# Patient Record
Sex: FEMALE | Race: WHITE
[De-identification: names, ages, dates, MRNs, and addresses within clinical notes are randomized per-mention and may not be internally consistent; named-entity substitution may affect disease eponyms.]

---

## 2018-01-25 ENCOUNTER — APPOINTMENT (OUTPATIENT)
Dept: MAMMOGRAPHY | Facility: CLINIC | Age: 78
End: 2018-01-25
Payer: MEDICARE

## 2018-01-25 ENCOUNTER — OUTPATIENT (OUTPATIENT)
Dept: OUTPATIENT SERVICES | Facility: HOSPITAL | Age: 78
LOS: 1 days | End: 2018-01-25
Payer: MEDICARE

## 2018-01-25 DIAGNOSIS — Z00.8 ENCOUNTER FOR OTHER GENERAL EXAMINATION: ICD-10-CM

## 2018-01-25 PROCEDURE — 77067 SCR MAMMO BI INCL CAD: CPT | Mod: 26

## 2018-01-25 PROCEDURE — 77063 BREAST TOMOSYNTHESIS BI: CPT | Mod: 26

## 2018-01-25 PROCEDURE — 77063 BREAST TOMOSYNTHESIS BI: CPT

## 2018-01-25 PROCEDURE — 77067 SCR MAMMO BI INCL CAD: CPT

## 2018-10-30 ENCOUNTER — APPOINTMENT (OUTPATIENT)
Dept: OBGYN | Facility: CLINIC | Age: 78
End: 2018-10-30
Payer: MEDICARE

## 2018-10-30 VITALS
BODY MASS INDEX: 21.79 KG/M2 | DIASTOLIC BLOOD PRESSURE: 60 MMHG | WEIGHT: 123 LBS | HEIGHT: 63 IN | SYSTOLIC BLOOD PRESSURE: 100 MMHG

## 2018-10-30 DIAGNOSIS — Z87.891 PERSONAL HISTORY OF NICOTINE DEPENDENCE: ICD-10-CM

## 2018-10-30 DIAGNOSIS — Z78.9 OTHER SPECIFIED HEALTH STATUS: ICD-10-CM

## 2018-10-30 PROCEDURE — 99214 OFFICE O/P EST MOD 30 MIN: CPT

## 2018-11-01 ENCOUNTER — MEDICATION RENEWAL (OUTPATIENT)
Age: 78
End: 2018-11-01

## 2018-11-01 LAB
25(OH)D3 SERPL-MCNC: 18 NG/ML
CALCIUM SERPL-MCNC: 9.4 MG/DL

## 2018-11-29 ENCOUNTER — FORM ENCOUNTER (OUTPATIENT)
Age: 78
End: 2018-11-29

## 2018-11-30 ENCOUNTER — OUTPATIENT (OUTPATIENT)
Dept: OUTPATIENT SERVICES | Facility: HOSPITAL | Age: 78
LOS: 1 days | End: 2018-11-30
Payer: MEDICARE

## 2018-11-30 ENCOUNTER — APPOINTMENT (OUTPATIENT)
Dept: RADIOLOGY | Facility: CLINIC | Age: 78
End: 2018-11-30
Payer: MEDICARE

## 2018-11-30 DIAGNOSIS — Z00.8 ENCOUNTER FOR OTHER GENERAL EXAMINATION: ICD-10-CM

## 2018-11-30 PROCEDURE — 77080 DXA BONE DENSITY AXIAL: CPT | Mod: 26

## 2018-11-30 PROCEDURE — 77080 DXA BONE DENSITY AXIAL: CPT

## 2019-01-11 ENCOUNTER — APPOINTMENT (OUTPATIENT)
Dept: OBGYN | Facility: CLINIC | Age: 79
End: 2019-01-11
Payer: MEDICARE

## 2019-01-11 VITALS — DIASTOLIC BLOOD PRESSURE: 60 MMHG | SYSTOLIC BLOOD PRESSURE: 110 MMHG

## 2019-01-11 DIAGNOSIS — E55.9 VITAMIN D DEFICIENCY, UNSPECIFIED: ICD-10-CM

## 2019-01-11 PROCEDURE — 96372 THER/PROPH/DIAG INJ SC/IM: CPT

## 2019-01-11 PROCEDURE — 36415 COLL VENOUS BLD VENIPUNCTURE: CPT

## 2019-01-11 PROCEDURE — 99213 OFFICE O/P EST LOW 20 MIN: CPT | Mod: 25

## 2019-01-11 RX ORDER — 1.1% SODIUM FLUORIDE PRESCRIPTION DENTAL CREAM 5 MG/G
1.1 CREAM DENTAL
Qty: 51 | Refills: 0 | Status: ACTIVE | COMMUNITY
Start: 2018-12-04

## 2019-01-11 RX ORDER — DENOSUMAB 60 MG/ML
60 INJECTION SUBCUTANEOUS
Refills: 0 | Status: ACTIVE | COMMUNITY
Start: 2019-01-11

## 2019-01-11 RX ORDER — DENOSUMAB 60 MG/ML
60 INJECTION SUBCUTANEOUS
Qty: 1 | Refills: 0 | Status: COMPLETED | OUTPATIENT
Start: 2019-01-11

## 2019-01-11 RX ADMIN — DENOSUMAB 0 MG/ML: 60 INJECTION SUBCUTANEOUS at 00:00

## 2019-01-11 NOTE — CHIEF COMPLAINT
[Follow Up] : follow up GYN visit [FreeTextEntry1] : pt here for first prolia injection. She has been taking 50k vit d since November. Has one pill left.

## 2019-01-23 ENCOUNTER — RX RENEWAL (OUTPATIENT)
Age: 79
End: 2019-01-23

## 2019-01-24 RX ORDER — ERGOCALCIFEROL 1.25 MG/1
1.25 MG CAPSULE, LIQUID FILLED ORAL
Qty: 12 | Refills: 0 | Status: ACTIVE | COMMUNITY
Start: 2018-11-01 | End: 1900-01-01

## 2019-04-08 ENCOUNTER — FORM ENCOUNTER (OUTPATIENT)
Age: 79
End: 2019-04-08

## 2019-04-09 ENCOUNTER — APPOINTMENT (OUTPATIENT)
Dept: MAMMOGRAPHY | Facility: CLINIC | Age: 79
End: 2019-04-09
Payer: MEDICARE

## 2019-04-09 ENCOUNTER — OUTPATIENT (OUTPATIENT)
Dept: OUTPATIENT SERVICES | Facility: HOSPITAL | Age: 79
LOS: 1 days | End: 2019-04-09
Payer: MEDICARE

## 2019-04-09 DIAGNOSIS — Z00.8 ENCOUNTER FOR OTHER GENERAL EXAMINATION: ICD-10-CM

## 2019-04-09 PROCEDURE — 77067 SCR MAMMO BI INCL CAD: CPT | Mod: 26

## 2019-04-09 PROCEDURE — 77067 SCR MAMMO BI INCL CAD: CPT

## 2019-04-09 PROCEDURE — 77063 BREAST TOMOSYNTHESIS BI: CPT

## 2019-04-09 PROCEDURE — 77063 BREAST TOMOSYNTHESIS BI: CPT | Mod: 26

## 2019-05-16 ENCOUNTER — RX RENEWAL (OUTPATIENT)
Age: 79
End: 2019-05-16

## 2019-07-11 ENCOUNTER — APPOINTMENT (OUTPATIENT)
Dept: OBGYN | Facility: CLINIC | Age: 79
End: 2019-07-11

## 2019-07-11 ENCOUNTER — APPOINTMENT (OUTPATIENT)
Dept: OBGYN | Facility: CLINIC | Age: 79
End: 2019-07-11
Payer: MEDICARE

## 2019-07-11 VITALS — SYSTOLIC BLOOD PRESSURE: 130 MMHG | DIASTOLIC BLOOD PRESSURE: 60 MMHG

## 2019-07-11 PROCEDURE — 96372 THER/PROPH/DIAG INJ SC/IM: CPT

## 2019-07-11 PROCEDURE — 99212 OFFICE O/P EST SF 10 MIN: CPT | Mod: 25

## 2019-07-11 RX ORDER — DENOSUMAB 60 MG/ML
60 INJECTION SUBCUTANEOUS
Qty: 1 | Refills: 0 | Status: COMPLETED | OUTPATIENT
Start: 2019-07-11

## 2019-07-11 RX ADMIN — DENOSUMAB 0 MG/ML: 60 INJECTION SUBCUTANEOUS at 00:00

## 2019-07-11 NOTE — CHIEF COMPLAINT
[Follow Up] : follow up GYN visit [FreeTextEntry1] : pt here for prolia shot. Doing well.\par No exercise.

## 2019-11-01 ENCOUNTER — APPOINTMENT (OUTPATIENT)
Dept: OBGYN | Facility: CLINIC | Age: 79
End: 2019-11-01
Payer: MEDICARE

## 2019-11-01 VITALS
DIASTOLIC BLOOD PRESSURE: 60 MMHG | BODY MASS INDEX: 23.05 KG/M2 | SYSTOLIC BLOOD PRESSURE: 100 MMHG | HEIGHT: 63 IN | WEIGHT: 130.07 LBS

## 2019-11-01 DIAGNOSIS — M81.0 AGE-RELATED OSTEOPOROSIS W/OUT CURRENT PATHOLOGICAL FRACTURE: ICD-10-CM

## 2019-11-01 DIAGNOSIS — Z01.419 ENCOUNTER FOR GYNECOLOGICAL EXAMINATION (GENERAL) (ROUTINE) W/OUT ABNORMAL FINDINGS: ICD-10-CM

## 2019-11-01 PROCEDURE — 99214 OFFICE O/P EST MOD 30 MIN: CPT

## 2019-11-01 RX ORDER — GATIFLOXACIN 5 MG/ML
0.5 SOLUTION/ DROPS OPHTHALMIC
Qty: 2 | Refills: 0 | Status: COMPLETED | COMMUNITY
Start: 2019-07-05

## 2019-11-01 RX ORDER — KETOROLAC TROMETHAMINE 5 MG/ML
0.5 SOLUTION OPHTHALMIC
Qty: 5 | Refills: 0 | Status: COMPLETED | COMMUNITY
Start: 2019-09-24

## 2019-11-01 RX ORDER — PREDNISOLONE ACETATE 10 MG/ML
1 SUSPENSION/ DROPS OPHTHALMIC
Qty: 5 | Refills: 0 | Status: COMPLETED | COMMUNITY
Start: 2019-07-05

## 2019-11-01 NOTE — HISTORY OF PRESENT ILLNESS
[1 Year Ago] : 1 year ago [Good] : being in good health [Healthy Diet] : a healthy diet [Last Mammogram ___] : Last Mammogram was [unfilled] [Last Bone Density ___] : Last bone density studies [unfilled] [Postmenopausal] : is postmenopausal

## 2019-11-01 NOTE — PHYSICAL EXAM
[Awake] : awake [Alert] : alert [Acute Distress] : no acute distress [LAD] : no lymphadenopathy [Thyroid Nodule] : no thyroid nodule [Goiter] : no goiter [Mass] : no breast mass [Nipple Discharge] : no nipple discharge [Axillary LAD] : no axillary lymphadenopathy [Soft] : soft [Tender] : non tender [Distended] : not distended [H/Smegaly] : no hepatosplenomegaly [Oriented x3] : oriented to person, place, and time [Depressed Mood] : depressed mood [Flat Affect] : affect not flat [Vulvar Atrophy] : vulvar atrophy [Normal] : uterus [Atrophy] : atrophy [No Bleeding] : there was no active vaginal bleeding [Pap Obtained] : a Pap smear was not performed [Normal Position] : in a normal position [Tenderness] : nontender [Enlarged ___ wks] : not enlarged [Mass ___ cm] : no uterine mass was palpated [Uterine Adnexae] : were not tender and not enlarged [Adnexa Tenderness] : were not tender [Ovarian Mass (___ Cm)] : there were no adnexal masses [No Tenderness] : no rectal tenderness [Occult Blood] : occult blood test from digital rectal exam was negative [RRR, No Murmurs] : RRR, no murmurs [CTAB] : CTAB

## 2019-11-01 NOTE — CHIEF COMPLAINT
[Annual Visit] : annual visit [FreeTextEntry1] : On prolia for osteoporosis, tolerating well, last dose 7/19. No vb, taking vit d. no gyn complaints

## 2020-01-03 ENCOUNTER — APPOINTMENT (OUTPATIENT)
Dept: OBGYN | Facility: CLINIC | Age: 80
End: 2020-01-03

## 2022-04-16 ENCOUNTER — EMERGENCY (EMERGENCY)
Facility: HOSPITAL | Age: 82
LOS: 0 days | Discharge: HOME | End: 2022-04-16
Attending: EMERGENCY MEDICINE
Payer: MEDICARE

## 2022-04-16 VITALS
RESPIRATION RATE: 17 BRPM | TEMPERATURE: 98 F | WEIGHT: 134.92 LBS | HEART RATE: 98 BPM | SYSTOLIC BLOOD PRESSURE: 121 MMHG | DIASTOLIC BLOOD PRESSURE: 60 MMHG | OXYGEN SATURATION: 99 % | HEIGHT: 63 IN

## 2022-04-16 VITALS
TEMPERATURE: 98 F | HEART RATE: 84 BPM | DIASTOLIC BLOOD PRESSURE: 70 MMHG | SYSTOLIC BLOOD PRESSURE: 143 MMHG | RESPIRATION RATE: 16 BRPM | OXYGEN SATURATION: 96 %
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
## 2022-04-16 DIAGNOSIS — J44.9 CHRONIC OBSTRUCTIVE PULMONARY DISEASE, UNSPECIFIED: ICD-10-CM
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
## 2022-04-16 DIAGNOSIS — M79.651 PAIN IN RIGHT THIGH: ICD-10-CM

## 2022-04-16 DIAGNOSIS — N39.0 URINARY TRACT INFECTION, SITE NOT SPECIFIED: ICD-10-CM

## 2022-04-16 DIAGNOSIS — S32.020A WEDGE COMPRESSION FRACTURE OF SECOND LUMBAR VERTEBRA, INITIAL ENCOUNTER FOR CLOSED FRACTURE: ICD-10-CM

## 2022-04-16 DIAGNOSIS — E78.5 HYPERLIPIDEMIA, UNSPECIFIED: ICD-10-CM

## 2022-04-16 DIAGNOSIS — Z20.822 CONTACT WITH AND (SUSPECTED) EXPOSURE TO COVID-19: ICD-10-CM

## 2022-04-16 DIAGNOSIS — R10.9 UNSPECIFIED ABDOMINAL PAIN: ICD-10-CM

## 2022-04-16 LAB
ALBUMIN SERPL ELPH-MCNC: 3.6 G/DL — SIGNIFICANT CHANGE UP (ref 3.3–5)
ALP SERPL-CCNC: 62 U/L — SIGNIFICANT CHANGE UP (ref 40–120)
ALT FLD-CCNC: 22 U/L — SIGNIFICANT CHANGE UP (ref 12–78)
ANION GAP SERPL CALC-SCNC: 7 MMOL/L — SIGNIFICANT CHANGE UP (ref 5–17)
APPEARANCE UR: ABNORMAL
AST SERPL-CCNC: 22 U/L — SIGNIFICANT CHANGE UP (ref 15–37)
BASOPHILS # BLD AUTO: 0.02 K/UL — SIGNIFICANT CHANGE UP (ref 0–0.2)
BASOPHILS NFR BLD AUTO: 0.2 % — SIGNIFICANT CHANGE UP (ref 0–2)
BILIRUB SERPL-MCNC: 0.4 MG/DL — SIGNIFICANT CHANGE UP (ref 0.2–1.2)
BILIRUB UR-MCNC: NEGATIVE — SIGNIFICANT CHANGE UP
BUN SERPL-MCNC: 22 MG/DL — SIGNIFICANT CHANGE UP (ref 7–23)
CALCIUM SERPL-MCNC: 8.9 MG/DL — SIGNIFICANT CHANGE UP (ref 8.5–10.1)
CHLORIDE SERPL-SCNC: 108 MMOL/L — SIGNIFICANT CHANGE UP (ref 96–108)
CO2 SERPL-SCNC: 26 MMOL/L — SIGNIFICANT CHANGE UP (ref 22–31)
COLOR SPEC: YELLOW — SIGNIFICANT CHANGE UP
CREAT SERPL-MCNC: 0.97 MG/DL — SIGNIFICANT CHANGE UP (ref 0.5–1.3)
DIFF PNL FLD: ABNORMAL
EGFR: 59 ML/MIN/1.73M2 — LOW
EOSINOPHIL # BLD AUTO: 0.22 K/UL — SIGNIFICANT CHANGE UP (ref 0–0.5)
EOSINOPHIL NFR BLD AUTO: 2.7 % — SIGNIFICANT CHANGE UP (ref 0–6)
GLUCOSE SERPL-MCNC: 100 MG/DL — HIGH (ref 70–99)
GLUCOSE UR QL: 100 MG/DL
HCT VFR BLD CALC: 44.9 % — SIGNIFICANT CHANGE UP (ref 34.5–45)
HGB BLD-MCNC: 14.4 G/DL — SIGNIFICANT CHANGE UP (ref 11.5–15.5)
IMM GRANULOCYTES NFR BLD AUTO: 0.4 % — SIGNIFICANT CHANGE UP (ref 0–1.5)
KETONES UR-MCNC: NEGATIVE — SIGNIFICANT CHANGE UP
LACTATE SERPL-SCNC: 2 MMOL/L — SIGNIFICANT CHANGE UP (ref 0.7–2)
LEUKOCYTE ESTERASE UR-ACNC: ABNORMAL
LIDOCAIN IGE QN: 103 U/L — SIGNIFICANT CHANGE UP (ref 73–393)
LYMPHOCYTES # BLD AUTO: 2.04 K/UL — SIGNIFICANT CHANGE UP (ref 1–3.3)
LYMPHOCYTES # BLD AUTO: 25.4 % — SIGNIFICANT CHANGE UP (ref 13–44)
MCHC RBC-ENTMCNC: 29 PG — SIGNIFICANT CHANGE UP (ref 27–34)
MCHC RBC-ENTMCNC: 32.1 GM/DL — SIGNIFICANT CHANGE UP (ref 32–36)
MCV RBC AUTO: 90.5 FL — SIGNIFICANT CHANGE UP (ref 80–100)
MONOCYTES # BLD AUTO: 0.84 K/UL — SIGNIFICANT CHANGE UP (ref 0–0.9)
MONOCYTES NFR BLD AUTO: 10.4 % — SIGNIFICANT CHANGE UP (ref 2–14)
NEUTROPHILS # BLD AUTO: 4.89 K/UL — SIGNIFICANT CHANGE UP (ref 1.8–7.4)
NEUTROPHILS NFR BLD AUTO: 60.9 % — SIGNIFICANT CHANGE UP (ref 43–77)
NITRITE UR-MCNC: POSITIVE
PH UR: 5 — SIGNIFICANT CHANGE UP (ref 5–8)
PLATELET # BLD AUTO: 216 K/UL — SIGNIFICANT CHANGE UP (ref 150–400)
POTASSIUM SERPL-MCNC: 4.4 MMOL/L — SIGNIFICANT CHANGE UP (ref 3.5–5.3)
POTASSIUM SERPL-SCNC: 4.4 MMOL/L — SIGNIFICANT CHANGE UP (ref 3.5–5.3)
PROT SERPL-MCNC: 7.8 GM/DL — SIGNIFICANT CHANGE UP (ref 6–8.3)
PROT UR-MCNC: 15
RBC # BLD: 4.96 M/UL — SIGNIFICANT CHANGE UP (ref 3.8–5.2)
RBC # FLD: 13.6 % — SIGNIFICANT CHANGE UP (ref 10.3–14.5)
SARS-COV-2 RNA SPEC QL NAA+PROBE: SIGNIFICANT CHANGE UP
SODIUM SERPL-SCNC: 141 MMOL/L — SIGNIFICANT CHANGE UP (ref 135–145)
SP GR SPEC: 1.02 — SIGNIFICANT CHANGE UP (ref 1.01–1.02)
TROPONIN I, HIGH SENSITIVITY RESULT: 6.04 NG/L — SIGNIFICANT CHANGE UP
UROBILINOGEN FLD QL: NEGATIVE — SIGNIFICANT CHANGE UP
WBC # BLD: 8.04 K/UL — SIGNIFICANT CHANGE UP (ref 3.8–10.5)
WBC # FLD AUTO: 8.04 K/UL — SIGNIFICANT CHANGE UP (ref 3.8–10.5)

## 2022-04-16 PROCEDURE — 96375 TX/PRO/DX INJ NEW DRUG ADDON: CPT

## 2022-04-16 PROCEDURE — 93971 EXTREMITY STUDY: CPT | Mod: 26,RT

## 2022-04-16 PROCEDURE — 99285 EMERGENCY DEPT VISIT HI MDM: CPT | Mod: 25

## 2022-04-16 PROCEDURE — 96374 THER/PROPH/DIAG INJ IV PUSH: CPT | Mod: XU

## 2022-04-16 PROCEDURE — 99285 EMERGENCY DEPT VISIT HI MDM: CPT | Mod: FS

## 2022-04-16 PROCEDURE — 75635 CT ANGIO ABDOMINAL ARTERIES: CPT | Mod: 26,MG

## 2022-04-16 PROCEDURE — 80053 COMPREHEN METABOLIC PANEL: CPT

## 2022-04-16 PROCEDURE — U0005: CPT

## 2022-04-16 PROCEDURE — 83690 ASSAY OF LIPASE: CPT

## 2022-04-16 PROCEDURE — 36415 COLL VENOUS BLD VENIPUNCTURE: CPT

## 2022-04-16 PROCEDURE — U0003: CPT

## 2022-04-16 PROCEDURE — G1004: CPT

## 2022-04-16 PROCEDURE — 83880 ASSAY OF NATRIURETIC PEPTIDE: CPT

## 2022-04-16 PROCEDURE — 87086 URINE CULTURE/COLONY COUNT: CPT

## 2022-04-16 PROCEDURE — 93971 EXTREMITY STUDY: CPT | Mod: RT

## 2022-04-16 PROCEDURE — 85025 COMPLETE CBC W/AUTO DIFF WBC: CPT

## 2022-04-16 PROCEDURE — 71045 X-RAY EXAM CHEST 1 VIEW: CPT

## 2022-04-16 PROCEDURE — 84484 ASSAY OF TROPONIN QUANT: CPT

## 2022-04-16 PROCEDURE — 71045 X-RAY EXAM CHEST 1 VIEW: CPT | Mod: 26

## 2022-04-16 PROCEDURE — 96376 TX/PRO/DX INJ SAME DRUG ADON: CPT | Mod: XU

## 2022-04-16 PROCEDURE — 81001 URINALYSIS AUTO W/SCOPE: CPT

## 2022-04-16 PROCEDURE — 83605 ASSAY OF LACTIC ACID: CPT

## 2022-04-16 PROCEDURE — 75635 CT ANGIO ABDOMINAL ARTERIES: CPT | Mod: MG

## 2022-04-16 RX ORDER — MORPHINE SULFATE 50 MG/1
2 CAPSULE, EXTENDED RELEASE ORAL ONCE
Refills: 0 | Status: DISCONTINUED | OUTPATIENT
Start: 2022-04-16 | End: 2022-04-16

## 2022-04-16 RX ORDER — CEPHALEXIN 500 MG
1 CAPSULE ORAL
Qty: 21 | Refills: 0
Start: 2022-04-16

## 2022-04-16 RX ORDER — CYCLOBENZAPRINE HYDROCHLORIDE 10 MG/1
5 TABLET, FILM COATED ORAL ONCE
Refills: 0 | Status: COMPLETED | OUTPATIENT
Start: 2022-04-16 | End: 2022-04-16

## 2022-04-16 RX ORDER — CEFTRIAXONE 500 MG/1
1000 INJECTION, POWDER, FOR SOLUTION INTRAMUSCULAR; INTRAVENOUS ONCE
Refills: 0 | Status: COMPLETED | OUTPATIENT
Start: 2022-04-16 | End: 2022-04-16

## 2022-04-16 RX ORDER — SODIUM CHLORIDE 9 MG/ML
500 INJECTION INTRAMUSCULAR; INTRAVENOUS; SUBCUTANEOUS ONCE
Refills: 0 | Status: COMPLETED | OUTPATIENT
Start: 2022-04-16 | End: 2022-04-16

## 2022-04-16 RX ORDER — LIDOCAINE 4 G/100G
1 CREAM TOPICAL
Qty: 14 | Refills: 0
Start: 2022-04-16

## 2022-04-16 RX ADMIN — SODIUM CHLORIDE 500 MILLILITER(S): 9 INJECTION INTRAMUSCULAR; INTRAVENOUS; SUBCUTANEOUS at 17:51

## 2022-04-16 RX ADMIN — CYCLOBENZAPRINE HYDROCHLORIDE 5 MILLIGRAM(S): 10 TABLET, FILM COATED ORAL at 19:06

## 2022-04-16 RX ADMIN — MORPHINE SULFATE 2 MILLIGRAM(S): 50 CAPSULE, EXTENDED RELEASE ORAL at 17:41

## 2022-04-16 RX ADMIN — CEFTRIAXONE 100 MILLIGRAM(S): 500 INJECTION, POWDER, FOR SOLUTION INTRAMUSCULAR; INTRAVENOUS at 19:36

## 2022-04-16 RX ADMIN — MORPHINE SULFATE 2 MILLIGRAM(S): 50 CAPSULE, EXTENDED RELEASE ORAL at 18:55

## 2022-04-16 NOTE — ED STATDOCS - CONSTITUTIONAL, MLM
normal... well appearing and + anxious affect well appearing and + anxious affect. AAOx4. Nontoxic appearing.

## 2022-04-16 NOTE — ED STATDOCS - CARE PROVIDER_API CALL
Emanuel Mcallister)  Gastroenterology; Internal Medicine  [address] [place] [address]  Phone: [phone]  Fax: [phone]  Follow Up Time:     Gurpreet Yeh)  Vascular Surgery  [address] [facility], [facility] [place] [address]  Phone: [phone]  Fax: [phone]  Follow Up Time:     Glenroy Medina)  Orthopaedic Surgery  [address] [place] [address]  Phone: [phone]  Fax: [phone]  Follow Up Time:

## 2022-04-16 NOTE — ED STATDOCS - CLINICAL SUMMARY MEDICAL DECISION MAKING FREE TEXT BOX
pt with hx of COPD, no recent f/u with cardiology or PMD presents with severe right flank and RLQ pain radiating to right thigh. ddx include r/o DVT, r/o vascular causes or intraabdominal or spinal causes, plan: labs, CT, ultrasound reassessment. pt with hx of COPD, no recent f/u with cardiology or PMD presents with severe right flank and RLQ pain radiating to right thigh. ddx include r/o DVT, r/o vascular causes or intraabdominal or spinal causes, plan: labs, CT, ultrasound reassessment.    PA note: All labwork/radiology (including incidental findings of L2 compression fracture) results discussed in detail with patient. Patient re-examined and re-evaluated. Patient feels much better at this time. ED evaluation, Diagnosis and management discussed with the patient in detail. Workup results discussed with ED attending, OK to dc home. Close ORTHO Spine MD follow up encouraged, aftercare to assist with scheduling appointment ASAP. Strict ED return instructions discussed in detail and patient given the opportunity to ask any questions about their discharge diagnosis and instructions. Patient verbalized understanding. ~ Dereck Guy PA-C

## 2022-04-16 NOTE — ED STATDOCS - MUSCULOSKELETAL, MLM
range of motion is not limited and +b/l LE mild swelling ( baseline as per pt), b/l LE varicosity (baseline as per pt) range of motion is not limited and +b/l LE mild swelling ( baseline as per pt, no change), b/l LE varicosity (baseline as per pt no chabnge) No saddle anesthesia. FROM x 4. 5/5 strength on flexion and extension of all limbs. No laxity of hip, knee or ankle b/l. No deformities. Intact passive and active range of motion in all limbs. No hematoma or contusion of legs or thigh. No asymmetry in size of thighs.

## 2022-04-16 NOTE — ED STATDOCS - PHYSICAL EXAMINATION
PA NOTE: GEN: AOX3, NAD. HEENT: Throat clear. Airway is patent. EYES: PERRLA. EOMI. Head: NC/AT. NECK: Supple, No JVD. FROM. C-spine non-tender. CV:S1S2, RRR, LUNGS: Non-labored breathing, no tachypnea. O2sat 100% RA. CTA b/l. No w/r/r. CHEST: Equal chest expansion and rise. No deformity. ABD: Soft, NT/ND, no rebound, no guarding. No CVAT. EXT: No e/c/c. 2+ distal pulses. RLE: +Mild tenderness right lateral thigh. SKIN: No rashes. NEURO: No focal deficits. CN II-XII intact. FROM. 5/5 motor and sensory. ~Dereck Guy PA-C

## 2022-04-16 NOTE — ED STATDOCS - OBJECTIVE STATEMENT
81 F hx HLD, COPD here c/o sharp shooting constant right flank pain radiating to  right thigh x 1 week. pt reports she has been taking Advil and using cold and hot compresses with no relief. denies fall. denies known trauma. denies hx of cardiac stents. denies hx of abdominal or chest surgeries. no urinary or bowel incontinence. denies hx of PSHx.  former smoker, quit 4 years ago. NKDA. PMD: Dr. Harris; cardiologist: Dr. Vergara 81 F hx HLD, COPD here c/o sharp shooting constant right flank pain radiating to  right thigh x 1 week. pt reports she has been taking Advil and using cold and hot compresses with no relief. denies fall. denies known trauma. denies hx of cardiac stents. denies hx of abdominal or chest surgeries. no urinary or bowel incontinence. denies hx of PSHx, no hx of PE or DVT, no leg swelling. No saddle anesthesia, lower back pain in the middle of spine, urinary incontinence or retention. No difficulty ambulating although with pain as described, and no paresthesias or anesthesia of the leg or spine or back.  former smoker, quit 4 years ago. NKDA. PMD: Dr. Harris; cardiologist: Dr. Vergara

## 2022-04-16 NOTE — ED STATDOCS - CARDIAC, MLM
normal rate, regular rhythm, and no murmur. 2+ pulses b/l DP and radial arteries normal rate, regular rhythm, and no gallops or rubs. 2+ pulses b/l DP and radial arteries. Cap refill less than 2 seconds.

## 2022-04-16 NOTE — ED STATDOCS - NSFOLLOWUPINSTRUCTIONS_ED_ALL_ED_FT
Please note that we have provided you with the results of your labs and imaging in the emergency room. I have discussed with you all the findings in detail and that the report shows certain incidental abnormalities which you need to further evaluate outpatient and that includes a compression fracture of the lumbar number 2, time of onset unknown which you would require an MRI for outpatient and follow up with an orthopedic spine doctor, the findings also include diverticula which are tic or outpockets in the abdomen which are not emergent but might require follow up with a gastroenterologist. You also have some atherosclerosis (cholesterol build up) and narrowing of the femoral artery (which is an artery that feeds your right leg) and it has no obstruction or any other immediate emergent findings so you need to follow up with your primary care physician or see a vascular doctor if you choose. Please avoid alcohol, stress, and illicit drugs. Please avoid excessive exertion. If your pain worsens then return to us immediately. Please note that if your pain worsens, you are to return to us immediately. If you have any fever, chills, chest pain, lightheadedness, or dizziness you have to return to us immediately as well.    For all other health concerns or if you can not see your primary care physician or a vascular or orthopedic spine doctor you return to us immediately.     ________________      Spinal Compression Fracture       A spinal compression fracture is a collapse of the bones that form the spine (vertebrae). With this type of fracture, the vertebrae become pushed (compressed) into a wedge shape. Most compression fractures happen in the middle or lower part of the spine.      What are the causes?    This condition may be caused by:  •Thinning and loss of density in the bones (osteoporosis). This is the most common cause.      •A fall.      •A car or motorcycle accident.      •Cancer.      •Trauma, such as a heavy, direct hit to the head or back.        What increases the risk?    You are more likely to develop this condition if:  •You are 60 years of age or older.      •You have osteoporosis.    •You have certain types of cancer, including:  •Multiple myeloma.      •Lymphoma.      •Prostate cancer.      •Lung cancer.      •Breast cancer.          What are the signs or symptoms?    Symptoms of this condition include:  •Severe pain with simple movements such as coughing or sneezing.      •Pain that gets worse over time.      •Pain that is worse when you stand, walk, sit, or bend.      •Sudden pain that is so bad that it is hard for you to move.      •Bending or humping of the spine.      •Gradual loss of height.      •Numbness, tingling, or weakness in the back and legs.      •Trouble walking.      Your symptoms will depend on the cause of the fracture and how quickly it develops.      How is this diagnosed?    This condition may be diagnosed based on symptoms, medical history, and a physical exam. During the physical exam, your health care provider may tap along the length of your spine to check for tenderness. Tests may be done to confirm the diagnosis. They may include:  •A bone mineral density test to check for osteoporosis.      •Imaging tests, such as a spine X-ray, CT scan, or MRI.        How is this treated?    Treatment depends on the cause and severity of the condition. Some fractures may heal on their own with supportive care. Treatment may include:  •Pain medicine.      •Rest.      •A back brace.      •Physical therapy exercises.      •Medicine to strengthen bone.      •Calcium and vitamin D supplements.      Fractures that cause the back to become misshapen, cause nerve pain or weakness, or do not respond to other treatment may be treated with surgery. This may include:  •Vertebroplasty. Bone cement is injected into the collapsed vertebrae to stabilize them.      •Balloon kyphoplasty. The collapsed vertebrae are expanded with a balloon and then bone cement is injected into them.      •Spinal fusion. The collapsed vertebrae are connected (fused) to normal vertebrae.        Follow these instructions at home:    Medicines     •Take over-the-counter and prescription medicines only as told by your health care provider.    •Ask your health care provider if the medicine prescribed to you:   •Requires you to avoid driving or using machinery.     •Can cause constipation. You may need to take these actions to prevent or treat constipation:   •Drink enough fluid to keep your urine pale yellow.       •Take over-the-counter or prescription medicines.       •Eat foods that are high in fiber, such as beans, whole grains, and fresh fruits and vegetables.       •Limit foods that are high in fat and processed sugars, such as fried or sweet foods.          If you have a brace:     •Wear the brace as told by your health care provider. Remove it only as told by your health care provider.       • Loosen the brace if your fingers or toes tingle, become numb, or turn cold and blue.      •Keep the brace clean.    •If the brace is not waterproof:  •Do not let it get wet.      •Cover it with a watertight covering when you take a bath or a shower.          Managing pain, stiffness, and swelling    •If directed, put ice on the injured area. To do this:  •If you have a removable brace, remove it as told by your health care provider.      •Put ice in a plastic bag.      •Place a towel between your skin and the bag.      •Leave the ice on for 20 minutes, 2–3 times a day.      •Remove the ice if your skin turns bright red. This is very important. If you cannot feel pain, heat, or cold, you have a greater risk of damage to the area.        Activity     •Rest as told by your health care provider.       •Avoid sitting for a long time without moving. Get up to take short walks every 1–2 hours. This is important to improve blood flow and breathing. Ask for help if you feel weak or unsteady.      •Return to your normal activities as told by your health care provider. Ask what activities are safe for you.      •Do physical therapy exercises to improve movement and strength in your back, as recommended by your health care provider.      •Exercise regularly as directed by your health care provider.        General instructions      • Do not drink alcohol. Alcohol can interfere with your treatment.      • Do not use any products that contain nicotine or tobacco, such as cigarettes, e-cigarettes, and chewing tobacco. These can delay bone healing. If you need help quitting, ask your health care provider.      •Keep all follow-up visits. This is important. It can help to prevent permanent injury, disability, and long-lasting (chronic) pain.        Contact a health care provider if:    •You have a fever.      •Your pain medicine is not helping.      •Your pain does not get better over time.      •You cannot return to your normal activities as planned or expected.        Get help right away if:    •Your pain is very bad and it suddenly gets worse.      •You are unable to move any body part (paralysis) that is below the level of your injury.      •You have numbness, tingling, or weakness in any body part that is below the level of your injury.      •You cannot control your bladder or bowels.        Summary    •A spinal compression fracture is a collapse of the bones that form the spine (vertebrae).      •With this type of fracture, the vertebrae become pushed (compressed) into a wedge shape.      •Your symptoms and treatment will depend on the cause and severity of the fracture and how quickly it develops.      •Some fractures may heal on their own with supportive care. Fractures that cause the back to become misshapen, cause nerve pain or weakness, or do not respond to other treatment may be treated with surgery.      This information is not intended to replace advice given to you by your health care provider. Make sure you discuss any questions you have with your health care provider.    _____________        Abdominal Pain, Adult      Pain in the abdomen (abdominal pain) can be caused by many things. Often, abdominal pain is not serious and it gets better with no treatment or by being treated at home. However, sometimes abdominal pain is serious.    Your health care provider will ask questions about your medical history and do a physical exam to try to determine the cause of your abdominal pain.      Follow these instructions at home:    Medicines     •Take over-the-counter and prescription medicines only as told by your health care provider.      • Do not take a laxative unless told by your health care provider.        General instructions      •Watch your condition for any changes.      •Drink enough fluid to keep your urine pale yellow.      •Keep all follow-up visits as told by your health care provider. This is important.        Contact a health care provider if:    •Your abdominal pain changes or gets worse.      •You are not hungry or you lose weight without trying.      •You are constipated or have diarrhea for more than 2–3 days.      •You have pain when you urinate or have a bowel movement.      •Your abdominal pain wakes you up at night.      •Your pain gets worse with meals, after eating, or with certain foods.      •You are vomiting and cannot keep anything down.      •You have a fever.      •You have blood in your urine.        Get help right away if:    •Your pain does not go away as soon as your health care provider told you to expect.      •You cannot stop vomiting.      •Your pain is only in areas of the abdomen, such as the right side or the left lower portion of the abdomen. Pain on the right side could be caused by appendicitis.      •You have bloody or black stools, or stools that look like tar.      •You have severe pain, cramping, or bloating in your abdomen.    •You have signs of dehydration, such as:  •Dark urine, very little urine, or no urine.      •Cracked lips.      •Dry mouth.      •Sunken eyes.      •Sleepiness.      •Weakness.        •You have trouble breathing or chest pain.        Summary    •Often, abdominal pain is not serious and it gets better with no treatment or by being treated at home. However, sometimes abdominal pain is serious.      •Watch your condition for any changes.      •Take over-the-counter and prescription medicines only as told by your health care provider.      •Contact a health care provider if your abdominal pain changes or gets worse.      •Get help right away if you have severe pain, cramping, or bloating in your abdomen.      This information is not intended to replace advice given to you by your health care provider. Make sure you discuss any questions you have with your health care provider.

## 2022-04-16 NOTE — ED STATDOCS - NS ED ATTENDING STATEMENT MOD
This was a shared visit with the ЮЛИЯ. I reviewed and verified the documentation and independently performed the documented:

## 2022-04-16 NOTE — ED STATDOCS - PATIENT PORTAL LINK FT
You can access the FollowMyHealth Patient Portal offered by [facility] by registering at the following website: [email or web address]. By joining [employer]’s FollowMyHealth portal, you will also be able to view your health information using other applications (apps) compatible with our system.

## 2022-04-16 NOTE — ED STATDOCS - ATTENDING CONTRIBUTION TO CARE
I Israel Reid MD saw and examined the patient. MLP saw and examined the patient under my supervision. I discussed the care of the patient with MLP and agree with MLP's plan, assessment and care of the patient while in the ED.

## 2022-04-16 NOTE — ED ADULT NURSE NOTE - OBJECTIVE STATEMENT
Pt presented to the ER with c/o right thigh pain that started 1 week ago. Pt denies any injury or trauma to the site. Pt needs help ambulating. Pt denies any numbness or tingling in her toes.

## 2022-04-16 NOTE — ED STATDOCS - CARE PLAN
Principal Discharge DX:	Right thigh pain   1 Principal Discharge DX:	Right thigh pain  Secondary Diagnosis:	Fracture, lumbar vertebra, compression  Secondary Diagnosis:	Acute UTI   Principal Discharge DX:	Right thigh pain  Goal:	Compression fracture, no neurological findings, able to ambulate, follow up with ortho spine, strict return precautions, some incidental findings in the abdomen concerning requires close follow up including pancreatic anomalies, and to follow up with MR and outpatient imaging/follow up  Secondary Diagnosis:	Fracture, lumbar vertebra, compression  Secondary Diagnosis:	Acute UTI

## 2022-04-16 NOTE — ED STATDOCS - GASTROINTESTINAL, MLM
abdomen soft, and non-distended. Bowel sounds present. + TTP RLQ, right CVA tenderness abdomen soft, and non-distended. Bowel sounds present. + TTP RLQ, right CVA tenderness. BS+

## 2022-04-16 NOTE — ED STATDOCS - NS_ ATTENDINGSCRIBEDETAILS _ED_A_ED_FT
I Israel Reid MD saw and examined the patient. Scribe documented for me and under my supervision. I have modified the scribe's documentation where necessary to reflect my history, physical exam and other relevant documentations pertinent to the care of the patient.

## 2022-04-16 NOTE — ED STATDOCS - PLAN OF CARE
Compression fracture, no neurological findings, able to ambulate, follow up with ortho spine, strict return precautions, some incidental findings in the abdomen concerning requires close follow up including pancreatic anomalies, and to follow up with MR and outpatient imaging/follow up

## 2022-04-16 NOTE — ED STATDOCS - PROGRESS NOTE DETAILS
Waiting on CTA. ~Dereck Guy PA-C PA: Patient is an 81 year old female with PMHx of HLD, COPD who presents to [facility] c/o sharp shooting constant right flank pain radiating to  right thigh x 1 week. pt reports she has been taking Advil and using cold and hot compresses with no relief. DENIES any trauma or fall injuries. No urinary or bowel incontinence. denies hx of PSHx. ~Dereck Guy PA-C [place] BROCK: Urine shows LE and nitrite positive findings, no evidence of any abdominal acute pathology other than findings of L2 compression fracture that is age indeterminate, evidence of slight dilation of the pancreatic duct (patient informed has to see GI consider MRI), and diverticuli. Also has some femoral atherosclersosis and mild stenosis but no obstruction and no acute vascular findings, to follow up with vascular outpatient. Strict return precautions. Pending US r/o DVT. PA note: All labwork/radiology (including incidental findings of L2 compression fracture) results discussed in detail with patient. Patient re-examined and re-evaluated. Patient feels much better at this time. ED evaluation, Diagnosis and management discussed with the patient in detail. Workup results discussed with ED attending, OK to IN home. Close ORTHO Spine MD follow up encouraged, aftercare to assist with scheduling appointment ASAP. Strict ED return instructions discussed in detail and patient given the opportunity to ask any questions about their discharge diagnosis and instructions. Patient verbalized understanding. ~ Dereck Guy PA-C [place] BROCK: Urine shows LE and nitrite positive findings, no evidence of any abdominal acute pathology other than findings of L2 compression fracture (no cord compression symptoms, no weakness in legs, to see ortho spine, return if any trauma or worsening pain, also has no midline lower back pain) that is age indeterminate, evidence of slight dilation of the pancreatic duct (patient informed has to see GI consider MRI, and can not rule out tumor or even other pathology so patient agreeable to see outpatient MD and further non emergent imaging), and diverticuli. Also has some femoral atherosclerosis and mild stenosis but no obstruction and no acute vascular findings, to follow up with vascular outpatient. Strict return precautions. Pending US r/o DVT.

## 2022-04-18 LAB
CULTURE RESULTS: SIGNIFICANT CHANGE UP
SPECIMEN SOURCE: SIGNIFICANT CHANGE UP

## 2022-04-19 NOTE — ED POST DISCHARGE NOTE - DETAILS
Spoke with pt and son and dicussed the finding of the contamiated UC. Pt already placed on abx. Advised family to have the pt f/u with her PMD to have the urine rechecked. Son aware and states he will take her to urgent care to have the urine retested. Strict return precautions given. -Enoch Osuna PA-C

## 2023-12-11 ENCOUNTER — RESULT REVIEW (OUTPATIENT)
Age: 83
End: 2023-12-11

## 2023-12-11 ENCOUNTER — INPATIENT (INPATIENT)
Facility: HOSPITAL | Age: 83
LOS: 11 days | Discharge: NURSING, REHAB OR LONG-TERM CARE | DRG: 853 | End: 2023-12-23
Attending: COLON & RECTAL SURGERY | Admitting: COLON & RECTAL SURGERY
Payer: MEDICARE

## 2023-12-11 ENCOUNTER — TRANSCRIPTION ENCOUNTER (OUTPATIENT)
Age: 83
End: 2023-12-11

## 2023-12-11 VITALS
HEART RATE: 56 BPM | TEMPERATURE: 98 F | RESPIRATION RATE: 16 BRPM | WEIGHT: 160.06 LBS | OXYGEN SATURATION: 94 % | DIASTOLIC BLOOD PRESSURE: 67 MMHG | SYSTOLIC BLOOD PRESSURE: 126 MMHG

## 2023-12-11 DIAGNOSIS — K63.1 PERFORATION OF INTESTINE (NONTRAUMATIC): ICD-10-CM

## 2023-12-11 DIAGNOSIS — R19.8 OTHER SPECIFIED SYMPTOMS AND SIGNS INVOLVING THE DIGESTIVE SYSTEM AND ABDOMEN: ICD-10-CM

## 2023-12-11 PROBLEM — E78.5 HYPERLIPIDEMIA, UNSPECIFIED: Chronic | Status: ACTIVE | Noted: 2022-04-28

## 2023-12-11 LAB
ABO RH CONFIRMATION: SIGNIFICANT CHANGE UP
ABO RH CONFIRMATION: SIGNIFICANT CHANGE UP
ALBUMIN SERPL ELPH-MCNC: 1.7 G/DL — LOW (ref 3.3–5)
ALBUMIN SERPL ELPH-MCNC: 1.7 G/DL — LOW (ref 3.3–5)
ALBUMIN SERPL ELPH-MCNC: 1.8 G/DL — LOW (ref 3.3–5)
ALBUMIN SERPL ELPH-MCNC: 1.8 G/DL — LOW (ref 3.3–5)
ALBUMIN SERPL ELPH-MCNC: 3.4 G/DL — SIGNIFICANT CHANGE UP (ref 3.3–5)
ALBUMIN SERPL ELPH-MCNC: 3.4 G/DL — SIGNIFICANT CHANGE UP (ref 3.3–5)
ALP SERPL-CCNC: 21 U/L — LOW (ref 40–120)
ALP SERPL-CCNC: 21 U/L — LOW (ref 40–120)
ALP SERPL-CCNC: 24 U/L — LOW (ref 40–120)
ALP SERPL-CCNC: 24 U/L — LOW (ref 40–120)
ALP SERPL-CCNC: 46 U/L — SIGNIFICANT CHANGE UP (ref 40–120)
ALP SERPL-CCNC: 46 U/L — SIGNIFICANT CHANGE UP (ref 40–120)
ALT FLD-CCNC: 19 U/L — SIGNIFICANT CHANGE UP (ref 12–78)
ALT FLD-CCNC: 23 U/L — SIGNIFICANT CHANGE UP (ref 12–78)
ALT FLD-CCNC: 23 U/L — SIGNIFICANT CHANGE UP (ref 12–78)
ANION GAP SERPL CALC-SCNC: 18 MMOL/L — HIGH (ref 5–17)
ANION GAP SERPL CALC-SCNC: 18 MMOL/L — HIGH (ref 5–17)
ANION GAP SERPL CALC-SCNC: 8 MMOL/L — SIGNIFICANT CHANGE UP (ref 5–17)
APTT BLD: 29.1 SEC — SIGNIFICANT CHANGE UP (ref 24.5–35.6)
APTT BLD: 29.1 SEC — SIGNIFICANT CHANGE UP (ref 24.5–35.6)
APTT BLD: 29.9 SEC — SIGNIFICANT CHANGE UP (ref 24.5–35.6)
APTT BLD: 29.9 SEC — SIGNIFICANT CHANGE UP (ref 24.5–35.6)
AST SERPL-CCNC: 20 U/L — SIGNIFICANT CHANGE UP (ref 15–37)
AST SERPL-CCNC: 20 U/L — SIGNIFICANT CHANGE UP (ref 15–37)
AST SERPL-CCNC: 24 U/L — SIGNIFICANT CHANGE UP (ref 15–37)
AST SERPL-CCNC: 24 U/L — SIGNIFICANT CHANGE UP (ref 15–37)
AST SERPL-CCNC: 28 U/L — SIGNIFICANT CHANGE UP (ref 15–37)
AST SERPL-CCNC: 28 U/L — SIGNIFICANT CHANGE UP (ref 15–37)
BASE EXCESS BLDA CALC-SCNC: -7.1 MMOL/L — LOW (ref -2–3)
BASE EXCESS BLDA CALC-SCNC: -7.1 MMOL/L — LOW (ref -2–3)
BASOPHILS # BLD AUTO: 0 K/UL — SIGNIFICANT CHANGE UP (ref 0–0.2)
BASOPHILS NFR BLD AUTO: 0 % — SIGNIFICANT CHANGE UP (ref 0–2)
BILIRUB SERPL-MCNC: 1.4 MG/DL — HIGH (ref 0.2–1.2)
BILIRUB SERPL-MCNC: 1.4 MG/DL — HIGH (ref 0.2–1.2)
BILIRUB SERPL-MCNC: 1.6 MG/DL — HIGH (ref 0.2–1.2)
BILIRUB SERPL-MCNC: 1.6 MG/DL — HIGH (ref 0.2–1.2)
BILIRUB SERPL-MCNC: 1.9 MG/DL — HIGH (ref 0.2–1.2)
BILIRUB SERPL-MCNC: 1.9 MG/DL — HIGH (ref 0.2–1.2)
BLD GP AB SCN SERPL QL: SIGNIFICANT CHANGE UP
BLD GP AB SCN SERPL QL: SIGNIFICANT CHANGE UP
BLOOD GAS COMMENTS ARTERIAL: SIGNIFICANT CHANGE UP
BLOOD GAS COMMENTS ARTERIAL: SIGNIFICANT CHANGE UP
BUN SERPL-MCNC: 20 MG/DL — SIGNIFICANT CHANGE UP (ref 7–23)
BUN SERPL-MCNC: 22 MG/DL — SIGNIFICANT CHANGE UP (ref 7–23)
BUN SERPL-MCNC: 22 MG/DL — SIGNIFICANT CHANGE UP (ref 7–23)
CALCIUM SERPL-MCNC: 6.6 MG/DL — LOW (ref 8.5–10.1)
CALCIUM SERPL-MCNC: 6.6 MG/DL — LOW (ref 8.5–10.1)
CALCIUM SERPL-MCNC: 7.8 MG/DL — LOW (ref 8.5–10.1)
CALCIUM SERPL-MCNC: 7.8 MG/DL — LOW (ref 8.5–10.1)
CALCIUM SERPL-MCNC: 9.3 MG/DL — SIGNIFICANT CHANGE UP (ref 8.5–10.1)
CALCIUM SERPL-MCNC: 9.3 MG/DL — SIGNIFICANT CHANGE UP (ref 8.5–10.1)
CHLORIDE SERPL-SCNC: 101 MMOL/L — SIGNIFICANT CHANGE UP (ref 96–108)
CHLORIDE SERPL-SCNC: 101 MMOL/L — SIGNIFICANT CHANGE UP (ref 96–108)
CHLORIDE SERPL-SCNC: 109 MMOL/L — HIGH (ref 96–108)
CHLORIDE SERPL-SCNC: 109 MMOL/L — HIGH (ref 96–108)
CHLORIDE SERPL-SCNC: 110 MMOL/L — HIGH (ref 96–108)
CHLORIDE SERPL-SCNC: 110 MMOL/L — HIGH (ref 96–108)
CO2 SERPL-SCNC: 17 MMOL/L — LOW (ref 22–31)
CO2 SERPL-SCNC: 17 MMOL/L — LOW (ref 22–31)
CO2 SERPL-SCNC: 21 MMOL/L — LOW (ref 22–31)
CREAT SERPL-MCNC: 1.03 MG/DL — SIGNIFICANT CHANGE UP (ref 0.5–1.3)
CREAT SERPL-MCNC: 1.03 MG/DL — SIGNIFICANT CHANGE UP (ref 0.5–1.3)
CREAT SERPL-MCNC: 1.08 MG/DL — SIGNIFICANT CHANGE UP (ref 0.5–1.3)
CREAT SERPL-MCNC: 1.08 MG/DL — SIGNIFICANT CHANGE UP (ref 0.5–1.3)
CREAT SERPL-MCNC: 1.9 MG/DL — HIGH (ref 0.5–1.3)
CREAT SERPL-MCNC: 1.9 MG/DL — HIGH (ref 0.5–1.3)
EGFR: 26 ML/MIN/1.73M2 — LOW
EGFR: 26 ML/MIN/1.73M2 — LOW
EGFR: 51 ML/MIN/1.73M2 — LOW
EGFR: 51 ML/MIN/1.73M2 — LOW
EGFR: 54 ML/MIN/1.73M2 — LOW
EGFR: 54 ML/MIN/1.73M2 — LOW
EOSINOPHIL # BLD AUTO: 0 K/UL — SIGNIFICANT CHANGE UP (ref 0–0.5)
EOSINOPHIL NFR BLD AUTO: 0 % — SIGNIFICANT CHANGE UP (ref 0–6)
GLUCOSE BLDC GLUCOMTR-MCNC: 88 MG/DL — SIGNIFICANT CHANGE UP (ref 70–99)
GLUCOSE BLDC GLUCOMTR-MCNC: 88 MG/DL — SIGNIFICANT CHANGE UP (ref 70–99)
GLUCOSE SERPL-MCNC: 131 MG/DL — HIGH (ref 70–99)
GLUCOSE SERPL-MCNC: 131 MG/DL — HIGH (ref 70–99)
GLUCOSE SERPL-MCNC: 135 MG/DL — HIGH (ref 70–99)
GLUCOSE SERPL-MCNC: 135 MG/DL — HIGH (ref 70–99)
GLUCOSE SERPL-MCNC: 248 MG/DL — HIGH (ref 70–99)
GLUCOSE SERPL-MCNC: 248 MG/DL — HIGH (ref 70–99)
HCO3 BLDA-SCNC: 19 MMOL/L — LOW (ref 21–28)
HCO3 BLDA-SCNC: 19 MMOL/L — LOW (ref 21–28)
HCT VFR BLD CALC: 35.4 % — SIGNIFICANT CHANGE UP (ref 34.5–45)
HCT VFR BLD CALC: 35.4 % — SIGNIFICANT CHANGE UP (ref 34.5–45)
HCT VFR BLD CALC: 41.6 % — SIGNIFICANT CHANGE UP (ref 34.5–45)
HCT VFR BLD CALC: 41.6 % — SIGNIFICANT CHANGE UP (ref 34.5–45)
HCT VFR BLD CALC: 50.3 % — HIGH (ref 34.5–45)
HCT VFR BLD CALC: 50.3 % — HIGH (ref 34.5–45)
HGB BLD-MCNC: 11.8 G/DL — SIGNIFICANT CHANGE UP (ref 11.5–15.5)
HGB BLD-MCNC: 11.8 G/DL — SIGNIFICANT CHANGE UP (ref 11.5–15.5)
HGB BLD-MCNC: 14 G/DL — SIGNIFICANT CHANGE UP (ref 11.5–15.5)
HGB BLD-MCNC: 14 G/DL — SIGNIFICANT CHANGE UP (ref 11.5–15.5)
HGB BLD-MCNC: 16.9 G/DL — HIGH (ref 11.5–15.5)
HGB BLD-MCNC: 16.9 G/DL — HIGH (ref 11.5–15.5)
INR BLD: 1.09 RATIO — SIGNIFICANT CHANGE UP (ref 0.85–1.18)
INR BLD: 1.09 RATIO — SIGNIFICANT CHANGE UP (ref 0.85–1.18)
INR BLD: 1.33 RATIO — HIGH (ref 0.85–1.18)
INR BLD: 1.33 RATIO — HIGH (ref 0.85–1.18)
LACTATE SERPL-SCNC: 3.4 MMOL/L — HIGH (ref 0.7–2)
LACTATE SERPL-SCNC: 3.4 MMOL/L — HIGH (ref 0.7–2)
LACTATE SERPL-SCNC: 3.6 MMOL/L — HIGH (ref 0.7–2)
LACTATE SERPL-SCNC: 3.6 MMOL/L — HIGH (ref 0.7–2)
LACTATE SERPL-SCNC: 7.7 MMOL/L — CRITICAL HIGH (ref 0.7–2)
LACTATE SERPL-SCNC: 7.7 MMOL/L — CRITICAL HIGH (ref 0.7–2)
LACTATE SERPL-SCNC: 9.5 MMOL/L — CRITICAL HIGH (ref 0.7–2)
LACTATE SERPL-SCNC: 9.5 MMOL/L — CRITICAL HIGH (ref 0.7–2)
LIDOCAIN IGE QN: 10 U/L — LOW (ref 13–75)
LIDOCAIN IGE QN: 10 U/L — LOW (ref 13–75)
LYMPHOCYTES # BLD AUTO: 0.92 K/UL — LOW (ref 1–3.3)
LYMPHOCYTES # BLD AUTO: 0.92 K/UL — LOW (ref 1–3.3)
LYMPHOCYTES # BLD AUTO: 1.88 K/UL — SIGNIFICANT CHANGE UP (ref 1–3.3)
LYMPHOCYTES # BLD AUTO: 1.88 K/UL — SIGNIFICANT CHANGE UP (ref 1–3.3)
LYMPHOCYTES # BLD AUTO: 22 % — SIGNIFICANT CHANGE UP (ref 13–44)
LYMPHOCYTES # BLD AUTO: 22 % — SIGNIFICANT CHANGE UP (ref 13–44)
LYMPHOCYTES # BLD AUTO: 9 % — LOW (ref 13–44)
LYMPHOCYTES # BLD AUTO: 9 % — LOW (ref 13–44)
MAGNESIUM SERPL-MCNC: 1.3 MG/DL — LOW (ref 1.6–2.6)
MAGNESIUM SERPL-MCNC: 1.3 MG/DL — LOW (ref 1.6–2.6)
MANUAL SMEAR VERIFICATION: SIGNIFICANT CHANGE UP
MCHC RBC-ENTMCNC: 29.5 PG — SIGNIFICANT CHANGE UP (ref 27–34)
MCHC RBC-ENTMCNC: 29.5 PG — SIGNIFICANT CHANGE UP (ref 27–34)
MCHC RBC-ENTMCNC: 29.7 PG — SIGNIFICANT CHANGE UP (ref 27–34)
MCHC RBC-ENTMCNC: 29.7 PG — SIGNIFICANT CHANGE UP (ref 27–34)
MCHC RBC-ENTMCNC: 29.9 PG — SIGNIFICANT CHANGE UP (ref 27–34)
MCHC RBC-ENTMCNC: 29.9 PG — SIGNIFICANT CHANGE UP (ref 27–34)
MCHC RBC-ENTMCNC: 33.3 GM/DL — SIGNIFICANT CHANGE UP (ref 32–36)
MCHC RBC-ENTMCNC: 33.3 GM/DL — SIGNIFICANT CHANGE UP (ref 32–36)
MCHC RBC-ENTMCNC: 33.6 GM/DL — SIGNIFICANT CHANGE UP (ref 32–36)
MCHC RBC-ENTMCNC: 33.6 GM/DL — SIGNIFICANT CHANGE UP (ref 32–36)
MCHC RBC-ENTMCNC: 33.7 GM/DL — SIGNIFICANT CHANGE UP (ref 32–36)
MCHC RBC-ENTMCNC: 33.7 GM/DL — SIGNIFICANT CHANGE UP (ref 32–36)
MCV RBC AUTO: 87.9 FL — SIGNIFICANT CHANGE UP (ref 80–100)
MCV RBC AUTO: 87.9 FL — SIGNIFICANT CHANGE UP (ref 80–100)
MCV RBC AUTO: 88.7 FL — SIGNIFICANT CHANGE UP (ref 80–100)
MCV RBC AUTO: 88.7 FL — SIGNIFICANT CHANGE UP (ref 80–100)
MCV RBC AUTO: 89.2 FL — SIGNIFICANT CHANGE UP (ref 80–100)
MCV RBC AUTO: 89.2 FL — SIGNIFICANT CHANGE UP (ref 80–100)
METAMYELOCYTES # FLD: 4 % — HIGH (ref 0–0)
METAMYELOCYTES # FLD: 4 % — HIGH (ref 0–0)
MONOCYTES # BLD AUTO: 0.2 K/UL — SIGNIFICANT CHANGE UP (ref 0–0.9)
MONOCYTES # BLD AUTO: 0.2 K/UL — SIGNIFICANT CHANGE UP (ref 0–0.9)
MONOCYTES # BLD AUTO: 0.68 K/UL — SIGNIFICANT CHANGE UP (ref 0–0.9)
MONOCYTES # BLD AUTO: 0.68 K/UL — SIGNIFICANT CHANGE UP (ref 0–0.9)
MONOCYTES NFR BLD AUTO: 2 % — SIGNIFICANT CHANGE UP (ref 2–14)
MONOCYTES NFR BLD AUTO: 2 % — SIGNIFICANT CHANGE UP (ref 2–14)
MONOCYTES NFR BLD AUTO: 8 % — SIGNIFICANT CHANGE UP (ref 2–14)
MONOCYTES NFR BLD AUTO: 8 % — SIGNIFICANT CHANGE UP (ref 2–14)
NEUTROPHILS # BLD AUTO: 5.98 K/UL — SIGNIFICANT CHANGE UP (ref 1.8–7.4)
NEUTROPHILS # BLD AUTO: 5.98 K/UL — SIGNIFICANT CHANGE UP (ref 1.8–7.4)
NEUTROPHILS # BLD AUTO: 8.69 K/UL — HIGH (ref 1.8–7.4)
NEUTROPHILS # BLD AUTO: 8.69 K/UL — HIGH (ref 1.8–7.4)
NEUTROPHILS NFR BLD AUTO: 63 % — SIGNIFICANT CHANGE UP (ref 43–77)
NEUTROPHILS NFR BLD AUTO: 63 % — SIGNIFICANT CHANGE UP (ref 43–77)
NEUTROPHILS NFR BLD AUTO: 78 % — HIGH (ref 43–77)
NEUTROPHILS NFR BLD AUTO: 78 % — HIGH (ref 43–77)
NEUTS BAND # BLD: 7 % — SIGNIFICANT CHANGE UP (ref 0–8)
NRBC # BLD: 0 /100 — SIGNIFICANT CHANGE UP (ref 0–0)
NRBC # BLD: SIGNIFICANT CHANGE UP /100 WBCS (ref 0–0)
PCO2 BLDA: 41 MMHG — SIGNIFICANT CHANGE UP (ref 32–45)
PCO2 BLDA: 41 MMHG — SIGNIFICANT CHANGE UP (ref 32–45)
PH BLDA: 7.28 — LOW (ref 7.35–7.45)
PH BLDA: 7.28 — LOW (ref 7.35–7.45)
PHOSPHATE SERPL-MCNC: 3.8 MG/DL — SIGNIFICANT CHANGE UP (ref 2.5–4.5)
PHOSPHATE SERPL-MCNC: 3.8 MG/DL — SIGNIFICANT CHANGE UP (ref 2.5–4.5)
PLAT MORPH BLD: NORMAL — SIGNIFICANT CHANGE UP
PLATELET # BLD AUTO: 171 K/UL — SIGNIFICANT CHANGE UP (ref 150–400)
PLATELET # BLD AUTO: 171 K/UL — SIGNIFICANT CHANGE UP (ref 150–400)
PLATELET # BLD AUTO: 181 K/UL — SIGNIFICANT CHANGE UP (ref 150–400)
PLATELET # BLD AUTO: 181 K/UL — SIGNIFICANT CHANGE UP (ref 150–400)
PLATELET # BLD AUTO: 269 K/UL — SIGNIFICANT CHANGE UP (ref 150–400)
PLATELET # BLD AUTO: 269 K/UL — SIGNIFICANT CHANGE UP (ref 150–400)
PO2 BLDA: 156 MMHG — HIGH (ref 83–108)
PO2 BLDA: 156 MMHG — HIGH (ref 83–108)
POTASSIUM SERPL-MCNC: 3.8 MMOL/L — SIGNIFICANT CHANGE UP (ref 3.5–5.3)
POTASSIUM SERPL-MCNC: 3.8 MMOL/L — SIGNIFICANT CHANGE UP (ref 3.5–5.3)
POTASSIUM SERPL-MCNC: 4.1 MMOL/L — SIGNIFICANT CHANGE UP (ref 3.5–5.3)
POTASSIUM SERPL-MCNC: 4.1 MMOL/L — SIGNIFICANT CHANGE UP (ref 3.5–5.3)
POTASSIUM SERPL-MCNC: 4.4 MMOL/L — SIGNIFICANT CHANGE UP (ref 3.5–5.3)
POTASSIUM SERPL-MCNC: 4.4 MMOL/L — SIGNIFICANT CHANGE UP (ref 3.5–5.3)
POTASSIUM SERPL-SCNC: 3.8 MMOL/L — SIGNIFICANT CHANGE UP (ref 3.5–5.3)
POTASSIUM SERPL-SCNC: 3.8 MMOL/L — SIGNIFICANT CHANGE UP (ref 3.5–5.3)
POTASSIUM SERPL-SCNC: 4.1 MMOL/L — SIGNIFICANT CHANGE UP (ref 3.5–5.3)
POTASSIUM SERPL-SCNC: 4.1 MMOL/L — SIGNIFICANT CHANGE UP (ref 3.5–5.3)
POTASSIUM SERPL-SCNC: 4.4 MMOL/L — SIGNIFICANT CHANGE UP (ref 3.5–5.3)
POTASSIUM SERPL-SCNC: 4.4 MMOL/L — SIGNIFICANT CHANGE UP (ref 3.5–5.3)
PROT SERPL-MCNC: 4.2 GM/DL — LOW (ref 6–8.3)
PROT SERPL-MCNC: 4.2 GM/DL — LOW (ref 6–8.3)
PROT SERPL-MCNC: 4.7 GM/DL — LOW (ref 6–8.3)
PROT SERPL-MCNC: 4.7 GM/DL — LOW (ref 6–8.3)
PROT SERPL-MCNC: 8.5 GM/DL — HIGH (ref 6–8.3)
PROT SERPL-MCNC: 8.5 GM/DL — HIGH (ref 6–8.3)
PROTHROM AB SERPL-ACNC: 12.3 SEC — SIGNIFICANT CHANGE UP (ref 9.5–13)
PROTHROM AB SERPL-ACNC: 12.3 SEC — SIGNIFICANT CHANGE UP (ref 9.5–13)
PROTHROM AB SERPL-ACNC: 14.9 SEC — HIGH (ref 9.5–13)
PROTHROM AB SERPL-ACNC: 14.9 SEC — HIGH (ref 9.5–13)
RBC # BLD: 3.97 M/UL — SIGNIFICANT CHANGE UP (ref 3.8–5.2)
RBC # BLD: 3.97 M/UL — SIGNIFICANT CHANGE UP (ref 3.8–5.2)
RBC # BLD: 4.69 M/UL — SIGNIFICANT CHANGE UP (ref 3.8–5.2)
RBC # BLD: 4.69 M/UL — SIGNIFICANT CHANGE UP (ref 3.8–5.2)
RBC # BLD: 5.72 M/UL — HIGH (ref 3.8–5.2)
RBC # BLD: 5.72 M/UL — HIGH (ref 3.8–5.2)
RBC # FLD: 13.8 % — SIGNIFICANT CHANGE UP (ref 10.3–14.5)
RBC # FLD: 14 % — SIGNIFICANT CHANGE UP (ref 10.3–14.5)
RBC # FLD: 14 % — SIGNIFICANT CHANGE UP (ref 10.3–14.5)
RBC BLD AUTO: NORMAL — SIGNIFICANT CHANGE UP
SAO2 % BLDA: 99 % — HIGH (ref 94–98)
SAO2 % BLDA: 99 % — HIGH (ref 94–98)
SODIUM SERPL-SCNC: 136 MMOL/L — SIGNIFICANT CHANGE UP (ref 135–145)
SODIUM SERPL-SCNC: 136 MMOL/L — SIGNIFICANT CHANGE UP (ref 135–145)
SODIUM SERPL-SCNC: 138 MMOL/L — SIGNIFICANT CHANGE UP (ref 135–145)
SODIUM SERPL-SCNC: 138 MMOL/L — SIGNIFICANT CHANGE UP (ref 135–145)
SODIUM SERPL-SCNC: 139 MMOL/L — SIGNIFICANT CHANGE UP (ref 135–145)
SODIUM SERPL-SCNC: 139 MMOL/L — SIGNIFICANT CHANGE UP (ref 135–145)
WBC # BLD: 10.22 K/UL — SIGNIFICANT CHANGE UP (ref 3.8–10.5)
WBC # BLD: 10.22 K/UL — SIGNIFICANT CHANGE UP (ref 3.8–10.5)
WBC # BLD: 5.79 K/UL — SIGNIFICANT CHANGE UP (ref 3.8–10.5)
WBC # BLD: 5.79 K/UL — SIGNIFICANT CHANGE UP (ref 3.8–10.5)
WBC # BLD: 8.54 K/UL — SIGNIFICANT CHANGE UP (ref 3.8–10.5)
WBC # BLD: 8.54 K/UL — SIGNIFICANT CHANGE UP (ref 3.8–10.5)
WBC # FLD AUTO: 10.22 K/UL — SIGNIFICANT CHANGE UP (ref 3.8–10.5)
WBC # FLD AUTO: 10.22 K/UL — SIGNIFICANT CHANGE UP (ref 3.8–10.5)
WBC # FLD AUTO: 5.79 K/UL — SIGNIFICANT CHANGE UP (ref 3.8–10.5)
WBC # FLD AUTO: 5.79 K/UL — SIGNIFICANT CHANGE UP (ref 3.8–10.5)
WBC # FLD AUTO: 8.54 K/UL — SIGNIFICANT CHANGE UP (ref 3.8–10.5)
WBC # FLD AUTO: 8.54 K/UL — SIGNIFICANT CHANGE UP (ref 3.8–10.5)

## 2023-12-11 PROCEDURE — 83735 ASSAY OF MAGNESIUM: CPT

## 2023-12-11 PROCEDURE — 83605 ASSAY OF LACTIC ACID: CPT

## 2023-12-11 PROCEDURE — 49406 IMAGE CATH FLUID PERI/RETRO: CPT

## 2023-12-11 PROCEDURE — 97162 PT EVAL MOD COMPLEX 30 MIN: CPT | Mod: GP

## 2023-12-11 PROCEDURE — 36415 COLL VENOUS BLD VENIPUNCTURE: CPT

## 2023-12-11 PROCEDURE — 71045 X-RAY EXAM CHEST 1 VIEW: CPT | Mod: 26

## 2023-12-11 PROCEDURE — 85027 COMPLETE CBC AUTOMATED: CPT

## 2023-12-11 PROCEDURE — 74177 CT ABD & PELVIS W/CONTRAST: CPT | Mod: 26,MA

## 2023-12-11 PROCEDURE — C1889: CPT

## 2023-12-11 PROCEDURE — 97530 THERAPEUTIC ACTIVITIES: CPT | Mod: GP

## 2023-12-11 PROCEDURE — 86850 RBC ANTIBODY SCREEN: CPT

## 2023-12-11 PROCEDURE — 99291 CRITICAL CARE FIRST HOUR: CPT

## 2023-12-11 PROCEDURE — 85025 COMPLETE CBC W/AUTO DIFF WBC: CPT

## 2023-12-11 PROCEDURE — 93306 TTE W/DOPPLER COMPLETE: CPT

## 2023-12-11 PROCEDURE — 71045 X-RAY EXAM CHEST 1 VIEW: CPT

## 2023-12-11 PROCEDURE — 85730 THROMBOPLASTIN TIME PARTIAL: CPT

## 2023-12-11 PROCEDURE — 80048 BASIC METABOLIC PNL TOTAL CA: CPT

## 2023-12-11 PROCEDURE — 88307 TISSUE EXAM BY PATHOLOGIST: CPT

## 2023-12-11 PROCEDURE — 85610 PROTHROMBIN TIME: CPT

## 2023-12-11 PROCEDURE — 83880 ASSAY OF NATRIURETIC PEPTIDE: CPT

## 2023-12-11 PROCEDURE — 88304 TISSUE EXAM BY PATHOLOGIST: CPT

## 2023-12-11 PROCEDURE — 86901 BLOOD TYPING SEROLOGIC RH(D): CPT

## 2023-12-11 PROCEDURE — 86900 BLOOD TYPING SEROLOGIC ABO: CPT

## 2023-12-11 PROCEDURE — 84478 ASSAY OF TRIGLYCERIDES: CPT

## 2023-12-11 PROCEDURE — C9113: CPT

## 2023-12-11 PROCEDURE — 71045 X-RAY EXAM CHEST 1 VIEW: CPT | Mod: 26,77

## 2023-12-11 PROCEDURE — 74177 CT ABD & PELVIS W/CONTRAST: CPT

## 2023-12-11 PROCEDURE — 97116 GAIT TRAINING THERAPY: CPT | Mod: GP

## 2023-12-11 PROCEDURE — 87040 BLOOD CULTURE FOR BACTERIA: CPT

## 2023-12-11 PROCEDURE — 87077 CULTURE AEROBIC IDENTIFY: CPT

## 2023-12-11 PROCEDURE — 88304 TISSUE EXAM BY PATHOLOGIST: CPT | Mod: 26

## 2023-12-11 PROCEDURE — 87070 CULTURE OTHR SPECIMN AEROBIC: CPT

## 2023-12-11 PROCEDURE — 87086 URINE CULTURE/COLONY COUNT: CPT

## 2023-12-11 PROCEDURE — 36600 WITHDRAWAL OF ARTERIAL BLOOD: CPT

## 2023-12-11 PROCEDURE — 87186 SC STD MICRODIL/AGAR DIL: CPT

## 2023-12-11 PROCEDURE — C1769: CPT

## 2023-12-11 PROCEDURE — 82962 GLUCOSE BLOOD TEST: CPT

## 2023-12-11 PROCEDURE — 93010 ELECTROCARDIOGRAM REPORT: CPT

## 2023-12-11 PROCEDURE — 93005 ELECTROCARDIOGRAM TRACING: CPT

## 2023-12-11 PROCEDURE — 97535 SELF CARE MNGMENT TRAINING: CPT | Mod: GP

## 2023-12-11 PROCEDURE — 81003 URINALYSIS AUTO W/O SCOPE: CPT

## 2023-12-11 PROCEDURE — C1729: CPT

## 2023-12-11 PROCEDURE — 87102 FUNGUS ISOLATION CULTURE: CPT

## 2023-12-11 PROCEDURE — 94003 VENT MGMT INPAT SUBQ DAY: CPT

## 2023-12-11 PROCEDURE — 93971 EXTREMITY STUDY: CPT | Mod: LT

## 2023-12-11 PROCEDURE — 44143 PARTIAL REMOVAL OF COLON: CPT

## 2023-12-11 PROCEDURE — 82803 BLOOD GASES ANY COMBINATION: CPT

## 2023-12-11 PROCEDURE — 84100 ASSAY OF PHOSPHORUS: CPT

## 2023-12-11 PROCEDURE — 99222 1ST HOSP IP/OBS MODERATE 55: CPT | Mod: 57

## 2023-12-11 PROCEDURE — 86920 COMPATIBILITY TEST SPIN: CPT

## 2023-12-11 PROCEDURE — 81001 URINALYSIS AUTO W/SCOPE: CPT

## 2023-12-11 PROCEDURE — 80053 COMPREHEN METABOLIC PANEL: CPT

## 2023-12-11 PROCEDURE — 88307 TISSUE EXAM BY PATHOLOGIST: CPT | Mod: 26

## 2023-12-11 PROCEDURE — P9047: CPT

## 2023-12-11 PROCEDURE — 87075 CULTR BACTERIA EXCEPT BLOOD: CPT

## 2023-12-11 RX ORDER — METRONIDAZOLE 500 MG
500 TABLET ORAL ONCE
Refills: 0 | Status: COMPLETED | OUTPATIENT
Start: 2023-12-11 | End: 2023-12-11

## 2023-12-11 RX ORDER — ONDANSETRON 8 MG/1
4 TABLET, FILM COATED ORAL ONCE
Refills: 0 | Status: DISCONTINUED | OUTPATIENT
Start: 2023-12-11 | End: 2023-12-11

## 2023-12-11 RX ORDER — SODIUM CHLORIDE 9 MG/ML
1000 INJECTION, SOLUTION INTRAVENOUS
Refills: 0 | Status: DISCONTINUED | OUTPATIENT
Start: 2023-12-11 | End: 2023-12-11

## 2023-12-11 RX ORDER — PIPERACILLIN AND TAZOBACTAM 4; .5 G/20ML; G/20ML
3.38 INJECTION, POWDER, LYOPHILIZED, FOR SOLUTION INTRAVENOUS EVERY 8 HOURS
Refills: 0 | Status: DISCONTINUED | OUTPATIENT
Start: 2023-12-11 | End: 2023-12-14

## 2023-12-11 RX ORDER — ONDANSETRON 8 MG/1
4 TABLET, FILM COATED ORAL EVERY 6 HOURS
Refills: 0 | Status: DISCONTINUED | OUTPATIENT
Start: 2023-12-11 | End: 2023-12-23

## 2023-12-11 RX ORDER — METOPROLOL TARTRATE 50 MG
5 TABLET ORAL ONCE
Refills: 0 | Status: COMPLETED | OUTPATIENT
Start: 2023-12-11 | End: 2023-12-11

## 2023-12-11 RX ORDER — SODIUM CHLORIDE 9 MG/ML
1000 INJECTION INTRAMUSCULAR; INTRAVENOUS; SUBCUTANEOUS ONCE
Refills: 0 | Status: COMPLETED | OUTPATIENT
Start: 2023-12-11 | End: 2023-12-11

## 2023-12-11 RX ORDER — SODIUM CHLORIDE 9 MG/ML
1000 INJECTION, SOLUTION INTRAVENOUS ONCE
Refills: 0 | Status: DISCONTINUED | OUTPATIENT
Start: 2023-12-11 | End: 2023-12-11

## 2023-12-11 RX ORDER — MAGNESIUM SULFATE 500 MG/ML
2 VIAL (ML) INJECTION ONCE
Refills: 0 | Status: COMPLETED | OUTPATIENT
Start: 2023-12-11 | End: 2023-12-12

## 2023-12-11 RX ORDER — SODIUM CHLORIDE 9 MG/ML
250 INJECTION, SOLUTION INTRAVENOUS ONCE
Refills: 0 | Status: DISCONTINUED | OUTPATIENT
Start: 2023-12-11 | End: 2023-12-12

## 2023-12-11 RX ORDER — ACETAMINOPHEN 500 MG
1000 TABLET ORAL ONCE
Refills: 0 | Status: COMPLETED | OUTPATIENT
Start: 2023-12-11 | End: 2023-12-11

## 2023-12-11 RX ORDER — SODIUM CHLORIDE 9 MG/ML
1000 INJECTION, SOLUTION INTRAVENOUS
Refills: 0 | Status: DISCONTINUED | OUTPATIENT
Start: 2023-12-11 | End: 2023-12-12

## 2023-12-11 RX ORDER — NOREPINEPHRINE BITARTRATE/D5W 8 MG/250ML
0.05 PLASTIC BAG, INJECTION (ML) INTRAVENOUS
Qty: 8 | Refills: 0 | Status: DISCONTINUED | OUTPATIENT
Start: 2023-12-11 | End: 2023-12-12

## 2023-12-11 RX ORDER — PANTOPRAZOLE SODIUM 20 MG/1
40 TABLET, DELAYED RELEASE ORAL DAILY
Refills: 0 | Status: DISCONTINUED | OUTPATIENT
Start: 2023-12-11 | End: 2023-12-12

## 2023-12-11 RX ORDER — SODIUM CHLORIDE 9 MG/ML
500 INJECTION INTRAMUSCULAR; INTRAVENOUS; SUBCUTANEOUS ONCE
Refills: 0 | Status: COMPLETED | OUTPATIENT
Start: 2023-12-11 | End: 2023-12-11

## 2023-12-11 RX ORDER — MORPHINE SULFATE 50 MG/1
2 CAPSULE, EXTENDED RELEASE ORAL EVERY 4 HOURS
Refills: 0 | Status: DISCONTINUED | OUTPATIENT
Start: 2023-12-11 | End: 2023-12-18

## 2023-12-11 RX ORDER — LIDOCAINE 4 G/100G
1 CREAM TOPICAL DAILY
Refills: 0 | Status: DISCONTINUED | OUTPATIENT
Start: 2023-12-11 | End: 2023-12-23

## 2023-12-11 RX ORDER — CHLORHEXIDINE GLUCONATE 213 G/1000ML
15 SOLUTION TOPICAL EVERY 12 HOURS
Refills: 0 | Status: DISCONTINUED | OUTPATIENT
Start: 2023-12-11 | End: 2023-12-12

## 2023-12-11 RX ORDER — KETOROLAC TROMETHAMINE 30 MG/ML
15 SYRINGE (ML) INJECTION EVERY 8 HOURS
Refills: 0 | Status: DISCONTINUED | OUTPATIENT
Start: 2023-12-11 | End: 2023-12-13

## 2023-12-11 RX ORDER — MORPHINE SULFATE 50 MG/1
4 CAPSULE, EXTENDED RELEASE ORAL ONCE
Refills: 0 | Status: DISCONTINUED | OUTPATIENT
Start: 2023-12-11 | End: 2023-12-11

## 2023-12-11 RX ORDER — SODIUM CHLORIDE 9 MG/ML
1000 INJECTION, SOLUTION INTRAVENOUS ONCE
Refills: 0 | Status: DISCONTINUED | OUTPATIENT
Start: 2023-12-11 | End: 2023-12-12

## 2023-12-11 RX ORDER — VANCOMYCIN HCL 1 G
1000 VIAL (EA) INTRAVENOUS ONCE
Refills: 0 | Status: COMPLETED | OUTPATIENT
Start: 2023-12-11 | End: 2023-12-11

## 2023-12-11 RX ORDER — FENTANYL CITRATE 50 UG/ML
50 INJECTION INTRAVENOUS ONCE
Refills: 0 | Status: DISCONTINUED | OUTPATIENT
Start: 2023-12-11 | End: 2023-12-11

## 2023-12-11 RX ORDER — ONDANSETRON 8 MG/1
4 TABLET, FILM COATED ORAL ONCE
Refills: 0 | Status: COMPLETED | OUTPATIENT
Start: 2023-12-11 | End: 2023-12-11

## 2023-12-11 RX ORDER — HYDROMORPHONE HYDROCHLORIDE 2 MG/ML
0.5 INJECTION INTRAMUSCULAR; INTRAVENOUS; SUBCUTANEOUS
Refills: 0 | Status: DISCONTINUED | OUTPATIENT
Start: 2023-12-11 | End: 2023-12-11

## 2023-12-11 RX ORDER — PIPERACILLIN AND TAZOBACTAM 4; .5 G/20ML; G/20ML
3.38 INJECTION, POWDER, LYOPHILIZED, FOR SOLUTION INTRAVENOUS ONCE
Refills: 0 | Status: COMPLETED | OUTPATIENT
Start: 2023-12-11 | End: 2023-12-11

## 2023-12-11 RX ORDER — ACETAMINOPHEN 500 MG
1000 TABLET ORAL ONCE
Refills: 0 | Status: DISCONTINUED | OUTPATIENT
Start: 2023-12-11 | End: 2023-12-23

## 2023-12-11 RX ORDER — ENOXAPARIN SODIUM 100 MG/ML
40 INJECTION SUBCUTANEOUS EVERY 24 HOURS
Refills: 0 | Status: DISCONTINUED | OUTPATIENT
Start: 2023-12-12 | End: 2023-12-16

## 2023-12-11 RX ORDER — PROPOFOL 10 MG/ML
50 INJECTION, EMULSION INTRAVENOUS
Qty: 1000 | Refills: 0 | Status: DISCONTINUED | OUTPATIENT
Start: 2023-12-11 | End: 2023-12-12

## 2023-12-11 RX ADMIN — Medication 400 MILLIGRAM(S): at 11:13

## 2023-12-11 RX ADMIN — PIPERACILLIN AND TAZOBACTAM 200 GRAM(S): 4; .5 INJECTION, POWDER, LYOPHILIZED, FOR SOLUTION INTRAVENOUS at 12:55

## 2023-12-11 RX ADMIN — FENTANYL CITRATE 50 MICROGRAM(S): 50 INJECTION INTRAVENOUS at 23:23

## 2023-12-11 RX ADMIN — SODIUM CHLORIDE 150 MILLILITER(S): 9 INJECTION, SOLUTION INTRAVENOUS at 20:16

## 2023-12-11 RX ADMIN — Medication 100 MILLIGRAM(S): at 13:47

## 2023-12-11 RX ADMIN — HYDROMORPHONE HYDROCHLORIDE 0.5 MILLIGRAM(S): 2 INJECTION INTRAMUSCULAR; INTRAVENOUS; SUBCUTANEOUS at 20:16

## 2023-12-11 RX ADMIN — SODIUM CHLORIDE 1000 MILLILITER(S): 9 INJECTION INTRAMUSCULAR; INTRAVENOUS; SUBCUTANEOUS at 11:52

## 2023-12-11 RX ADMIN — Medication 250 MILLIGRAM(S): at 15:03

## 2023-12-11 RX ADMIN — Medication 6.81 MICROGRAM(S)/KG/MIN: at 23:35

## 2023-12-11 RX ADMIN — SODIUM CHLORIDE 1000 MILLILITER(S): 9 INJECTION INTRAMUSCULAR; INTRAVENOUS; SUBCUTANEOUS at 12:55

## 2023-12-11 RX ADMIN — PROPOFOL 21.8 MICROGRAM(S)/KG/MIN: 10 INJECTION, EMULSION INTRAVENOUS at 20:17

## 2023-12-11 RX ADMIN — Medication 5 MILLIGRAM(S): at 23:24

## 2023-12-11 RX ADMIN — FENTANYL CITRATE 50 MICROGRAM(S): 50 INJECTION INTRAVENOUS at 23:53

## 2023-12-11 RX ADMIN — SODIUM CHLORIDE 110 MILLILITER(S): 9 INJECTION, SOLUTION INTRAVENOUS at 15:05

## 2023-12-11 RX ADMIN — MORPHINE SULFATE 4 MILLIGRAM(S): 50 CAPSULE, EXTENDED RELEASE ORAL at 13:34

## 2023-12-11 RX ADMIN — HYDROMORPHONE HYDROCHLORIDE 0.5 MILLIGRAM(S): 2 INJECTION INTRAMUSCULAR; INTRAVENOUS; SUBCUTANEOUS at 20:46

## 2023-12-11 RX ADMIN — ONDANSETRON 4 MILLIGRAM(S): 8 TABLET, FILM COATED ORAL at 11:13

## 2023-12-11 RX ADMIN — SODIUM CHLORIDE 500 MILLILITER(S): 9 INJECTION INTRAMUSCULAR; INTRAVENOUS; SUBCUTANEOUS at 13:34

## 2023-12-11 NOTE — ED PROVIDER NOTE - OBJECTIVE STATEMENT
82 y/o female with a PMHx of COPD, HLD presents to the ED c/o abd pain, n/v and constipation x2 days. Denies CP, SOB, urinary symptoms. NKDA. Nonsmoker. No EtOH use. No other complaints at this time. 84 y/o female with a PMHx of COPD, HLD presents to the ED c/o abd pain, n/v and constipation x2 days. Denies CP, SOB, urinary symptoms. NKDA. Nonsmoker. No EtOH use. No other complaints at this time.

## 2023-12-11 NOTE — H&P ADULT - ASSESSMENT
83F w/ colonic perforation, most likely from rectum    s/p 2L in the ED  s/p 1 dose of zosyn 3.375    Plan:  - Admit under Dr. Armenta's service  - Will undergo urgent open sandra's today  - Preop  -Ostomy marking needed  - Pain control PRN  - Monitor vitals  - NPO   - Nausea control PRN  - IV abx: zosyn  - IVF: LR@110  - replete electrolytes  - daily labs  - OOB/PT    Plan will be discussed with Colorectal surgery attending, Dr. Armenta

## 2023-12-11 NOTE — ED ADULT NURSE NOTE - NSFALLRISKASMTTYPE_ED_ALL_ED
Kim Gaviria is here for Initial evaluation of potential COVID-19 exposure.      TM reports unknown exposure.  SX 11/24/20.  PCR test scheduled.  Off work letter emailed to TM and MGR.          You are to remain off work and out of public places except to seek medical care, and complete the symptom tracker daily on [facility].  YOU MAY NOT RETURN TO WORK WITHOUT CLEARANCE FROM EMPLOYEE HEALTH.    Off work start date: 11/28/20                Initial (On Arrival)

## 2023-12-11 NOTE — ED ADULT NURSE REASSESSMENT NOTE - NS ED NURSE REASSESS COMMENT FT1
Pt resting in bed with son at bedside. Pt placed on bedside monitors. Comfort and safety measures maintained.

## 2023-12-11 NOTE — ED ADULT NURSE REASSESSMENT NOTE - NS ED NURSE REASSESS COMMENT FT1
Pt resting in bed with son at bedside. Pt complaining of pain 8/10. Comfort and safety measures maintained.

## 2023-12-11 NOTE — H&P ADULT - NSHPLABSRESULTS_GEN_ALL_CORE
.  Chief complaint:      PMHx:  HLD (hyperlipidemia)    History of COPD        PSHx:      FHx:      Vitals:  T(C): 36.8 (12-11 @ 10:30), Max: 36.8 (12-11 @ 10:30)  HR: 56 (12-11 @ 10:30) (56 - 56)  BP: 126/67 (12-11 @ 10:30) (126/67 - 126/67)  RR: 16 (12-11 @ 10:30) (16 - 16)  SpO2: 94% (12-11 @ 10:30) (94% - 94%)      I&Os    .    Labs:  12-11 @ 10:57                    16.9  CBC: 10.22>)-------(<269                     50.3                 136 | 101 | 22    CMP:  ----------------------< 248               4.4 | 17 | 1.90                      Ca:9.3  Phos:-  Mg:-               1.6|      |24        LFTs:  ------|46|-----             -|      |-        Cultures:        Current Inpatient Medications:  acetaminophen   IVPB .. 1000 milliGRAM(s) IV Intermittent once PRN  ketorolac   Injectable 15 milliGRAM(s) IV Push every 8 hours PRN  lactated ringers. 1000 milliLiter(s) (110 mL/Hr) IV Continuous <Continuous>  lidocaine   4% Patch 1 Patch Transdermal daily  morphine  - Injectable 2 milliGRAM(s) IV Push every 4 hours PRN  ondansetron Injectable 4 milliGRAM(s) IV Push every 6 hours PRN  piperacillin/tazobactam IVPB.. 3.375 Gram(s) IV Intermittent every 8 hours  vancomycin  IVPB. 1000 milliGRAM(s) IV Intermittent once    < from: CT Abdomen and Pelvis w/ Oral Cont and w/ IV Cont (12.11.23 @ 12:45) >      BLADDER: Minimally distended.  REPRODUCTIVE ORGANS: Uterus and adnexa within normal limits.    BOWEL: No bowel obstruction. Appendix is not visualized. No evidence of   inflammation in the pericecal region. Evidence of uncomplicated   predominantly sigmoid diverticula. There are small foci of extraluminal   gas at the level of the rectum along with probable rectal pneumatosis.  PERITONEUM: Small volume abdominal and pelvic ascites. Also small volume   pneumoperitoneum.  VESSELS: Atherosclerotic changes. Common origin of the celiac and   superior mesenteric arteries.  RETROPERITONEUM/LYMPH NODES: No lymphadenopathy.  ABDOMINAL WALL: Within normal limits.  BONES: Degenerative changes. Stable moderate compression of L2.    IMPRESSION:  Evidence of pneumoperitoneum. Suspect rectal perforation considering the   presence of perirectal gas and pneumatosis.      < end of copied text >

## 2023-12-11 NOTE — PATIENT PROFILE ADULT - FALL HARM RISK - HARM RISK INTERVENTIONS
Assistance with ambulation/Assistance OOB with selected safe patient handling equipment/Communicate Risk of Fall with Harm to all staff/Monitor gait and stability/Reinforce activity limits and safety measures with patient and family/Review medications for side effects contributing to fall risk/Sit up slowly, dangle for a short time, stand at bedside before walking/Tailored Fall Risk Interventions/Toileting schedule using arm’s reach rule for commode and bathroom/Use of alarms - bed, chair and/or voice tab/Visual Cue: Yellow wristband and red socks/Bed in lowest position, wheels locked, appropriate side rails in place/Call bell, personal items and telephone in reach/Instruct patient to call for assistance before getting out of bed or chair/Non-slip footwear when patient is out of bed/[place] to call system/Physically safe environment - no spills, clutter or unnecessary equipment/Purposeful Proactive Rounding/Room/bathroom lighting operational, light cord in reach Assistance with ambulation/Assistance OOB with selected safe patient handling equipment/Communicate Risk of Fall with Harm to all staff/Monitor gait and stability/Reinforce activity limits and safety measures with patient and family/Review medications for side effects contributing to fall risk/Sit up slowly, dangle for a short time, stand at bedside before walking/Tailored Fall Risk Interventions/Toileting schedule using arm’s reach rule for commode and bathroom/Use of alarms - bed, chair and/or voice tab/Visual Cue: Yellow wristband and red socks/Bed in lowest position, wheels locked, appropriate side rails in place/Call bell, personal items and telephone in reach/Instruct patient to call for assistance before getting out of bed or chair/Non-slip footwear when patient is out of bed/[place] to call system/Physically safe environment - no spills, clutter or unnecessary equipment/Purposeful Proactive Rounding/Room/bathroom lighting operational, light cord in reach

## 2023-12-11 NOTE — GOALS OF CARE CONVERSATION - ADVANCED CARE PLANNING - CONVERSATION DETAILS
Patient is pretty active at home, goes to [employer] walks the stairs.      Per the son, she has avoided conversations about advanced directives since here  ,     For now aggressive care post op fecal peritonitis JAZMIN and lactic acidosis Patient is pretty active at home, goes to [employer] walks the stairs.      Per the son, she has avoided conversations about advanced directives since here  ,     For now aggressive care post op fecal peritonitis JAZMIN and lactic acidosis

## 2023-12-11 NOTE — ED ADULT NURSE NOTE - OBJECTIVE STATEMENT
Pt presents to the ED with c/o abdominal pain. Pt states the pain began 2 days ago. Pts son at bedside and states pt hasn't been able to eat much. Pt complains of N/V. Pt states she hasn't had a BM in 2 days.

## 2023-12-11 NOTE — ED PROVIDER NOTE - NS_ ATTENDINGSCRIBEDETAILS _ED_A_ED_FT
I, Tra Knight MD,  performed the initial face to face bedside interview with this patient regarding history of present illness, review of symptoms and relevant past medical, social and family history.  I completed an independent physical examination.  I was the initial provider who evaluated this patient.   I personally saw the patient and performed a substantive portion of the visit including all aspects of the medical decision making.  The history, relevant review of systems, past medical and surgical history, medical decision making, and physical examination was documented by the scribe in my presence and I attest to the accuracy of the documentation.

## 2023-12-11 NOTE — H&P ADULT - HISTORY OF PRESENT ILLNESS
[age] y/o female with a PMHx of COPD, HLD presents to the ED c/o abd pain, n/v and constipation x2 days. Denies CP, SOB, urinary symptoms. NKDA. Nonsmoker. No EtOH use. No other complaints at this time.

## 2023-12-11 NOTE — CONSULT NOTE ADULT - SUBJECTIVE AND OBJECTIVE BOX
Date of service  is equal to the date of entry    Patient is a 83y old  Female who presents with a chief complaint of colonic perforation with pneumoperitoneaum (11 Dec 2023 14:04)    PAST MEDICAL & SURGICAL HISTORY:  HLD (hyperlipidemia)      History of COPD        REJI SCHMIDT   83y    Female    BRIEF HOSPITAL COURSE:    Review of Systems:                       All other ROS are negative.    Allergies    No Known Allergies    Intolerances      Weight (kg): 72.6 (12-11-23 @ 10:30)    ICU Vital Signs Last 24 Hrs  T(C): 36.3 (11 Dec 2023 19:35), Max: 36.8 (11 Dec 2023 10:30)  T(F): 97.4 (11 Dec 2023 19:35), Max: 98.2 (11 Dec 2023 10:30)  HR: 103 (11 Dec 2023 21:15) (56 - 137)  BP: 98/44 (11 Dec 2023 21:15) (84/43 - 142/67)  BP(mean): 89 (11 Dec 2023 13:32) (70 - 89)  ABP: --  ABP(mean): --  RR: 14 (11 Dec 2023 21:15) (12 - 33)  SpO2: 99% (11 Dec 2023 21:15) (94% - 100%)    O2 Parameters below as of 11 Dec 2023 19:35  Patient On (Oxygen Delivery Method): ventilator    O2 Concentration (%): 80      Physical Examination:    General: NAD sedate     HEENT: no JVD     PULM: bilateral BS    CVS: s1 s2 reg    ABD: wound vac drains ostomy pink     EXT: trace edema     SKIN: warm     Neuro: withdraws to pain    ABG - ( 11 Dec 2023 21:26 )  pH, Arterial: 7.28  pH, Blood: x     /  pCO2: 41    /  pO2: 156   / HCO3: 19    / Base Excess: -7.1  /  SaO2: 99.0              Mode: AC/ CMV (Assist Control/ Continuous Mandatory Ventilation)  RR (machine): 12  TV (machine): 550  FiO2: 80  PEEP: 5  ITime: 0.8  MAP: 11  PIP: 25    Mode: AC/ CMV (Assist Control/ Continuous Mandatory Ventilation), RR (machine): 12, TV (machine): 550, FiO2: 80, PEEP: 5, ITime: 0.8, MAP: 11, PIP: 25  LABS:                        14.0   5.79  )-----------( 181      ( 11 Dec 2023 20:10 )             41.6     12-11    138  |  109<H>  |  20  ----------------------------<  131<H>  4.1   |  21<L>  |  1.08    Ca    7.8<L>      11 Dec 2023 20:10  Phos  3.8     12-11  Mg     1.3     12-11    TPro  4.7<L>  /  Alb  1.8<L>  /  TBili  1.9<H>  /  DBili  x   /  AST  28  /  ALT  23  /  AlkPhos  24<L>  12-11          CAPILLARY BLOOD GLUCOSE      POCT Blood Glucose.: 88 mg/dL (11 Dec 2023 19:43)    PT/INR - ( 11 Dec 2023 20:10 )   PT: 14.9 sec;   INR: 1.33 ratio         PTT - ( 11 Dec 2023 20:10 )  PTT:29.1 sec  Urinalysis Basic - ( 11 Dec 2023 20:10 )    Color: x / Appearance: x / SG: x / pH: x  Gluc: 131 mg/dL / Ketone: x  / Bili: x / Urobili: x   Blood: x / Protein: x / Nitrite: x   Leuk Esterase: x / RBC: x / WBC x   Sq Epi: x / Non Sq Epi: x / Bacteria: x      CULTURES:      Medications:  MEDICATIONS  (STANDING):  chlorhexidine 0.12% Liquid 15 milliLiter(s) Oral Mucosa every 12 hours  lactated ringers Bolus 1000 milliLiter(s) IV Bolus once  lactated ringers. 1000 milliLiter(s) (75 mL/Hr) IV Continuous <Continuous>  lidocaine   4% Patch 1 Patch Transdermal daily  magnesium sulfate  IVPB 2 Gram(s) IV Intermittent once  norepinephrine Infusion 0.05 MICROgram(s)/kG/Min (6.81 mL/Hr) IV Continuous <Continuous>  pantoprazole  Injectable 40 milliGRAM(s) IV Push daily  piperacillin/tazobactam IVPB.. 3.375 Gram(s) IV Intermittent every 8 hours  propofol Infusion 50 MICROgram(s)/kG/Min (21.8 mL/Hr) IV Continuous <Continuous>    MEDICATIONS  (PRN):  acetaminophen   IVPB .. 1000 milliGRAM(s) IV Intermittent once PRN Temp greater or equal to 38C (100.4F), Mild Pain (1 - 3)  HYDROmorphone  Injectable 0.5 milliGRAM(s) IV Push every 10 minutes PRN Moderate Pain (4 - 6)  ketorolac   Injectable 15 milliGRAM(s) IV Push every 8 hours PRN Severe Pain (7 - 10)  morphine  - Injectable 2 milliGRAM(s) IV Push every 4 hours PRN Moderate Pain (4 - 6)  ondansetron Injectable 4 milliGRAM(s) IV Push every 6 hours PRN Nausea  ondansetron Injectable 4 milliGRAM(s) IV Push once PRN Nausea and/or Vomiting        12-11 @ 07:01  -  12-11 @ 21:56  --------------------------------------------------------  IN: 4000 mL / OUT: 715 mL / NET: 3285 mL        RADIOLOGY/IMAGING/ECHO      < from: CT Abdomen and Pelvis w/ Oral Cont and w/ IV Cont (12.11.23 @ 12:45) >  IMPRESSION:  Evidence of pneumoperitoneum. Suspect rectal perforation considering the   presence of perirectal gas and pneumatosis.          Assessment/Plan:    83F Good functional status  hx  COPD, HLD admit this morning with abd pain.  Free air in the peritoneum on CT     POD #0 Ex lap Christina's   Extensive stercocolitis with 2 feculent perforation of sigmoid & rectum. Necrotic segment of colon also noticed  Fecal peritonitis with 7 liter wash out.     Has received >7 liters of crystalloid since arrival.      Septic shock  starting nor-epi  titrating to MAP 65  POCUS when she arrives to the unit   JAZMIN improving   keep LR at 75.  De-resuscitate when BP stable   Lactic acidosis clearing  Ostomy is pink   Post op Respiratory failure  SBT when more stable and acidosis resolving   LLTV       ABX > Zosyn  ?? antifungal coverage.  Defer for now unless she is not improving   DVT P lovenox     D/W Dr Gutierrez of the E-ICU           CRITICAL CARE TIME SPENT:  35 minutes assessing presenting problems of acute illness, which pose high probability of life threatening deterioration or end organ damage/dysfunction, as well as medical decision making including initiating plan of care, reviewing data, reviewing radiologic exams, discussing with multidisciplinary team,  discussing goals of care with patient/family, and writing this note.  Non-inclusive of procedures performed.  The date of service for this encounter is the entered date.

## 2023-12-11 NOTE — ED PROVIDER NOTE - PHYSICAL EXAMINATION
Constitutional: NAD AAOx3  Eyes: PERRLA EOMI  Head: Normocephalic atraumatic  Mouth: MMM  Cardiac: regular rate   Resp: unlabored breathing  GI: Abd soft, distended, diffuse abd tenderness  Neuro: grossly normal and intact  Skin: No visible rashes Constitutional: moderate distress AAOx3  Eyes: PERRLA EOMI  Head: Normocephalic atraumatic  Mouth: MMM  Cardiac: regular rate   Resp: unlabored breathing  GI: Abd distended, diffuse abd tenderness  Neuro: grossly normal and intact  Skin: No visible rashes

## 2023-12-11 NOTE — BRIEF OPERATIVE NOTE - NSICDXBRIEFPROCEDURE_GEN_ALL_CORE_FT
PROCEDURES:  Christina resection of colon 11-Dec-2023 19:45:47 Open Melissa Wilson  Abdominal washout 11-Dec-2023 19:46:01 7L Melissa Wilson  Block, transversus abdominis plane, bilateral 11-Dec-2023 19:46:22  Melissa Wilson  Mobilization of colon 11-Dec-2023 19:46:33  Melissa Wilson

## 2023-12-11 NOTE — ED PROVIDER NOTE - CLINICAL SUMMARY MEDICAL DECISION MAKING FREE TEXT BOX
82 y/o female with abd pain, n/v and constipation. On exam, pt with diffuse abd tenderness. Concern for obstruction. Will CT, pain control and reassess. 84 y/o female with abd pain, n/v and constipation. On exam, pt with diffuse abd tenderness. Concern for obstruction. Will CT, pain control and reassess. 84 y/o female with abd pain, n/v and constipation. On exam, pt with diffuse abd tenderness. Concern for obstruction. Will CT, pain control and reassess.    Please see progress note for timing of interventions in summary all labs and imaging reviewed by myself.  Lactate came back at 9 creatinine 1.9.  Spoke with surgical attending prior to CT given elevated lactate and abdominal exam spoke with radiologist regarding CT which showed free air in abdomen and perforation likely near rectum spoke with surgical attending  again who recommended calling colorectal.  Spoke with colorectal attending who states they will bring patient to the OR patient received IV antibiotics fluids spoke at length with patient and family regarding severity of illness.

## 2023-12-11 NOTE — H&P ADULT - TIME BILLING
Patient Specific Counseling (Will Not Stick From Patient To Patient): [place] handout provided.
Detail Level: Simple
83yoF with PMH COPD, HLD who presents with abdominal pain, peritonitis, concern for perforated viscus, likely rectosigmoid in location and secondary to stercoral colitis.  Last colonoscopy several years ago, states that she was told she no longer needs any colonoscopy due to age.  CT with locules of pneumoperitoneum in upper abdomen and concern for perforation.  She is hemodynamically stable but she has a significant lactic acidosis to 9.5, down to 7.7 on repeat check after 2L saline.  She received antibiotics in the ED.  On exam her abdomen is markedly distended and diffusely tender, rigid and with guarding.      Given concerns for perforated viscus and intraabdominal sepsis will take to OR emergently for exploratory laparotomy, colectomy, and ostomy.  I marked the patient in the preop area for ostomy with her in the partially recumbent position.  Due to her pain she was unable to sit further upright.    Reviewed risks of bleeding, infection, injury to intraabdominal/pelvic structures, need for further surgery, need for ostomy, anastomotic leak, pain, ileus, hermia, need for prolonged intubation/ICU stay postop, DVT/PE/pneumonia/stroke/MI/death.  These were reviewed with the patient and her son who is at bedside.  Patient expressed understanding and informed consent form was signed and witnessed.  OR today, SICU vs ICU afterward.

## 2023-12-11 NOTE — BRIEF OPERATIVE NOTE - OPERATION/FINDINGS
Extensive stercocolitis with 2 feculent perforation of sigmoid & rectum. Necrotic segment of colon also noticed  Open Christina done, 2 prolene stitches placed on staple line  Irrigated with 7L of warm saline  1 Rodriguez drain placed at pelvis, 19Fr  TAP block done Extensive stercocolitis with 2 feculent perforation of sigmoid & rectum. Necrotic segment of colon also noticed  Open Christina done, 2 prolene stitches placed on staple line of rectal stump  Irrigated with 7L of warm saline  1 Rodriguez drain placed at pelvis, 19Fr  TAP block done

## 2023-12-11 NOTE — ED ADULT NURSE NOTE - NSFALLHARMRISKINTERV_ED_ALL_ED
Assistance OOB with selected safe patient handling equipment if applicable/Assistance with ambulation/Communicate risk of Fall with Harm to all staff, patient, and family/Provide visual cue: red socks, yellow wristband, yellow gown, etc/Reinforce activity limits and safety measures with patient and family/Bed in lowest position, wheels locked, appropriate side rails in place/Call bell, personal items and telephone in reach/Instruct patient to call for assistance before getting out of bed/chair/stretcher/Non-slip footwear applied when patient is off stretcher/[place] to call system/Physically safe environment - no spills, clutter or unnecessary equipment/Purposeful Proactive Rounding/Room/bathroom lighting operational, light cord in reach Assistance OOB with selected safe patient handling equipment if applicable/Assistance with ambulation/Communicate risk of Fall with Harm to all staff, patient, and family/Provide visual cue: red socks, yellow wristband, yellow gown, etc/Reinforce activity limits and safety measures with patient and family/Bed in lowest position, wheels locked, appropriate side rails in place/Call bell, personal items and telephone in reach/Instruct patient to call for assistance before getting out of bed/chair/stretcher/Non-slip footwear applied when patient is off stretcher/[place] to call system/Physically safe environment - no spills, clutter or unnecessary equipment/Purposeful Proactive Rounding/Room/bathroom lighting operational, light cord in reach

## 2023-12-11 NOTE — H&P ADULT - NSHPPHYSICALEXAM_GEN_ALL_CORE
PHYSICAL EXAM:  - GENERAL: No acute distress.  - EYES: EOMI. Anicteric.  - HENT: Moist mucous membranes. No scleral icterus. No cervical lymphadenopathy.  - LUNGS:  No accessory muscle use.  - CARDIOVASCULAR: Regular rate and rhythm.   - ABDOMEN: obese, rigid to lower abdomen. tender tended. No palpable masses.  - EXTREMITIES: No edema. Non-tender.  - SKIN: No rashes or lesions. Warm.  - NEUROLOGIC: No focal neurological deficits. CN II-XII grossly intact, but not individually tested.  - PSYCHIATRIC: Cooperative. Appropriate mood and affect.

## 2023-12-11 NOTE — ED PROVIDER NOTE - PROGRESS NOTE DETAILS
Luis Carlos Ribeiro for attending Dr. Knight: Pt's lactate 9.5. Spoke with surgical attending. Will see pt shortly. spoke with radiology states pt with perforation - spoke with Dr Lvoe states that we should call colorectal. called Dr. Armenat cell left message called surgical resident aware of patient Tra Knight M.D., Attending Physician spoke with radiology states pt with perforation - spoke with Dr Love states that we should call colorectal. called Dr. Armenta cell left message called surgical resident aware of patient Tra Knight M.D., Attending Physician Spoke with Dr. Armenta pt to go to OR. Tra Knight M.D., Attending Physician

## 2023-12-11 NOTE — ED PROVIDER NOTE - NS ED MD DISPO ADMITTING SERVICE
She had colonoscopy in 2017 that was negative other than noting hemorrhoids.  She had an upper endoscopy in 2013 that was essentially unremarkable, empiric dilation was performed at that time.  She saw Dr. Christian couple of months ago for evaluation of rectal bleeding.  She complained of several months of passing blood and mucus.  CT angiogram revealed no active bleeding in the hospital but there was evidence of colitis extending from the transverse colon to the sigmoid colon and the suspicion was acute ischemic colitis. Since last visit patient has seen her pain doctor and was prescribed Movantik.  She took 1 pill only.  She does not want to take laxatives.  She continues to take oxycodone and experiencing opioid-induced constipation.  The rectal bleeding has stopped but she still has mucus and abdominal cramping diffusely.  She would like acute intervention and evaluation of possible. SURG

## 2023-12-12 LAB
ALBUMIN SERPL ELPH-MCNC: 1.4 G/DL — LOW (ref 3.3–5)
ALBUMIN SERPL ELPH-MCNC: 1.4 G/DL — LOW (ref 3.3–5)
ALBUMIN SERPL ELPH-MCNC: 2.8 G/DL — LOW (ref 3.3–5)
ALBUMIN SERPL ELPH-MCNC: 2.8 G/DL — LOW (ref 3.3–5)
ALP SERPL-CCNC: 17 U/L — LOW (ref 40–120)
ALP SERPL-CCNC: 17 U/L — LOW (ref 40–120)
ALP SERPL-CCNC: 19 U/L — LOW (ref 40–120)
ALP SERPL-CCNC: 19 U/L — LOW (ref 40–120)
ALT FLD-CCNC: 20 U/L — SIGNIFICANT CHANGE UP (ref 12–78)
ALT FLD-CCNC: 20 U/L — SIGNIFICANT CHANGE UP (ref 12–78)
ALT FLD-CCNC: 21 U/L — SIGNIFICANT CHANGE UP (ref 12–78)
ALT FLD-CCNC: 21 U/L — SIGNIFICANT CHANGE UP (ref 12–78)
ANION GAP SERPL CALC-SCNC: 7 MMOL/L — SIGNIFICANT CHANGE UP (ref 5–17)
ANION GAP SERPL CALC-SCNC: 7 MMOL/L — SIGNIFICANT CHANGE UP (ref 5–17)
ANION GAP SERPL CALC-SCNC: 8 MMOL/L — SIGNIFICANT CHANGE UP (ref 5–17)
APPEARANCE UR: ABNORMAL
APPEARANCE UR: ABNORMAL
AST SERPL-CCNC: 22 U/L — SIGNIFICANT CHANGE UP (ref 15–37)
AST SERPL-CCNC: 22 U/L — SIGNIFICANT CHANGE UP (ref 15–37)
AST SERPL-CCNC: 23 U/L — SIGNIFICANT CHANGE UP (ref 15–37)
AST SERPL-CCNC: 23 U/L — SIGNIFICANT CHANGE UP (ref 15–37)
BACTERIA # UR AUTO: NEGATIVE /HPF — SIGNIFICANT CHANGE UP
BACTERIA # UR AUTO: NEGATIVE /HPF — SIGNIFICANT CHANGE UP
BASOPHILS # BLD AUTO: 0.04 K/UL — SIGNIFICANT CHANGE UP (ref 0–0.2)
BASOPHILS # BLD AUTO: 0.04 K/UL — SIGNIFICANT CHANGE UP (ref 0–0.2)
BASOPHILS NFR BLD AUTO: 0.6 % — SIGNIFICANT CHANGE UP (ref 0–2)
BASOPHILS NFR BLD AUTO: 0.6 % — SIGNIFICANT CHANGE UP (ref 0–2)
BILIRUB SERPL-MCNC: 1.7 MG/DL — HIGH (ref 0.2–1.2)
BILIRUB SERPL-MCNC: 1.7 MG/DL — HIGH (ref 0.2–1.2)
BILIRUB SERPL-MCNC: 2.8 MG/DL — HIGH (ref 0.2–1.2)
BILIRUB SERPL-MCNC: 2.8 MG/DL — HIGH (ref 0.2–1.2)
BILIRUB UR-MCNC: ABNORMAL
BILIRUB UR-MCNC: ABNORMAL
BUN SERPL-MCNC: 19 MG/DL — SIGNIFICANT CHANGE UP (ref 7–23)
BUN SERPL-MCNC: 19 MG/DL — SIGNIFICANT CHANGE UP (ref 7–23)
BUN SERPL-MCNC: 22 MG/DL — SIGNIFICANT CHANGE UP (ref 7–23)
CALCIUM SERPL-MCNC: 7.1 MG/DL — LOW (ref 8.5–10.1)
CALCIUM SERPL-MCNC: 7.1 MG/DL — LOW (ref 8.5–10.1)
CALCIUM SERPL-MCNC: 7.7 MG/DL — LOW (ref 8.5–10.1)
CALCIUM SERPL-MCNC: 7.7 MG/DL — LOW (ref 8.5–10.1)
CALCIUM SERPL-MCNC: 8.1 MG/DL — LOW (ref 8.5–10.1)
CALCIUM SERPL-MCNC: 8.1 MG/DL — LOW (ref 8.5–10.1)
CAST: 17 /LPF — HIGH (ref 0–4)
CAST: 17 /LPF — HIGH (ref 0–4)
CHLORIDE SERPL-SCNC: 107 MMOL/L — SIGNIFICANT CHANGE UP (ref 96–108)
CHLORIDE SERPL-SCNC: 111 MMOL/L — HIGH (ref 96–108)
CHLORIDE SERPL-SCNC: 111 MMOL/L — HIGH (ref 96–108)
CO2 SERPL-SCNC: 22 MMOL/L — SIGNIFICANT CHANGE UP (ref 22–31)
COLOR SPEC: SIGNIFICANT CHANGE UP
COLOR SPEC: SIGNIFICANT CHANGE UP
CREAT SERPL-MCNC: 0.99 MG/DL — SIGNIFICANT CHANGE UP (ref 0.5–1.3)
CREAT SERPL-MCNC: 0.99 MG/DL — SIGNIFICANT CHANGE UP (ref 0.5–1.3)
CREAT SERPL-MCNC: 1.23 MG/DL — SIGNIFICANT CHANGE UP (ref 0.5–1.3)
CREAT SERPL-MCNC: 1.23 MG/DL — SIGNIFICANT CHANGE UP (ref 0.5–1.3)
CREAT SERPL-MCNC: 1.26 MG/DL — SIGNIFICANT CHANGE UP (ref 0.5–1.3)
CREAT SERPL-MCNC: 1.26 MG/DL — SIGNIFICANT CHANGE UP (ref 0.5–1.3)
DIFF PNL FLD: ABNORMAL
DIFF PNL FLD: ABNORMAL
EGFR: 42 ML/MIN/1.73M2 — LOW
EGFR: 42 ML/MIN/1.73M2 — LOW
EGFR: 44 ML/MIN/1.73M2 — LOW
EGFR: 44 ML/MIN/1.73M2 — LOW
EGFR: 57 ML/MIN/1.73M2 — LOW
EGFR: 57 ML/MIN/1.73M2 — LOW
EOSINOPHIL # BLD AUTO: 0 K/UL — SIGNIFICANT CHANGE UP (ref 0–0.5)
EOSINOPHIL # BLD AUTO: 0 K/UL — SIGNIFICANT CHANGE UP (ref 0–0.5)
EOSINOPHIL NFR BLD AUTO: 0 % — SIGNIFICANT CHANGE UP (ref 0–6)
EOSINOPHIL NFR BLD AUTO: 0 % — SIGNIFICANT CHANGE UP (ref 0–6)
GLUCOSE BLDC GLUCOMTR-MCNC: 100 MG/DL — HIGH (ref 70–99)
GLUCOSE BLDC GLUCOMTR-MCNC: 100 MG/DL — HIGH (ref 70–99)
GLUCOSE BLDC GLUCOMTR-MCNC: 105 MG/DL — HIGH (ref 70–99)
GLUCOSE BLDC GLUCOMTR-MCNC: 105 MG/DL — HIGH (ref 70–99)
GLUCOSE BLDC GLUCOMTR-MCNC: 106 MG/DL — HIGH (ref 70–99)
GLUCOSE BLDC GLUCOMTR-MCNC: 106 MG/DL — HIGH (ref 70–99)
GLUCOSE BLDC GLUCOMTR-MCNC: 109 MG/DL — HIGH (ref 70–99)
GLUCOSE BLDC GLUCOMTR-MCNC: 109 MG/DL — HIGH (ref 70–99)
GLUCOSE BLDC GLUCOMTR-MCNC: 99 MG/DL — SIGNIFICANT CHANGE UP (ref 70–99)
GLUCOSE BLDC GLUCOMTR-MCNC: 99 MG/DL — SIGNIFICANT CHANGE UP (ref 70–99)
GLUCOSE SERPL-MCNC: 108 MG/DL — HIGH (ref 70–99)
GLUCOSE SERPL-MCNC: 108 MG/DL — HIGH (ref 70–99)
GLUCOSE SERPL-MCNC: 117 MG/DL — HIGH (ref 70–99)
GLUCOSE SERPL-MCNC: 117 MG/DL — HIGH (ref 70–99)
GLUCOSE SERPL-MCNC: 120 MG/DL — HIGH (ref 70–99)
GLUCOSE SERPL-MCNC: 120 MG/DL — HIGH (ref 70–99)
GLUCOSE UR QL: NEGATIVE MG/DL — SIGNIFICANT CHANGE UP
GLUCOSE UR QL: NEGATIVE MG/DL — SIGNIFICANT CHANGE UP
GRAN CASTS # UR COMP ASSIST: PRESENT
GRAN CASTS # UR COMP ASSIST: PRESENT
HCT VFR BLD CALC: 34.2 % — LOW (ref 34.5–45)
HCT VFR BLD CALC: 34.2 % — LOW (ref 34.5–45)
HCT VFR BLD CALC: 36.9 % — SIGNIFICANT CHANGE UP (ref 34.5–45)
HCT VFR BLD CALC: 36.9 % — SIGNIFICANT CHANGE UP (ref 34.5–45)
HGB BLD-MCNC: 11.4 G/DL — LOW (ref 11.5–15.5)
HGB BLD-MCNC: 11.4 G/DL — LOW (ref 11.5–15.5)
HGB BLD-MCNC: 12.3 G/DL — SIGNIFICANT CHANGE UP (ref 11.5–15.5)
HGB BLD-MCNC: 12.3 G/DL — SIGNIFICANT CHANGE UP (ref 11.5–15.5)
IMM GRANULOCYTES NFR BLD AUTO: 0.3 % — SIGNIFICANT CHANGE UP (ref 0–0.9)
IMM GRANULOCYTES NFR BLD AUTO: 0.3 % — SIGNIFICANT CHANGE UP (ref 0–0.9)
KETONES UR-MCNC: NEGATIVE MG/DL — SIGNIFICANT CHANGE UP
KETONES UR-MCNC: NEGATIVE MG/DL — SIGNIFICANT CHANGE UP
LACTATE SERPL-SCNC: 1.4 MMOL/L — SIGNIFICANT CHANGE UP (ref 0.7–2)
LACTATE SERPL-SCNC: 1.4 MMOL/L — SIGNIFICANT CHANGE UP (ref 0.7–2)
LACTATE SERPL-SCNC: 2.1 MMOL/L — HIGH (ref 0.7–2)
LACTATE SERPL-SCNC: 2.1 MMOL/L — HIGH (ref 0.7–2)
LACTATE SERPL-SCNC: 4.8 MMOL/L — CRITICAL HIGH (ref 0.7–2)
LACTATE SERPL-SCNC: 4.8 MMOL/L — CRITICAL HIGH (ref 0.7–2)
LEUKOCYTE ESTERASE UR-ACNC: ABNORMAL
LEUKOCYTE ESTERASE UR-ACNC: ABNORMAL
LYMPHOCYTES # BLD AUTO: 1.74 K/UL — SIGNIFICANT CHANGE UP (ref 1–3.3)
LYMPHOCYTES # BLD AUTO: 1.74 K/UL — SIGNIFICANT CHANGE UP (ref 1–3.3)
LYMPHOCYTES # BLD AUTO: 26.9 % — SIGNIFICANT CHANGE UP (ref 13–44)
LYMPHOCYTES # BLD AUTO: 26.9 % — SIGNIFICANT CHANGE UP (ref 13–44)
MAGNESIUM SERPL-MCNC: 1.3 MG/DL — LOW (ref 1.6–2.6)
MAGNESIUM SERPL-MCNC: 1.3 MG/DL — LOW (ref 1.6–2.6)
MAGNESIUM SERPL-MCNC: 2.2 MG/DL — SIGNIFICANT CHANGE UP (ref 1.6–2.6)
MAGNESIUM SERPL-MCNC: 2.2 MG/DL — SIGNIFICANT CHANGE UP (ref 1.6–2.6)
MCHC RBC-ENTMCNC: 29.8 PG — SIGNIFICANT CHANGE UP (ref 27–34)
MCHC RBC-ENTMCNC: 29.8 PG — SIGNIFICANT CHANGE UP (ref 27–34)
MCHC RBC-ENTMCNC: 30.1 PG — SIGNIFICANT CHANGE UP (ref 27–34)
MCHC RBC-ENTMCNC: 30.1 PG — SIGNIFICANT CHANGE UP (ref 27–34)
MCHC RBC-ENTMCNC: 33.3 GM/DL — SIGNIFICANT CHANGE UP (ref 32–36)
MCV RBC AUTO: 89.3 FL — SIGNIFICANT CHANGE UP (ref 80–100)
MCV RBC AUTO: 89.3 FL — SIGNIFICANT CHANGE UP (ref 80–100)
MCV RBC AUTO: 90.2 FL — SIGNIFICANT CHANGE UP (ref 80–100)
MCV RBC AUTO: 90.2 FL — SIGNIFICANT CHANGE UP (ref 80–100)
MONOCYTES # BLD AUTO: 0.29 K/UL — SIGNIFICANT CHANGE UP (ref 0–0.9)
MONOCYTES # BLD AUTO: 0.29 K/UL — SIGNIFICANT CHANGE UP (ref 0–0.9)
MONOCYTES NFR BLD AUTO: 4.5 % — SIGNIFICANT CHANGE UP (ref 2–14)
MONOCYTES NFR BLD AUTO: 4.5 % — SIGNIFICANT CHANGE UP (ref 2–14)
NEUTROPHILS # BLD AUTO: 4.37 K/UL — SIGNIFICANT CHANGE UP (ref 1.8–7.4)
NEUTROPHILS # BLD AUTO: 4.37 K/UL — SIGNIFICANT CHANGE UP (ref 1.8–7.4)
NEUTROPHILS NFR BLD AUTO: 67.7 % — SIGNIFICANT CHANGE UP (ref 43–77)
NEUTROPHILS NFR BLD AUTO: 67.7 % — SIGNIFICANT CHANGE UP (ref 43–77)
NITRITE UR-MCNC: NEGATIVE — SIGNIFICANT CHANGE UP
NITRITE UR-MCNC: NEGATIVE — SIGNIFICANT CHANGE UP
PH UR: 5.5 — SIGNIFICANT CHANGE UP (ref 5–8)
PH UR: 5.5 — SIGNIFICANT CHANGE UP (ref 5–8)
PHOSPHATE SERPL-MCNC: 4 MG/DL — SIGNIFICANT CHANGE UP (ref 2.5–4.5)
PHOSPHATE SERPL-MCNC: 4 MG/DL — SIGNIFICANT CHANGE UP (ref 2.5–4.5)
PHOSPHATE SERPL-MCNC: 4.4 MG/DL — SIGNIFICANT CHANGE UP (ref 2.5–4.5)
PHOSPHATE SERPL-MCNC: 4.4 MG/DL — SIGNIFICANT CHANGE UP (ref 2.5–4.5)
PLATELET # BLD AUTO: 162 K/UL — SIGNIFICANT CHANGE UP (ref 150–400)
PLATELET # BLD AUTO: 162 K/UL — SIGNIFICANT CHANGE UP (ref 150–400)
PLATELET # BLD AUTO: 174 K/UL — SIGNIFICANT CHANGE UP (ref 150–400)
PLATELET # BLD AUTO: 174 K/UL — SIGNIFICANT CHANGE UP (ref 150–400)
POTASSIUM SERPL-MCNC: 4 MMOL/L — SIGNIFICANT CHANGE UP (ref 3.5–5.3)
POTASSIUM SERPL-MCNC: 4 MMOL/L — SIGNIFICANT CHANGE UP (ref 3.5–5.3)
POTASSIUM SERPL-MCNC: 4.3 MMOL/L — SIGNIFICANT CHANGE UP (ref 3.5–5.3)
POTASSIUM SERPL-MCNC: 4.3 MMOL/L — SIGNIFICANT CHANGE UP (ref 3.5–5.3)
POTASSIUM SERPL-MCNC: 4.6 MMOL/L — SIGNIFICANT CHANGE UP (ref 3.5–5.3)
POTASSIUM SERPL-MCNC: 4.6 MMOL/L — SIGNIFICANT CHANGE UP (ref 3.5–5.3)
POTASSIUM SERPL-SCNC: 4 MMOL/L — SIGNIFICANT CHANGE UP (ref 3.5–5.3)
POTASSIUM SERPL-SCNC: 4 MMOL/L — SIGNIFICANT CHANGE UP (ref 3.5–5.3)
POTASSIUM SERPL-SCNC: 4.3 MMOL/L — SIGNIFICANT CHANGE UP (ref 3.5–5.3)
POTASSIUM SERPL-SCNC: 4.3 MMOL/L — SIGNIFICANT CHANGE UP (ref 3.5–5.3)
POTASSIUM SERPL-SCNC: 4.6 MMOL/L — SIGNIFICANT CHANGE UP (ref 3.5–5.3)
POTASSIUM SERPL-SCNC: 4.6 MMOL/L — SIGNIFICANT CHANGE UP (ref 3.5–5.3)
PROT SERPL-MCNC: 3.8 GM/DL — LOW (ref 6–8.3)
PROT SERPL-MCNC: 3.8 GM/DL — LOW (ref 6–8.3)
PROT SERPL-MCNC: 5.5 GM/DL — LOW (ref 6–8.3)
PROT SERPL-MCNC: 5.5 GM/DL — LOW (ref 6–8.3)
PROT UR-MCNC: 30 MG/DL
PROT UR-MCNC: 30 MG/DL
RBC # BLD: 3.83 M/UL — SIGNIFICANT CHANGE UP (ref 3.8–5.2)
RBC # BLD: 3.83 M/UL — SIGNIFICANT CHANGE UP (ref 3.8–5.2)
RBC # BLD: 4.09 M/UL — SIGNIFICANT CHANGE UP (ref 3.8–5.2)
RBC # BLD: 4.09 M/UL — SIGNIFICANT CHANGE UP (ref 3.8–5.2)
RBC # FLD: 14.1 % — SIGNIFICANT CHANGE UP (ref 10.3–14.5)
RBC CASTS # UR COMP ASSIST: 98 /HPF — HIGH (ref 0–4)
RBC CASTS # UR COMP ASSIST: 98 /HPF — HIGH (ref 0–4)
SODIUM SERPL-SCNC: 137 MMOL/L — SIGNIFICANT CHANGE UP (ref 135–145)
SODIUM SERPL-SCNC: 140 MMOL/L — SIGNIFICANT CHANGE UP (ref 135–145)
SODIUM SERPL-SCNC: 140 MMOL/L — SIGNIFICANT CHANGE UP (ref 135–145)
SP GR SPEC: >1.03 — HIGH (ref 1–1.03)
SP GR SPEC: >1.03 — HIGH (ref 1–1.03)
SQUAMOUS # UR AUTO: 5 /HPF — SIGNIFICANT CHANGE UP (ref 0–5)
SQUAMOUS # UR AUTO: 5 /HPF — SIGNIFICANT CHANGE UP (ref 0–5)
UROBILINOGEN FLD QL: 1 MG/DL — SIGNIFICANT CHANGE UP (ref 0.2–1)
UROBILINOGEN FLD QL: 1 MG/DL — SIGNIFICANT CHANGE UP (ref 0.2–1)
WBC # BLD: 6.46 K/UL — SIGNIFICANT CHANGE UP (ref 3.8–10.5)
WBC # BLD: 6.46 K/UL — SIGNIFICANT CHANGE UP (ref 3.8–10.5)
WBC # BLD: 8.11 K/UL — SIGNIFICANT CHANGE UP (ref 3.8–10.5)
WBC # BLD: 8.11 K/UL — SIGNIFICANT CHANGE UP (ref 3.8–10.5)
WBC # FLD AUTO: 6.46 K/UL — SIGNIFICANT CHANGE UP (ref 3.8–10.5)
WBC # FLD AUTO: 6.46 K/UL — SIGNIFICANT CHANGE UP (ref 3.8–10.5)
WBC # FLD AUTO: 8.11 K/UL — SIGNIFICANT CHANGE UP (ref 3.8–10.5)
WBC # FLD AUTO: 8.11 K/UL — SIGNIFICANT CHANGE UP (ref 3.8–10.5)
WBC UR QL: 4 /HPF — SIGNIFICANT CHANGE UP (ref 0–5)
WBC UR QL: 4 /HPF — SIGNIFICANT CHANGE UP (ref 0–5)

## 2023-12-12 PROCEDURE — 93010 ELECTROCARDIOGRAM REPORT: CPT

## 2023-12-12 PROCEDURE — 93306 TTE W/DOPPLER COMPLETE: CPT | Mod: 26

## 2023-12-12 PROCEDURE — 99291 CRITICAL CARE FIRST HOUR: CPT

## 2023-12-12 PROCEDURE — G0508: CPT | Mod: 95

## 2023-12-12 PROCEDURE — 93308 TTE F-UP OR LMTD: CPT | Mod: 26

## 2023-12-12 PROCEDURE — 76604 US EXAM CHEST: CPT | Mod: 26

## 2023-12-12 RX ORDER — ALBUMIN HUMAN 25 %
100 VIAL (ML) INTRAVENOUS ONCE
Refills: 0 | Status: COMPLETED | OUTPATIENT
Start: 2023-12-12 | End: 2023-12-12

## 2023-12-12 RX ORDER — PANTOPRAZOLE SODIUM 20 MG/1
40 TABLET, DELAYED RELEASE ORAL
Refills: 0 | Status: DISCONTINUED | OUTPATIENT
Start: 2023-12-12 | End: 2023-12-23

## 2023-12-12 RX ORDER — MORPHINE SULFATE 50 MG/1
2 CAPSULE, EXTENDED RELEASE ORAL ONCE
Refills: 0 | Status: DISCONTINUED | OUTPATIENT
Start: 2023-12-12 | End: 2023-12-12

## 2023-12-12 RX ORDER — ALBUMIN HUMAN 25 %
100 VIAL (ML) INTRAVENOUS EVERY 8 HOURS
Refills: 0 | Status: DISCONTINUED | OUTPATIENT
Start: 2023-12-12 | End: 2023-12-12

## 2023-12-12 RX ORDER — PROPOFOL 10 MG/ML
10 INJECTION, EMULSION INTRAVENOUS
Qty: 500 | Refills: 0 | Status: DISCONTINUED | OUTPATIENT
Start: 2023-12-12 | End: 2023-12-12

## 2023-12-12 RX ORDER — SODIUM CHLORIDE 9 MG/ML
1000 INJECTION, SOLUTION INTRAVENOUS
Refills: 0 | Status: DISCONTINUED | OUTPATIENT
Start: 2023-12-12 | End: 2023-12-13

## 2023-12-12 RX ADMIN — CHLORHEXIDINE GLUCONATE 15 MILLILITER(S): 213 SOLUTION TOPICAL at 10:28

## 2023-12-12 RX ADMIN — Medication 50 MILLILITER(S): at 06:22

## 2023-12-12 RX ADMIN — MORPHINE SULFATE 2 MILLIGRAM(S): 50 CAPSULE, EXTENDED RELEASE ORAL at 08:55

## 2023-12-12 RX ADMIN — PIPERACILLIN AND TAZOBACTAM 25 GRAM(S): 4; .5 INJECTION, POWDER, LYOPHILIZED, FOR SOLUTION INTRAVENOUS at 21:05

## 2023-12-12 RX ADMIN — MORPHINE SULFATE 2 MILLIGRAM(S): 50 CAPSULE, EXTENDED RELEASE ORAL at 06:00

## 2023-12-12 RX ADMIN — SODIUM CHLORIDE 100 MILLILITER(S): 9 INJECTION, SOLUTION INTRAVENOUS at 21:05

## 2023-12-12 RX ADMIN — ENOXAPARIN SODIUM 40 MILLIGRAM(S): 100 INJECTION SUBCUTANEOUS at 10:29

## 2023-12-12 RX ADMIN — Medication 15 MILLIGRAM(S): at 19:45

## 2023-12-12 RX ADMIN — MORPHINE SULFATE 2 MILLIGRAM(S): 50 CAPSULE, EXTENDED RELEASE ORAL at 09:10

## 2023-12-12 RX ADMIN — Medication 25 GRAM(S): at 01:01

## 2023-12-12 RX ADMIN — PIPERACILLIN AND TAZOBACTAM 25 GRAM(S): 4; .5 INJECTION, POWDER, LYOPHILIZED, FOR SOLUTION INTRAVENOUS at 09:16

## 2023-12-12 RX ADMIN — PANTOPRAZOLE SODIUM 40 MILLIGRAM(S): 20 TABLET, DELAYED RELEASE ORAL at 10:29

## 2023-12-12 RX ADMIN — MORPHINE SULFATE 2 MILLIGRAM(S): 50 CAPSULE, EXTENDED RELEASE ORAL at 06:25

## 2023-12-12 RX ADMIN — Medication 15 MILLIGRAM(S): at 20:47

## 2023-12-12 RX ADMIN — PROPOFOL 21.8 MICROGRAM(S)/KG/MIN: 10 INJECTION, EMULSION INTRAVENOUS at 03:12

## 2023-12-12 RX ADMIN — PIPERACILLIN AND TAZOBACTAM 25 GRAM(S): 4; .5 INJECTION, POWDER, LYOPHILIZED, FOR SOLUTION INTRAVENOUS at 15:42

## 2023-12-12 RX ADMIN — LIDOCAINE 1 PATCH: 4 CREAM TOPICAL at 19:17

## 2023-12-12 RX ADMIN — PANTOPRAZOLE SODIUM 40 MILLIGRAM(S): 20 TABLET, DELAYED RELEASE ORAL at 21:05

## 2023-12-12 RX ADMIN — SODIUM CHLORIDE 100 MILLILITER(S): 9 INJECTION, SOLUTION INTRAVENOUS at 13:32

## 2023-12-12 RX ADMIN — LIDOCAINE 1 PATCH: 4 CREAM TOPICAL at 12:56

## 2023-12-12 RX ADMIN — PIPERACILLIN AND TAZOBACTAM 25 GRAM(S): 4; .5 INJECTION, POWDER, LYOPHILIZED, FOR SOLUTION INTRAVENOUS at 02:15

## 2023-12-12 RX ADMIN — Medication 50 MILLILITER(S): at 06:38

## 2023-12-12 RX ADMIN — CHLORHEXIDINE GLUCONATE 15 MILLILITER(S): 213 SOLUTION TOPICAL at 01:38

## 2023-12-12 NOTE — PROGRESS NOTE ADULT - ASSESSMENT
83F w/ colonic perforation 2/2 stercoral ulcer  now POD 1 s/p Christina  Critically ill, intubated, being resuscitated    Plan:  - Resuscitation per ICU  - Wean pressors  - Wean ventilator  - Pain & nausea control PRN  - NPO  - NGT to LIWS  - IV abx: zosyn  - IVF: LR@110  - replete electrolytes  - daily labs  - Will need ostomy teaching & PT eval after extubation  - VTE ppx w/ SCDs & Lovenox    Discussed w/ CRS team    Plan will be discussed with Colorectal surgery attending, Dr. Armenta

## 2023-12-12 NOTE — PHYSICAL THERAPY INITIAL EVALUATION ADULT - PERTINENT HX OF CURRENT PROBLEM, REHAB EVAL
pt adm w/ colonic perforation with pneumoperitoneaum. found w/ Extensive stercocolitis with 2 feculent perforation of sigmoid & rectum, Necrotic segment of colon- s/p Open Christina's and abdom washout. post-op resp failure, septic shock requiring pressor support and mechanical ventilation. pt adm w/ colonic perforation with pneumoperitoneaum. found w/ Extensive stercocolitis with 2 feculent perforation of sigmoid & rectum, Necrotic segment of colon- s/p Open Christina's colectomy and abdom washout. post-op resp failure, septic shock requiring pressor support and mechanical ventilation.

## 2023-12-12 NOTE — PHYSICAL THERAPY INITIAL EVALUATION ADULT - ACTIVE RANGE OF MOTION EXAMINATION, REHAB EVAL
(ltd by poor effort) no active shld, elbow WFL, min R hand-ltd by swelling too, min B knee flex ~10deg, no KE, DF/PF WFL

## 2023-12-12 NOTE — PHYSICAL THERAPY INITIAL EVALUATION ADULT - GENERAL OBSERVATIONS, REHAB EVAL
pt approached this pm., rec'd supine in bed in ICU, son present, NGT, prevena, SCDs, IVs, monitors, O2. pt extubated earlier. R hand edematous-nsg aware. pt non-verbal this encounter, info obtained from son.

## 2023-12-12 NOTE — PROGRESS NOTE ADULT - SUBJECTIVE AND OBJECTIVE BOX
SURGERY DAILY PROGRESS NOTE:     Subjective:  Patient seen and examined this AM at bedside. Received metoprolol for AFib w/ RVR, required initiation of Levo, max 0.7 and as low as 0.03 overnight. NGT w/ mildly blood tinged output. VS reviewed. Unable to obtain ROS due to mental status.    Objective:    MEDICATIONS  (STANDING):  albumin human 25% IVPB 100 milliLiter(s) IV Intermittent every 8 hours  chlorhexidine 0.12% Liquid 15 milliLiter(s) Oral Mucosa every 12 hours  enoxaparin Injectable 40 milliGRAM(s) SubCutaneous every 24 hours  lactated ringers Bolus 250 milliLiter(s) IV Bolus once  lactated ringers Bolus 1000 milliLiter(s) IV Bolus once  lactated ringers. 1000 milliLiter(s) (75 mL/Hr) IV Continuous <Continuous>  lidocaine   4% Patch 1 Patch Transdermal daily  norepinephrine Infusion 0.05 MICROgram(s)/kG/Min (6.81 mL/Hr) IV Continuous <Continuous>  pantoprazole  Injectable 40 milliGRAM(s) IV Push daily  piperacillin/tazobactam IVPB.. 3.375 Gram(s) IV Intermittent every 8 hours  propofol Infusion 50 MICROgram(s)/kG/Min (21.8 mL/Hr) IV Continuous <Continuous>  propofol Infusion 10 MICROgram(s)/kG/Min (4.36 mL/Hr) IV Continuous <Continuous>    MEDICATIONS  (PRN):  acetaminophen   IVPB .. 1000 milliGRAM(s) IV Intermittent once PRN Temp greater or equal to 38C (100.4F), Mild Pain (1 - 3)  ketorolac   Injectable 15 milliGRAM(s) IV Push every 8 hours PRN Severe Pain (7 - 10)  morphine  - Injectable 2 milliGRAM(s) IV Push every 4 hours PRN Moderate Pain (4 - 6)  ondansetron Injectable 4 milliGRAM(s) IV Push every 6 hours PRN Nausea      Vital Signs Last 24 Hrs  T(C): 36 (12 Dec 2023 06:00), Max: 36.8 (11 Dec 2023 10:30)  T(F): 96.8 (12 Dec 2023 06:00), Max: 98.2 (11 Dec 2023 10:30)  HR: 102 (12 Dec 2023 06:02) (56 - 146)  BP: 109/80 (11 Dec 2023 23:45) (58/45 - 160/65)  BP(mean): 89 (11 Dec 2023 23:45) (37 - 96)  RR: 19 (12 Dec 2023 06:00) (12 - 33)  SpO2: 97% (12 Dec 2023 06:02) (92% - 100%)    Parameters below as of 12 Dec 2023 03:22  Patient On (Oxygen Delivery Method): ventilator    O2 Concentration (%): 30      PHYSICAL EXAM   GENERAL: NAD, well developed  HEAD: Atraumatic, normocephalic  EYES: EOMI, PERRLA, conjunctiva and sclera clear  ENT: moist mucous membrane, intubated, NGT w/ blood-tinged bilious output  NECK: supple, No JVD, midline trachea  CHEST/LUNG: No increased WOB, symmetric excursions  Heart: Irregularly irregular rate & rhythm ppp, no peripheral edema  ABDOMEN: Round, distended, soft, nontender, Prevena with suction, drain bulb w/ more serous than sanguineous output  EXTREMITIES: Brisk cap refill. no clubbing or cyanosis  NERVOUS SYSTEM: Alert, arousable, no neuro-deficits  SKIN: warm to touch, no rash or lesions      I&O's Detail    11 Dec 2023 07:01  -  12 Dec 2023 07:00  --------------------------------------------------------  IN:    IV PiggyBack: 400 mL    Lactated Ringers Bolus: 1250 mL    Norepinephrine: 75.3 mL    Other (mL): 4000 mL    Propofol: 10.8 mL  Total IN: 5736.1 mL    OUT:    Drain (mL): 140 mL    Indwelling Catheter - Urethral (mL): 525 mL    Nasogastric/Oral tube (mL): 300 mL    Other (mL): 450 mL  Total OUT: 1415 mL    Total NET: 4321.1 mL          Daily     Daily Weight in k.4 (11 Dec 2023 22:42)    LABS:                        12.3   6.46  )-----------( 174      ( 11 Dec 2023 23:50 )             36.9     12-11    140  |  111<H>  |  19  ----------------------------<  117<H>  4.6   |  22  |  0.99    Ca    7.1<L>      11 Dec 2023 23:50  Phos  4.4       Mg     1.3         TPro  3.8<L>  /  Alb  1.4<L>  /  TBili  1.7<H>  /  DBili  x   /  AST  23  /  ALT  20  /  AlkPhos  17<L>      PT/INR - ( 11 Dec 2023 20:10 )   PT: 14.9 sec;   INR: 1.33 ratio         PTT - ( 11 Dec 2023 20:10 )  PTT:29.1 sec  Urinalysis Basic - ( 11 Dec 2023 23:50 )    Color: x / Appearance: x / SG: x / pH: x  Gluc: 117 mg/dL / Ketone: x  / Bili: x / Urobili: x   Blood: x / Protein: x / Nitrite: x   Leuk Esterase: x / RBC: x / WBC x   Sq Epi: x / Non Sq Epi: x / Bacteria: x   SURGERY DAILY PROGRESS NOTE:     Subjective:  Patient seen and examined this AM at bedside. Received metoprolol for AFib w/ RVR, required initiation of Levo, max 0.7 and as low as 0.03 overnight. NGT w/ mildly blood tinged output. VS reviewed. Unable to obtain ROS due to mental status.    Objective:    MEDICATIONS  (STANDING):  albumin human 25% IVPB 100 milliLiter(s) IV Intermittent every 8 hours  chlorhexidine 0.12% Liquid 15 milliLiter(s) Oral Mucosa every 12 hours  enoxaparin Injectable 40 milliGRAM(s) SubCutaneous every 24 hours  lactated ringers Bolus 250 milliLiter(s) IV Bolus once  lactated ringers Bolus 1000 milliLiter(s) IV Bolus once  lactated ringers. 1000 milliLiter(s) (75 mL/Hr) IV Continuous <Continuous>  lidocaine   4% Patch 1 Patch Transdermal daily  norepinephrine Infusion 0.05 MICROgram(s)/kG/Min (6.81 mL/Hr) IV Continuous <Continuous>  pantoprazole  Injectable 40 milliGRAM(s) IV Push daily  piperacillin/tazobactam IVPB.. 3.375 Gram(s) IV Intermittent every 8 hours  propofol Infusion 50 MICROgram(s)/kG/Min (21.8 mL/Hr) IV Continuous <Continuous>  propofol Infusion 10 MICROgram(s)/kG/Min (4.36 mL/Hr) IV Continuous <Continuous>    MEDICATIONS  (PRN):  acetaminophen   IVPB .. 1000 milliGRAM(s) IV Intermittent once PRN Temp greater or equal to 38C (100.4F), Mild Pain (1 - 3)  ketorolac   Injectable 15 milliGRAM(s) IV Push every 8 hours PRN Severe Pain (7 - 10)  morphine  - Injectable 2 milliGRAM(s) IV Push every 4 hours PRN Moderate Pain (4 - 6)  ondansetron Injectable 4 milliGRAM(s) IV Push every 6 hours PRN Nausea      Vital Signs Last 24 Hrs  T(C): 36 (12 Dec 2023 06:00), Max: 36.8 (11 Dec 2023 10:30)  T(F): 96.8 (12 Dec 2023 06:00), Max: 98.2 (11 Dec 2023 10:30)  HR: 102 (12 Dec 2023 06:02) (56 - 146)  BP: 109/80 (11 Dec 2023 23:45) (58/45 - 160/65)  BP(mean): 89 (11 Dec 2023 23:45) (37 - 96)  RR: 19 (12 Dec 2023 06:00) (12 - 33)  SpO2: 97% (12 Dec 2023 06:02) (92% - 100%)    Parameters below as of 12 Dec 2023 03:22  Patient On (Oxygen Delivery Method): ventilator    O2 Concentration (%): 30      PHYSICAL EXAM   GENERAL: NAD, well developed  HEAD: Atraumatic, normocephalic  EYES: EOMI, PERRLA, conjunctiva and sclera clear  ENT: moist mucous membrane, intubated, NGT w/ blood-tinged bilious output  NECK: supple, No JVD, midline trachea  CHEST/LUNG: No increased WOB, symmetric excursions  Heart: Irregularly irregular rate & rhythm ppp, no peripheral edema  ABDOMEN: Round, distended, soft, nontender, Prevena with suction, drain bulb w/ more serous than sanguineous output, stoma pink & mildly edematous with bowel sweat and no gas in bag  EXTREMITIES: Brisk cap refill. no clubbing or cyanosis  NERVOUS SYSTEM: Alert, arousable, no neuro-deficits  SKIN: warm to touch, no rash or lesions      I&O's Detail    11 Dec 2023 07:01  -  12 Dec 2023 07:00  --------------------------------------------------------  IN:    IV PiggyBack: 400 mL    Lactated Ringers Bolus: 1250 mL    Norepinephrine: 75.3 mL    Other (mL): 4000 mL    Propofol: 10.8 mL  Total IN: 5736.1 mL    OUT:    Drain (mL): 140 mL    Indwelling Catheter - Urethral (mL): 525 mL    Nasogastric/Oral tube (mL): 300 mL    Other (mL): 450 mL  Total OUT: 1415 mL    Total NET: 4321.1 mL          Daily     Daily Weight in k.4 (11 Dec 2023 22:42)    LABS:                        12.3   6.46  )-----------( 174      ( 11 Dec 2023 23:50 )             36.9     12-11    140  |  111<H>  |  19  ----------------------------<  117<H>  4.6   |  22  |  0.99    Ca    7.1<L>      11 Dec 2023 23:50  Phos  4.4       Mg     1.3         TPro  3.8<L>  /  Alb  1.4<L>  /  TBili  1.7<H>  /  DBili  x   /  AST  23  /  ALT  20  /  AlkPhos  17<L>  12    PT/INR - ( 11 Dec 2023 20:10 )   PT: 14.9 sec;   INR: 1.33 ratio         PTT - ( 11 Dec 2023 20:10 )  PTT:29.1 sec  Urinalysis Basic - ( 11 Dec 2023 23:50 )    Color: x / Appearance: x / SG: x / pH: x  Gluc: 117 mg/dL / Ketone: x  / Bili: x / Urobili: x   Blood: x / Protein: x / Nitrite: x   Leuk Esterase: x / RBC: x / WBC x   Sq Epi: x / Non Sq Epi: x / Bacteria: x   SURGERY DAILY PROGRESS NOTE:     Subjective:  Patient seen and examined this AM at bedside. Received metoprolol for AFib w/ RVR, required initiation of Levo, max 0.7 and as low as 0.03 overnight. NGT w/ mildly blood tinged output. VS reviewed. Unable to obtain ROS due to mental status.    Objective:    MEDICATIONS  (STANDING):  albumin human 25% IVPB 100 milliLiter(s) IV Intermittent every 8 hours  chlorhexidine 0.12% Liquid 15 milliLiter(s) Oral Mucosa every 12 hours  enoxaparin Injectable 40 milliGRAM(s) SubCutaneous every 24 hours  lactated ringers Bolus 250 milliLiter(s) IV Bolus once  lactated ringers Bolus 1000 milliLiter(s) IV Bolus once  lactated ringers. 1000 milliLiter(s) (75 mL/Hr) IV Continuous <Continuous>  lidocaine   4% Patch 1 Patch Transdermal daily  norepinephrine Infusion 0.05 MICROgram(s)/kG/Min (6.81 mL/Hr) IV Continuous <Continuous>  pantoprazole  Injectable 40 milliGRAM(s) IV Push daily  piperacillin/tazobactam IVPB.. 3.375 Gram(s) IV Intermittent every 8 hours  propofol Infusion 50 MICROgram(s)/kG/Min (21.8 mL/Hr) IV Continuous <Continuous>  propofol Infusion 10 MICROgram(s)/kG/Min (4.36 mL/Hr) IV Continuous <Continuous>    MEDICATIONS  (PRN):  acetaminophen   IVPB .. 1000 milliGRAM(s) IV Intermittent once PRN Temp greater or equal to 38C (100.4F), Mild Pain (1 - 3)  ketorolac   Injectable 15 milliGRAM(s) IV Push every 8 hours PRN Severe Pain (7 - 10)  morphine  - Injectable 2 milliGRAM(s) IV Push every 4 hours PRN Moderate Pain (4 - 6)  ondansetron Injectable 4 milliGRAM(s) IV Push every 6 hours PRN Nausea      Vital Signs Last 24 Hrs  T(C): 36 (12 Dec 2023 06:00), Max: 36.8 (11 Dec 2023 10:30)  T(F): 96.8 (12 Dec 2023 06:00), Max: 98.2 (11 Dec 2023 10:30)  HR: 102 (12 Dec 2023 06:02) (56 - 146)  BP: 109/80 (11 Dec 2023 23:45) (58/45 - 160/65)  BP(mean): 89 (11 Dec 2023 23:45) (37 - 96)  RR: 19 (12 Dec 2023 06:00) (12 - 33)  SpO2: 97% (12 Dec 2023 06:02) (92% - 100%)    Parameters below as of 12 Dec 2023 03:22  Patient On (Oxygen Delivery Method): ventilator    O2 Concentration (%): 30      PHYSICAL EXAM   GENERAL: NAD, well developed  HEAD: Atraumatic, normocephalic  EYES: EOMI, PERRLA, conjunctiva and sclera clear  ENT: moist mucous membrane, intubated, NGT w/ blood-tinged bilious output  NECK: supple, No JVD, midline trachea  CHEST/LUNG: No increased WOB, symmetric excursions  Heart: Irregularly irregular rate & rhythm ppp, no peripheral edema  ABDOMEN: Round, distended, soft, nontender, Prevena with suction, drain bulb w/ more serous than sanguineous output, stoma pink & mildly edematous with bowel sweat and no gas in bag  : Leyva w/ clear mariella colored fluid in tubing  EXTREMITIES: Brisk cap refill. no clubbing or cyanosis  NERVOUS SYSTEM: Alert, arousable, no neuro-deficits  SKIN: warm to touch, no rash or lesions      I&O's Detail    11 Dec 2023 07:01  -  12 Dec 2023 07:00  --------------------------------------------------------  IN:    IV PiggyBack: 400 mL    Lactated Ringers Bolus: 1250 mL    Norepinephrine: 75.3 mL    Other (mL): 4000 mL    Propofol: 10.8 mL  Total IN: 5736.1 mL    OUT:    Drain (mL): 140 mL    Indwelling Catheter - Urethral (mL): 525 mL    Nasogastric/Oral tube (mL): 300 mL    Other (mL): 450 mL  Total OUT: 1415 mL    Total NET: 4321.1 mL          Daily     Daily Weight in k.4 (11 Dec 2023 22:42)    LABS:                        12.3   6.46  )-----------( 174      ( 11 Dec 2023 23:50 )             36.9         140  |  111<H>  |  19  ----------------------------<  117<H>  4.6   |  22  |  0.99    Ca    7.1<L>      11 Dec 2023 23:50  Phos  4.4       Mg     1.3         TPro  3.8<L>  /  Alb  1.4<L>  /  TBili  1.7<H>  /  DBili  x   /  AST  23  /  ALT  20  /  AlkPhos  17<L>  12    PT/INR - ( 11 Dec 2023 20:10 )   PT: 14.9 sec;   INR: 1.33 ratio         PTT - ( 11 Dec 2023 20:10 )  PTT:29.1 sec  Urinalysis Basic - ( 11 Dec 2023 23:50 )    Color: x / Appearance: x / SG: x / pH: x  Gluc: 117 mg/dL / Ketone: x  / Bili: x / Urobili: x   Blood: x / Protein: x / Nitrite: x   Leuk Esterase: x / RBC: x / WBC x   Sq Epi: x / Non Sq Epi: x / Bacteria: x

## 2023-12-12 NOTE — PROVIDER CONTACT NOTE (EICU) - ACTION/TREATMENT ORDERED:
I have personally provided care, evaluated the patient, reviewed all orders/labs/imaging and/or discussed case with site/Tele provider(s) through telehealth encounter.

## 2023-12-12 NOTE — PROGRESS NOTE ADULT - SUBJECTIVE AND OBJECTIVE BOX
Patient is a 83y old  Female who presents with a chief complaint of colonic perforation with pneumoperitoneum (12 Dec 2023 05:05)    24 hour events:     Allergies    No Known Allergies    Intolerances      REVIEW OF SYSTEMS: SEE BELOW       ICU Vital Signs Last 24 Hrs  T(C): 37.1 (12 Dec 2023 12:00), Max: 37.3 (12 Dec 2023 08:00)  T(F): 98.7 (12 Dec 2023 12:00), Max: 99.2 (12 Dec 2023 08:00)  HR: 106 (12 Dec 2023 14:00) (90 - 146)  BP: 130/52 (12 Dec 2023 09:00) (58/45 - 160/65)  BP(mean): 74 (12 Dec 2023 09:00) (37 - 96)  ABP: 147/60 (12 Dec 2023 14:00) (63/35 - 192/77)  ABP(mean): 86 (12 Dec 2023 14:00) (44 - 110)  RR: 22 (12 Dec 2023 14:00) (12 - 29)  SpO2: 92% (12 Dec 2023 14:00) (92% - 100%)    O2 Parameters below as of 12 Dec 2023 14:00  Patient On (Oxygen Delivery Method): nasal cannula  O2 Flow (L/min): 2          CAPILLARY BLOOD GLUCOSE      POCT Blood Glucose.: 100 mg/dL (12 Dec 2023 12:55)  POCT Blood Glucose.: 109 mg/dL (12 Dec 2023 06:04)  POCT Blood Glucose.: 106 mg/dL (12 Dec 2023 02:20)  POCT Blood Glucose.: 88 mg/dL (11 Dec 2023 19:43)      I&O's Summary    11 Dec 2023 07:01  -  12 Dec 2023 07:00  --------------------------------------------------------  IN: 5736.1 mL / OUT: 1415 mL / NET: 4321.1 mL    12 Dec 2023 07:01  -  12 Dec 2023 15:07  --------------------------------------------------------  IN: 122.1 mL / OUT: 250 mL / NET: -127.9 mL        Mode: standby      MEDICATIONS  (STANDING):  enoxaparin Injectable 40 milliGRAM(s) SubCutaneous every 24 hours  lactated ringers. 1000 milliLiter(s) (100 mL/Hr) IV Continuous <Continuous>  lidocaine   4% Patch 1 Patch Transdermal daily  norepinephrine Infusion 0.05 MICROgram(s)/kG/Min (6.81 mL/Hr) IV Continuous <Continuous>  pantoprazole  Injectable 40 milliGRAM(s) IV Push two times a day  piperacillin/tazobactam IVPB.. 3.375 Gram(s) IV Intermittent every 8 hours      MEDICATIONS  (PRN):  acetaminophen   IVPB .. 1000 milliGRAM(s) IV Intermittent once PRN Temp greater or equal to 38C (100.4F), Mild Pain (1 - 3)  ketorolac   Injectable 15 milliGRAM(s) IV Push every 8 hours PRN Severe Pain (7 - 10)  morphine  - Injectable 2 milliGRAM(s) IV Push every 4 hours PRN Moderate Pain (4 - 6)  ondansetron Injectable 4 milliGRAM(s) IV Push every 6 hours PRN Nausea      PHYSICAL EXAM: SEE BELOW                          11.4   8.11  )-----------( 162      ( 12 Dec 2023 07:53 )             34.2     Bands 7.0    12-12    137  |  107  |  22  ----------------------------<  120<H>  4.3   |  22  |  1.26    Ca    8.1<L>      12 Dec 2023 07:53  Phos  4.0     12-12  Mg     2.2     12-12    TPro  5.5<L>  /  Alb  2.8<L>  /  TBili  2.8<H>  /  DBili  x   /  AST  22  /  ALT  21  /  AlkPhos  19<L>  12-12    Lactate 2.1           12-12 @ 07:53    Lactate 4.8           12-11 @ 23:50    Lactate 3.4           12-11 @ 20:10    Lactate 3.6           12-11 @ 18:20          PT/INR - ( 11 Dec 2023 20:10 )   PT: 14.9 sec;   INR: 1.33 ratio         PTT - ( 11 Dec 2023 20:10 )  PTT:29.1 sec  Urinalysis Basic - ( 12 Dec 2023 09:10 )    Color: Dark Yellow / Appearance: Cloudy / SG: >1.030 / pH: x  Gluc: x / Ketone: Negative mg/dL  / Bili: Small / Urobili: 1.0 mg/dL   Blood: x / Protein: 30 mg/dL / Nitrite: Negative   Leuk Esterase: Trace / RBC: 98 /HPF / WBC 4 /HPF   Sq Epi: x / Non Sq Epi: 5 /HPF / Bacteria: Negative /HPF      .Other culture, peritonitis   Testing in progress -- 12-11 @ 19:09  .Blood None   No growth at 24 hours -- 12-11 @ 10:57

## 2023-12-12 NOTE — PROVIDER CONTACT NOTE (EICU) - BACKGROUND
83F p/w colonic perforation with pneumoperitoneum s/p ex lap Kent's procedure. Now in ICU post-operatively.

## 2023-12-12 NOTE — PHYSICAL THERAPY INITIAL EVALUATION ADULT - MANUAL MUSCLE TESTING RESULTS, REHAB EVAL
B shld at least trace, elbow 3+, B KE at least trace (+QS), B hip/knee flex at least 2-, DF/PF 4/5- appears ltd by poor effort vs true weakness

## 2023-12-12 NOTE — PROGRESS NOTE ADULT - SUBJECTIVE AND OBJECTIVE BOX
Date of service  is equal to the date of entry    Patient is a 83y old  Female who presents with a chief complaint of colonic perforation with pneumoperitoneaum (12 Dec 2023 15:07)    PAST MEDICAL & SURGICAL HISTORY:  HLD (hyperlipidemia)      History of COPD        REJI SCHMIDT   83y    Female    BRIEF HOSPITAL COURSE:    Review of Systems:                       All other ROS are negative.    Allergies    No Known Allergies    Intolerances          ICU Vital Signs Last 24 Hrs  T(C): 37.1 (12 Dec 2023 16:00), Max: 37.3 (12 Dec 2023 08:00)  T(F): 98.7 (12 Dec 2023 16:00), Max: 99.2 (12 Dec 2023 08:00)  HR: 107 (12 Dec 2023 22:00) (96 - 117)  BP: 130/52 (12 Dec 2023 09:00) (130/52 - 130/52)  BP(mean): 74 (12 Dec 2023 09:00) (74 - 74)  ABP: 106/40 (12 Dec 2023 22:00) (89/37 - 147/60)  ABP(mean): 59 (12 Dec 2023 22:00) (52 - 89)  RR: 16 (12 Dec 2023 22:00) (16 - 29)  SpO2: 98% (12 Dec 2023 22:00) (92% - 100%)    O2 Parameters below as of 12 Dec 2023 19:00  Patient On (Oxygen Delivery Method): nasal cannula  O2 Flow (L/min): 2        Physical Examination:    General:     HEENT:     PULM:     CVS:     ABD:     EXT:     SKIN:     Neuro:    ABG - ( 11 Dec 2023 21:26 )  pH, Arterial: 7.28  pH, Blood: x     /  pCO2: 41    /  pO2: 156   / HCO3: 19    / Base Excess: -7.1  /  SaO2: 99.0              Mode: standby    Mode: standby  LABS:                        11.4   8.11  )-----------( 162      ( 12 Dec 2023 07:53 )             34.2     12-12    137  |  107  |  22  ----------------------------<  108<H>  4.0   |  22  |  1.23    Ca    7.7<L>      12 Dec 2023 15:25  Phos  4.0     12-12  Mg     2.2     12-12    TPro  5.5<L>  /  Alb  2.8<L>  /  TBili  2.8<H>  /  DBili  x   /  AST  22  /  ALT  21  /  AlkPhos  19<L>  12-12          CAPILLARY BLOOD GLUCOSE      POCT Blood Glucose.: 99 mg/dL (12 Dec 2023 23:51)  POCT Blood Glucose.: 105 mg/dL (12 Dec 2023 17:57)  POCT Blood Glucose.: 100 mg/dL (12 Dec 2023 12:55)  POCT Blood Glucose.: 109 mg/dL (12 Dec 2023 06:04)  POCT Blood Glucose.: 106 mg/dL (12 Dec 2023 02:20)    PT/INR - ( 11 Dec 2023 20:10 )   PT: 14.9 sec;   INR: 1.33 ratio         PTT - ( 11 Dec 2023 20:10 )  PTT:29.1 sec  Urinalysis Basic - ( 12 Dec 2023 15:25 )    Color: x / Appearance: x / SG: x / pH: x  Gluc: 108 mg/dL / Ketone: x  / Bili: x / Urobili: x   Blood: x / Protein: x / Nitrite: x   Leuk Esterase: x / RBC: x / WBC x   Sq Epi: x / Non Sq Epi: x / Bacteria: x      CULTURES:  Culture Results:   Rare Proteus mirabilis (12-11 @ 19:09)  Culture Results:   Testing in progress (12-11 @ 19:09)  Culture Results:   No growth at 24 hours (12-11 @ 10:57)  Culture Results:   No growth at 24 hours (12-11 @ 10:57)      Medications:  MEDICATIONS  (STANDING):  enoxaparin Injectable 40 milliGRAM(s) SubCutaneous every 24 hours  lactated ringers. 1000 milliLiter(s) (100 mL/Hr) IV Continuous <Continuous>  lidocaine   4% Patch 1 Patch Transdermal daily  pantoprazole  Injectable 40 milliGRAM(s) IV Push two times a day  piperacillin/tazobactam IVPB.. 3.375 Gram(s) IV Intermittent every 8 hours    MEDICATIONS  (PRN):  acetaminophen   IVPB .. 1000 milliGRAM(s) IV Intermittent once PRN Temp greater or equal to 38C (100.4F), Mild Pain (1 - 3)  ketorolac   Injectable 15 milliGRAM(s) IV Push every 8 hours PRN Severe Pain (7 - 10)  morphine  - Injectable 2 milliGRAM(s) IV Push every 4 hours PRN Moderate Pain (4 - 6)  ondansetron Injectable 4 milliGRAM(s) IV Push every 6 hours PRN Nausea        12-11 @ 07:01 - 12-12 @ 07:00  --------------------------------------------------------  IN: 5736.1 mL / OUT: 1415 mL / NET: 4321.1 mL    12-12 @ 07:01 - 12-12 @ 23:53  --------------------------------------------------------  IN: 975.1 mL / OUT: 752 mL / NET: 223.1 mL        RADIOLOGY/IMAGING/ECHO    Critical care point of care ultrasound:    Assessment/Plan:          CRITICAL CARE TIME SPENT:    37 minutes assessing presenting problems of acute illness, which pose high probability of life threatening deterioration or end organ damage/dysfunction, as well as medical decision making including initiating plan of care, reviewing data, reviewing radiologic exams, discussing with multidisciplinary team,  discussing goals of care with patient/family, and writing this note.  Non-inclusive of procedures performed.  The date of service for this encounter is the entered date.         Date of service  is equal to the date of entry    Patient is a 83y old  Female who presents with a chief complaint of colonic perforation with pneumoperitoneaum (12 Dec 2023 15:07)    PAST MEDICAL & SURGICAL HISTORY:  HLD (hyperlipidemia)      History of COPD        REJI SCHMIDT   83y    Female    BRIEF HOSPITAL COURSE:    Review of Systems:                       All other ROS are negative.    Allergies    No Known Allergies    Intolerances          ICU Vital Signs Last 24 Hrs  T(C): 37.1 (12 Dec 2023 16:00), Max: 37.3 (12 Dec 2023 08:00)  T(F): 98.7 (12 Dec 2023 16:00), Max: 99.2 (12 Dec 2023 08:00)  HR: 107 (12 Dec 2023 22:00) (96 - 117)  BP: 130/52 (12 Dec 2023 09:00) (130/52 - 130/52)  BP(mean): 74 (12 Dec 2023 09:00) (74 - 74)  ABP: 106/40 (12 Dec 2023 22:00) (89/37 - 147/60)  ABP(mean): 59 (12 Dec 2023 22:00) (52 - 89)  RR: 16 (12 Dec 2023 22:00) (16 - 29)  SpO2: 98% (12 Dec 2023 22:00) (92% - 100%)    O2 Parameters below as of 12 Dec 2023 19:00  Patient On (Oxygen Delivery Method): nasal cannula  O2 Flow (L/min): 2        Physical Examination:    General:     HEENT:  No JVD    PULM:  bilateral BS     CVS: s1 s2 reg tachy     ABD: ostomy pink     EXT:  + edema     SKIN: warm    Neuro: weak MOLINA    ABG - ( 11 Dec 2023 21:26 )  pH, Arterial: 7.28  pH, Blood: x     /  pCO2: 41    /  pO2: 156   / HCO3: 19    / Base Excess: -7.1  /  SaO2: 99.0              Mode: standby    Mode: standby  LABS:                        11.4   8.11  )-----------( 162      ( 12 Dec 2023 07:53 )             34.2     12-12    137  |  107  |  22  ----------------------------<  108<H>  4.0   |  22  |  1.23    Ca    7.7<L>      12 Dec 2023 15:25  Phos  4.0     12-12  Mg     2.2     12-12    TPro  5.5<L>  /  Alb  2.8<L>  /  TBili  2.8<H>  /  DBili  x   /  AST  22  /  ALT  21  /  AlkPhos  19<L>  12-12          CAPILLARY BLOOD GLUCOSE      POCT Blood Glucose.: 99 mg/dL (12 Dec 2023 23:51)  POCT Blood Glucose.: 105 mg/dL (12 Dec 2023 17:57)  POCT Blood Glucose.: 100 mg/dL (12 Dec 2023 12:55)  POCT Blood Glucose.: 109 mg/dL (12 Dec 2023 06:04)  POCT Blood Glucose.: 106 mg/dL (12 Dec 2023 02:20)    PT/INR - ( 11 Dec 2023 20:10 )   PT: 14.9 sec;   INR: 1.33 ratio         PTT - ( 11 Dec 2023 20:10 )  PTT:29.1 sec  Urinalysis Basic - ( 12 Dec 2023 15:25 )    Color: x / Appearance: x / SG: x / pH: x  Gluc: 108 mg/dL / Ketone: x  / Bili: x / Urobili: x   Blood: x / Protein: x / Nitrite: x   Leuk Esterase: x / RBC: x / WBC x   Sq Epi: x / Non Sq Epi: x / Bacteria: x      CULTURES:  Culture Results:   Rare Proteus mirabilis (12-11 @ 19:09)  Culture Results:   Testing in progress (12-11 @ 19:09)  Culture Results:   No growth at 24 hours (12-11 @ 10:57)  Culture Results:   No growth at 24 hours (12-11 @ 10:57)      Medications:  MEDICATIONS  (STANDING):  enoxaparin Injectable 40 milliGRAM(s) SubCutaneous every 24 hours  lactated ringers. 1000 milliLiter(s) (100 mL/Hr) IV Continuous <Continuous>  lidocaine   4% Patch 1 Patch Transdermal daily  pantoprazole  Injectable 40 milliGRAM(s) IV Push two times a day  piperacillin/tazobactam IVPB.. 3.375 Gram(s) IV Intermittent every 8 hours    MEDICATIONS  (PRN):  acetaminophen   IVPB .. 1000 milliGRAM(s) IV Intermittent once PRN Temp greater or equal to 38C (100.4F), Mild Pain (1 - 3)  ketorolac   Injectable 15 milliGRAM(s) IV Push every 8 hours PRN Severe Pain (7 - 10)  morphine  - Injectable 2 milliGRAM(s) IV Push every 4 hours PRN Moderate Pain (4 - 6)  ondansetron Injectable 4 milliGRAM(s) IV Push every 6 hours PRN Nausea        12-11 @ 07:01  -  12-12 @ 07:00  --------------------------------------------------------  IN: 5736.1 mL / OUT: 1415 mL / NET: 4321.1 mL    12-12 @ 07:01  -  12-12 @ 23:53  --------------------------------------------------------  IN: 975.1 mL / OUT: 752 mL / NET: 223.1 mL        RADIOLOGY/IMAGING/ECHO    < from: CT Abdomen and Pelvis w/ Oral Cont and w/ IV Cont (12.11.23 @ 12:45) >  IMPRESSION:  Evidence of pneumoperitoneum. Suspect rectal perforation considering the   presence of perirectal gas and pneumatosis.    Additional findings as above.    Findings were discussed with Dr. TRAY GÓMEZ [phone] 12/11/2023 1:16   PM by Dr. Tejeda read back confirmation.    < end of copied text >      < from: TTE Echo Complete w/o Contrast w/ Doppler (12.12.23 @ 14:00) >   Impression     Summary     Mild mitral annular calcification is present.   Moderate (2+) mitral regurgitation is present.   Mild aortic sclerosis is present with normal valvular opening.   Moderate to severe (3+) tricuspid valve regurgitation is present.   Mild pulmonary hypertension. Pulmonary pressures can be underestimated by   this study.   Normal appearing pulmonic valve structure and function.   Normal appearing left atrium.   The left ventricle is normal in size, wall thickness, wall motion and   contractility.   Estimatedleft ventricular ejection fraction is 55-60 %.   The right atrium appears mildly dilated.   Normal appearing right ventricle structure and function.      Assessment/Plan:    83F Good functional status  hx  COPD, HLD admit this morning with abd pain.  Free air in the peritoneum on CT     POD #01 Ex lap Christina's   Extensive stercocolitis with 2 feculent perforation of sigmoid & rectum. Necrotic segment of colon also noticed    Did well on PS ventilation > Extubated today resp status OK, but she is weak.    Pressors tapered off  Lactate has cleared   Proteus in the OR cultures no + blood cultures.    JAZMIN improved UOP adequate.    Pip/tazo  DVT P    Keep IVF but decrease rate     She is total body fluid overloaded      Now runs of SVT  RX IV metoprolol     Check Mg++ K+             CRITICAL CARE TIME SPENT:  33  minutes assessing presenting problems of acute illness, which pose high probability of life threatening deterioration or end organ damage/dysfunction, as well as medical decision making including initiating plan of care, reviewing data, reviewing radiologic exams, discussing with multidisciplinary team,  discussing goals of care with patient/family, and writing this note.  Non-inclusive of procedures performed.  The date of service for this encounter is the entered date.         Date of service  is equal to the date of entry    Patient is a 83y old  Female who presents with a chief complaint of colonic perforation with pneumoperitoneaum (12 Dec 2023 15:07)    PAST MEDICAL & SURGICAL HISTORY:  HLD (hyperlipidemia)      History of COPD        REJI SCHMIDT   83y    Female    BRIEF HOSPITAL COURSE:    Review of Systems:                       All other ROS are negative.    Allergies    No Known Allergies    Intolerances          ICU Vital Signs Last 24 Hrs  T(C): 37.1 (12 Dec 2023 16:00), Max: 37.3 (12 Dec 2023 08:00)  T(F): 98.7 (12 Dec 2023 16:00), Max: 99.2 (12 Dec 2023 08:00)  HR: 107 (12 Dec 2023 22:00) (96 - 117)  BP: 130/52 (12 Dec 2023 09:00) (130/52 - 130/52)  BP(mean): 74 (12 Dec 2023 09:00) (74 - 74)  ABP: 106/40 (12 Dec 2023 22:00) (89/37 - 147/60)  ABP(mean): 59 (12 Dec 2023 22:00) (52 - 89)  RR: 16 (12 Dec 2023 22:00) (16 - 29)  SpO2: 98% (12 Dec 2023 22:00) (92% - 100%)    O2 Parameters below as of 12 Dec 2023 19:00  Patient On (Oxygen Delivery Method): nasal cannula  O2 Flow (L/min): 2        Physical Examination:    General:     HEENT:  No JVD    PULM:  bilateral BS     CVS: s1 s2 reg tachy     ABD: ostomy pink     EXT:  + edema     SKIN: warm    Neuro: weak MOLINA    ABG - ( 11 Dec 2023 21:26 )  pH, Arterial: 7.28  pH, Blood: x     /  pCO2: 41    /  pO2: 156   / HCO3: 19    / Base Excess: -7.1  /  SaO2: 99.0              Mode: standby    Mode: standby  LABS:                        11.4   8.11  )-----------( 162      ( 12 Dec 2023 07:53 )             34.2     12-12    137  |  107  |  22  ----------------------------<  108<H>  4.0   |  22  |  1.23    Ca    7.7<L>      12 Dec 2023 15:25  Phos  4.0     12-12  Mg     2.2     12-12    TPro  5.5<L>  /  Alb  2.8<L>  /  TBili  2.8<H>  /  DBili  x   /  AST  22  /  ALT  21  /  AlkPhos  19<L>  12-12          CAPILLARY BLOOD GLUCOSE      POCT Blood Glucose.: 99 mg/dL (12 Dec 2023 23:51)  POCT Blood Glucose.: 105 mg/dL (12 Dec 2023 17:57)  POCT Blood Glucose.: 100 mg/dL (12 Dec 2023 12:55)  POCT Blood Glucose.: 109 mg/dL (12 Dec 2023 06:04)  POCT Blood Glucose.: 106 mg/dL (12 Dec 2023 02:20)    PT/INR - ( 11 Dec 2023 20:10 )   PT: 14.9 sec;   INR: 1.33 ratio         PTT - ( 11 Dec 2023 20:10 )  PTT:29.1 sec  Urinalysis Basic - ( 12 Dec 2023 15:25 )    Color: x / Appearance: x / SG: x / pH: x  Gluc: 108 mg/dL / Ketone: x  / Bili: x / Urobili: x   Blood: x / Protein: x / Nitrite: x   Leuk Esterase: x / RBC: x / WBC x   Sq Epi: x / Non Sq Epi: x / Bacteria: x      CULTURES:  Culture Results:   Rare Proteus mirabilis (12-11 @ 19:09)  Culture Results:   Testing in progress (12-11 @ 19:09)  Culture Results:   No growth at 24 hours (12-11 @ 10:57)  Culture Results:   No growth at 24 hours (12-11 @ 10:57)      Medications:  MEDICATIONS  (STANDING):  enoxaparin Injectable 40 milliGRAM(s) SubCutaneous every 24 hours  lactated ringers. 1000 milliLiter(s) (100 mL/Hr) IV Continuous <Continuous>  lidocaine   4% Patch 1 Patch Transdermal daily  pantoprazole  Injectable 40 milliGRAM(s) IV Push two times a day  piperacillin/tazobactam IVPB.. 3.375 Gram(s) IV Intermittent every 8 hours    MEDICATIONS  (PRN):  acetaminophen   IVPB .. 1000 milliGRAM(s) IV Intermittent once PRN Temp greater or equal to 38C (100.4F), Mild Pain (1 - 3)  ketorolac   Injectable 15 milliGRAM(s) IV Push every 8 hours PRN Severe Pain (7 - 10)  morphine  - Injectable 2 milliGRAM(s) IV Push every 4 hours PRN Moderate Pain (4 - 6)  ondansetron Injectable 4 milliGRAM(s) IV Push every 6 hours PRN Nausea        12-11 @ 07:01  -  12-12 @ 07:00  --------------------------------------------------------  IN: 5736.1 mL / OUT: 1415 mL / NET: 4321.1 mL    12-12 @ 07:01  -  12-12 @ 23:53  --------------------------------------------------------  IN: 975.1 mL / OUT: 752 mL / NET: 223.1 mL        RADIOLOGY/IMAGING/ECHO    < from: CT Abdomen and Pelvis w/ Oral Cont and w/ IV Cont (12.11.23 @ 12:45) >  IMPRESSION:  Evidence of pneumoperitoneum. Suspect rectal perforation considering the   presence of perirectal gas and pneumatosis.    Additional findings as above.    Findings were discussed with Dr. TRAY GÓMEZ [phone] 12/11/2023 1:16   PM by Dr. Tejeda read back confirmation.    < end of copied text >      < from: TTE Echo Complete w/o Contrast w/ Doppler (12.12.23 @ 14:00) >   Impression     Summary     Mild mitral annular calcification is present.   Moderate (2+) mitral regurgitation is present.   Mild aortic sclerosis is present with normal valvular opening.   Moderate to severe (3+) tricuspid valve regurgitation is present.   Mild pulmonary hypertension. Pulmonary pressures can be underestimated by   this study.   Normal appearing pulmonic valve structure and function.   Normal appearing left atrium.   The left ventricle is normal in size, wall thickness, wall motion and   contractility.   Estimatedleft ventricular ejection fraction is 55-60 %.   The right atrium appears mildly dilated.   Normal appearing right ventricle structure and function.      Assessment/Plan:    83F Good functional status  hx  COPD, HLD admit this morning with abd pain.  Free air in the peritoneum on CT     POD #01 Ex lap Christina's   Extensive stercocolitis with 2 feculent perforation of sigmoid & rectum. Necrotic segment of colon also noticed    Did well on PS ventilation > Extubated today resp status OK, but she is weak.    Pressors tapered off  Lactate has cleared   Proteus in the OR cultures no + blood cultures.    JAZMIN improved UOP adequate.    Pip/tazo  DVT P    Keep IVF but decrease rate     She is total body fluid overloaded      Now runs of SVT  RX IV metoprolol     Check Mg++ K+             CRITICAL CARE TIME SPENT:  33  minutes assessing presenting problems of acute illness, which pose high probability of life threatening deterioration or end organ damage/dysfunction, as well as medical decision making including initiating plan of care, reviewing data, reviewing radiologic exams, discussing with multidisciplinary team,  discussing goals of care with patient/family, and writing this note.  Non-inclusive of procedures performed.  The date of service for this encounter is the entered date.

## 2023-12-12 NOTE — PHYSICAL THERAPY INITIAL EVALUATION ADULT - DIAGNOSIS, PT EVAL
Post op Respiratory failure, septic shock, colonic perforation with pneumoperitoneaum, s/p Crhistina's Post op Respiratory failure, septic shock, colonic perforation with pneumoperitoneaum, s/p Christina's

## 2023-12-12 NOTE — PROGRESS NOTE ADULT - ASSESSMENT
83y Female PMH COPD, HLD     Presented with abdominal pain, N, V, constipation x 2 days     Found to have perf viscus     Emergent OR 12/11 - ex lap, abdominal washout, Christina's colectomy  Found to have perforation of sigmoid and proximal rectum       Admitted:    Septic shock from perforated colon and feculent peritonitis   Prerenal JAZMIN   Lactic acidosis   Post-operative acute resp failure       Plan:     Neuro - daily reorientation. Pain mx prn. Avoid deliriogenic meds. PT, OOB when stable    CV - on levo, wean for goal MAP>65, continue hemodynamic and telemetry monitoring. Found to have dilated RV, RA on bedside ECHO, will get official TTE     Resp - ventilation support, was on AC vol in am, placed on PSV 8/6 and tolerated it, no ETT secretions, extubated now and doing ok. Order IS. HOB elevation. Aspiration precautions    GI - NPO today. Monitor abd exam, CORKY output, colostomy care. Trend LFTs, avoid hepatotoxic meds    Renal - monitor UO and renal fn. IVF. Trend and replete lytes prn. Avoid nephrotoxic meds. D/c daugherty in am    ID - empiric zosyn, f/u cx, trend WBC and temp    Endo - maintain euglycemia     DVT ppx with lovenox         Patient is critically ill with organ dysfunction and/or high risk for decompensation, requires close monitoring and frequent bedside assessments with necessary therapy change to prevent further clinical deterioration. Heme - transfuse PRBC for Hb<7 unless active bleeding or HD unstable       Son and daughter updated on current status and plan

## 2023-12-13 LAB
-  AMOXICILLIN/CLAVULANIC ACID: SIGNIFICANT CHANGE UP
-  AMOXICILLIN/CLAVULANIC ACID: SIGNIFICANT CHANGE UP
-  AMPICILLIN/SULBACTAM: SIGNIFICANT CHANGE UP
-  AMPICILLIN/SULBACTAM: SIGNIFICANT CHANGE UP
-  AMPICILLIN: SIGNIFICANT CHANGE UP
-  AMPICILLIN: SIGNIFICANT CHANGE UP
-  AZTREONAM: SIGNIFICANT CHANGE UP
-  AZTREONAM: SIGNIFICANT CHANGE UP
-  BLOOD PCR PANEL: SIGNIFICANT CHANGE UP
-  BLOOD PCR PANEL: SIGNIFICANT CHANGE UP
-  CEFAZOLIN: SIGNIFICANT CHANGE UP
-  CEFAZOLIN: SIGNIFICANT CHANGE UP
-  CEFEPIME: SIGNIFICANT CHANGE UP
-  CEFEPIME: SIGNIFICANT CHANGE UP
-  CEFOXITIN: SIGNIFICANT CHANGE UP
-  CEFOXITIN: SIGNIFICANT CHANGE UP
-  CEFTRIAXONE: SIGNIFICANT CHANGE UP
-  CEFTRIAXONE: SIGNIFICANT CHANGE UP
-  CIPROFLOXACIN: SIGNIFICANT CHANGE UP
-  CIPROFLOXACIN: SIGNIFICANT CHANGE UP
-  ERTAPENEM: SIGNIFICANT CHANGE UP
-  ERTAPENEM: SIGNIFICANT CHANGE UP
-  GENTAMICIN: SIGNIFICANT CHANGE UP
-  GENTAMICIN: SIGNIFICANT CHANGE UP
-  LEVOFLOXACIN: SIGNIFICANT CHANGE UP
-  LEVOFLOXACIN: SIGNIFICANT CHANGE UP
-  MEROPENEM: SIGNIFICANT CHANGE UP
-  MEROPENEM: SIGNIFICANT CHANGE UP
-  PIPERACILLIN/TAZOBACTAM: SIGNIFICANT CHANGE UP
-  PIPERACILLIN/TAZOBACTAM: SIGNIFICANT CHANGE UP
-  TOBRAMYCIN: SIGNIFICANT CHANGE UP
-  TOBRAMYCIN: SIGNIFICANT CHANGE UP
-  TRIMETHOPRIM/SULFAMETHOXAZOLE: SIGNIFICANT CHANGE UP
-  TRIMETHOPRIM/SULFAMETHOXAZOLE: SIGNIFICANT CHANGE UP
ANION GAP SERPL CALC-SCNC: 7 MMOL/L — SIGNIFICANT CHANGE UP (ref 5–17)
ANION GAP SERPL CALC-SCNC: 7 MMOL/L — SIGNIFICANT CHANGE UP (ref 5–17)
ANISOCYTOSIS BLD QL: SLIGHT — SIGNIFICANT CHANGE UP
ANISOCYTOSIS BLD QL: SLIGHT — SIGNIFICANT CHANGE UP
BASOPHILS # BLD AUTO: 0 K/UL — SIGNIFICANT CHANGE UP (ref 0–0.2)
BASOPHILS # BLD AUTO: 0 K/UL — SIGNIFICANT CHANGE UP (ref 0–0.2)
BASOPHILS NFR BLD AUTO: 0 % — SIGNIFICANT CHANGE UP (ref 0–2)
BASOPHILS NFR BLD AUTO: 0 % — SIGNIFICANT CHANGE UP (ref 0–2)
BUN SERPL-MCNC: 22 MG/DL — SIGNIFICANT CHANGE UP (ref 7–23)
BUN SERPL-MCNC: 22 MG/DL — SIGNIFICANT CHANGE UP (ref 7–23)
CALCIUM SERPL-MCNC: 7.5 MG/DL — LOW (ref 8.5–10.1)
CALCIUM SERPL-MCNC: 7.5 MG/DL — LOW (ref 8.5–10.1)
CHLORIDE SERPL-SCNC: 107 MMOL/L — SIGNIFICANT CHANGE UP (ref 96–108)
CHLORIDE SERPL-SCNC: 107 MMOL/L — SIGNIFICANT CHANGE UP (ref 96–108)
CO2 SERPL-SCNC: 23 MMOL/L — SIGNIFICANT CHANGE UP (ref 22–31)
CO2 SERPL-SCNC: 23 MMOL/L — SIGNIFICANT CHANGE UP (ref 22–31)
CREAT SERPL-MCNC: 1.09 MG/DL — SIGNIFICANT CHANGE UP (ref 0.5–1.3)
CREAT SERPL-MCNC: 1.09 MG/DL — SIGNIFICANT CHANGE UP (ref 0.5–1.3)
CULTURE RESULTS: ABNORMAL
CULTURE RESULTS: ABNORMAL
CULTURE RESULTS: NO GROWTH — SIGNIFICANT CHANGE UP
EGFR: 50 ML/MIN/1.73M2 — LOW
EGFR: 50 ML/MIN/1.73M2 — LOW
EOSINOPHIL # BLD AUTO: 0 K/UL — SIGNIFICANT CHANGE UP (ref 0–0.5)
EOSINOPHIL # BLD AUTO: 0 K/UL — SIGNIFICANT CHANGE UP (ref 0–0.5)
EOSINOPHIL NFR BLD AUTO: 0 % — SIGNIFICANT CHANGE UP (ref 0–6)
EOSINOPHIL NFR BLD AUTO: 0 % — SIGNIFICANT CHANGE UP (ref 0–6)
GLUCOSE BLDC GLUCOMTR-MCNC: 87 MG/DL — SIGNIFICANT CHANGE UP (ref 70–99)
GLUCOSE BLDC GLUCOMTR-MCNC: 87 MG/DL — SIGNIFICANT CHANGE UP (ref 70–99)
GLUCOSE BLDC GLUCOMTR-MCNC: 94 MG/DL — SIGNIFICANT CHANGE UP (ref 70–99)
GLUCOSE BLDC GLUCOMTR-MCNC: 94 MG/DL — SIGNIFICANT CHANGE UP (ref 70–99)
GLUCOSE BLDC GLUCOMTR-MCNC: 98 MG/DL — SIGNIFICANT CHANGE UP (ref 70–99)
GLUCOSE BLDC GLUCOMTR-MCNC: 98 MG/DL — SIGNIFICANT CHANGE UP (ref 70–99)
GLUCOSE SERPL-MCNC: 111 MG/DL — HIGH (ref 70–99)
GLUCOSE SERPL-MCNC: 111 MG/DL — HIGH (ref 70–99)
GRAM STN FLD: ABNORMAL
GRAM STN FLD: ABNORMAL
HCT VFR BLD CALC: 31.4 % — LOW (ref 34.5–45)
HCT VFR BLD CALC: 31.4 % — LOW (ref 34.5–45)
HGB BLD-MCNC: 10.5 G/DL — LOW (ref 11.5–15.5)
HGB BLD-MCNC: 10.5 G/DL — LOW (ref 11.5–15.5)
LYMPHOCYTES # BLD AUTO: 1.14 K/UL — SIGNIFICANT CHANGE UP (ref 1–3.3)
LYMPHOCYTES # BLD AUTO: 1.14 K/UL — SIGNIFICANT CHANGE UP (ref 1–3.3)
LYMPHOCYTES # BLD AUTO: 11 % — LOW (ref 13–44)
LYMPHOCYTES # BLD AUTO: 11 % — LOW (ref 13–44)
MAGNESIUM SERPL-MCNC: 2.1 MG/DL — SIGNIFICANT CHANGE UP (ref 1.6–2.6)
MAGNESIUM SERPL-MCNC: 2.1 MG/DL — SIGNIFICANT CHANGE UP (ref 1.6–2.6)
MANUAL SMEAR VERIFICATION: SIGNIFICANT CHANGE UP
MANUAL SMEAR VERIFICATION: SIGNIFICANT CHANGE UP
MCHC RBC-ENTMCNC: 29.6 PG — SIGNIFICANT CHANGE UP (ref 27–34)
MCHC RBC-ENTMCNC: 29.6 PG — SIGNIFICANT CHANGE UP (ref 27–34)
MCHC RBC-ENTMCNC: 33.4 GM/DL — SIGNIFICANT CHANGE UP (ref 32–36)
MCHC RBC-ENTMCNC: 33.4 GM/DL — SIGNIFICANT CHANGE UP (ref 32–36)
MCV RBC AUTO: 88.5 FL — SIGNIFICANT CHANGE UP (ref 80–100)
MCV RBC AUTO: 88.5 FL — SIGNIFICANT CHANGE UP (ref 80–100)
METHOD TYPE: SIGNIFICANT CHANGE UP
MONOCYTES # BLD AUTO: 0.52 K/UL — SIGNIFICANT CHANGE UP (ref 0–0.9)
MONOCYTES # BLD AUTO: 0.52 K/UL — SIGNIFICANT CHANGE UP (ref 0–0.9)
MONOCYTES NFR BLD AUTO: 5 % — SIGNIFICANT CHANGE UP (ref 2–14)
MONOCYTES NFR BLD AUTO: 5 % — SIGNIFICANT CHANGE UP (ref 2–14)
NEUTROPHILS # BLD AUTO: 8.71 K/UL — HIGH (ref 1.8–7.4)
NEUTROPHILS # BLD AUTO: 8.71 K/UL — HIGH (ref 1.8–7.4)
NEUTROPHILS NFR BLD AUTO: 83 % — HIGH (ref 43–77)
NEUTROPHILS NFR BLD AUTO: 83 % — HIGH (ref 43–77)
NEUTS BAND # BLD: 1 % — SIGNIFICANT CHANGE UP (ref 0–8)
NEUTS BAND # BLD: 1 % — SIGNIFICANT CHANGE UP (ref 0–8)
NRBC # BLD: 0 /100 — SIGNIFICANT CHANGE UP (ref 0–0)
NRBC # BLD: 0 /100 — SIGNIFICANT CHANGE UP (ref 0–0)
NRBC # BLD: SIGNIFICANT CHANGE UP /100 WBCS (ref 0–0)
NRBC # BLD: SIGNIFICANT CHANGE UP /100 WBCS (ref 0–0)
ORGANISM # SPEC MICROSCOPIC CNT: ABNORMAL
ORGANISM # SPEC MICROSCOPIC CNT: ABNORMAL
ORGANISM # SPEC MICROSCOPIC CNT: SIGNIFICANT CHANGE UP
ORGANISM # SPEC MICROSCOPIC CNT: SIGNIFICANT CHANGE UP
PHOSPHATE SERPL-MCNC: 2.5 MG/DL — SIGNIFICANT CHANGE UP (ref 2.5–4.5)
PHOSPHATE SERPL-MCNC: 2.5 MG/DL — SIGNIFICANT CHANGE UP (ref 2.5–4.5)
PLAT MORPH BLD: NORMAL — SIGNIFICANT CHANGE UP
PLAT MORPH BLD: NORMAL — SIGNIFICANT CHANGE UP
PLATELET # BLD AUTO: 145 K/UL — LOW (ref 150–400)
PLATELET # BLD AUTO: 145 K/UL — LOW (ref 150–400)
POIKILOCYTOSIS BLD QL AUTO: SLIGHT — SIGNIFICANT CHANGE UP
POIKILOCYTOSIS BLD QL AUTO: SLIGHT — SIGNIFICANT CHANGE UP
POTASSIUM SERPL-MCNC: 3.5 MMOL/L — SIGNIFICANT CHANGE UP (ref 3.5–5.3)
POTASSIUM SERPL-MCNC: 3.5 MMOL/L — SIGNIFICANT CHANGE UP (ref 3.5–5.3)
POTASSIUM SERPL-SCNC: 3.5 MMOL/L — SIGNIFICANT CHANGE UP (ref 3.5–5.3)
POTASSIUM SERPL-SCNC: 3.5 MMOL/L — SIGNIFICANT CHANGE UP (ref 3.5–5.3)
RBC # BLD: 3.55 M/UL — LOW (ref 3.8–5.2)
RBC # BLD: 3.55 M/UL — LOW (ref 3.8–5.2)
RBC # FLD: 14.4 % — SIGNIFICANT CHANGE UP (ref 10.3–14.5)
RBC # FLD: 14.4 % — SIGNIFICANT CHANGE UP (ref 10.3–14.5)
RBC BLD AUTO: ABNORMAL
RBC BLD AUTO: ABNORMAL
SODIUM SERPL-SCNC: 137 MMOL/L — SIGNIFICANT CHANGE UP (ref 135–145)
SODIUM SERPL-SCNC: 137 MMOL/L — SIGNIFICANT CHANGE UP (ref 135–145)
SPECIMEN SOURCE: SIGNIFICANT CHANGE UP
WBC # BLD: 10.37 K/UL — SIGNIFICANT CHANGE UP (ref 3.8–10.5)
WBC # BLD: 10.37 K/UL — SIGNIFICANT CHANGE UP (ref 3.8–10.5)
WBC # FLD AUTO: 10.37 K/UL — SIGNIFICANT CHANGE UP (ref 3.8–10.5)
WBC # FLD AUTO: 10.37 K/UL — SIGNIFICANT CHANGE UP (ref 3.8–10.5)

## 2023-12-13 PROCEDURE — 99233 SBSQ HOSP IP/OBS HIGH 50: CPT

## 2023-12-13 PROCEDURE — 93010 ELECTROCARDIOGRAM REPORT: CPT

## 2023-12-13 RX ORDER — METOPROLOL TARTRATE 50 MG
5 TABLET ORAL ONCE
Refills: 0 | Status: COMPLETED | OUTPATIENT
Start: 2023-12-13 | End: 2023-12-13

## 2023-12-13 RX ORDER — DILTIAZEM HCL 120 MG
5 CAPSULE, EXT RELEASE 24 HR ORAL
Qty: 125 | Refills: 0 | Status: DISCONTINUED | OUTPATIENT
Start: 2023-12-13 | End: 2023-12-14

## 2023-12-13 RX ORDER — BUMETANIDE 0.25 MG/ML
1 INJECTION INTRAMUSCULAR; INTRAVENOUS ONCE
Refills: 0 | Status: COMPLETED | OUTPATIENT
Start: 2023-12-13 | End: 2023-12-13

## 2023-12-13 RX ORDER — ALBUMIN HUMAN 25 %
50 VIAL (ML) INTRAVENOUS
Refills: 0 | Status: DISCONTINUED | OUTPATIENT
Start: 2023-12-13 | End: 2023-12-13

## 2023-12-13 RX ORDER — FUROSEMIDE 40 MG
20 TABLET ORAL
Refills: 0 | Status: DISCONTINUED | OUTPATIENT
Start: 2023-12-13 | End: 2023-12-14

## 2023-12-13 RX ORDER — AMIODARONE HYDROCHLORIDE 400 MG/1
150 TABLET ORAL ONCE
Refills: 0 | Status: COMPLETED | OUTPATIENT
Start: 2023-12-13 | End: 2023-12-13

## 2023-12-13 RX ORDER — ALBUMIN HUMAN 25 %
50 VIAL (ML) INTRAVENOUS
Refills: 0 | Status: DISCONTINUED | OUTPATIENT
Start: 2023-12-13 | End: 2023-12-14

## 2023-12-13 RX ORDER — METOPROLOL TARTRATE 50 MG
2.5 TABLET ORAL ONCE
Refills: 0 | Status: COMPLETED | OUTPATIENT
Start: 2023-12-13 | End: 2023-12-13

## 2023-12-13 RX ORDER — BUMETANIDE 0.25 MG/ML
1 INJECTION INTRAMUSCULAR; INTRAVENOUS ONCE
Refills: 0 | Status: DISCONTINUED | OUTPATIENT
Start: 2023-12-13 | End: 2023-12-13

## 2023-12-13 RX ADMIN — LIDOCAINE 1 PATCH: 4 CREAM TOPICAL at 19:50

## 2023-12-13 RX ADMIN — PIPERACILLIN AND TAZOBACTAM 25 GRAM(S): 4; .5 INJECTION, POWDER, LYOPHILIZED, FOR SOLUTION INTRAVENOUS at 22:16

## 2023-12-13 RX ADMIN — Medication 2.5 MILLIGRAM(S): at 00:34

## 2023-12-13 RX ADMIN — BUMETANIDE 1 MILLIGRAM(S): 0.25 INJECTION INTRAMUSCULAR; INTRAVENOUS at 06:19

## 2023-12-13 RX ADMIN — MORPHINE SULFATE 2 MILLIGRAM(S): 50 CAPSULE, EXTENDED RELEASE ORAL at 22:09

## 2023-12-13 RX ADMIN — LIDOCAINE 1 PATCH: 4 CREAM TOPICAL at 00:16

## 2023-12-13 RX ADMIN — MORPHINE SULFATE 2 MILLIGRAM(S): 50 CAPSULE, EXTENDED RELEASE ORAL at 04:47

## 2023-12-13 RX ADMIN — ENOXAPARIN SODIUM 40 MILLIGRAM(S): 100 INJECTION SUBCUTANEOUS at 10:59

## 2023-12-13 RX ADMIN — Medication 50 MILLILITER(S): at 12:49

## 2023-12-13 RX ADMIN — Medication 5 MILLIGRAM(S): at 01:00

## 2023-12-13 RX ADMIN — Medication 15 MILLIGRAM(S): at 17:33

## 2023-12-13 RX ADMIN — Medication 20 MILLIGRAM(S): at 12:50

## 2023-12-13 RX ADMIN — MORPHINE SULFATE 2 MILLIGRAM(S): 50 CAPSULE, EXTENDED RELEASE ORAL at 02:52

## 2023-12-13 RX ADMIN — Medication 15 MILLIGRAM(S): at 09:04

## 2023-12-13 RX ADMIN — MORPHINE SULFATE 2 MILLIGRAM(S): 50 CAPSULE, EXTENDED RELEASE ORAL at 21:20

## 2023-12-13 RX ADMIN — LIDOCAINE 1 PATCH: 4 CREAM TOPICAL at 10:59

## 2023-12-13 RX ADMIN — PANTOPRAZOLE SODIUM 40 MILLIGRAM(S): 20 TABLET, DELAYED RELEASE ORAL at 22:16

## 2023-12-13 RX ADMIN — Medication 50 MILLILITER(S): at 15:14

## 2023-12-13 RX ADMIN — LIDOCAINE 1 PATCH: 4 CREAM TOPICAL at 22:33

## 2023-12-13 RX ADMIN — PIPERACILLIN AND TAZOBACTAM 25 GRAM(S): 4; .5 INJECTION, POWDER, LYOPHILIZED, FOR SOLUTION INTRAVENOUS at 05:25

## 2023-12-13 RX ADMIN — ONDANSETRON 4 MILLIGRAM(S): 8 TABLET, FILM COATED ORAL at 02:51

## 2023-12-13 RX ADMIN — PANTOPRAZOLE SODIUM 40 MILLIGRAM(S): 20 TABLET, DELAYED RELEASE ORAL at 10:59

## 2023-12-13 RX ADMIN — Medication 5 MG/HR: at 05:19

## 2023-12-13 RX ADMIN — PIPERACILLIN AND TAZOBACTAM 25 GRAM(S): 4; .5 INJECTION, POWDER, LYOPHILIZED, FOR SOLUTION INTRAVENOUS at 15:14

## 2023-12-13 RX ADMIN — AMIODARONE HYDROCHLORIDE 618 MILLIGRAM(S): 400 TABLET ORAL at 03:58

## 2023-12-13 RX ADMIN — Medication 15 MILLIGRAM(S): at 17:03

## 2023-12-13 RX ADMIN — Medication 15 MILLIGRAM(S): at 09:34

## 2023-12-13 NOTE — DIETITIAN INITIAL EVALUATION ADULT - NAME AND PHONE
Evelyn Rich (Formerly Huy), MS, RDN, CNSC, [phone]  Certified Nutrition   Evelyn Rich (Formerly Huy), MS, RDN, CNSC, [phone]  Certified Nutrition

## 2023-12-13 NOTE — DIETITIAN NUTRITION RISK NOTIFICATION - ADDITIONAL COMMENTS/DIETITIAN RECOMMENDATIONS
1) ADAT per surgery/ MD - if unable to advance by tomorrow, would suggest to initiate PPN and re-consult RD for recs, 2) daily wts to track/trend changes, 3) MVI w/ minerals daily to ensure 100% RDA met, 4) Consider adding thiamine 100 mg daily 2/2 poor PO intake/ malnutrition, 5) Consider obtaining vitamin D 25OH level to assess nutriture, 6) Monitor bowel movements/ ostomy func, if no BM for >3 days, consider implementing bowel regimen, 7) Confirm goals of care regarding nutrition support. RD will continue to monitor PO intake, labs, hydration, and wt prn.

## 2023-12-13 NOTE — DIETITIAN INITIAL EVALUATION ADULT - NSFNSGIIOFT_GEN_A_CORE
12-12-23 @ 07:01  -  12-13-23 @ 07:00  --------------------------------------------------------  OUT:    Colostomy (mL): 7 mL    Nasogastric/Oral tube (mL): 400 mL  Total OUT: 407 mL    Total NET: -407 mL

## 2023-12-13 NOTE — DIETITIAN INITIAL EVALUATION ADULT - ADD RECOMMEND
1) ADAT per surgery/ DM - if unable to advance by tomorrow, would suggest to initiate PPN and re-consult RD for recs, 2) daily wts to track/trend changes, 3) MVI w/ minerals daily to ensure 100% RDA met, 4) Consider adding thiamine 100 mg daily 2/2 poor PO intake/ malnutrition, 5) Consider obtaining vitamin D 25OH level to assess nutriture, 6) Monitor bowel movements/ ostomy func, if no BM for >3 days, consider implementing bowel regimen, 7) Confirm goals of care regarding nutrition support. RD will continue to monitor PO intake, labs, hydration, and wt prn.  1) ADAT per surgery/ MD - if unable to advance by tomorrow, would suggest to initiate PPN and re-consult RD for recs, 2) daily wts to track/trend changes, 3) MVI w/ minerals daily to ensure 100% RDA met, 4) Consider adding thiamine 100 mg daily 2/2 poor PO intake/ malnutrition, 5) Consider obtaining vitamin D 25OH level to assess nutriture, 6) Monitor bowel movements/ ostomy func, if no BM for >3 days, consider implementing bowel regimen, 7) Confirm goals of care regarding nutrition support. RD will continue to monitor PO intake, labs, hydration, and wt prn.

## 2023-12-13 NOTE — PROGRESS NOTE ADULT - SUBJECTIVE AND OBJECTIVE BOX
Patient is a 83y old  Female who presents with a chief complaint of colonic perforation with pneumoperitoneum    12/13: Patient had AFIB over night, now back in [facility], off cardizem, remains NPO       PAST MEDICAL & SURGICAL HISTORY:  HLD (hyperlipidemia)      History of COPD          FAMILY HISTORY:      Social Hx:    Allergies    No Known Allergies    Intolerances            ICU Vital Signs Last 24 Hrs  T(C): 36.4 (13 Dec 2023 07:00), Max: 37.1 (12 Dec 2023 16:00)  T(F): 97.6 (13 Dec 2023 07:00), Max: 98.7 (12 Dec 2023 16:00)  HR: 83 (13 Dec 2023 12:00) (83 - 153)  BP: 106/72 (13 Dec 2023 12:00) (106/72 - 119/47)  BP(mean): 83 (13 Dec 2023 12:00) (65 - 83)  ABP: 121/42 (13 Dec 2023 12:00) (104/45 - 157/49)  ABP(mean): 64 (13 Dec 2023 12:00) (57 - 86)  RR: 26 (13 Dec 2023 12:00) (16 - 26)  SpO2: 100% (13 Dec 2023 12:00) (92% - 100%)    O2 Parameters below as of 13 Dec 2023 04:00  Patient On (Oxygen Delivery Method): nasal cannula  O2 Flow (L/min): 2              I&O's Summary    12 Dec 2023 07:01  -  13 Dec 2023 07:00  --------------------------------------------------------  IN: 1726.1 mL / OUT: 1317 mL / NET: 409.1 mL    13 Dec 2023 07:01  -  13 Dec 2023 13:01  --------------------------------------------------------  IN: 100 mL / OUT: 0 mL / NET: 100 mL                              10.5   10.37 )-----------( 145      ( 13 Dec 2023 04:42 )             31.4       12-13    137  |  107  |  22  ----------------------------<  111<H>  3.5   |  23  |  1.09    Ca    7.5<L>      13 Dec 2023 04:42  Phos  2.5     12-13  Mg     2.1     12-13    TPro  5.5<L>  /  Alb  2.8<L>  /  TBili  2.8<H>  /  DBili  x   /  AST  22  /  ALT  21  /  AlkPhos  19<L>  12-12            ABG - ( 11 Dec 2023 21:26 )  pH, Arterial: 7.28  pH, Blood: x     /  pCO2: 41    /  pO2: 156   / HCO3: 19    / Base Excess: -7.1  /  SaO2: 99.0                Urinalysis Basic - ( 13 Dec 2023 04:42 )    Color: x / Appearance: x / SG: x / pH: x  Gluc: 111 mg/dL / Ketone: x  / Bili: x / Urobili: x   Blood: x / Protein: x / Nitrite: x   Leuk Esterase: x / RBC: x / WBC x   Sq Epi: x / Non Sq Epi: x / Bacteria: x        MEDICATIONS  (STANDING):  albumin human 25% IVPB 50 milliLiter(s) IV Intermittent <User Schedule>  diltiazem Infusion 5 mG/Hr (5 mL/Hr) IV Continuous <Continuous>  enoxaparin Injectable 40 milliGRAM(s) SubCutaneous every 24 hours  furosemide   Injectable 20 milliGRAM(s) IV Push two times a day  lidocaine   4% Patch 1 Patch Transdermal daily  pantoprazole  Injectable 40 milliGRAM(s) IV Push two times a day  piperacillin/tazobactam IVPB.. 3.375 Gram(s) IV Intermittent every 8 hours    MEDICATIONS  (PRN):  acetaminophen   IVPB .. 1000 milliGRAM(s) IV Intermittent once PRN Temp greater or equal to 38C (100.4F), Mild Pain (1 - 3)  ketorolac   Injectable 15 milliGRAM(s) IV Push every 8 hours PRN Severe Pain (7 - 10)  morphine  - Injectable 2 milliGRAM(s) IV Push every 4 hours PRN Moderate Pain (4 - 6)  ondansetron Injectable 4 milliGRAM(s) IV Push every 6 hours PRN Nausea      DVT Prophylaxis: Lovenox    Advanced Directives:  Discussed with:    Visit Information:    ** Time is exclusive of billed procedures and/or teaching and/or routine family updates.         Patient is a 83y old  Female who presents with a chief complaint of colonic perforation with pneumoperitoneum    12/13: Patient had AFIB over night, now back in [facility], off cardizem, remains NPO       PAST MEDICAL & SURGICAL HISTORY:  HLD (hyperlipidemia)      History of COPD          FAMILY HISTORY:      Social Hx:    Allergies    No Known Allergies    Intolerances            ICU Vital Signs Last 24 Hrs  T(C): 36.4 (13 Dec 2023 07:00), Max: 37.1 (12 Dec 2023 16:00)  T(F): 97.6 (13 Dec 2023 07:00), Max: 98.7 (12 Dec 2023 16:00)  HR: 83 (13 Dec 2023 12:00) (83 - 153)  BP: 106/72 (13 Dec 2023 12:00) (106/72 - 119/47)  BP(mean): 83 (13 Dec 2023 12:00) (65 - 83)  ABP: 121/42 (13 Dec 2023 12:00) (104/45 - 157/49)  ABP(mean): 64 (13 Dec 2023 12:00) (57 - 86)  RR: 26 (13 Dec 2023 12:00) (16 - 26)  SpO2: 100% (13 Dec 2023 12:00) (92% - 100%)    O2 Parameters below as of 13 Dec 2023 04:00  Patient On (Oxygen Delivery Method): nasal cannula  O2 Flow (L/min): 2              I&O's Summary    12 Dec 2023 07:01  -  13 Dec 2023 07:00  --------------------------------------------------------  IN: 1726.1 mL / OUT: 1317 mL / NET: 409.1 mL    13 Dec 2023 07:01  -  13 Dec 2023 13:01  --------------------------------------------------------  IN: 100 mL / OUT: 0 mL / NET: 100 mL                              10.5   10.37 )-----------( 145      ( 13 Dec 2023 04:42 )             31.4       12-13    137  |  107  |  22  ----------------------------<  111<H>  3.5   |  23  |  1.09    Ca    7.5<L>      13 Dec 2023 04:42  Phos  2.5     12-13  Mg     2.1     12-13    TPro  5.5<L>  /  Alb  2.8<L>  /  TBili  2.8<H>  /  DBili  x   /  AST  22  /  ALT  21  /  AlkPhos  19<L>  12-12            ABG - ( 11 Dec 2023 21:26 )  pH, Arterial: 7.28  pH, Blood: x     /  pCO2: 41    /  pO2: 156   / HCO3: 19    / Base Excess: -7.1  /  SaO2: 99.0                Urinalysis Basic - ( 13 Dec 2023 04:42 )    Color: x / Appearance: x / SG: x / pH: x  Gluc: 111 mg/dL / Ketone: x  / Bili: x / Urobili: x   Blood: x / Protein: x / Nitrite: x   Leuk Esterase: x / RBC: x / WBC x   Sq Epi: x / Non Sq Epi: x / Bacteria: x        MEDICATIONS  (STANDING):  albumin human 25% IVPB 50 milliLiter(s) IV Intermittent <User Schedule>  diltiazem Infusion 5 mG/Hr (5 mL/Hr) IV Continuous <Continuous>  enoxaparin Injectable 40 milliGRAM(s) SubCutaneous every 24 hours  furosemide   Injectable 20 milliGRAM(s) IV Push two times a day  lidocaine   4% Patch 1 Patch Transdermal daily  pantoprazole  Injectable 40 milliGRAM(s) IV Push two times a day  piperacillin/tazobactam IVPB.. 3.375 Gram(s) IV Intermittent every 8 hours    MEDICATIONS  (PRN):  acetaminophen   IVPB .. 1000 milliGRAM(s) IV Intermittent once PRN Temp greater or equal to 38C (100.4F), Mild Pain (1 - 3)  ketorolac   Injectable 15 milliGRAM(s) IV Push every 8 hours PRN Severe Pain (7 - 10)  morphine  - Injectable 2 milliGRAM(s) IV Push every 4 hours PRN Moderate Pain (4 - 6)  ondansetron Injectable 4 milliGRAM(s) IV Push every 6 hours PRN Nausea      DVT Prophylaxis: Lovenox    Advanced Directives:  Discussed with:    Visit Information:    ** Time is exclusive of billed procedures and/or teaching and/or routine family updates.

## 2023-12-13 NOTE — DIETITIAN INITIAL EVALUATION ADULT - PERTINENT LABORATORY DATA
12-13    137  |  107  |  22  ----------------------------<  111<H>  3.5   |  23  |  1.09    Ca    7.5<L>      13 Dec 2023 04:42  Phos  2.5     12-13  Mg     2.1     12-13    TPro  5.5<L>  /  Alb  2.8<L>  /  TBili  2.8<H>  /  DBili  x   /  AST  22  /  ALT  21  /  AlkPhos  19<L>  12-12  POCT Blood Glucose.: 94 mg/dL (12-13-23 @ 11:52)

## 2023-12-13 NOTE — PROGRESS NOTE ADULT - SUBJECTIVE AND OBJECTIVE BOX
SURGERY DAILY PROGRESS NOTE:     Subjective:  Patient seen and examined this AM at bedside. Extubated. Off vasopressin. Levophed at 0.09. NGT output still mildly blood tinged. No acute events overnight and patient resting comfortably. Pain controlled. Denies fever/chills, shortness of breath, chest pain. VS reviewed    Objective:    MEDICATIONS  (STANDING):  enoxaparin Injectable 40 milliGRAM(s) SubCutaneous every 24 hours  lactated ringers. 1000 milliLiter(s) (50 mL/Hr) IV Continuous <Continuous>  lidocaine   4% Patch 1 Patch Transdermal daily  pantoprazole  Injectable 40 milliGRAM(s) IV Push two times a day  piperacillin/tazobactam IVPB.. 3.375 Gram(s) IV Intermittent every 8 hours    MEDICATIONS  (PRN):  acetaminophen   IVPB .. 1000 milliGRAM(s) IV Intermittent once PRN Temp greater or equal to 38C (100.4F), Mild Pain (1 - 3)  ketorolac   Injectable 15 milliGRAM(s) IV Push every 8 hours PRN Severe Pain (7 - 10)  morphine  - Injectable 2 milliGRAM(s) IV Push every 4 hours PRN Moderate Pain (4 - 6)  ondansetron Injectable 4 milliGRAM(s) IV Push every 6 hours PRN Nausea      Vital Signs Last 24 Hrs  T(C): 36.6 (13 Dec 2023 00:00), Max: 37.3 (12 Dec 2023 08:00)  T(F): 97.9 (13 Dec 2023 00:00), Max: 99.2 (12 Dec 2023 08:00)  HR: 146 (13 Dec 2023 00:16) (96 - 146)  BP: 119/47 (13 Dec 2023 00:00) (119/47 - 130/52)  BP(mean): 65 (13 Dec 2023 00:00) (65 - 74)  RR: 19 (13 Dec 2023 00:16) (16 - 29)  SpO2: 98% (13 Dec 2023 00:16) (92% - 100%)    Parameters below as of 13 Dec 2023 00:00  Patient On (Oxygen Delivery Method): nasal cannula  O2 Flow (L/min): 2        PHYSICAL EXAM   GENERAL: NAD, well developed, thin  HEAD: Atraumatic, normocephalic  EYES: EOMI, PERRLA, conjunctiva and sclera clear  ENT: moist mucous membrane, NC at 2 LPM, NGT w/ blood-tinged bilious output  NECK: supple, No JVD, midline trachea  CHEST/LUNG: No increased WOB, symmetric excursions  Heart: Irregularly irregular rate & rhythm ppp, no peripheral edema  ABDOMEN: Round, distended, soft, nontender, Prevena with suction, drain bulb w/ mildly opaque serous output, stoma pink & mildly congested with bowel sweat and no gas in bag  : Leyva w/ clear lighter mariella colored fluid in tubing  EXTREMITIES: Brisk cap refill. no clubbing or cyanosis  NERVOUS SYSTEM: Alert, arousable, no neuro-deficits  SKIN: warm to touch, no rash or lesions      I&O's Detail    11 Dec 2023 07:01  -  12 Dec 2023 07:00  --------------------------------------------------------  IN:    IV PiggyBack: 400 mL    Lactated Ringers Bolus: 1250 mL    Norepinephrine: 75.3 mL    Other (mL): 4000 mL    Propofol: 10.8 mL  Total IN: 5736.1 mL    OUT:    Drain (mL): 140 mL    Indwelling Catheter - Urethral (mL): 525 mL    Nasogastric/Oral tube (mL): 300 mL    Other (mL): 450 mL  Total OUT: 1415 mL    Total NET: 4321.1 mL      12 Dec 2023 07:01  -  13 Dec 2023 01:54  --------------------------------------------------------  IN:    IV PiggyBack: 200 mL    Lactated Ringers: 698 mL    Norepinephrine: 55 mL    Propofol: 22.1 mL  Total IN: 975.1 mL    OUT:    Colostomy (mL): 2 mL    Drain (mL): 95 mL    Indwelling Catheter - Urethral (mL): 595 mL    Nasogastric/Oral tube (mL): 200 mL  Total OUT: 892 mL    Total NET: 83.1 mL          Daily     Daily     LABS:                        11.4   8.11  )-----------( 162      ( 12 Dec 2023 07:53 )             34.2     12-12    137  |  107  |  22  ----------------------------<  108<H>  4.0   |  22  |  1.23    Ca    7.7<L>      12 Dec 2023 15:25  Phos  4.0     12-12  Mg     2.2     12-12    TPro  5.5<L>  /  Alb  2.8<L>  /  TBili  2.8<H>  /  DBili  x   /  AST  22  /  ALT  21  /  AlkPhos  19<L>  12-12    PT/INR - ( 11 Dec 2023 20:10 )   PT: 14.9 sec;   INR: 1.33 ratio         PTT - ( 11 Dec 2023 20:10 )  PTT:29.1 sec  Urinalysis Basic - ( 12 Dec 2023 15:25 )    Color: x / Appearance: x / SG: x / pH: x  Gluc: 108 mg/dL / Ketone: x  / Bili: x / Urobili: x   Blood: x / Protein: x / Nitrite: x   Leuk Esterase: x / RBC: x / WBC x   Sq Epi: x / Non Sq Epi: x / Bacteria: x

## 2023-12-13 NOTE — DIETITIAN INITIAL EVALUATION ADULT - OTHER INFO
82 YO F with a PMHx of COPD, HLD presents to the ED c/o abd pain, n/v and constipation x2 days. Admitted w/ colonic perforation, Extensive stercocolitis with 2 feculent perforation of sigmoid & rectum. Necrotic segment of colon also noticed now POD 2 s/p Christina's. Had post-op fecal peritonitis, 7L washout. Went into septic shock, requiring pressors. Tx to ICU w/ post-op resp failure. NGT to LWS, blood tinged. Off pressors as of this AM 12/13.     Pt NPO x 2 days. Lethargic, endorses that she has pain/ discomfort d/t NGT - denies full interview but does state she was eating poorly PTA and UBW ~140#. Believes she lost wt but does not weigh self frequently. RD took bed scale wt on 12/13 at 153#, pt is very edematous currently. NFPE reveals moderate muscle/ fat wasting; meets criteria for PCM. Likely wt/ appearance being skewed by edema. Suggest to ADAT, if unable to advance diet by tomorrow would suggest to start PPN. Re-consult RD for recs. See additional recs below.

## 2023-12-13 NOTE — PROGRESS NOTE ADULT - ATTENDING COMMENTS
This is a delayed attestation - patient was seen this morning at 10:00am.  Denied N/V, abdomen sore but much better than when she presented.  Leyva in place.  Required diltiazem gtt for AFib.  At that time she was sitting up in chair, no acute distress.  NGT in place - brown output.  Abdomen mildly distended but soft, no rebound/guarding.  Appropriately tender near midline.    -- Diltiazem as per ICU  -- Resuscitation as per ICU - lactic acidosis has resolved and UOP improving.  From my standpoint there is no surgical need for Leyva catheter to stay; once she is cleared by ICU team and found to be stable catheter may be removed anytime.  -- IS  -- Encourage OOB; will need PT  -- Ostomy care and education  -- DVT prophylaxis - Lovenox  -- Awaiting return of bowel function

## 2023-12-13 NOTE — PROGRESS NOTE ADULT - ASSESSMENT
83y Female PMH COPD, HLD     Presented with abdominal pain, N, V, constipation x 2 days     Found to have perf viscus     Emergent OR 12/11 - ex lap, abdominal washout, Christina's colectomy  Found to have perforation of sigmoid and proximal rectum       Admitted:    Septic shock from perforated colon and feculent peritonitis   Prerenal JAZMIN   Lactic acidosis   Post-operative acute resp failure       Plan:     Neuro - daily reorientation. Pain mx prn. Avoid deliriogenic meds. PT,     CV - Off Pressors, Went into RAFIB, Now NSR, Off Cardizem drip, ECHO noted    Resp - NC O2, IS Q1H    GI - NPO. Monitor abd exam, CORKY output, colostomy care.     Renal - monitor UO and renal fn. IVF. Trend and replete lytes prn. Avoid nephrotoxic meds. .  Very edematous and with DUCKWORTH, Lasix with Albumin for the next 24-48hrs if she tolerates    ID - empiric zosyn, f/u cx, trend WBC and temp    Endo - maintain euglycemia     DVT ppx with lovenox     OOB, PT        Patient is critically ill with organ dysfunction and/or high risk for decompensation, requires close monitoring and frequent bedside assessments with necessary therapy change to prevent further clinical deterioration. Heme - transfuse PRBC for Hb<7 unless active bleeding or HD unstable

## 2023-12-13 NOTE — PROGRESS NOTE ADULT - SUBJECTIVE AND OBJECTIVE BOX
83F  hx COPD, HLD admit 11/11 with abd pain.  Free air in the peritoneum on CT     POD #2  Ex lap Christina's   Extensive stercocolitis with 2 feculent perforation of sigmoid & rectum. Necrotic segment of colon also noticed    Extubated  off pressos  Weak      Feels SOB   Tachypneic in rapid A fib  02 sat down     D/C IVF for now   Dose bumex   Marked + fluid balance puffy .      No response to lopressor IV or amio to quickly convert.      Start diltiazem drip   Has some bloody drainage from the NGT  Send hgb  PPI q12  If hgb OK will have to start on UFH for stroke prophylaxis.  Will wait for lab and d/w Dr Devine and sx team     CCT 32

## 2023-12-13 NOTE — PROGRESS NOTE ADULT - ASSESSMENT
83F w/ colonic perforation 2/2 stercoral ulcer  now POD 2 s/p Christina  Critically ill, vasopressor requirement decreasing, awaiting return of bowel function    Plan:  - Resuscitation per ICU  - Wean pressors  - Pain & nausea control PRN  - NPO  - NGT to LIWS  - Incentive spirometry  - Zosyn since 12/11, f/u intraop cx (w/ Proteus) & preop blood cx (NGTD)  - Strict I/Os  - Replace electrolytes  - PT anticipates RONALD  - Will need ostomy teaching  - VTE ppx w/ SCDs & Lovenox    Discussed w/ CRS team

## 2023-12-13 NOTE — DIETITIAN INITIAL EVALUATION ADULT - PERTINENT MEDS FT
MEDICATIONS  (STANDING):  albumin human 25% IVPB 50 milliLiter(s) IV Intermittent <User Schedule>  diltiazem Infusion 5 mG/Hr (5 mL/Hr) IV Continuous <Continuous>  enoxaparin Injectable 40 milliGRAM(s) SubCutaneous every 24 hours  furosemide   Injectable 20 milliGRAM(s) IV Push two times a day  lidocaine   4% Patch 1 Patch Transdermal daily  pantoprazole  Injectable 40 milliGRAM(s) IV Push two times a day  piperacillin/tazobactam IVPB.. 3.375 Gram(s) IV Intermittent every 8 hours    MEDICATIONS  (PRN):  acetaminophen   IVPB .. 1000 milliGRAM(s) IV Intermittent once PRN Temp greater or equal to 38C (100.4F), Mild Pain (1 - 3)  ketorolac   Injectable 15 milliGRAM(s) IV Push every 8 hours PRN Severe Pain (7 - 10)  morphine  - Injectable 2 milliGRAM(s) IV Push every 4 hours PRN Moderate Pain (4 - 6)  ondansetron Injectable 4 milliGRAM(s) IV Push every 6 hours PRN Nausea

## 2023-12-13 NOTE — PROVIDER CONTACT NOTE (CRITICAL VALUE NOTIFICATION) - PERSON GIVING RESULT:
[facility]

## 2023-12-13 NOTE — DIETITIAN INITIAL EVALUATION ADULT - ORAL INTAKE PTA/DIET HISTORY
Lives w/ her son since passing of her . Reports she does not have good appetite/ PO intake. Likes to eat pastries. Likely meeting <75-50% ENN chronically

## 2023-12-14 LAB
ANION GAP SERPL CALC-SCNC: 12 MMOL/L — SIGNIFICANT CHANGE UP (ref 5–17)
ANION GAP SERPL CALC-SCNC: 12 MMOL/L — SIGNIFICANT CHANGE UP (ref 5–17)
ANION GAP SERPL CALC-SCNC: 13 MMOL/L — SIGNIFICANT CHANGE UP (ref 5–17)
ANION GAP SERPL CALC-SCNC: 13 MMOL/L — SIGNIFICANT CHANGE UP (ref 5–17)
ANION GAP SERPL CALC-SCNC: 8 MMOL/L — SIGNIFICANT CHANGE UP (ref 5–17)
ANION GAP SERPL CALC-SCNC: 8 MMOL/L — SIGNIFICANT CHANGE UP (ref 5–17)
BUN SERPL-MCNC: 22 MG/DL — SIGNIFICANT CHANGE UP (ref 7–23)
BUN SERPL-MCNC: 24 MG/DL — HIGH (ref 7–23)
BUN SERPL-MCNC: 24 MG/DL — HIGH (ref 7–23)
CALCIUM SERPL-MCNC: 7.7 MG/DL — LOW (ref 8.5–10.1)
CALCIUM SERPL-MCNC: 7.7 MG/DL — LOW (ref 8.5–10.1)
CALCIUM SERPL-MCNC: 7.8 MG/DL — LOW (ref 8.5–10.1)
CALCIUM SERPL-MCNC: 7.8 MG/DL — LOW (ref 8.5–10.1)
CALCIUM SERPL-MCNC: 8 MG/DL — LOW (ref 8.5–10.1)
CALCIUM SERPL-MCNC: 8 MG/DL — LOW (ref 8.5–10.1)
CHLORIDE SERPL-SCNC: 100 MMOL/L — SIGNIFICANT CHANGE UP (ref 96–108)
CHLORIDE SERPL-SCNC: 104 MMOL/L — SIGNIFICANT CHANGE UP (ref 96–108)
CHLORIDE SERPL-SCNC: 104 MMOL/L — SIGNIFICANT CHANGE UP (ref 96–108)
CO2 SERPL-SCNC: 24 MMOL/L — SIGNIFICANT CHANGE UP (ref 22–31)
CO2 SERPL-SCNC: 24 MMOL/L — SIGNIFICANT CHANGE UP (ref 22–31)
CO2 SERPL-SCNC: 27 MMOL/L — SIGNIFICANT CHANGE UP (ref 22–31)
CO2 SERPL-SCNC: 27 MMOL/L — SIGNIFICANT CHANGE UP (ref 22–31)
CO2 SERPL-SCNC: 28 MMOL/L — SIGNIFICANT CHANGE UP (ref 22–31)
CO2 SERPL-SCNC: 28 MMOL/L — SIGNIFICANT CHANGE UP (ref 22–31)
CREAT SERPL-MCNC: 1.02 MG/DL — SIGNIFICANT CHANGE UP (ref 0.5–1.3)
CREAT SERPL-MCNC: 1.02 MG/DL — SIGNIFICANT CHANGE UP (ref 0.5–1.3)
CREAT SERPL-MCNC: 1.12 MG/DL — SIGNIFICANT CHANGE UP (ref 0.5–1.3)
CREAT SERPL-MCNC: 1.12 MG/DL — SIGNIFICANT CHANGE UP (ref 0.5–1.3)
CREAT SERPL-MCNC: 1.15 MG/DL — SIGNIFICANT CHANGE UP (ref 0.5–1.3)
CREAT SERPL-MCNC: 1.15 MG/DL — SIGNIFICANT CHANGE UP (ref 0.5–1.3)
EGFR: 47 ML/MIN/1.73M2 — LOW
EGFR: 47 ML/MIN/1.73M2 — LOW
EGFR: 49 ML/MIN/1.73M2 — LOW
EGFR: 49 ML/MIN/1.73M2 — LOW
EGFR: 55 ML/MIN/1.73M2 — LOW
EGFR: 55 ML/MIN/1.73M2 — LOW
GLUCOSE BLDC GLUCOMTR-MCNC: 76 MG/DL — SIGNIFICANT CHANGE UP (ref 70–99)
GLUCOSE BLDC GLUCOMTR-MCNC: 76 MG/DL — SIGNIFICANT CHANGE UP (ref 70–99)
GLUCOSE SERPL-MCNC: 80 MG/DL — SIGNIFICANT CHANGE UP (ref 70–99)
GLUCOSE SERPL-MCNC: 80 MG/DL — SIGNIFICANT CHANGE UP (ref 70–99)
GLUCOSE SERPL-MCNC: 88 MG/DL — SIGNIFICANT CHANGE UP (ref 70–99)
GLUCOSE SERPL-MCNC: 88 MG/DL — SIGNIFICANT CHANGE UP (ref 70–99)
GLUCOSE SERPL-MCNC: 90 MG/DL — SIGNIFICANT CHANGE UP (ref 70–99)
GLUCOSE SERPL-MCNC: 90 MG/DL — SIGNIFICANT CHANGE UP (ref 70–99)
HCT VFR BLD CALC: 30.4 % — LOW (ref 34.5–45)
HCT VFR BLD CALC: 30.4 % — LOW (ref 34.5–45)
HGB BLD-MCNC: 10.2 G/DL — LOW (ref 11.5–15.5)
HGB BLD-MCNC: 10.2 G/DL — LOW (ref 11.5–15.5)
MAGNESIUM SERPL-MCNC: 2.1 MG/DL — SIGNIFICANT CHANGE UP (ref 1.6–2.6)
MAGNESIUM SERPL-MCNC: 2.1 MG/DL — SIGNIFICANT CHANGE UP (ref 1.6–2.6)
MCHC RBC-ENTMCNC: 29.8 PG — SIGNIFICANT CHANGE UP (ref 27–34)
MCHC RBC-ENTMCNC: 29.8 PG — SIGNIFICANT CHANGE UP (ref 27–34)
MCHC RBC-ENTMCNC: 33.6 GM/DL — SIGNIFICANT CHANGE UP (ref 32–36)
MCHC RBC-ENTMCNC: 33.6 GM/DL — SIGNIFICANT CHANGE UP (ref 32–36)
MCV RBC AUTO: 88.9 FL — SIGNIFICANT CHANGE UP (ref 80–100)
MCV RBC AUTO: 88.9 FL — SIGNIFICANT CHANGE UP (ref 80–100)
PHOSPHATE SERPL-MCNC: 2.7 MG/DL — SIGNIFICANT CHANGE UP (ref 2.5–4.5)
PHOSPHATE SERPL-MCNC: 2.7 MG/DL — SIGNIFICANT CHANGE UP (ref 2.5–4.5)
PLATELET # BLD AUTO: 146 K/UL — LOW (ref 150–400)
PLATELET # BLD AUTO: 146 K/UL — LOW (ref 150–400)
POTASSIUM SERPL-MCNC: 2.7 MMOL/L — CRITICAL LOW (ref 3.5–5.3)
POTASSIUM SERPL-MCNC: 3.1 MMOL/L — LOW (ref 3.5–5.3)
POTASSIUM SERPL-MCNC: 3.1 MMOL/L — LOW (ref 3.5–5.3)
POTASSIUM SERPL-SCNC: 2.7 MMOL/L — CRITICAL LOW (ref 3.5–5.3)
POTASSIUM SERPL-SCNC: 3.1 MMOL/L — LOW (ref 3.5–5.3)
POTASSIUM SERPL-SCNC: 3.1 MMOL/L — LOW (ref 3.5–5.3)
RBC # BLD: 3.42 M/UL — LOW (ref 3.8–5.2)
RBC # BLD: 3.42 M/UL — LOW (ref 3.8–5.2)
RBC # FLD: 14.6 % — HIGH (ref 10.3–14.5)
RBC # FLD: 14.6 % — HIGH (ref 10.3–14.5)
SODIUM SERPL-SCNC: 137 MMOL/L — SIGNIFICANT CHANGE UP (ref 135–145)
SODIUM SERPL-SCNC: 137 MMOL/L — SIGNIFICANT CHANGE UP (ref 135–145)
SODIUM SERPL-SCNC: 139 MMOL/L — SIGNIFICANT CHANGE UP (ref 135–145)
SODIUM SERPL-SCNC: 139 MMOL/L — SIGNIFICANT CHANGE UP (ref 135–145)
SODIUM SERPL-SCNC: 140 MMOL/L — SIGNIFICANT CHANGE UP (ref 135–145)
SODIUM SERPL-SCNC: 140 MMOL/L — SIGNIFICANT CHANGE UP (ref 135–145)
WBC # BLD: 13.05 K/UL — HIGH (ref 3.8–10.5)
WBC # BLD: 13.05 K/UL — HIGH (ref 3.8–10.5)
WBC # FLD AUTO: 13.05 K/UL — HIGH (ref 3.8–10.5)
WBC # FLD AUTO: 13.05 K/UL — HIGH (ref 3.8–10.5)

## 2023-12-14 PROCEDURE — 99233 SBSQ HOSP IP/OBS HIGH 50: CPT

## 2023-12-14 RX ORDER — POTASSIUM CHLORIDE 20 MEQ
10 PACKET (EA) ORAL
Refills: 0 | Status: COMPLETED | OUTPATIENT
Start: 2023-12-14 | End: 2023-12-14

## 2023-12-14 RX ORDER — POTASSIUM CHLORIDE 20 MEQ
80 PACKET (EA) ORAL ONCE
Refills: 0 | Status: DISCONTINUED | OUTPATIENT
Start: 2023-12-14 | End: 2023-12-14

## 2023-12-14 RX ORDER — CEFTRIAXONE 500 MG/1
1000 INJECTION, POWDER, FOR SOLUTION INTRAMUSCULAR; INTRAVENOUS EVERY 24 HOURS
Refills: 0 | Status: DISCONTINUED | OUTPATIENT
Start: 2023-12-14 | End: 2023-12-16

## 2023-12-14 RX ORDER — ELECTROLYTE SOLUTION,INJ
1 VIAL (ML) INTRAVENOUS
Refills: 0 | Status: DISCONTINUED | OUTPATIENT
Start: 2023-12-14 | End: 2023-12-14

## 2023-12-14 RX ORDER — POTASSIUM CHLORIDE 20 MEQ
80 PACKET (EA) ORAL ONCE
Refills: 0 | Status: COMPLETED | OUTPATIENT
Start: 2023-12-14 | End: 2023-12-15

## 2023-12-14 RX ORDER — POTASSIUM CHLORIDE 20 MEQ
10 PACKET (EA) ORAL
Refills: 0 | Status: COMPLETED | OUTPATIENT
Start: 2023-12-14 | End: 2023-12-15

## 2023-12-14 RX ADMIN — Medication 100 MILLIEQUIVALENT(S): at 08:56

## 2023-12-14 RX ADMIN — Medication 100 MILLIEQUIVALENT(S): at 16:41

## 2023-12-14 RX ADMIN — Medication 20 MILLIGRAM(S): at 15:32

## 2023-12-14 RX ADMIN — Medication 1 EACH: at 22:01

## 2023-12-14 RX ADMIN — Medication 50 MILLILITER(S): at 05:23

## 2023-12-14 RX ADMIN — Medication 100 MILLIEQUIVALENT(S): at 15:52

## 2023-12-14 RX ADMIN — PANTOPRAZOLE SODIUM 40 MILLIGRAM(S): 20 TABLET, DELAYED RELEASE ORAL at 21:36

## 2023-12-14 RX ADMIN — ENOXAPARIN SODIUM 40 MILLIGRAM(S): 100 INJECTION SUBCUTANEOUS at 10:04

## 2023-12-14 RX ADMIN — CEFTRIAXONE 1000 MILLIGRAM(S): 500 INJECTION, POWDER, FOR SOLUTION INTRAMUSCULAR; INTRAVENOUS at 15:32

## 2023-12-14 RX ADMIN — Medication 50 MILLILITER(S): at 15:13

## 2023-12-14 RX ADMIN — Medication 100 MILLIEQUIVALENT(S): at 23:28

## 2023-12-14 RX ADMIN — PIPERACILLIN AND TAZOBACTAM 25 GRAM(S): 4; .5 INJECTION, POWDER, LYOPHILIZED, FOR SOLUTION INTRAVENOUS at 05:23

## 2023-12-14 RX ADMIN — Medication 100 MILLIEQUIVALENT(S): at 12:02

## 2023-12-14 RX ADMIN — PANTOPRAZOLE SODIUM 40 MILLIGRAM(S): 20 TABLET, DELAYED RELEASE ORAL at 10:04

## 2023-12-14 RX ADMIN — Medication 100 MILLIEQUIVALENT(S): at 10:42

## 2023-12-14 RX ADMIN — Medication 100 MILLIEQUIVALENT(S): at 17:50

## 2023-12-14 RX ADMIN — Medication 20 MILLIGRAM(S): at 05:23

## 2023-12-14 NOTE — PROGRESS NOTE ADULT - ATTENDING COMMENTS
Patient seen and examined this morning at bedside  NPO, NGT in place, pain is controlled, she denies N/V  Abdomen is softly distended, ostomy is pink, sweat to the appliance, Leyva in place  A/P: Plan to keep NPO with NGT in place 2/2 abdominal distention, continue IV fluids, and antibiotics, trend WBC. Follow up ostomy output. Leyva to be removed. PT, OOB to chair. Continue supportive care during recovery    Vital Signs Last 24 Hrs  T(C): 36.8 (14 Dec 2023 10:00), Max: 36.9 (14 Dec 2023 02:00)  T(F): 98.3 (14 Dec 2023 10:00), Max: 98.4 (14 Dec 2023 02:00)  HR: 91 (14 Dec 2023 17:00) (74 - 96)  BP: 134/49 (14 Dec 2023 17:00) (96/47 - 143/50)  BP(mean): 71 (14 Dec 2023 17:00) (64 - 102)  RR: 18 (14 Dec 2023 17:00) (13 - 21)  SpO2: 100% (14 Dec 2023 17:00) (96% - 100%)                          10.2   13.05 )-----------( 146      ( 14 Dec 2023 06:38 )             30.4

## 2023-12-14 NOTE — PROGRESS NOTE ADULT - SUBJECTIVE AND OBJECTIVE BOX
Patient is a 83y old  Female who presents with a chief complaint of colonic perforation with pneumoperitoneum    12/13: Patient had AFIB over night, now back in [place], off cardizem, remains NPO  12/14: No events over night, no episodes of RAFIB         PAST MEDICAL & SURGICAL HISTORY:  HLD (hyperlipidemia)      History of COPD          FAMILY HISTORY:      Social Hx:    Allergies    No Known Allergies    Intolerances            ICU Vital Signs Last 24 Hrs  T(C): 36.8 (14 Dec 2023 10:00), Max: 36.9 (14 Dec 2023 02:00)  T(F): 98.3 (14 Dec 2023 10:00), Max: 98.4 (14 Dec 2023 02:00)  HR: 89 (14 Dec 2023 13:00) (74 - 96)  BP: 108/96 (14 Dec 2023 13:00) (96/47 - 143/50)  BP(mean): 102 (14 Dec 2023 13:00) (64 - 102)  ABP: 140/46 (14 Dec 2023 13:00) (113/55 - 150/50)  ABP(mean): 77 (14 Dec 2023 13:00) (65 - 88)  RR: 18 (14 Dec 2023 13:00) (13 - 21)  SpO2: 100% (14 Dec 2023 13:00) (96% - 100%)            I&O's Summary    13 Dec 2023 07:01  -  14 Dec 2023 07:00  --------------------------------------------------------  IN: 370 mL / OUT: 2165 mL / NET: -1795 mL    14 Dec 2023 07:01  -  14 Dec 2023 16:04  --------------------------------------------------------  IN: 100 mL / OUT: 0 mL / NET: 100 mL                              10.2   13.05 )-----------( 146      ( 14 Dec 2023 06:38 )             30.4       12-14    137  |  100  |  22  ----------------------------<  88  3.1<L>   |  24  |  1.15    Ca    8.0<L>      14 Dec 2023 13:19  Phos  2.7     12-14  Mg     2.1     12-14                  Urinalysis Basic - ( 14 Dec 2023 13:19 )    Color: x / Appearance: x / SG: x / pH: x  Gluc: 88 mg/dL / Ketone: x  / Bili: x / Urobili: x   Blood: x / Protein: x / Nitrite: x   Leuk Esterase: x / RBC: x / WBC x   Sq Epi: x / Non Sq Epi: x / Bacteria: x        MEDICATIONS  (STANDING):  cefTRIAXone Injectable. 1000 milliGRAM(s) IV Push every 24 hours  enoxaparin Injectable 40 milliGRAM(s) SubCutaneous every 24 hours  lidocaine   4% Patch 1 Patch Transdermal daily  pantoprazole  Injectable 40 milliGRAM(s) IV Push two times a day  Parenteral Nutrition - Adult 1 Each (75 mL/Hr) TPN Continuous <Continuous>  potassium chloride  10 mEq/100 mL IVPB 10 milliEquivalent(s) IV Intermittent every 1 hour    MEDICATIONS  (PRN):  acetaminophen   IVPB .. 1000 milliGRAM(s) IV Intermittent once PRN Temp greater or equal to 38C (100.4F), Mild Pain (1 - 3)  ketorolac   Injectable 15 milliGRAM(s) IV Push every 8 hours PRN Severe Pain (7 - 10)  morphine  - Injectable 2 milliGRAM(s) IV Push every 4 hours PRN Moderate Pain (4 - 6)  ondansetron Injectable 4 milliGRAM(s) IV Push every 6 hours PRN Nausea      DVT Prophylaxis: Lovenox    Advanced Directives:  Discussed with:    Visit Information:    ** Time is exclusive of billed procedures and/or teaching and/or routine family updates.

## 2023-12-14 NOTE — PROGRESS NOTE ADULT - ASSESSMENT
83y Female PMH COPD, HLD     Presented with abdominal pain, N, V, constipation x 2 days     Found to have perf viscus     Emergent OR 12/11 - ex lap, abdominal washout, Christina's colectomy  Found to have perforation of sigmoid and proximal rectum       Admitted:    Septic shock from perforated colon and feculent peritonitis   Prerenal JAZMIN   Lactic acidosis   Post-operative acute resp failure       Plan:     Neuro - daily reorientation. Pain mx prn. Avoid deliriogenic meds. PT,     CV - Off Pressors, Went into RAFIB, Now NSR, Off Cardizem drip, ECHO noted    Resp - NC O2, IS Q1H    GI - NPO. Start PPN, Monitor abd exam, colostomy care.     Renal - monitor UO and renal fn. IVF. Trend and replete lytes prn. Avoid nephrotoxic meds. .  Very edematous and with DUCKWORTH, D/C Lasix with Albumin  ID - empiric zosyn, f/u cx, trend WBC and temp    Endo - maintain euglycemia     DVT ppx with lovenox     OOB, PT    Transfer to Tele  Medicine consult Called

## 2023-12-14 NOTE — CHART NOTE - NSCHARTNOTEFT_GEN_A_CORE
Clinical Nutrition PN Follow Up Note    *  year old admitted for    *current status:    *new pertinent meds:    *labs reviewed;  12-14    140  |  104  |  24<H>  ----------------------------<  90  2.7<LL>   |  28  |  1.12    Ca    7.8<L>      14 Dec 2023 06:38  Phos  2.7     12-14  Mg     2.1     12-14        BMI:   HbA1c:   Glucose: POCT Blood Glucose.: 98 mg/dL (12-13-23 @ 23:22)    BP: 123/53 (12-14-23 @ 09:00) (58/45 - 160/65)Vital Signs Last 24 Hrs  T(C): 36.7 (12-14-23 @ 05:00), Max: 36.9 (12-14-23 @ 02:00)  T(F): 98.1 (12-14-23 @ 05:00), Max: 98.4 (12-14-23 @ 02:00)  HR: 86 (12-14-23 @ 09:00) (74 - 96)  BP: 123/53 (12-14-23 @ 09:00) (96/47 - 143/50)  BP(mean): 72 (12-14-23 @ 09:00) (52 - 83)  RR: 17 (12-14-23 @ 09:00) (13 - 26)  SpO2: 98% (12-14-23 @ 09:00) (96% - 100%)      Lipid Panel:   POCT Blood Glucose.: 98 mg/dL (12-13-23 @ 23:22)  POCT Blood Glucose.: 87 mg/dL (12-13-23 @ 18:15)  POCT Blood Glucose.: 94 mg/dL (12-13-23 @ 11:52)      *I&O's Detail    13 Dec 2023 07:01  -  14 Dec 2023 07:00  --------------------------------------------------------  IN:    Diltiazem: 20 mL    IV PiggyBack: 350 mL  Total IN: 370 mL    OUT:    Colostomy (mL): 10 mL    Drain (mL): 125 mL    Indwelling Catheter - Urethral (mL): 1980 mL    Nasogastric/Oral tube (mL): 50 mL  Total OUT: 2165 mL    Total NET: -1795 mL        * fluid status:   *BM (+) on .  pt on bowel regimen.  *edema:    *malnutrition: Pt meets criteria for severe protein-calorie malnutrition in context of acute illness r/t decreased ability to meet increased nutrient needs 2/2 colonic perf w/ septic shock AEB moderate muscle/ fat wasting    Estimated Needs: based on 63.5 Kg (UBW - likely dry wt)  Calories: 1905- 2222 Kcal (30- 35 Kcal/Kg)  Protein: 95- 127 g (1.5- 2 g/Kg)  Fluids: 1587- 1905 mL (25- 30 mL/Kg)    Diet, NPO (12-11-23 @ 13:56) [Active]    Weight History:  Weight (kg): 72.6 (12-11-23 @ 10:30)    PPN Recommendations: via peripheral venous line  Total Volume: 1800 mL x 24 hours  65 g  Amino Acids  100 g Dextrose  0 g Lipids 20% (pending triglyceride level)  98 mEq Sodium Chloride  14 mEq Sodium Acetate  20 mmol Sodium Phosphate  32 mEq Potassium Chloride  18 mEq Potassium Acetate  0 mmol Potassium Phosphate  0 mEq Calcium Gluconate (CAPS out of PN solution)  8 mEq Magnesium Sulfate  200 mg Thiamine  0 ml Trace Elements  10 ml MVI  10 mcg Chromium  5 mg Zinc  60 mcg Selenium    Total Calories    600      (Meets    29%  of  Estimated Energy needs  and    56%  Protein needs)   (osmolarity <900)    Additional Recommendations:    1) Daily weights  2) Strict I & O's  3) Daily lyte checks including magnesium and phos  4) Weekly triglycerides/LFT checks  5) POCT q6hrs; maintain 140-180mg/dL  6) if on PN > 6 days, consider placing PICC line to meet 100% of nutr needs    *will continue to monitor and adjust PN prn*  Evelyn Rich (Formerly Huy), MS, RDN, CNSC, [phone]  Certified Nutrition  Clinical Nutrition PN Follow Up Note    *  year old admitted for    *current status:    *new pertinent meds:    *labs reviewed;  12-14    140  |  104  |  24<H>  ----------------------------<  90  2.7<LL>   |  28  |  1.12    Ca    7.8<L>      14 Dec 2023 06:38  Phos  2.7     12-14  Mg     2.1     12-14        BMI:   HbA1c:   Glucose: POCT Blood Glucose.: 98 mg/dL (12-13-23 @ 23:22)    BP: 123/53 (12-14-23 @ 09:00) (58/45 - 160/65)Vital Signs Last 24 Hrs  T(C): 36.7 (12-14-23 @ 05:00), Max: 36.9 (12-14-23 @ 02:00)  T(F): 98.1 (12-14-23 @ 05:00), Max: 98.4 (12-14-23 @ 02:00)  HR: 86 (12-14-23 @ 09:00) (74 - 96)  BP: 123/53 (12-14-23 @ 09:00) (96/47 - 143/50)  BP(mean): 72 (12-14-23 @ 09:00) (52 - 83)  RR: 17 (12-14-23 @ 09:00) (13 - 26)  SpO2: 98% (12-14-23 @ 09:00) (96% - 100%)      Lipid Panel:   POCT Blood Glucose.: 98 mg/dL (12-13-23 @ 23:22)  POCT Blood Glucose.: 87 mg/dL (12-13-23 @ 18:15)  POCT Blood Glucose.: 94 mg/dL (12-13-23 @ 11:52)      *I&O's Detail    13 Dec 2023 07:01  -  14 Dec 2023 07:00  --------------------------------------------------------  IN:    Diltiazem: 20 mL    IV PiggyBack: 350 mL  Total IN: 370 mL    OUT:    Colostomy (mL): 10 mL    Drain (mL): 125 mL    Indwelling Catheter - Urethral (mL): 1980 mL    Nasogastric/Oral tube (mL): 50 mL  Total OUT: 2165 mL    Total NET: -1795 mL        * fluid status:   *BM (+) on .  pt on bowel regimen.  *edema:    *malnutrition: Pt meets criteria for severe protein-calorie malnutrition in context of acute illness r/t decreased ability to meet increased nutrient needs 2/2 colonic perf w/ septic shock AEB moderate muscle/ fat wasting    Estimated Needs: based on 63.5 Kg (UBW - likely dry wt)  Calories: 1905- 2222 Kcal (30- 35 Kcal/Kg)  Protein: 95- 127 g (1.5- 2 g/Kg)  Fluids: 1587- 1905 mL (25- 30 mL/Kg)    Diet, NPO (12-11-23 @ 13:56) [Active]    Weight History:  Weight (kg): 72.6 (12-11-23 @ 10:30)    PPN Recommendations: via peripheral venous line  Total Volume: 1800 mL x 24 hours  65 g  Amino Acids  100 g Dextrose  0 g Lipids 20% (pending triglyceride level)  98 mEq Sodium Chloride  14 mEq Sodium Acetate  20 mmol Sodium Phosphate  32 mEq Potassium Chloride  18 mEq Potassium Acetate  0 mmol Potassium Phosphate  0 mEq Calcium Gluconate (CAPS out of PN solution)  8 mEq Magnesium Sulfate  200 mg Thiamine  0 ml Trace Elements  10 ml MVI  10 mcg Chromium  5 mg Zinc  60 mcg Selenium    Total Calories    600      (Meets    29%  of  Estimated Energy needs  and    56%  Protein needs)   (osmolarity <900)    Additional Recommendations:    1) Daily weights  2) Strict I & O's  3) Daily lyte checks including magnesium and phos  4) Weekly triglycerides/LFT checks  5) POCT q6hrs; maintain 140-180mg/dL  6) if on PN > 6 days, consider placing PICC line to meet 100% of nutr needs    *will continue to monitor and adjust PN prn*  Evelyn Rich (Formerly Huy), MS, RDN, CNSC, [phone]  Certified Nutrition  Clinical Nutrition PN Follow Up Note    * 84 YO F with a PMHx of COPD, HLD presents to the ED c/o abd pain, n/v and constipation x2 days. Admitted w/ colonic perforation, Extensive stercocolitis with 2 feculent perforation of sigmoid & rectum. Necrotic segment of colon also noticed now POD 2 s/p Christina's. Had post-op fecal peritonitis, 7L washout. Went into septic shock, requiring pressors. Tx to ICU w/ post-op resp failure. NGT to LWS, blood tinged. Off pressors as of this AM 12/13.    *current status: has been NPO x 3 days, nausea overnight. NGT still in place, no plan for clamp trial today.  Plan to initiate PPN today, posisble transfer out of ICU    *new pertinent meds: IV alb, abx, lasix, protonix, 30 mEq KCl repletion outside bag    *labs reviewed; Na and Mg WNL.  K LOW and Phos low-end of normal, will start low in bag and adjust prn. Pt may be in refeeding - suggest to replete KPhos outside PN bag before initiation.  POCT WNL.  Last TBili on 12/12 elevated, will keep trace out of bag and add in zinc, selenium, and chromium.  No triglyceride level available, pending.  Will start all lytes low in bag and adjust based on tomorrow's labs.  MVI and thiamine will be in PN bag.    12-14    140  |  104  |  24<H>  ----------------------------<  90  2.7<LL>   |  28  |  1.12    Ca    7.8<L>      14 Dec 2023 06:38  Phos  2.7     12-14  Mg     2.1     12-14    BP: 123/53 (12-14-23 @ 09:00) (58/45 - 160/65)Vital Signs Last 24 Hrs  T(C): 36.7 (12-14-23 @ 05:00), Max: 36.9 (12-14-23 @ 02:00)  T(F): 98.1 (12-14-23 @ 05:00), Max: 98.4 (12-14-23 @ 02:00)  HR: 86 (12-14-23 @ 09:00) (74 - 96)  BP: 123/53 (12-14-23 @ 09:00) (96/47 - 143/50)  BP(mean): 72 (12-14-23 @ 09:00) (52 - 83)  RR: 17 (12-14-23 @ 09:00) (13 - 26)  SpO2: 98% (12-14-23 @ 09:00) (96% - 100%)    Lipid Panel: pending     POCT Blood Glucose.: 98 mg/dL (12-13-23 @ 23:22)  POCT Blood Glucose.: 87 mg/dL (12-13-23 @ 18:15)  POCT Blood Glucose.: 94 mg/dL (12-13-23 @ 11:52)    *I&O's Detail    13 Dec 2023 07:01  -  14 Dec 2023 07:00  --------------------------------------------------------  IN:    Diltiazem: 20 mL    IV PiggyBack: 350 mL  Total IN: 370 mL    OUT:    Colostomy (mL): 10 mL    Drain (mL): 125 mL    Indwelling Catheter - Urethral (mL): 1980 mL    Nasogastric/Oral tube (mL): 50 mL  Total OUT: 2165 mL    Total NET: -1795 mL  * fluid status: negative; minimal ostomy and NGT output.  Will monitor/ adjust total volume prn.   *no BM (+) doc'd.  pt not on a bowel regimen.  *edema: 1+ generalized     *malnutrition: Pt meets criteria for severe protein-calorie malnutrition in context of acute illness r/t decreased ability to meet increased nutrient needs 2/2 colonic perf w/ septic shock AEB moderate muscle/ fat wasting    Estimated Needs: based on 63.5 Kg (UBW - likely dry wt)  Calories: 1905- 2222 Kcal (30- 35 Kcal/Kg)  Protein: 95- 127 g (1.5- 2 g/Kg)  Fluids: 1587- 1905 mL (25- 30 mL/Kg)    Diet, NPO (12-11-23 @ 13:56) [Active]    Weight History:  Weight (kg): 72.6 (12-11-23 @ 10:30)    PPN Recommendations: via peripheral venous line  Total Volume: 1800 mL x 24 hours  65 g  Amino Acids  100 g Dextrose  0 g Lipids 20% (pending triglyceride level)  98 mEq Sodium Chloride  14 mEq Sodium Acetate  20 mmol Sodium Phosphate  32 mEq Potassium Chloride  18 mEq Potassium Acetate  0 mmol Potassium Phosphate  0 mEq Calcium Gluconate (CAPS out of PN solution)  8 mEq Magnesium Sulfate  200 mg Thiamine  0 ml Trace Elements  10 ml MVI  10 mcg Chromium  5 mg Zinc  60 mcg Selenium    Total Calories    600      (Meets    29%  of  Estimated Energy needs  and    56%  Protein needs)   (osmolarity <900)    Additional Recommendations:    1) Daily weights  2) Strict I & O's  3) Daily lyte checks including magnesium and phos  4) Weekly triglycerides/LFT checks  5) POCT q6hrs; maintain 140-180mg/dL  6) if on PN > 6 days, consider placing PICC line to meet 100% of nutr needs    *will continue to monitor and adjust PN prn*  Evelyn Rich (Formerly Huy), MS, RDN, CNSC, [phone]  Certified Nutrition  Clinical Nutrition PN Follow Up Note    * 84 YO F with a PMHx of COPD, HLD presents to the ED c/o abd pain, n/v and constipation x2 days. Admitted w/ colonic perforation, Extensive stercocolitis with 2 feculent perforation of sigmoid & rectum. Necrotic segment of colon also noticed now POD 2 s/p Christina's. Had post-op fecal peritonitis, 7L washout. Went into septic shock, requiring pressors. Tx to ICU w/ post-op resp failure. NGT to LWS, blood tinged. Off pressors as of this AM 12/13.    *current status: has been NPO x 3 days, nausea overnight. NGT still in place, no plan for clamp trial today.  Plan to initiate PPN today, posisble transfer out of ICU    *new pertinent meds: IV alb, abx, lasix, protonix, 30 mEq KCl repletion outside bag    *labs reviewed; Na and Mg WNL.  K LOW and Phos low-end of normal, will start low in bag and adjust prn. Pt may be in refeeding - suggest to replete KPhos outside PN bag before initiation.  POCT WNL.  Last TBili on 12/12 elevated, will keep trace out of bag and add in zinc, selenium, and chromium.  No triglyceride level available, pending.  Will start all lytes low in bag and adjust based on tomorrow's labs.  MVI and thiamine will be in PN bag.    12-14    140  |  104  |  24<H>  ----------------------------<  90  2.7<LL>   |  28  |  1.12    Ca    7.8<L>      14 Dec 2023 06:38  Phos  2.7     12-14  Mg     2.1     12-14    BP: 123/53 (12-14-23 @ 09:00) (58/45 - 160/65)Vital Signs Last 24 Hrs  T(C): 36.7 (12-14-23 @ 05:00), Max: 36.9 (12-14-23 @ 02:00)  T(F): 98.1 (12-14-23 @ 05:00), Max: 98.4 (12-14-23 @ 02:00)  HR: 86 (12-14-23 @ 09:00) (74 - 96)  BP: 123/53 (12-14-23 @ 09:00) (96/47 - 143/50)  BP(mean): 72 (12-14-23 @ 09:00) (52 - 83)  RR: 17 (12-14-23 @ 09:00) (13 - 26)  SpO2: 98% (12-14-23 @ 09:00) (96% - 100%)    Lipid Panel: pending     POCT Blood Glucose.: 98 mg/dL (12-13-23 @ 23:22)  POCT Blood Glucose.: 87 mg/dL (12-13-23 @ 18:15)  POCT Blood Glucose.: 94 mg/dL (12-13-23 @ 11:52)    *I&O's Detail    13 Dec 2023 07:01  -  14 Dec 2023 07:00  --------------------------------------------------------  IN:    Diltiazem: 20 mL    IV PiggyBack: 350 mL  Total IN: 370 mL    OUT:    Colostomy (mL): 10 mL    Drain (mL): 125 mL    Indwelling Catheter - Urethral (mL): 1980 mL    Nasogastric/Oral tube (mL): 50 mL  Total OUT: 2165 mL    Total NET: -1795 mL  * fluid status: negative; minimal ostomy and NGT output.  Will monitor/ adjust total volume prn.   *no BM (+) doc'd.  pt not on a bowel regimen.  *edema: 1+ generalized     *malnutrition: Pt meets criteria for severe protein-calorie malnutrition in context of acute illness r/t decreased ability to meet increased nutrient needs 2/2 colonic perf w/ septic shock AEB moderate muscle/ fat wasting    Estimated Needs: based on 63.5 Kg (UBW - likely dry wt)  Calories: 1905- 2222 Kcal (30- 35 Kcal/Kg)  Protein: 95- 127 g (1.5- 2 g/Kg)  Fluids: 1587- 1905 mL (25- 30 mL/Kg)    Diet, NPO (12-11-23 @ 13:56) [Active]    Weight History:  Weight (kg): 72.6 (12-11-23 @ 10:30)    PPN Recommendations: via peripheral venous line  Total Volume: 1800 mL x 24 hours  65 g  Amino Acids  100 g Dextrose  0 g Lipids 20% (pending triglyceride level)  98 mEq Sodium Chloride  14 mEq Sodium Acetate  20 mmol Sodium Phosphate  32 mEq Potassium Chloride  18 mEq Potassium Acetate  0 mmol Potassium Phosphate  0 mEq Calcium Gluconate (CAPS out of PN solution)  8 mEq Magnesium Sulfate  200 mg Thiamine  0 ml Trace Elements  10 ml MVI  10 mcg Chromium  5 mg Zinc  60 mcg Selenium    Total Calories    600      (Meets    29%  of  Estimated Energy needs  and    56%  Protein needs)   (osmolarity <900)    Additional Recommendations:    1) Daily weights  2) Strict I & O's  3) Daily lyte checks including magnesium and phos  4) Weekly triglycerides/LFT checks  5) POCT q6hrs; maintain 140-180mg/dL  6) if on PN > 6 days, consider placing PICC line to meet 100% of nutr needs    *will continue to monitor and adjust PN prn*  Evelyn Rich (Formerly Huy), MS, RDN, CNSC, [phone]  Certified Nutrition

## 2023-12-14 NOTE — PROGRESS NOTE ADULT - ASSESSMENT
83F w/ colonic perforation 2/2 stercoral ulcer  now POD 3 s/p Christina  AFib requiring diltiazem gtt, off vasopressors, awaiting return of bowel function    Plan:  - Possible clamp trial pending final 24 hr NGT output  - TOV per ICU (Leyva not needed from CRS standpoint)  - Rate control per ICU  - Pain & nausea control PRN  - NPO while NGT in place  - Incentive spirometry  - Zosyn since 12/11, f/u intraop cx (w/ pan sensitive Proteus) & preop blood cx (NGTD)  - Strict I/Os  - Replace electrolytes  - PT anticipates RONALD  - Will need ostomy teaching  - VTE ppx w/ SCDs & Lovenox    Discussed w/ CRS team 83F w/ colonic perforation 2/2 stercoral ulcer  now POD 3 s/p Christina  AFib requiring diltiazem gtt, off vasopressors, awaiting return of bowel function    Plan:  - Possible clamp trial pending final 24 hr NGT output  - TOV per ICU (Leyva not needed from CRS standpoint)  - Rate control per ICU, consider transition to full AC  - Pain & nausea control PRN  - NPO while NGT in place  - Incentive spirometry  - Zosyn since 12/11, f/u intraop cx (w/ pan sensitive Proteus) & preop blood cx (NGTD)  - Strict I/Os  - Replace electrolytes  - PT anticipates RONALD  - Will need ostomy teaching  - VTE ppx w/ SCDs & Lovenox    Discussed w/ CRS team

## 2023-12-14 NOTE — PROGRESS NOTE ADULT - SUBJECTIVE AND OBJECTIVE BOX
SURGERY DAILY PROGRESS NOTE:     Subjective:  Patient seen and examined this AM at bedside. No acute events overnight and patient resting comfortably. Off pressors. Nausea when NGT not sumping properly. Pain controlled. Denies fever/chills, shortness of breath, chest pain. VS reviewed    Objective:    MEDICATIONS  (STANDING):  albumin human 25% IVPB 50 milliLiter(s) IV Intermittent <User Schedule>  diltiazem Infusion 5 mG/Hr (5 mL/Hr) IV Continuous <Continuous>  enoxaparin Injectable 40 milliGRAM(s) SubCutaneous every 24 hours  furosemide   Injectable 20 milliGRAM(s) IV Push two times a day  lidocaine   4% Patch 1 Patch Transdermal daily  pantoprazole  Injectable 40 milliGRAM(s) IV Push two times a day  piperacillin/tazobactam IVPB.. 3.375 Gram(s) IV Intermittent every 8 hours    MEDICATIONS  (PRN):  acetaminophen   IVPB .. 1000 milliGRAM(s) IV Intermittent once PRN Temp greater or equal to 38C (100.4F), Mild Pain (1 - 3)  ketorolac   Injectable 15 milliGRAM(s) IV Push every 8 hours PRN Severe Pain (7 - 10)  morphine  - Injectable 2 milliGRAM(s) IV Push every 4 hours PRN Moderate Pain (4 - 6)  ondansetron Injectable 4 milliGRAM(s) IV Push every 6 hours PRN Nausea      Vital Signs Last 24 Hrs  T(C): 36.9 (14 Dec 2023 02:00), Max: 36.9 (14 Dec 2023 02:00)  T(F): 98.4 (14 Dec 2023 02:00), Max: 98.4 (14 Dec 2023 02:00)  HR: 90 (14 Dec 2023 04:00) (74 - 133)  BP: 128/50 (14 Dec 2023 04:00) (96/47 - 131/44)  BP(mean): 74 (14 Dec 2023 04:00) (52 - 83)  RR: 16 (14 Dec 2023 04:00) (14 - 26)  SpO2: 96% (14 Dec 2023 04:00) (96% - 100%)          PHYSICAL EXAM   GENERAL: NAD, well developed, thin  HEAD: Atraumatic, normocephalic  EYES: EOMI, PERRLA, conjunctiva and sclera clear  ENT: moist mucous membrane, NC in place, NGT w/ bilious output  NECK: supple, No JVD, midline trachea  CHEST/LUNG: No increased WOB, symmetric excursions  Heart: Irregularly irregular rate & rhythm ppp, no peripheral edema  ABDOMEN: Round, distended, soft, nontender, Prevena with suction, drain bulb w/ mildly opaque serous output, stoma pink & mildly congested with bowel sweat and no gas in bag  : Leyva w/ clear light yellow urine in tubing  EXTREMITIES: Brisk cap refill. no clubbing or cyanosis  NERVOUS SYSTEM: Alert, arousable, no neuro-deficits  SKIN: warm to touch, no rash or lesions      I&O's Detail    12 Dec 2023 07:01  -  13 Dec 2023 07:00  --------------------------------------------------------  IN:    Diltiazem: 12 mL    IV PiggyBack: 200 mL    Lactated Ringers: 1437 mL    Norepinephrine: 55 mL    Propofol: 22.1 mL  Total IN: 1726.1 mL    OUT:    Colostomy (mL): 7 mL    Drain (mL): 175 mL    Indwelling Catheter - Urethral (mL): 735 mL    Nasogastric/Oral tube (mL): 400 mL  Total OUT: 1317 mL    Total NET: 409.1 mL      13 Dec 2023 07:01  -  14 Dec 2023 04:56  --------------------------------------------------------  IN:    Diltiazem: 20 mL    IV PiggyBack: 250 mL  Total IN: 270 mL    OUT:    Colostomy (mL): 10 mL    Drain (mL): 95 mL    Indwelling Catheter - Urethral (mL): 1680 mL  Total OUT: 1785 mL    Total NET: -1515 mL          Daily     Daily     LABS:                        10.5   10.37 )-----------( 145      ( 13 Dec 2023 04:42 )             31.4     12-13    137  |  107  |  22  ----------------------------<  111<H>  3.5   |  23  |  1.09    Ca    7.5<L>      13 Dec 2023 04:42  Phos  2.5     12-13  Mg     2.1     12-13    TPro  5.5<L>  /  Alb  2.8<L>  /  TBili  2.8<H>  /  DBili  x   /  AST  22  /  ALT  21  /  AlkPhos  19<L>  12-12      Urinalysis Basic - ( 13 Dec 2023 04:42 )    Color: x / Appearance: x / SG: x / pH: x  Gluc: 111 mg/dL / Ketone: x  / Bili: x / Urobili: x   Blood: x / Protein: x / Nitrite: x   Leuk Esterase: x / RBC: x / WBC x   Sq Epi: x / Non Sq Epi: x / Bacteria: x

## 2023-12-15 LAB
ALBUMIN SERPL ELPH-MCNC: 2.6 G/DL — LOW (ref 3.3–5)
ALBUMIN SERPL ELPH-MCNC: 2.6 G/DL — LOW (ref 3.3–5)
ALP SERPL-CCNC: 51 U/L — SIGNIFICANT CHANGE UP (ref 40–120)
ALP SERPL-CCNC: 51 U/L — SIGNIFICANT CHANGE UP (ref 40–120)
ALT FLD-CCNC: 16 U/L — SIGNIFICANT CHANGE UP (ref 12–78)
ALT FLD-CCNC: 16 U/L — SIGNIFICANT CHANGE UP (ref 12–78)
ANION GAP SERPL CALC-SCNC: 7 MMOL/L — SIGNIFICANT CHANGE UP (ref 5–17)
AST SERPL-CCNC: 28 U/L — SIGNIFICANT CHANGE UP (ref 15–37)
AST SERPL-CCNC: 28 U/L — SIGNIFICANT CHANGE UP (ref 15–37)
BILIRUB SERPL-MCNC: 2.7 MG/DL — HIGH (ref 0.2–1.2)
BILIRUB SERPL-MCNC: 2.7 MG/DL — HIGH (ref 0.2–1.2)
BUN SERPL-MCNC: 25 MG/DL — HIGH (ref 7–23)
BUN SERPL-MCNC: 25 MG/DL — HIGH (ref 7–23)
BUN SERPL-MCNC: 26 MG/DL — HIGH (ref 7–23)
BUN SERPL-MCNC: 26 MG/DL — HIGH (ref 7–23)
CALCIUM SERPL-MCNC: 7.7 MG/DL — LOW (ref 8.5–10.1)
CALCIUM SERPL-MCNC: 7.7 MG/DL — LOW (ref 8.5–10.1)
CALCIUM SERPL-MCNC: 7.8 MG/DL — LOW (ref 8.5–10.1)
CALCIUM SERPL-MCNC: 7.8 MG/DL — LOW (ref 8.5–10.1)
CHLORIDE SERPL-SCNC: 100 MMOL/L — SIGNIFICANT CHANGE UP (ref 96–108)
CHLORIDE SERPL-SCNC: 100 MMOL/L — SIGNIFICANT CHANGE UP (ref 96–108)
CHLORIDE SERPL-SCNC: 103 MMOL/L — SIGNIFICANT CHANGE UP (ref 96–108)
CHLORIDE SERPL-SCNC: 103 MMOL/L — SIGNIFICANT CHANGE UP (ref 96–108)
CO2 SERPL-SCNC: 28 MMOL/L — SIGNIFICANT CHANGE UP (ref 22–31)
CO2 SERPL-SCNC: 28 MMOL/L — SIGNIFICANT CHANGE UP (ref 22–31)
CO2 SERPL-SCNC: 30 MMOL/L — SIGNIFICANT CHANGE UP (ref 22–31)
CO2 SERPL-SCNC: 30 MMOL/L — SIGNIFICANT CHANGE UP (ref 22–31)
CREAT SERPL-MCNC: 0.85 MG/DL — SIGNIFICANT CHANGE UP (ref 0.5–1.3)
CREAT SERPL-MCNC: 0.85 MG/DL — SIGNIFICANT CHANGE UP (ref 0.5–1.3)
CREAT SERPL-MCNC: 1.04 MG/DL — SIGNIFICANT CHANGE UP (ref 0.5–1.3)
CREAT SERPL-MCNC: 1.04 MG/DL — SIGNIFICANT CHANGE UP (ref 0.5–1.3)
EGFR: 53 ML/MIN/1.73M2 — LOW
EGFR: 53 ML/MIN/1.73M2 — LOW
EGFR: 68 ML/MIN/1.73M2 — SIGNIFICANT CHANGE UP
EGFR: 68 ML/MIN/1.73M2 — SIGNIFICANT CHANGE UP
GLUCOSE BLDC GLUCOMTR-MCNC: 157 MG/DL — HIGH (ref 70–99)
GLUCOSE BLDC GLUCOMTR-MCNC: 157 MG/DL — HIGH (ref 70–99)
GLUCOSE BLDC GLUCOMTR-MCNC: 161 MG/DL — HIGH (ref 70–99)
GLUCOSE BLDC GLUCOMTR-MCNC: 161 MG/DL — HIGH (ref 70–99)
GLUCOSE SERPL-MCNC: 129 MG/DL — HIGH (ref 70–99)
GLUCOSE SERPL-MCNC: 129 MG/DL — HIGH (ref 70–99)
GLUCOSE SERPL-MCNC: 168 MG/DL — HIGH (ref 70–99)
GLUCOSE SERPL-MCNC: 168 MG/DL — HIGH (ref 70–99)
MAGNESIUM SERPL-MCNC: 2 MG/DL — SIGNIFICANT CHANGE UP (ref 1.6–2.6)
MAGNESIUM SERPL-MCNC: 2 MG/DL — SIGNIFICANT CHANGE UP (ref 1.6–2.6)
PHOSPHATE SERPL-MCNC: 1.8 MG/DL — LOW (ref 2.5–4.5)
PHOSPHATE SERPL-MCNC: 1.8 MG/DL — LOW (ref 2.5–4.5)
POTASSIUM SERPL-MCNC: 3.2 MMOL/L — LOW (ref 3.5–5.3)
POTASSIUM SERPL-MCNC: 3.2 MMOL/L — LOW (ref 3.5–5.3)
POTASSIUM SERPL-MCNC: 3.7 MMOL/L — SIGNIFICANT CHANGE UP (ref 3.5–5.3)
POTASSIUM SERPL-MCNC: 3.7 MMOL/L — SIGNIFICANT CHANGE UP (ref 3.5–5.3)
POTASSIUM SERPL-SCNC: 3.2 MMOL/L — LOW (ref 3.5–5.3)
POTASSIUM SERPL-SCNC: 3.2 MMOL/L — LOW (ref 3.5–5.3)
POTASSIUM SERPL-SCNC: 3.7 MMOL/L — SIGNIFICANT CHANGE UP (ref 3.5–5.3)
POTASSIUM SERPL-SCNC: 3.7 MMOL/L — SIGNIFICANT CHANGE UP (ref 3.5–5.3)
PROT SERPL-MCNC: 5.9 GM/DL — LOW (ref 6–8.3)
PROT SERPL-MCNC: 5.9 GM/DL — LOW (ref 6–8.3)
SODIUM SERPL-SCNC: 137 MMOL/L — SIGNIFICANT CHANGE UP (ref 135–145)
SODIUM SERPL-SCNC: 137 MMOL/L — SIGNIFICANT CHANGE UP (ref 135–145)
SODIUM SERPL-SCNC: 138 MMOL/L — SIGNIFICANT CHANGE UP (ref 135–145)
SODIUM SERPL-SCNC: 138 MMOL/L — SIGNIFICANT CHANGE UP (ref 135–145)
TRIGL SERPL-MCNC: 187 MG/DL — HIGH
TRIGL SERPL-MCNC: 187 MG/DL — HIGH

## 2023-12-15 PROCEDURE — 99233 SBSQ HOSP IP/OBS HIGH 50: CPT

## 2023-12-15 RX ORDER — TAMSULOSIN HYDROCHLORIDE 0.4 MG/1
0.4 CAPSULE ORAL ONCE
Refills: 0 | Status: COMPLETED | OUTPATIENT
Start: 2023-12-15 | End: 2023-12-15

## 2023-12-15 RX ORDER — ELECTROLYTE SOLUTION,INJ
1 VIAL (ML) INTRAVENOUS
Refills: 0 | Status: DISCONTINUED | OUTPATIENT
Start: 2023-12-15 | End: 2023-12-15

## 2023-12-15 RX ORDER — POTASSIUM CHLORIDE 20 MEQ
10 PACKET (EA) ORAL
Refills: 0 | Status: COMPLETED | OUTPATIENT
Start: 2023-12-15 | End: 2023-12-15

## 2023-12-15 RX ORDER — POTASSIUM PHOSPHATE, MONOBASIC POTASSIUM PHOSPHATE, DIBASIC 236; 224 MG/ML; MG/ML
15 INJECTION, SOLUTION INTRAVENOUS EVERY 6 HOURS
Refills: 0 | Status: COMPLETED | OUTPATIENT
Start: 2023-12-15 | End: 2023-12-15

## 2023-12-15 RX ORDER — DEXTROSE MONOHYDRATE, SODIUM CHLORIDE, AND POTASSIUM CHLORIDE 50; .745; 4.5 G/1000ML; G/1000ML; G/1000ML
1000 INJECTION, SOLUTION INTRAVENOUS
Refills: 0 | Status: DISCONTINUED | OUTPATIENT
Start: 2023-12-15 | End: 2023-12-16

## 2023-12-15 RX ADMIN — Medication 80 MILLIEQUIVALENT(S): at 02:09

## 2023-12-15 RX ADMIN — Medication 100 MILLIEQUIVALENT(S): at 07:54

## 2023-12-15 RX ADMIN — PANTOPRAZOLE SODIUM 40 MILLIGRAM(S): 20 TABLET, DELAYED RELEASE ORAL at 23:05

## 2023-12-15 RX ADMIN — Medication 100 MILLIEQUIVALENT(S): at 02:55

## 2023-12-15 RX ADMIN — Medication 100 MILLIEQUIVALENT(S): at 04:07

## 2023-12-15 RX ADMIN — DEXTROSE MONOHYDRATE, SODIUM CHLORIDE, AND POTASSIUM CHLORIDE 35 MILLILITER(S): 50; .745; 4.5 INJECTION, SOLUTION INTRAVENOUS at 07:54

## 2023-12-15 RX ADMIN — TAMSULOSIN HYDROCHLORIDE 0.4 MILLIGRAM(S): 0.4 CAPSULE ORAL at 16:43

## 2023-12-15 RX ADMIN — CEFTRIAXONE 1000 MILLIGRAM(S): 500 INJECTION, POWDER, FOR SOLUTION INTRAMUSCULAR; INTRAVENOUS at 14:01

## 2023-12-15 RX ADMIN — Medication 100 MILLIEQUIVALENT(S): at 02:07

## 2023-12-15 RX ADMIN — PANTOPRAZOLE SODIUM 40 MILLIGRAM(S): 20 TABLET, DELAYED RELEASE ORAL at 09:23

## 2023-12-15 RX ADMIN — ENOXAPARIN SODIUM 40 MILLIGRAM(S): 100 INJECTION SUBCUTANEOUS at 09:23

## 2023-12-15 RX ADMIN — POTASSIUM PHOSPHATE, MONOBASIC POTASSIUM PHOSPHATE, DIBASIC 62.5 MILLIMOLE(S): 236; 224 INJECTION, SOLUTION INTRAVENOUS at 13:22

## 2023-12-15 RX ADMIN — Medication 100 MILLIEQUIVALENT(S): at 12:11

## 2023-12-15 RX ADMIN — Medication 1 EACH: at 22:33

## 2023-12-15 RX ADMIN — Medication 100 MILLIEQUIVALENT(S): at 10:44

## 2023-12-15 RX ADMIN — Medication 100 MILLIEQUIVALENT(S): at 09:22

## 2023-12-15 RX ADMIN — POTASSIUM PHOSPHATE, MONOBASIC POTASSIUM PHOSPHATE, DIBASIC 62.5 MILLIMOLE(S): 236; 224 INJECTION, SOLUTION INTRAVENOUS at 17:48

## 2023-12-15 NOTE — PROGRESS NOTE ADULT - ATTENDING COMMENTS
S&E  NGT with minimal output.  AFVSS  NAD  soft, NTND, colostomy healthy but quiet  Labs reviewed  A/P -   NGT removed.  OK for sips/chips.  Cont IVF.  Ostomy teaching.  OOB and ambulating.

## 2023-12-15 NOTE — PROGRESS NOTE ADULT - ASSESSMENT
83F w/ colonic perforation 2/2 stercoral ulcer  now POD 4 s/p Christina  AFib has reverted to NSR, transferred to floor, awaiting return of bowel function    Plan:  - Possible clamp trial pending final 24 hr NGT output  - TOV  - Pain & nausea control PRN  - NPO while NGT in place  - Incentive spirometry  - Zosyn since 12/11, f/u intraop cx (w/ pan sensitive Proteus) & preop blood cx (NGTD)  - Strict I/Os  - Replace electrolytes  - PT anticipates RONALD  - Will need ostomy teaching  - VTE ppx w/ SCDs & Lovenox    Discussed w/ CRS team

## 2023-12-15 NOTE — PROGRESS NOTE ADULT - ASSESSMENT
83F presented with abd pain, n/v and constipation, noted to have colonic perforation 2/2 stercoral ulcer      #Septic shock Septic shock from perforated colon and feculent peritonitis   -s/p  83F presented with abd pain, n/v and constipation, noted to have colonic perforation 2/2 stercoral ulcer    83F presented with abd pain, n/v and constipation, noted to have colonic perforation 2/2 stercoral ulcer      #Septic shock Septic shock from perforated colon and feculent peritonitis   -s/p Christina procedure  -S/p NGT  -Ostomy care  -Monitor CORKY output  -BCx: Gram Negative Rods  -Intraop cultures: Proteus Mirabilus   -s/p Zosyn   -currently on Rocephin   -currently on PPN  -strict i&o's   -daugherty in place   -Pain management  -Serial abdominal exam  -management per CRSx    #pAfib, currently in sinus tach  -TTE: 2+ MR, 3+ TR, pHTN (RVSP: 41), EF: 55 to 60%  -S/p Lopressor and amiodarone  -S/p Cardizem gtt.  -Currently in NSR (sinus tach on telemetry)   -[id number] Score: 3  -Monitor electrolytes, replace and trend   -Recommend cardiology eval    #COPD  -Mild  -Stable  -Currently not on any medication    #HLD  -Diet controlled, currently not on any medication    VTE prophylaxis  -Lovenox/SCDs       83F presented with abd pain, n/v and constipation, noted to have colonic perforation 2/2 stercoral ulcer    83F presented with abd pain, n/v and constipation, noted to have colonic perforation 2/2 stercoral ulcer      #Septic shock Septic shock from perforated colon and feculent peritonitis   -s/p Christina procedure  -S/p NGT  -Ostomy care  -Monitor CORKY output  -BCx: Gram Negative Rods  -Intraop cultures: Proteus Mirabilus   -s/p Zosyn   -currently on Rocephin   -currently on PPN  -strict i&o's   -daugherty in place   -Pain management  -Serial abdominal exam  -management per CRSx    #pAfib, currently in sinus tach  -TTE: 2+ MR, 3+ TR, pHTN (RVSP: 41), EF: 55 to 60%  -S/p Lopressor and amiodarone  -S/p Cardizem gtt.  -Currently in NSR (sinus tach on telemetry)   -[id number] Score: 3  -Monitor electrolytes, replace and trend   -Recommend cardiology eval    #COPD  -Mild  -Stable  -Currently not on any medication    #HLD  -Diet controlled, currently not on any medication    VTE prophylaxis  -Lovenox/SCDs       83F presented with abd pain, n/v and constipation, noted to have colonic perforation 2/2 stercoral ulcer    #Septic shock Septic shock from perforated colon and feculent peritonitis   -s/p Christina procedure  -S/p NGT  -Ostomy care  -Monitor CORKY output  -BCx: Gram Negative Rods, pending identification. will need repeat BCx   -Intraop cultures: Proteus Mirabilis   -s/p Zosyn   -currently on Rocephin   -repeat blood cultures   -currently on PPN  -strict i&o's   -daugherty in place, urology consult placed   -Pain management  -Serial abdominal exam  -management per CRSx    #pAfib, currently in NSR   -TTE: 2+ MR, 3+ TR, pHTN (RVSP: 41), EF: 55 to 60%  -S/p Lopressor and amiodarone  -S/p Cardizem gtt.  -Currently in NSR (intermittent episodes of sinus tach on telemetry)   -[id number] Score: 3 (Age, Sex),   -Monitor electrolytes, replace and trend   -Recommend cardiology eval    #COPD  -Mild  -Stable  -Currently not on any medication    #HLD  -Diet controlled, currently not on any medication    VTE prophylaxis  -Lovenox/SCDs       83F presented with abd pain, n/v and constipation, noted to have colonic perforation 2/2 stercoral ulcer    #Septic shock Septic shock from perforated colon and feculent peritonitis   -s/p Christina procedure  -S/p NGT  -Ostomy care  -Monitor CORKY output  -BCx: Gram Negative Rods, pending identification. will need repeat BCx   -Intraop cultures: Proteus Mirabilis   -s/p Zosyn   -currently on Rocephin   -repeat blood cultures   -currently on PPN  -strict i&o's   -daugherty in place, urology consult placed   -Pain management  -Serial abdominal exam  -management per CRSx    #pAfib, currently in NSR   -TTE: 2+ MR, 3+ TR, pHTN (RVSP: 41), EF: 55 to 60%  -S/p Lopressor and amiodarone  -S/p Cardizem gtt.  -Currently in NSR (intermittent episodes of sinus tach on telemetry)   -[id number] Score: 3 (Age, Sex),   -Monitor electrolytes, replace and trend   -Recommend cardiology eval    #COPD  -Mild  -Stable  -Currently not on any medication    #HLD  -Diet controlled, currently not on any medication    VTE prophylaxis  -Lovenox/SCDs       83F presented with abd pain, n/v and constipation, noted to have colonic perforation 2/2 stercoral ulcer    #Septic shock Septic shock from perforated colon and feculent peritonitis   -s/p Christina procedure  -S/p NGT  -Ostomy care  -Monitor CORKY output  -BCx: Gram Negative Rods, pending identification. will need repeat BCx   -Intraop cultures: Proteus Mirabilis   -s/p Zosyn   -currently on Rocephin   -repeat blood cultures   -currently on PPN  -strict i&o's   -daugherty in place, urology consult placed for urinary retention   -Pain management  -Serial abdominal exam  -management per CRSx    #pAfib, currently in NSR   -TTE: 2+ MR, 3+ TR, pHTN (RVSP: 41), EF: 55 to 60%  -S/p Lopressor and amiodarone  -S/p Cardizem gtt.  -Currently in NSR (intermittent episodes of sinus tach on telemetry)   -[id number] Score: 3 (Age, Sex),   -Monitor electrolytes, replace and trend   -monitor on tele   -Recommend cardiology eval      #COPD  -Mild  -Stable  -Currently not on any medication    #HLD  -Diet controlled, currently not on any medication    VTE prophylaxis  -Lovenox/SCDs       83F presented with abd pain, n/v and constipation, noted to have colonic perforation 2/2 stercoral ulcer    #Septic shock Septic shock from perforated colon and feculent peritonitis   -s/p Christina procedure  -S/p NGT  -Ostomy care  -Monitor CORKY output  -BCx: Gram Negative Rods, pending identification. will need repeat BCx   -Intraop cultures: Proteus Mirabilis   -s/p Zosyn   -currently on Rocephin   -repeat blood cultures   -currently on PPN  -strict i&o's   -daugherty in place, urology consult placed for urinary retention   -Pain management  -Serial abdominal exam  -management per CRSx    #pAfib, currently in NSR   -TTE: 2+ MR, 3+ TR, pHTN (RVSP: 41), EF: 55 to 60%  -S/p Lopressor and amiodarone  -S/p Cardizem gtt.  -Currently in NSR (intermittent episodes of sinus tach on telemetry)   -[id number] Score: 3 (Age, Sex),   -Monitor electrolytes, replace and trend   -monitor on tele   -Recommend cardiology eval      #COPD  -Mild  -Stable  -Currently not on any medication    #HLD  -Diet controlled, currently not on any medication    VTE prophylaxis  -Lovenox/SCDs

## 2023-12-15 NOTE — PROGRESS NOTE ADULT - SUBJECTIVE AND OBJECTIVE BOX
HOSPITALIST ATTENDING PROGRESS NOTE     Chart and meds reviewed. Patient seen and examined     CC: abd pain, n/v and constipation    Subjective: Pt seen and evaluated. Downgraded from ICU yesterday. Currently, has mild pain. Patient denies fever, chills, chest pain, palpitations, shortness of breath, numbness, weakness or tingling     Hospital Course reviewed   Presented with abdominal pain, N, V, constipation x 2 days     Found to have perf viscus     Emergent OR 12/11 - ex lap, abdominal washout, Christina's colectomy  Found to have perforation of sigmoid and proximal rectum         All other systems and founds to be negative with exception of what has been described above.         PHYSICAL EXAM:  Vital Signs Last 24 Hrs  T(C): 36.7 (15 Dec 2023 20:11), Max: 37 (15 Dec 2023 08:20)  T(F): 98 (15 Dec 2023 20:11), Max: 98.6 (15 Dec 2023 08:20)  HR: 101 (15 Dec 2023 20:11) (85 - 101)  BP: 146/49 (15 Dec 2023 20:11) (132/59 - 146/49)  BP(mean): 81 (15 Dec 2023 00:00) (81 - 81)  RR: 18 (15 Dec 2023 20:11) (17 - 20)  SpO2: 91% (15 Dec 2023 20:11) (91% - 98%)    Parameters below as of 15 Dec 2023 20:11  Patient On (Oxygen Delivery Method): nasal cannula  O2 Flow (L/min): 2    Daily     Daily     GEN: NAD   HEENT: EOMI, normal hearing, moist mucous membranes  NECK : Soft and supple, no JVD  LUNG: CTABL, No wheezing, rales or rhonchi  CVS: S1S2+, RRR, no M/G/R  GI: BS+, soft, NT/ND, no guarding, no rebound  EXTREMITIES: No peripheral edema  VASCULAR: 2+ peripheral pulses  NEURO: AAOx3, grossly non-focal   MSK: 5/5 strength b/l upper and lower extremities  SKIN: No rashes    HOME MEDICATIONS:  Home Medications:  Multiple Vitamins oral tablet: 1 tab(s) orally once a day (11 Dec 2023 15:18)      MEDICATIONS  MEDICATIONS  (STANDING):  cefTRIAXone Injectable. 1000 milliGRAM(s) IV Push every 24 hours  dextrose 5% + sodium chloride 0.45% with potassium chloride 20 mEq/L 1000 milliLiter(s) (35 mL/Hr) IV Continuous <Continuous>  enoxaparin Injectable 40 milliGRAM(s) SubCutaneous every 24 hours  lidocaine   4% Patch 1 Patch Transdermal daily  pantoprazole  Injectable 40 milliGRAM(s) IV Push two times a day  Parenteral Nutrition - Adult 1 Each (75 mL/Hr) TPN Continuous <Continuous>  Parenteral Nutrition - Adult 1 Each (75 mL/Hr) TPN Continuous <Continuous>      LABS: All Labs Reviewed:                        9.6    12.91 )-----------( 178      ( 15 Dec 2023 04:58 )             28.7     12-15    138  |  103  |  25<H>  ----------------------------<  168<H>  3.7   |  28  |  0.85    Ca    7.7<L>      15 Dec 2023 17:05  Phos  1.8     12-15  Mg     2.0     12-15    TPro  5.9<L>  /  Alb  2.6<L>  /  TBili  2.7<H>  /  DBili  x   /  AST  28  /  ALT  16  /  AlkPhos  51  12-15        Urinalysis Basic - ( 15 Dec 2023 17:05 )    Color: x / Appearance: x / SG: x / pH: x  Gluc: 168 mg/dL / Ketone: x  / Bili: x / Urobili: x   Blood: x / Protein: x / Nitrite: x   Leuk Esterase: x / RBC: x / WBC x   Sq Epi: x / Non Sq Epi: x / Bacteria: x        Blood Culture: 12-12 @ 09:10  Organism --  Gram Stain Blood -- Gram Stain --  Specimen Source Clean Catch Clean Catch (Midstream)  Culture-Blood --    12-11 @ 19:09  Organism Proteus mirabilis  Gram Stain Blood -- Gram Stain --  Specimen Source .Surgical Swab culture, peritonitis  Culture-Blood --    12-11 @ 10:57  Organism Blood Culture PCR  Gram Stain Blood -- Gram Stain   Growth in anaerobic bottle: Gram Negative Rods  Specimen Source .Blood None  Culture-Blood --      I&O's Detail    14 Dec 2023 07:01  -  15 Dec 2023 07:00  --------------------------------------------------------  IN:    IV PiggyBack: 650 mL  Total IN: 650 mL    OUT:    Colostomy (mL): 0 mL    Drain (mL): 75 mL    Indwelling Catheter - Urethral (mL): 3400 mL    Nasogastric/Oral tube (mL): 100 mL    Voided (mL): 0 mL  Total OUT: 3575 mL    Total NET: -2925 mL      15 Dec 2023 07:01  -  15 Dec 2023 20:45  --------------------------------------------------------  IN:  Total IN: 0 mL    OUT:    Drain (mL): 90 mL    Intermittent Catheterization - Urethral (mL): 700 mL  Total OUT: 790 mL    Total NET: -790 mL        CAPILLARY BLOOD GLUCOSE      POCT Blood Glucose.: 161 mg/dL (15 Dec 2023 11:51)  POCT Blood Glucose.: 157 mg/dL (15 Dec 2023 07:07)  POCT Blood Glucose.: 76 mg/dL (14 Dec 2023 21:48)        CARDIOLOGY TESTING         EKG     ECHO       RADIOLOGY HOSPITALIST ATTENDING PROGRESS NOTE     Chart and meds reviewed. Patient seen and examined     CC: abd pain, n/v and constipation    Subjective: Pt seen and evaluated. Downgraded from ICU yesterday. Currently, has mild pain. Patient denies fever, chills, chest pain, palpitations, shortness of breath, numbness, weakness or tingling     Hospital Course reviewed   Found to have perforated viscus s/p emergent OR  ex lap, abdominal washout, Christina's colectomy, Found to have perforation of sigmoid and proximal rectum  Postop course complicated by rapid A-fib s/p Cardizem gtt.  Cultures noted  S/p Zosyn, Currently on Rocephin  Currently on PPN  Currently has CORKY drain  Currently has a Daugherty  NG tube was removed today    All other systems and founds to be negative with exception of what has been described above.         PHYSICAL EXAM:  Vital Signs Last 24 Hrs  T(C): 36.7 (15 Dec 2023 20:11), Max: 37 (15 Dec 2023 08:20)  T(F): 98 (15 Dec 2023 20:11), Max: 98.6 (15 Dec 2023 08:20)  HR: 101 (15 Dec 2023 20:11) (85 - 101)  BP: 146/49 (15 Dec 2023 20:11) (132/59 - 146/49)  BP(mean): 81 (15 Dec 2023 00:00) (81 - 81)  RR: 18 (15 Dec 2023 20:11) (17 - 20)  SpO2: 91% (15 Dec 2023 20:11) (91% - 98%)    Parameters below as of 15 Dec 2023 20:11  Patient On (Oxygen Delivery Method): nasal cannula  O2 Flow (L/min): 2    Daily     Daily     GEN: NAD   HEENT: EOMI, normal hearing, moist mucous membranes  NECK : Soft and supple, no JVD  LUNG: CTABL, No wheezing, rales or rhonchi  CVS: S1S2+, (+)tachycardic, +regular rhythm   GI: BS+, soft, NT/ND, no guarding, no rebound, +Prevena dressing in place c/d/i, +daugherty in place   EXTREMITIES: No peripheral edema  VASCULAR: 2+ peripheral pulses  NEURO: AAOx3, grossly non-focal   SKIN: No rashes    HOME MEDICATIONS:  Home Medications:  Multiple Vitamins oral tablet: 1 tab(s) orally once a day (11 Dec 2023 15:18)      MEDICATIONS  MEDICATIONS  (STANDING):  cefTRIAXone Injectable. 1000 milliGRAM(s) IV Push every 24 hours  dextrose 5% + sodium chloride 0.45% with potassium chloride 20 mEq/L 1000 milliLiter(s) (35 mL/Hr) IV Continuous <Continuous>  enoxaparin Injectable 40 milliGRAM(s) SubCutaneous every 24 hours  lidocaine   4% Patch 1 Patch Transdermal daily  pantoprazole  Injectable 40 milliGRAM(s) IV Push two times a day  Parenteral Nutrition - Adult 1 Each (75 mL/Hr) TPN Continuous <Continuous>  Parenteral Nutrition - Adult 1 Each (75 mL/Hr) TPN Continuous <Continuous>      LABS: All Labs Reviewed:                        9.6    12.91 )-----------( 178      ( 15 Dec 2023 04:58 )             28.7     12-15    138  |  103  |  25<H>  ----------------------------<  168<H>  3.7   |  28  |  0.85    Ca    7.7<L>      15 Dec 2023 17:05  Phos  1.8     12-15  Mg     2.0     12-15    TPro  5.9<L>  /  Alb  2.6<L>  /  TBili  2.7<H>  /  DBili  x   /  AST  28  /  ALT  16  /  AlkPhos  51  12-15        Urinalysis Basic - ( 15 Dec 2023 17:05 )    Color: x / Appearance: x / SG: x / pH: x  Gluc: 168 mg/dL / Ketone: x  / Bili: x / Urobili: x   Blood: x / Protein: x / Nitrite: x   Leuk Esterase: x / RBC: x / WBC x   Sq Epi: x / Non Sq Epi: x / Bacteria: x        Blood Culture: 12-12 @ 09:10  Organism --  Gram Stain Blood -- Gram Stain --  Specimen Source Clean Catch Clean Catch (Midstream)  Culture-Blood --    12-11 @ 19:09  Organism Proteus mirabilis  Gram Stain Blood -- Gram Stain --  Specimen Source .Surgical Swab culture, peritonitis  Culture-Blood --    12-11 @ 10:57  Organism Blood Culture PCR  Gram Stain Blood -- Gram Stain   Growth in anaerobic bottle: Gram Negative Rods  Specimen Source .Blood None  Culture-Blood --      I&O's Detail    14 Dec 2023 07:01  -  15 Dec 2023 07:00  --------------------------------------------------------  IN:    IV PiggyBack: 650 mL  Total IN: 650 mL    OUT:    Colostomy (mL): 0 mL    Drain (mL): 75 mL    Indwelling Catheter - Urethral (mL): 3400 mL    Nasogastric/Oral tube (mL): 100 mL    Voided (mL): 0 mL  Total OUT: 3575 mL    Total NET: -2925 mL      15 Dec 2023 07:01  -  15 Dec 2023 20:45  --------------------------------------------------------  IN:  Total IN: 0 mL    OUT:    Drain (mL): 90 mL    Intermittent Catheterization - Urethral (mL): 700 mL  Total OUT: 790 mL    Total NET: -790 mL        CAPILLARY BLOOD GLUCOSE      POCT Blood Glucose.: 161 mg/dL (15 Dec 2023 11:51)  POCT Blood Glucose.: 157 mg/dL (15 Dec 2023 07:07)  POCT Blood Glucose.: 76 mg/dL (14 Dec 2023 21:48)        CARDIOLOGY TESTING   EKG   ECHO   < from: TTE Echo Complete w/o Contrast w/ Doppler (12.12.23 @ 14:00) >   Mild mitral annular calcification is present.   Moderate (2+) mitral regurgitation is present.   Mild aortic sclerosis is present with normal valvular opening.   Moderate to severe (3+) tricuspid valve regurgitation is present.   Mild pulmonary hypertension. Pulmonary pressures can be underestimated by   this study.   Normal appearing pulmonic valve structure and function.   Normal appearing left atrium.   The left ventricle is normal in size, wall thickness, wall motion and   contractility.   Estimatedleft ventricular ejection fraction is 55-60 %.   The right atrium appears mildly dilated.   Normal appearing right ventricle structure and function.      < end of copied text >        RADIOLOGY

## 2023-12-15 NOTE — PROGRESS NOTE ADULT - SUBJECTIVE AND OBJECTIVE BOX
SURGERY DAILY PROGRESS NOTE:     Subjective:  Patient seen and examined this AM at bedside. Received albumin and Lasix again; hypokalemia after diuresis, being replaced. Transferred out of ICU. No acute events overnight and patient resting comfortably. Pain responsive to meds. Intermittent nausea w/ NGT in place. Was out of bed yesterday. Denies fever/chills, shortness of breath, chest pain. VS reviewed    Objective:    MEDICATIONS  (STANDING):  cefTRIAXone Injectable. 1000 milliGRAM(s) IV Push every 24 hours  dextrose 5% + sodium chloride 0.45% with potassium chloride 20 mEq/L 1000 milliLiter(s) (35 mL/Hr) IV Continuous <Continuous>  enoxaparin Injectable 40 milliGRAM(s) SubCutaneous every 24 hours  lidocaine   4% Patch 1 Patch Transdermal daily  pantoprazole  Injectable 40 milliGRAM(s) IV Push two times a day  Parenteral Nutrition - Adult 1 Each (75 mL/Hr) TPN Continuous <Continuous>  potassium chloride  10 mEq/100 mL IVPB 10 milliEquivalent(s) IV Intermittent every 1 hour  potassium phosphate IVPB 15 milliMole(s) IV Intermittent every 6 hours    MEDICATIONS  (PRN):  acetaminophen   IVPB .. 1000 milliGRAM(s) IV Intermittent once PRN Temp greater or equal to 38C (100.4F), Mild Pain (1 - 3)  ketorolac   Injectable 15 milliGRAM(s) IV Push every 8 hours PRN Severe Pain (7 - 10)  morphine  - Injectable 2 milliGRAM(s) IV Push every 4 hours PRN Moderate Pain (4 - 6)  ondansetron Injectable 4 milliGRAM(s) IV Push every 6 hours PRN Nausea      Vital Signs Last 24 Hrs  T(C): 36.7 (15 Dec 2023 00:45), Max: 36.9 (14 Dec 2023 20:00)  T(F): 98.1 (15 Dec 2023 00:45), Max: 98.5 (14 Dec 2023 20:00)  HR: 85 (15 Dec 2023 00:45) (85 - 94)  BP: 141/70 (15 Dec 2023 00:45) (108/96 - 143/50)  BP(mean): 81 (15 Dec 2023 00:00) (71 - 102)  RR: 20 (15 Dec 2023 00:45) (14 - 22)  SpO2: 96% (15 Dec 2023 00:45) (96% - 100%)    Parameters below as of 15 Dec 2023 00:45  Patient On (Oxygen Delivery Method): nasal cannula  O2 Flow (L/min): 2        PHYSICAL EXAM   GENERAL: NAD, well developed, thin  HEAD: Atraumatic, normocephalic  EYES: EOMI, PERRLA, conjunctiva and sclera clear  ENT: moist mucous membrane, NC in place, NGT w/ thin bilious output  NECK: supple, No JVD, midline trachea  CHEST/LUNG: No increased WOB, symmetric excursions  Heart: RRR ppp, no peripheral edema  ABDOMEN: Round, distended, soft, nontender, Prevena with suction, drain bulb w/ serous output, stoma pink & mildly congested with bowel sweat and no gas in bag  EXTREMITIES: Brisk cap refill. no clubbing or cyanosis  NERVOUS SYSTEM: Alert, arousable, no neuro-deficits  SKIN: warm to touch, no rash or lesions      I&O's Detail    14 Dec 2023 07:01  -  15 Dec 2023 07:00  --------------------------------------------------------  IN:    IV PiggyBack: 650 mL  Total IN: 650 mL    OUT:    Colostomy (mL): 0 mL    Drain (mL): 65 mL    Indwelling Catheter - Urethral (mL): 3400 mL    Nasogastric/Oral tube (mL): 100 mL  Total OUT: 3565 mL    Total NET: -2915 mL        Daily     LABS:                        9.6    12.91 )-----------( 178      ( 15 Dec 2023 04:58 )             28.7     12-15    137  |  100  |  26<H>  ----------------------------<  129<H>  3.2<L>   |  30  |  1.04    Ca    7.8<L>      15 Dec 2023 04:58  Phos  1.8     12-15  Mg     2.0     12-15    TPro  5.9<L>  /  Alb  2.6<L>  /  TBili  2.7<H>  /  DBili  x   /  AST  28  /  ALT  16  /  AlkPhos  51  12-15      Urinalysis Basic - ( 15 Dec 2023 04:58 )    Color: x / Appearance: x / SG: x / pH: x  Gluc: 129 mg/dL / Ketone: x  / Bili: x / Urobili: x   Blood: x / Protein: x / Nitrite: x   Leuk Esterase: x / RBC: x / WBC x   Sq Epi: x / Non Sq Epi: x / Bacteria: x

## 2023-12-15 NOTE — CHART NOTE - NSCHARTNOTEFT_GEN_A_CORE
Clinical Nutrition PN Follow Up Note    * 82 YO F with a PMHx of COPD, HLD presents to the ED c/o abd pain, n/v and constipation x2 days. Admitted w/ colonic perforation, Extensive stercocolitis with 2 feculent perforation of sigmoid & rectum. Necrotic segment of colon also noticed now POD 2 s/p Christina's. Had post-op fecal peritonitis, 7L washout. Went into septic shock, requiring pressors. Tx to ICU w/ post-op resp failure. NGT to LWS, blood tinged. Off pressors as of this AM 12/13.    *PPN initiated 2/2 NPO s/p Christina procedure2/2 colonic perforation     *12/15: has been NPO x 3 days, nausea overnight. NGT still in place, no plan for clamp trial today.  Plan to initiate PPN today, posisble transfer out of ICU    *current status: Transferred out of ICU, off pressors, now on 3E w/ no acute events overnight. Hypokalemia noted s/p HD yesterday, being repleted. Remains NPO w/ NGT to LWS in place, reports intermittent nausea and awaiting return of bowel function as per surgery.    *new pertinent meds: Protonix, morphine, zofran prn, 80 mEq KCl repletion outside bag, KCl 10 mEq IVPB, KPhos 15 mmol IVPB, D5 + NaCl + KCl 20 mEq solution    *labs reviewed; Na low-end of normal, however, appears to be baseline. Mg WNL.  Hypokalemia and hypophosphatemia noted, possibly in refeeding but also s/p HD - will increase both K+ and Phos in PN bag and adjust prn and will recommend to continue w/ repletion of KCl and KPhos outside PN bag until WNL.  POCT WNL.  TBili remains elevated, will keep trace out of bag and c/w zinc, selenium, and chromium in PN bag.  Still no triglyceride level available, pending.      12-14    140  |  104  |  24<H>  ----------------------------<  90  2.7<LL>   |  28  |  1.12    Ca    7.8<L>      14 Dec 2023 06:38  Phos  2.7     12-14  Mg     2.1     12-14    BP: 123/53 (12-14-23 @ 09:00) (58/45 - 160/65)Vital Signs Last 24 Hrs  T(C): 36.7 (12-14-23 @ 05:00), Max: 36.9 (12-14-23 @ 02:00)  T(F): 98.1 (12-14-23 @ 05:00), Max: 98.4 (12-14-23 @ 02:00)  HR: 86 (12-14-23 @ 09:00) (74 - 96)  BP: 123/53 (12-14-23 @ 09:00) (96/47 - 143/50)  BP(mean): 72 (12-14-23 @ 09:00) (52 - 83)  RR: 17 (12-14-23 @ 09:00) (13 - 26)  SpO2: 98% (12-14-23 @ 09:00) (96% - 100%)    Lipid Panel: pending     POCT Blood Glucose.: 98 mg/dL (12-13-23 @ 23:22)  POCT Blood Glucose.: 87 mg/dL (12-13-23 @ 18:15)  POCT Blood Glucose.: 94 mg/dL (12-13-23 @ 11:52)    *I&O's Detail    13 Dec 2023 07:01  -  14 Dec 2023 07:00  --------------------------------------------------------  IN:    Diltiazem: 20 mL    IV PiggyBack: 350 mL  Total IN: 370 mL    OUT:    Colostomy (mL): 10 mL    Drain (mL): 125 mL    Indwelling Catheter - Urethral (mL): 1980 mL    Nasogastric/Oral tube (mL): 50 mL  Total OUT: 2165 mL    Total NET: -1795 mL  * fluid status: negative; minimal ostomy and NGT output.  Will monitor/ adjust total volume prn.   *no BM (+) doc'd.  pt not on a bowel regimen.  *edema: 1+ generalized     *malnutrition: Pt meets criteria for severe protein-calorie malnutrition in context of acute illness r/t decreased ability to meet increased nutrient needs 2/2 colonic perf w/ septic shock AEB moderate muscle/ fat wasting    Estimated Needs: based on 63.5 Kg (UBW - likely dry wt)  Calories: 1905- 2222 Kcal (30- 35 Kcal/Kg)  Protein: 95- 127 g (1.5- 2 g/Kg)  Fluids: 1587- 1905 mL (25- 30 mL/Kg)    Diet, NPO (12-11-23 @ 13:56) [Active]    Weight History:  Weight (kg): 72.6 (12-11-23 @ 10:30)    PPN Recommendations: via peripheral venous line  Total Volume: 1800 mL x 24 hours  65 g  Amino Acids  100 g Dextrose  0 g Lipids 20% (pending triglyceride level)  98 mEq Sodium Chloride  14 mEq Sodium Acetate  20 mmol Sodium Phosphate  29 mEq Potassium Chloride  16 mEq Potassium Acetate  10 mmol Potassium Phosphate  0 mEq Calcium Gluconate (CAPS out of PN solution)  8 mEq Magnesium Sulfate  200 mg Thiamine  0 ml Trace Elements  10 ml MVI  10 mcg Chromium  5 mg Zinc  60 mcg Selenium    Total Calories    600      (Meets    29%  of  Estimated Energy needs  and    56%  Protein needs)   (osmolarity <900)    Additional Recommendations:    1) Daily weights  2) Strict I & O's  3) Daily lyte checks including magnesium and phos  4) Weekly triglycerides/LFT checks  5) POCT q6hrs; maintain 140-180mg/dL  6) if on PN > 6 days, consider placing PICC line to meet 100% of nutr needs    *will continue to monitor and adjust PN prn*  Hilda Davalos RDN Clinical Nutrition PN Follow Up Note    * 84 YO F with a PMHx of COPD, HLD presents to the ED c/o abd pain, n/v and constipation x2 days. Admitted w/ colonic perforation, Extensive stercocolitis with 2 feculent perforation of sigmoid & rectum. Necrotic segment of colon also noticed now POD 2 s/p Christina's. Had post-op fecal peritonitis, 7L washout. Went into septic shock, requiring pressors. Tx to ICU w/ post-op resp failure. NGT to LWS, blood tinged. Off pressors as of this AM 12/13.    *PPN initiated 2/2 NPO s/p Christina procedure2/2 colonic perforation     *12/15: has been NPO x 3 days, nausea overnight. NGT still in place, no plan for clamp trial today.  Plan to initiate PPN today, posisble transfer out of ICU    *current status: Transferred out of ICU, off pressors, now on 3E w/ no acute events overnight. Hypokalemia noted s/p HD yesterday, being repleted. Remains NPO w/ NGT to LWS in place, reports intermittent nausea and awaiting return of bowel function as per surgery.    *new pertinent meds: Protonix, morphine, zofran prn, 80 mEq KCl repletion outside bag, KCl 10 mEq IVPB, KPhos 15 mmol IVPB, D5 + NaCl + KCl 20 mEq solution    *labs reviewed; Na low-end of normal, however, appears to be baseline. Mg WNL.  Hypokalemia and hypophosphatemia noted, possibly in refeeding but also s/p HD - will increase both K+ and Phos in PN bag and adjust prn and will recommend to continue w/ repletion of KCl and KPhos outside PN bag until WNL.  POCT WNL.  TBili remains elevated, will keep trace out of bag and c/w zinc, selenium, and chromium in PN bag.  Still no triglyceride level available, pending.      12-14    140  |  104  |  24<H>  ----------------------------<  90  2.7<LL>   |  28  |  1.12    Ca    7.8<L>      14 Dec 2023 06:38  Phos  2.7     12-14  Mg     2.1     12-14    BP: 123/53 (12-14-23 @ 09:00) (58/45 - 160/65)Vital Signs Last 24 Hrs  T(C): 36.7 (12-14-23 @ 05:00), Max: 36.9 (12-14-23 @ 02:00)  T(F): 98.1 (12-14-23 @ 05:00), Max: 98.4 (12-14-23 @ 02:00)  HR: 86 (12-14-23 @ 09:00) (74 - 96)  BP: 123/53 (12-14-23 @ 09:00) (96/47 - 143/50)  BP(mean): 72 (12-14-23 @ 09:00) (52 - 83)  RR: 17 (12-14-23 @ 09:00) (13 - 26)  SpO2: 98% (12-14-23 @ 09:00) (96% - 100%)    Lipid Panel: pending     POCT Blood Glucose.: 98 mg/dL (12-13-23 @ 23:22)  POCT Blood Glucose.: 87 mg/dL (12-13-23 @ 18:15)  POCT Blood Glucose.: 94 mg/dL (12-13-23 @ 11:52)    *I&O's Detail    13 Dec 2023 07:01  -  14 Dec 2023 07:00  --------------------------------------------------------  IN:    Diltiazem: 20 mL    IV PiggyBack: 350 mL  Total IN: 370 mL    OUT:    Colostomy (mL): 10 mL    Drain (mL): 125 mL    Indwelling Catheter - Urethral (mL): 1980 mL    Nasogastric/Oral tube (mL): 50 mL  Total OUT: 2165 mL    Total NET: -1795 mL  * fluid status: negative; minimal ostomy and NGT output.  Will monitor/ adjust total volume prn.   *no BM (+) doc'd.  pt not on a bowel regimen.  *edema: 1+ generalized     *malnutrition: Pt meets criteria for severe protein-calorie malnutrition in context of acute illness r/t decreased ability to meet increased nutrient needs 2/2 colonic perf w/ septic shock AEB moderate muscle/ fat wasting    Estimated Needs: based on 63.5 Kg (UBW - likely dry wt)  Calories: 1905- 2222 Kcal (30- 35 Kcal/Kg)  Protein: 95- 127 g (1.5- 2 g/Kg)  Fluids: 1587- 1905 mL (25- 30 mL/Kg)    Diet, NPO (12-11-23 @ 13:56) [Active]    Weight History:  Weight (kg): 72.6 (12-11-23 @ 10:30)    PPN Recommendations: via peripheral venous line  Total Volume: 1800 mL x 24 hours  65 g  Amino Acids  100 g Dextrose  0 g Lipids 20% (pending triglyceride level)  98 mEq Sodium Chloride  14 mEq Sodium Acetate  20 mmol Sodium Phosphate  29 mEq Potassium Chloride  16 mEq Potassium Acetate  10 mmol Potassium Phosphate  0 mEq Calcium Gluconate (CAPS out of PN solution)  8 mEq Magnesium Sulfate  200 mg Thiamine  0 ml Trace Elements  10 ml MVI  10 mcg Chromium  5 mg Zinc  60 mcg Selenium    Total Calories    600      (Meets    29%  of  Estimated Energy needs  and    56%  Protein needs)   (osmolarity <900)    Additional Recommendations:    1) Daily weights  2) Strict I & O's  3) Daily lyte checks including magnesium and phos  4) Weekly triglycerides/LFT checks  5) POCT q6hrs; maintain 140-180mg/dL  6) if on PN > 6 days, consider placing PICC line to meet 100% of nutr needs    *will continue to monitor and adjust PN prn*  Hilda Davalos RDN Clinical Nutrition PN Follow Up Note    *84 YO F with a PMHx of COPD, HLD presents to the ED c/o abd pain, n/v and constipation x2 days. Admitted w/ colonic perforation, Extensive stercocolitis with 2 feculent perforation of sigmoid & rectum. Necrotic segment of colon also noticed now POD 2 s/p Christina's. Had post-op fecal peritonitis, 7L washout. Went into septic shock, requiring pressors. Tx to ICU w/ post-op resp failure. NGT to LWS, blood tinged. Off pressors as of this AM 12/13.    *PPN initiated 2/2 NPO s/p Christina procedure2/2 colonic perforation     *12/15: has been NPO x 3 days, nausea overnight. NGT still in place, no plan for clamp trial today.  Plan to initiate PPN today, posisble transfer out of ICU    *current status: PPN day #2. Transferred out of ICU, off pressors, now on 3E w/ no acute events overnight. Hypokalemia and hypophosphatemia noted s/p HD yesterday, being repleted. Possibly going into refeeding, will slowly increase lytes in PN bag and recommend to c/w KCl and KPhos repletion outside of bag until WNL. Remains NPO w/ NGT to LWS in place, reports intermittent nausea, on Zofran, and awaiting return of bowel function as per surgery. If to remain on PPN >7 days, consider placing PICC line to initiate TPN and meet >80% ENN.     *new pertinent meds: Protonix, morphine, zofran prn, 80 mEq KCl repletion outside bag, 10 mEq KCl IVPB, 15 mmol KPhos IVPB, D5 + NaCl + 20 KCl mEq solution    *labs reviewed; Na low-end of normal, however, negative fluid status noted - will c/w total volume 1800 mL in PN bag for today and adjust prn as per labs. Mg WNL.  Hypokalemia and hypophosphatemia noted, possibly in refeeding but also s/p HD - will increase both K+ and Phos in PN bag, however, still at low levels, and adjust prn. Will recommend to continue w/ repletion of KCl and KPhos outside PN bag until WNL.  POCT WNL.  TBili remains elevated, will keep trace out of bag and c/w zinc, selenium, and chromium in PN bag.  Triglyceride <400, will add 70 g lipids daily tomorrow in PN bag and adjust prn based on labs. Corrected Ca WNL, rec'd add 10mEq of Calcium Gluconate outside of PN bag as CAPS is out.     12-15    137  |  100  |  26<H>  ----------------------------<  129<H>  3.2<L>   |  30  |  1.04    Ca    7.8<L>      15 Dec 2023 04:58  Phos  1.8     12-15  Mg     2.0     12-15    TPro  5.9<L>  /  Alb  2.6<L>  /  TBili  2.7<H>  /  DBili  x   /  AST  28  /  ALT  16  /  AlkPhos  51  12-15    CAPILLARY BLOOD GLUCOSE  POCT Blood Glucose.: 161 mg/dL (15 Dec 2023 11:51)  POCT Blood Glucose.: 157 mg/dL (15 Dec 2023 07:07)  POCT Blood Glucose.: 76 mg/dL (14 Dec 2023 21:48)    BP: 132/59 (12-15-23 @ 08:20) (96/47 - 143/50)Vital Signs Last 24 Hrs  T(C): 37 (12-15-23 @ 08:20), Max: 37 (12-15-23 @ 08:20)  T(F): 98.6 (12-15-23 @ 08:20), Max: 98.6 (12-15-23 @ 08:20)  HR: 95 (12-15-23 @ 08:20) (85 - 95)  BP: 132/59 (12-15-23 @ 08:20) (118/91 - 141/70)  BP(mean): 81 (12-15-23 @ 00:00) (71 - 101)  RR: 18 (12-15-23 @ 08:20) (14 - 22)  SpO2: 96% (12-15-23 @ 08:20) (96% - 100%)    Lipid Panel: Date/Time: 12-15-23 @ 04:58  Triglycerides, Serum: 187    I&O's Detail    14 Dec 2023 07:01  -  15 Dec 2023 07:00  --------------------------------------------------------  IN:    IV PiggyBack: 650 mL  Total IN: 650 mL    OUT:    Colostomy (mL): 0 mL    Drain (mL): 65 mL    Indwelling Catheter - Urethral (mL): 3400 mL    Nasogastric/Oral tube (mL): 100 mL  Total OUT: 3565 mL    Total NET: -2915 mL  *fluid status: negative; no ostomy output doc'd. Will monitor/ adjust total volume prn.   *no BM (+) doc'd.  pt not on a bowel regimen.  *edema: 1+ generalized doc'd 12/15    *malnutrition: Pt continues to meet criteria for severe protein-calorie malnutrition in context of acute illness r/t decreased ability to meet increased nutrient needs 2/2 colonic perf w/ septic shock AEB moderate muscle/ fat wasting    Estimated Needs: based on 63.5 Kg (UBW - likely dry wt)  Calories: 1905- 2222 Kcal (30- 35 Kcal/Kg)  Protein: 95- 127 g (1.5- 2 g/Kg)  Fluids: 1587- 1905 mL (25- 30 mL/Kg)    Diet, NPO (12-11-23 @ 13:56) [Active]    Weight History:  Weight (kg): 72.6 (12-11-23 @ 10:30)    PPN Recommendations: via peripheral venous line  Total Volume: 1800 mL x 24 hours  65 g  Amino Acids  100 g Dextrose  0 g Lipids 20% (triglyceride <400, will add 70 g lipids in PN bag tomorrow)   98 mEq Sodium Chloride  14 mEq Sodium Acetate  20 mmol Sodium Phosphate  29 mEq Potassium Chloride  16 mEq Potassium Acetate  10 mmol Potassium Phosphate  0 mEq Calcium Gluconate (CAPS out of PN solution)  8 mEq Magnesium Sulfate  200 mg Thiamine  0 ml Trace Elements  10 ml MVI  10 mcg Chromium  5 mg Zinc  60 mcg Selenium    Total Calories    600      (Meets    29%  of  Estimated Energy needs  and    56%  Protein needs)   (osmolarity <900)    Additional Recommendations:    1) Daily weights  2) Strict I & O's  3) Daily lyte checks including magnesium and phos - c/w KCl and KPhos repletion outside of bag until WNL   4) Weekly triglycerides/LFT checks  5) POCT q6hrs; maintain 140-180mg/dL  6) if on PN > 6 days, consider placing PICC line to meet 100% of nutr needs    *will continue to monitor and adjust PN prn*  Hilda Davalos, VIANNEYN Clinical Nutrition PN Follow Up Note    *82 YO F with a PMHx of COPD, HLD presents to the ED c/o abd pain, n/v and constipation x2 days. Admitted w/ colonic perforation, Extensive stercocolitis with 2 feculent perforation of sigmoid & rectum. Necrotic segment of colon also noticed now POD 2 s/p Christina's. Had post-op fecal peritonitis, 7L washout. Went into septic shock, requiring pressors. Tx to ICU w/ post-op resp failure. NGT to LWS, blood tinged. Off pressors as of this AM 12/13.    *PPN initiated 2/2 NPO s/p Christina procedure2/2 colonic perforation     *12/15: has been NPO x 3 days, nausea overnight. NGT still in place, no plan for clamp trial today.  Plan to initiate PPN today, posisble transfer out of ICU    *current status: PPN day #2. Transferred out of ICU, off pressors, now on 3E w/ no acute events overnight. Hypokalemia and hypophosphatemia noted s/p HD yesterday, being repleted. Possibly going into refeeding, will slowly increase lytes in PN bag and recommend to c/w KCl and KPhos repletion outside of bag until WNL. Remains NPO w/ NGT to LWS in place, reports intermittent nausea, on Zofran, and awaiting return of bowel function as per surgery. If to remain on PPN >7 days, consider placing PICC line to initiate TPN and meet >80% ENN.     *new pertinent meds: Protonix, morphine, zofran prn, 80 mEq KCl repletion outside bag, 10 mEq KCl IVPB, 15 mmol KPhos IVPB, D5 + NaCl + 20 KCl mEq solution    *labs reviewed; Na low-end of normal, however, negative fluid status noted - will c/w total volume 1800 mL in PN bag for today and adjust prn as per labs. Mg WNL.  Hypokalemia and hypophosphatemia noted, possibly in refeeding but also s/p HD - will increase both K+ and Phos in PN bag, however, still at low levels, and adjust prn. Will recommend to continue w/ repletion of KCl and KPhos outside PN bag until WNL.  POCT WNL.  TBili remains elevated, will keep trace out of bag and c/w zinc, selenium, and chromium in PN bag.  Triglyceride <400, will add 70 g lipids daily tomorrow in PN bag and adjust prn based on labs. Corrected Ca WNL, rec'd add 10mEq of Calcium Gluconate outside of PN bag as CAPS is out.     12-15    137  |  100  |  26<H>  ----------------------------<  129<H>  3.2<L>   |  30  |  1.04    Ca    7.8<L>      15 Dec 2023 04:58  Phos  1.8     12-15  Mg     2.0     12-15    TPro  5.9<L>  /  Alb  2.6<L>  /  TBili  2.7<H>  /  DBili  x   /  AST  28  /  ALT  16  /  AlkPhos  51  12-15    CAPILLARY BLOOD GLUCOSE  POCT Blood Glucose.: 161 mg/dL (15 Dec 2023 11:51)  POCT Blood Glucose.: 157 mg/dL (15 Dec 2023 07:07)  POCT Blood Glucose.: 76 mg/dL (14 Dec 2023 21:48)    BP: 132/59 (12-15-23 @ 08:20) (96/47 - 143/50)Vital Signs Last 24 Hrs  T(C): 37 (12-15-23 @ 08:20), Max: 37 (12-15-23 @ 08:20)  T(F): 98.6 (12-15-23 @ 08:20), Max: 98.6 (12-15-23 @ 08:20)  HR: 95 (12-15-23 @ 08:20) (85 - 95)  BP: 132/59 (12-15-23 @ 08:20) (118/91 - 141/70)  BP(mean): 81 (12-15-23 @ 00:00) (71 - 101)  RR: 18 (12-15-23 @ 08:20) (14 - 22)  SpO2: 96% (12-15-23 @ 08:20) (96% - 100%)    Lipid Panel: Date/Time: 12-15-23 @ 04:58  Triglycerides, Serum: 187    I&O's Detail    14 Dec 2023 07:01  -  15 Dec 2023 07:00  --------------------------------------------------------  IN:    IV PiggyBack: 650 mL  Total IN: 650 mL    OUT:    Colostomy (mL): 0 mL    Drain (mL): 65 mL    Indwelling Catheter - Urethral (mL): 3400 mL    Nasogastric/Oral tube (mL): 100 mL  Total OUT: 3565 mL    Total NET: -2915 mL  *fluid status: negative; no ostomy output doc'd. Will monitor/ adjust total volume prn.   *no BM (+) doc'd.  pt not on a bowel regimen.  *edema: 1+ generalized doc'd 12/15    *malnutrition: Pt continues to meet criteria for severe protein-calorie malnutrition in context of acute illness r/t decreased ability to meet increased nutrient needs 2/2 colonic perf w/ septic shock AEB moderate muscle/ fat wasting    Estimated Needs: based on 63.5 Kg (UBW - likely dry wt)  Calories: 1905- 2222 Kcal (30- 35 Kcal/Kg)  Protein: 95- 127 g (1.5- 2 g/Kg)  Fluids: 1587- 1905 mL (25- 30 mL/Kg)    Diet, NPO (12-11-23 @ 13:56) [Active]    Weight History:  Weight (kg): 72.6 (12-11-23 @ 10:30)    PPN Recommendations: via peripheral venous line  Total Volume: 1800 mL x 24 hours  65 g  Amino Acids  100 g Dextrose  0 g Lipids 20% (triglyceride <400, will add 70 g lipids in PN bag tomorrow)   98 mEq Sodium Chloride  14 mEq Sodium Acetate  20 mmol Sodium Phosphate  29 mEq Potassium Chloride  16 mEq Potassium Acetate  10 mmol Potassium Phosphate  0 mEq Calcium Gluconate (CAPS out of PN solution)  8 mEq Magnesium Sulfate  200 mg Thiamine  0 ml Trace Elements  10 ml MVI  10 mcg Chromium  5 mg Zinc  60 mcg Selenium    Total Calories    600      (Meets    29%  of  Estimated Energy needs  and    56%  Protein needs)   (osmolarity <900)    Additional Recommendations:    1) Daily weights  2) Strict I & O's  3) Daily lyte checks including magnesium and phos - c/w KCl and KPhos repletion outside of bag until WNL   4) Weekly triglycerides/LFT checks  5) POCT q6hrs; maintain 140-180mg/dL  6) if on PN > 6 days, consider placing PICC line to meet 100% of nutr needs    *will continue to monitor and adjust PN prn*  Hilda Davalos, VIANNEYN  Evelyn Rich (Formerly Huy), MS, RDN, CNSC, [phone]  Certified Nutrition  Clinical Nutrition PN Follow Up Note    *82 YO F with a PMHx of COPD, HLD presents to the ED c/o abd pain, n/v and constipation x2 days. Admitted w/ colonic perforation, Extensive stercocolitis with 2 feculent perforation of sigmoid & rectum. Necrotic segment of colon also noticed now POD 2 s/p Christina's. Had post-op fecal peritonitis, 7L washout. Went into septic shock, requiring pressors. Tx to ICU w/ post-op resp failure. NGT to LWS, blood tinged. Off pressors as of this AM 12/13.    *PPN initiated 2/2 NPO s/p Christina procedure2/2 colonic perforation     *12/15: has been NPO x 3 days, nausea overnight. NGT still in place, no plan for clamp trial today.  Plan to initiate PPN today, posisble transfer out of ICU    *current status: PPN day #2. Transferred out of ICU, off pressors, now on 3E w/ no acute events overnight. Hypokalemia and hypophosphatemia noted s/p HD yesterday, being repleted. Possibly going into refeeding, will slowly increase lytes in PN bag and recommend to c/w KCl and KPhos repletion outside of bag until WNL. Remains NPO w/ NGT to LWS in place, reports intermittent nausea, on Zofran, and awaiting return of bowel function as per surgery. If to remain on PPN >7 days, consider placing PICC line to initiate TPN and meet >80% ENN.     *new pertinent meds: Protonix, morphine, zofran prn, 80 mEq KCl repletion outside bag, 10 mEq KCl IVPB, 15 mmol KPhos IVPB, D5 + NaCl + 20 KCl mEq solution    *labs reviewed; Na low-end of normal, however, negative fluid status noted - will c/w total volume 1800 mL in PN bag for today and adjust prn as per labs. Mg WNL.  Hypokalemia and hypophosphatemia noted, possibly in refeeding but also s/p HD - will increase both K+ and Phos in PN bag, however, still at low levels, and adjust prn. Will recommend to continue w/ repletion of KCl and KPhos outside PN bag until WNL.  POCT WNL.  TBili remains elevated, will keep trace out of bag and c/w zinc, selenium, and chromium in PN bag.  Triglyceride <400, will add 70 g lipids daily tomorrow in PN bag and adjust prn based on labs. Corrected Ca WNL, rec'd add 10mEq of Calcium Gluconate outside of PN bag as CAPS is out.     12-15    137  |  100  |  26<H>  ----------------------------<  129<H>  3.2<L>   |  30  |  1.04    Ca    7.8<L>      15 Dec 2023 04:58  Phos  1.8     12-15  Mg     2.0     12-15    TPro  5.9<L>  /  Alb  2.6<L>  /  TBili  2.7<H>  /  DBili  x   /  AST  28  /  ALT  16  /  AlkPhos  51  12-15    CAPILLARY BLOOD GLUCOSE  POCT Blood Glucose.: 161 mg/dL (15 Dec 2023 11:51)  POCT Blood Glucose.: 157 mg/dL (15 Dec 2023 07:07)  POCT Blood Glucose.: 76 mg/dL (14 Dec 2023 21:48)    BP: 132/59 (12-15-23 @ 08:20) (96/47 - 143/50)Vital Signs Last 24 Hrs  T(C): 37 (12-15-23 @ 08:20), Max: 37 (12-15-23 @ 08:20)  T(F): 98.6 (12-15-23 @ 08:20), Max: 98.6 (12-15-23 @ 08:20)  HR: 95 (12-15-23 @ 08:20) (85 - 95)  BP: 132/59 (12-15-23 @ 08:20) (118/91 - 141/70)  BP(mean): 81 (12-15-23 @ 00:00) (71 - 101)  RR: 18 (12-15-23 @ 08:20) (14 - 22)  SpO2: 96% (12-15-23 @ 08:20) (96% - 100%)    Lipid Panel: Date/Time: 12-15-23 @ 04:58  Triglycerides, Serum: 187    I&O's Detail    14 Dec 2023 07:01  -  15 Dec 2023 07:00  --------------------------------------------------------  IN:    IV PiggyBack: 650 mL  Total IN: 650 mL    OUT:    Colostomy (mL): 0 mL    Drain (mL): 65 mL    Indwelling Catheter - Urethral (mL): 3400 mL    Nasogastric/Oral tube (mL): 100 mL  Total OUT: 3565 mL    Total NET: -2915 mL  *fluid status: negative; no ostomy output doc'd. Will monitor/ adjust total volume prn.   *no BM (+) doc'd.  pt not on a bowel regimen.  *edema: 1+ generalized doc'd 12/15    *malnutrition: Pt continues to meet criteria for severe protein-calorie malnutrition in context of acute illness r/t decreased ability to meet increased nutrient needs 2/2 colonic perf w/ septic shock AEB moderate muscle/ fat wasting    Estimated Needs: based on 63.5 Kg (UBW - likely dry wt)  Calories: 1905- 2222 Kcal (30- 35 Kcal/Kg)  Protein: 95- 127 g (1.5- 2 g/Kg)  Fluids: 1587- 1905 mL (25- 30 mL/Kg)    Diet, NPO (12-11-23 @ 13:56) [Active]    Weight History:  Weight (kg): 72.6 (12-11-23 @ 10:30)    PPN Recommendations: via peripheral venous line  Total Volume: 1800 mL x 24 hours  65 g  Amino Acids  100 g Dextrose  0 g Lipids 20% (triglyceride <400, will add 70 g lipids in PN bag tomorrow)   98 mEq Sodium Chloride  14 mEq Sodium Acetate  20 mmol Sodium Phosphate  29 mEq Potassium Chloride  16 mEq Potassium Acetate  10 mmol Potassium Phosphate  0 mEq Calcium Gluconate (CAPS out of PN solution)  8 mEq Magnesium Sulfate  200 mg Thiamine  0 ml Trace Elements  10 ml MVI  10 mcg Chromium  5 mg Zinc  60 mcg Selenium    Total Calories    600      (Meets    29%  of  Estimated Energy needs  and    56%  Protein needs)   (osmolarity <900)    Additional Recommendations:    1) Daily weights  2) Strict I & O's  3) Daily lyte checks including magnesium and phos - c/w KCl and KPhos repletion outside of bag until WNL   4) Weekly triglycerides/LFT checks  5) POCT q6hrs; maintain 140-180mg/dL  6) if on PN > 6 days, consider placing PICC line to meet 100% of nutr needs    *will continue to monitor and adjust PN prn*  Hilda Davalos, VIANNEYN  Evelyn Rich (Formerly Huy), MS, RDN, CNSC, [phone]  Certified Nutrition

## 2023-12-15 NOTE — CHART NOTE - NSCHARTNOTEFT_GEN_A_CORE
Clinical Nutrition PN Follow Up Note    * 84 YO F with a PMHx of COPD, HLD presents to the ED c/o abd pain, n/v and constipation x2 days. Admitted w/ colonic perforation, Extensive stercocolitis with 2 feculent perforation of sigmoid & rectum. Necrotic segment of colon also noticed now POD 2 s/p Christina's. Had post-op fecal peritonitis, 7L washout. Went into septic shock, requiring pressors. Tx to ICU w/ post-op resp failure. NGT to LWS, blood tinged. Off pressors as of this AM 12/13.    *PPN initiated 2/2 NPO s/p Christina procedure2/2 colonic perforation     *12/15: has been NPO x 3 days, nausea overnight. NGT still in place, no plan for clamp trial today.  Plan to initiate PPN today, posisble transfer out of ICU    *current status: Transferred out of ICU, off pressors, now on 3E w/ no acute events overnight. Hypokalemia noted s/p HD yesterday, being repleted. Remains NPO w/ NGT to LWS in place, reports intermittent nausea and awaiting return of bowel function as per surgery.    *new pertinent meds: Protonix, morphine, zofran prn, 80 mEq KCl repletion outside bag, KCl 10 mEq IVPB, KPhos 15 mmol IVPB, D5 + NaCl + KCl 20 mEq solution    *labs reviewed; Na low-end of normal, however, appears to be baseline. Mg WNL.  Hypokalemia and hypophosphatemia noted, possibly in refeeding but also s/p HD - will increase both K+ and Phos in PN bag and adjust prn and will recommend to continue w/ repletion of KCl and KPhos outside PN bag until WNL.  POCT WNL.  TBili remains elevated, will keep trace out of bag and c/w zinc, selenium, and chromium in PN bag.  Still no triglyceride level available, pending.      12-14    140  |  104  |  24<H>  ----------------------------<  90  2.7<LL>   |  28  |  1.12    Ca    7.8<L>      14 Dec 2023 06:38  Phos  2.7     12-14  Mg     2.1     12-14    BP: 123/53 (12-14-23 @ 09:00) (58/45 - 160/65)Vital Signs Last 24 Hrs  T(C): 36.7 (12-14-23 @ 05:00), Max: 36.9 (12-14-23 @ 02:00)  T(F): 98.1 (12-14-23 @ 05:00), Max: 98.4 (12-14-23 @ 02:00)  HR: 86 (12-14-23 @ 09:00) (74 - 96)  BP: 123/53 (12-14-23 @ 09:00) (96/47 - 143/50)  BP(mean): 72 (12-14-23 @ 09:00) (52 - 83)  RR: 17 (12-14-23 @ 09:00) (13 - 26)  SpO2: 98% (12-14-23 @ 09:00) (96% - 100%)    Lipid Panel: pending     POCT Blood Glucose.: 98 mg/dL (12-13-23 @ 23:22)  POCT Blood Glucose.: 87 mg/dL (12-13-23 @ 18:15)  POCT Blood Glucose.: 94 mg/dL (12-13-23 @ 11:52)    *I&O's Detail    13 Dec 2023 07:01  -  14 Dec 2023 07:00  --------------------------------------------------------  IN:    Diltiazem: 20 mL    IV PiggyBack: 350 mL  Total IN: 370 mL    OUT:    Colostomy (mL): 10 mL    Drain (mL): 125 mL    Indwelling Catheter - Urethral (mL): 1980 mL    Nasogastric/Oral tube (mL): 50 mL  Total OUT: 2165 mL    Total NET: -1795 mL  * fluid status: negative; minimal ostomy and NGT output.  Will monitor/ adjust total volume prn.   *no BM (+) doc'd.  pt not on a bowel regimen.  *edema: 1+ generalized     *malnutrition: Pt meets criteria for severe protein-calorie malnutrition in context of acute illness r/t decreased ability to meet increased nutrient needs 2/2 colonic perf w/ septic shock AEB moderate muscle/ fat wasting    Estimated Needs: based on 63.5 Kg (UBW - likely dry wt)  Calories: 1905- 2222 Kcal (30- 35 Kcal/Kg)  Protein: 95- 127 g (1.5- 2 g/Kg)  Fluids: 1587- 1905 mL (25- 30 mL/Kg)    Diet, NPO (12-11-23 @ 13:56) [Active]    Weight History:  Weight (kg): 72.6 (12-11-23 @ 10:30)    PPN Recommendations: via peripheral venous line  Total Volume: 1800 mL x 24 hours  65 g  Amino Acids  100 g Dextrose  0 g Lipids 20% (pending triglyceride level)  98 mEq Sodium Chloride  14 mEq Sodium Acetate  20 mmol Sodium Phosphate  32 mEq Potassium Chloride  18 mEq Potassium Acetate  0 mmol Potassium Phosphate  0 mEq Calcium Gluconate (CAPS out of PN solution)  8 mEq Magnesium Sulfate  200 mg Thiamine  0 ml Trace Elements  10 ml MVI  10 mcg Chromium  5 mg Zinc  60 mcg Selenium    Total Calories    600      (Meets    29%  of  Estimated Energy needs  and    56%  Protein needs)   (osmolarity <900)    Additional Recommendations:    1) Daily weights  2) Strict I & O's  3) Daily lyte checks including magnesium and phos  4) Weekly triglycerides/LFT checks  5) POCT q6hrs; maintain 140-180mg/dL  6) if on PN > 6 days, consider placing PICC line to meet 100% of nutr needs    *will continue to monitor and adjust PN prn*  Evelyn Rich (Formerly Huy), MS, RDN, CNSC, [phone]  Certified Nutrition  Clinical Nutrition PN Follow Up Note    * 84 YO F with a PMHx of COPD, HLD presents to the ED c/o abd pain, n/v and constipation x2 days. Admitted w/ colonic perforation, Extensive stercocolitis with 2 feculent perforation of sigmoid & rectum. Necrotic segment of colon also noticed now POD 2 s/p Christina's. Had post-op fecal peritonitis, 7L washout. Went into septic shock, requiring pressors. Tx to ICU w/ post-op resp failure. NGT to LWS, blood tinged. Off pressors as of this AM 12/13.    *PPN initiated 2/2 NPO s/p Christina procedure2/2 colonic perforation     *12/15: has been NPO x 3 days, nausea overnight. NGT still in place, no plan for clamp trial today.  Plan to initiate PPN today, posisble transfer out of ICU    *current status: Transferred out of ICU, off pressors, now on 3E w/ no acute events overnight. Hypokalemia noted s/p HD yesterday, being repleted. Remains NPO w/ NGT to LWS in place, reports intermittent nausea and awaiting return of bowel function as per surgery.    *new pertinent meds: Protonix, morphine, zofran prn, 80 mEq KCl repletion outside bag, KCl 10 mEq IVPB, KPhos 15 mmol IVPB, D5 + NaCl + KCl 20 mEq solution    *labs reviewed; Na low-end of normal, however, appears to be baseline. Mg WNL.  Hypokalemia and hypophosphatemia noted, possibly in refeeding but also s/p HD - will increase both K+ and Phos in PN bag and adjust prn and will recommend to continue w/ repletion of KCl and KPhos outside PN bag until WNL.  POCT WNL.  TBili remains elevated, will keep trace out of bag and c/w zinc, selenium, and chromium in PN bag.  Still no triglyceride level available, pending.      12-14    140  |  104  |  24<H>  ----------------------------<  90  2.7<LL>   |  28  |  1.12    Ca    7.8<L>      14 Dec 2023 06:38  Phos  2.7     12-14  Mg     2.1     12-14    BP: 123/53 (12-14-23 @ 09:00) (58/45 - 160/65)Vital Signs Last 24 Hrs  T(C): 36.7 (12-14-23 @ 05:00), Max: 36.9 (12-14-23 @ 02:00)  T(F): 98.1 (12-14-23 @ 05:00), Max: 98.4 (12-14-23 @ 02:00)  HR: 86 (12-14-23 @ 09:00) (74 - 96)  BP: 123/53 (12-14-23 @ 09:00) (96/47 - 143/50)  BP(mean): 72 (12-14-23 @ 09:00) (52 - 83)  RR: 17 (12-14-23 @ 09:00) (13 - 26)  SpO2: 98% (12-14-23 @ 09:00) (96% - 100%)    Lipid Panel: pending     POCT Blood Glucose.: 98 mg/dL (12-13-23 @ 23:22)  POCT Blood Glucose.: 87 mg/dL (12-13-23 @ 18:15)  POCT Blood Glucose.: 94 mg/dL (12-13-23 @ 11:52)    *I&O's Detail    13 Dec 2023 07:01  -  14 Dec 2023 07:00  --------------------------------------------------------  IN:    Diltiazem: 20 mL    IV PiggyBack: 350 mL  Total IN: 370 mL    OUT:    Colostomy (mL): 10 mL    Drain (mL): 125 mL    Indwelling Catheter - Urethral (mL): 1980 mL    Nasogastric/Oral tube (mL): 50 mL  Total OUT: 2165 mL    Total NET: -1795 mL  * fluid status: negative; minimal ostomy and NGT output.  Will monitor/ adjust total volume prn.   *no BM (+) doc'd.  pt not on a bowel regimen.  *edema: 1+ generalized     *malnutrition: Pt meets criteria for severe protein-calorie malnutrition in context of acute illness r/t decreased ability to meet increased nutrient needs 2/2 colonic perf w/ septic shock AEB moderate muscle/ fat wasting    Estimated Needs: based on 63.5 Kg (UBW - likely dry wt)  Calories: 1905- 2222 Kcal (30- 35 Kcal/Kg)  Protein: 95- 127 g (1.5- 2 g/Kg)  Fluids: 1587- 1905 mL (25- 30 mL/Kg)    Diet, NPO (12-11-23 @ 13:56) [Active]    Weight History:  Weight (kg): 72.6 (12-11-23 @ 10:30)    PPN Recommendations: via peripheral venous line  Total Volume: 1800 mL x 24 hours  65 g  Amino Acids  100 g Dextrose  0 g Lipids 20% (pending triglyceride level)  98 mEq Sodium Chloride  14 mEq Sodium Acetate  20 mmol Sodium Phosphate  32 mEq Potassium Chloride  18 mEq Potassium Acetate  0 mmol Potassium Phosphate  0 mEq Calcium Gluconate (CAPS out of PN solution)  8 mEq Magnesium Sulfate  200 mg Thiamine  0 ml Trace Elements  10 ml MVI  10 mcg Chromium  5 mg Zinc  60 mcg Selenium    Total Calories    600      (Meets    29%  of  Estimated Energy needs  and    56%  Protein needs)   (osmolarity <900)    Additional Recommendations:    1) Daily weights  2) Strict I & O's  3) Daily lyte checks including magnesium and phos  4) Weekly triglycerides/LFT checks  5) POCT q6hrs; maintain 140-180mg/dL  6) if on PN > 6 days, consider placing PICC line to meet 100% of nutr needs    *will continue to monitor and adjust PN prn*  Evelyn Rich (Formerly Huy), MS, RDN, CNSC, [phone]  Certified Nutrition

## 2023-12-16 LAB
-  AMOXICILLIN/CLAVULANIC ACID: SIGNIFICANT CHANGE UP
-  AMOXICILLIN/CLAVULANIC ACID: SIGNIFICANT CHANGE UP
-  CLINDAMYCIN: SIGNIFICANT CHANGE UP
-  CLINDAMYCIN: SIGNIFICANT CHANGE UP
-  IMIPENEM: SIGNIFICANT CHANGE UP
-  IMIPENEM: SIGNIFICANT CHANGE UP
-  METRONIDAZOLE: SIGNIFICANT CHANGE UP
-  METRONIDAZOLE: SIGNIFICANT CHANGE UP
ALBUMIN SERPL ELPH-MCNC: 2.2 G/DL — LOW (ref 3.3–5)
ALBUMIN SERPL ELPH-MCNC: 2.2 G/DL — LOW (ref 3.3–5)
ALP SERPL-CCNC: 57 U/L — SIGNIFICANT CHANGE UP (ref 40–120)
ALP SERPL-CCNC: 57 U/L — SIGNIFICANT CHANGE UP (ref 40–120)
ALT FLD-CCNC: 22 U/L — SIGNIFICANT CHANGE UP (ref 12–78)
ALT FLD-CCNC: 22 U/L — SIGNIFICANT CHANGE UP (ref 12–78)
ANION GAP SERPL CALC-SCNC: 6 MMOL/L — SIGNIFICANT CHANGE UP (ref 5–17)
ANION GAP SERPL CALC-SCNC: 6 MMOL/L — SIGNIFICANT CHANGE UP (ref 5–17)
APTT BLD: 23 SEC — LOW (ref 24.5–35.6)
APTT BLD: 23 SEC — LOW (ref 24.5–35.6)
APTT BLD: 33.3 SEC — SIGNIFICANT CHANGE UP (ref 24.5–35.6)
APTT BLD: 33.3 SEC — SIGNIFICANT CHANGE UP (ref 24.5–35.6)
AST SERPL-CCNC: 30 U/L — SIGNIFICANT CHANGE UP (ref 15–37)
AST SERPL-CCNC: 30 U/L — SIGNIFICANT CHANGE UP (ref 15–37)
BILIRUB SERPL-MCNC: 1.5 MG/DL — HIGH (ref 0.2–1.2)
BILIRUB SERPL-MCNC: 1.5 MG/DL — HIGH (ref 0.2–1.2)
BUN SERPL-MCNC: 22 MG/DL — SIGNIFICANT CHANGE UP (ref 7–23)
BUN SERPL-MCNC: 22 MG/DL — SIGNIFICANT CHANGE UP (ref 7–23)
CALCIUM SERPL-MCNC: 7.4 MG/DL — LOW (ref 8.5–10.1)
CALCIUM SERPL-MCNC: 7.4 MG/DL — LOW (ref 8.5–10.1)
CHLORIDE SERPL-SCNC: 108 MMOL/L — SIGNIFICANT CHANGE UP (ref 96–108)
CHLORIDE SERPL-SCNC: 108 MMOL/L — SIGNIFICANT CHANGE UP (ref 96–108)
CO2 SERPL-SCNC: 26 MMOL/L — SIGNIFICANT CHANGE UP (ref 22–31)
CO2 SERPL-SCNC: 26 MMOL/L — SIGNIFICANT CHANGE UP (ref 22–31)
CREAT SERPL-MCNC: 0.68 MG/DL — SIGNIFICANT CHANGE UP (ref 0.5–1.3)
CREAT SERPL-MCNC: 0.68 MG/DL — SIGNIFICANT CHANGE UP (ref 0.5–1.3)
CULTURE RESULTS: ABNORMAL
CULTURE RESULTS: ABNORMAL
CULTURE RESULTS: SIGNIFICANT CHANGE UP
CULTURE RESULTS: SIGNIFICANT CHANGE UP
EGFR: 86 ML/MIN/1.73M2 — SIGNIFICANT CHANGE UP
EGFR: 86 ML/MIN/1.73M2 — SIGNIFICANT CHANGE UP
GLUCOSE SERPL-MCNC: 150 MG/DL — HIGH (ref 70–99)
GLUCOSE SERPL-MCNC: 150 MG/DL — HIGH (ref 70–99)
HCT VFR BLD CALC: 29.9 % — LOW (ref 34.5–45)
HCT VFR BLD CALC: 29.9 % — LOW (ref 34.5–45)
HCT VFR BLD CALC: 30 % — LOW (ref 34.5–45)
HCT VFR BLD CALC: 30 % — LOW (ref 34.5–45)
HGB BLD-MCNC: 10 G/DL — LOW (ref 11.5–15.5)
INR BLD: 0.95 RATIO — SIGNIFICANT CHANGE UP (ref 0.85–1.18)
INR BLD: 0.95 RATIO — SIGNIFICANT CHANGE UP (ref 0.85–1.18)
MAGNESIUM SERPL-MCNC: 1.9 MG/DL — SIGNIFICANT CHANGE UP (ref 1.6–2.6)
MAGNESIUM SERPL-MCNC: 1.9 MG/DL — SIGNIFICANT CHANGE UP (ref 1.6–2.6)
MCHC RBC-ENTMCNC: 29.2 PG — SIGNIFICANT CHANGE UP (ref 27–34)
MCHC RBC-ENTMCNC: 29.2 PG — SIGNIFICANT CHANGE UP (ref 27–34)
MCHC RBC-ENTMCNC: 29.4 PG — SIGNIFICANT CHANGE UP (ref 27–34)
MCHC RBC-ENTMCNC: 29.4 PG — SIGNIFICANT CHANGE UP (ref 27–34)
MCHC RBC-ENTMCNC: 33.3 GM/DL — SIGNIFICANT CHANGE UP (ref 32–36)
MCHC RBC-ENTMCNC: 33.3 GM/DL — SIGNIFICANT CHANGE UP (ref 32–36)
MCHC RBC-ENTMCNC: 33.4 GM/DL — SIGNIFICANT CHANGE UP (ref 32–36)
MCHC RBC-ENTMCNC: 33.4 GM/DL — SIGNIFICANT CHANGE UP (ref 32–36)
MCV RBC AUTO: 87.4 FL — SIGNIFICANT CHANGE UP (ref 80–100)
MCV RBC AUTO: 87.4 FL — SIGNIFICANT CHANGE UP (ref 80–100)
MCV RBC AUTO: 88.2 FL — SIGNIFICANT CHANGE UP (ref 80–100)
MCV RBC AUTO: 88.2 FL — SIGNIFICANT CHANGE UP (ref 80–100)
METHOD TYPE: SIGNIFICANT CHANGE UP
METHOD TYPE: SIGNIFICANT CHANGE UP
ORGANISM # SPEC MICROSCOPIC CNT: ABNORMAL
ORGANISM # SPEC MICROSCOPIC CNT: SIGNIFICANT CHANGE UP
ORGANISM # SPEC MICROSCOPIC CNT: SIGNIFICANT CHANGE UP
PHOSPHATE SERPL-MCNC: 2 MG/DL — LOW (ref 2.5–4.5)
PHOSPHATE SERPL-MCNC: 2 MG/DL — LOW (ref 2.5–4.5)
PLATELET # BLD AUTO: 194 K/UL — SIGNIFICANT CHANGE UP (ref 150–400)
PLATELET # BLD AUTO: 194 K/UL — SIGNIFICANT CHANGE UP (ref 150–400)
PLATELET # BLD AUTO: 202 K/UL — SIGNIFICANT CHANGE UP (ref 150–400)
PLATELET # BLD AUTO: 202 K/UL — SIGNIFICANT CHANGE UP (ref 150–400)
POTASSIUM SERPL-MCNC: 3.7 MMOL/L — SIGNIFICANT CHANGE UP (ref 3.5–5.3)
POTASSIUM SERPL-MCNC: 3.7 MMOL/L — SIGNIFICANT CHANGE UP (ref 3.5–5.3)
POTASSIUM SERPL-SCNC: 3.7 MMOL/L — SIGNIFICANT CHANGE UP (ref 3.5–5.3)
POTASSIUM SERPL-SCNC: 3.7 MMOL/L — SIGNIFICANT CHANGE UP (ref 3.5–5.3)
PROT SERPL-MCNC: 5.7 GM/DL — LOW (ref 6–8.3)
PROT SERPL-MCNC: 5.7 GM/DL — LOW (ref 6–8.3)
PROTHROM AB SERPL-ACNC: 10.8 SEC — SIGNIFICANT CHANGE UP (ref 9.5–13)
PROTHROM AB SERPL-ACNC: 10.8 SEC — SIGNIFICANT CHANGE UP (ref 9.5–13)
RBC # BLD: 3.4 M/UL — LOW (ref 3.8–5.2)
RBC # BLD: 3.4 M/UL — LOW (ref 3.8–5.2)
RBC # BLD: 3.42 M/UL — LOW (ref 3.8–5.2)
RBC # BLD: 3.42 M/UL — LOW (ref 3.8–5.2)
RBC # FLD: 14.3 % — SIGNIFICANT CHANGE UP (ref 10.3–14.5)
RBC # FLD: 14.3 % — SIGNIFICANT CHANGE UP (ref 10.3–14.5)
RBC # FLD: 14.6 % — HIGH (ref 10.3–14.5)
RBC # FLD: 14.6 % — HIGH (ref 10.3–14.5)
SODIUM SERPL-SCNC: 140 MMOL/L — SIGNIFICANT CHANGE UP (ref 135–145)
SODIUM SERPL-SCNC: 140 MMOL/L — SIGNIFICANT CHANGE UP (ref 135–145)
SPECIMEN SOURCE: SIGNIFICANT CHANGE UP
WBC # BLD: 12.38 K/UL — HIGH (ref 3.8–10.5)
WBC # BLD: 12.38 K/UL — HIGH (ref 3.8–10.5)
WBC # BLD: 13.58 K/UL — HIGH (ref 3.8–10.5)
WBC # BLD: 13.58 K/UL — HIGH (ref 3.8–10.5)
WBC # FLD AUTO: 12.38 K/UL — HIGH (ref 3.8–10.5)
WBC # FLD AUTO: 12.38 K/UL — HIGH (ref 3.8–10.5)
WBC # FLD AUTO: 13.58 K/UL — HIGH (ref 3.8–10.5)
WBC # FLD AUTO: 13.58 K/UL — HIGH (ref 3.8–10.5)

## 2023-12-16 PROCEDURE — 93010 ELECTROCARDIOGRAM REPORT: CPT

## 2023-12-16 PROCEDURE — 99233 SBSQ HOSP IP/OBS HIGH 50: CPT

## 2023-12-16 RX ORDER — DILTIAZEM HCL 120 MG
20 CAPSULE, EXT RELEASE 24 HR ORAL ONCE
Refills: 0 | Status: COMPLETED | OUTPATIENT
Start: 2023-12-16 | End: 2023-12-16

## 2023-12-16 RX ORDER — SODIUM,POTASSIUM PHOSPHATES 278-250MG
1 POWDER IN PACKET (EA) ORAL ONCE
Refills: 0 | Status: DISCONTINUED | OUTPATIENT
Start: 2023-12-16 | End: 2023-12-16

## 2023-12-16 RX ORDER — I.V. FAT EMULSION 20 G/100ML
0.69 EMULSION INTRAVENOUS
Qty: 50 | Refills: 0 | Status: DISCONTINUED | OUTPATIENT
Start: 2023-12-16 | End: 2023-12-16

## 2023-12-16 RX ORDER — SODIUM CHLORIDE 9 MG/ML
500 INJECTION, SOLUTION INTRAVENOUS
Refills: 0 | Status: DISCONTINUED | OUTPATIENT
Start: 2023-12-16 | End: 2023-12-16

## 2023-12-16 RX ORDER — ELECTROLYTE SOLUTION,INJ
1 VIAL (ML) INTRAVENOUS
Refills: 0 | Status: DISCONTINUED | OUTPATIENT
Start: 2023-12-16 | End: 2023-12-16

## 2023-12-16 RX ORDER — POTASSIUM CHLORIDE 20 MEQ
20 PACKET (EA) ORAL
Refills: 0 | Status: DISCONTINUED | OUTPATIENT
Start: 2023-12-16 | End: 2023-12-16

## 2023-12-16 RX ORDER — METOPROLOL TARTRATE 50 MG
5 TABLET ORAL
Refills: 0 | Status: DISCONTINUED | OUTPATIENT
Start: 2023-12-16 | End: 2023-12-23

## 2023-12-16 RX ORDER — HEPARIN SODIUM 5000 [USP'U]/ML
4400 INJECTION INTRAVENOUS; SUBCUTANEOUS ONCE
Refills: 0 | Status: COMPLETED | OUTPATIENT
Start: 2023-12-16 | End: 2023-12-16

## 2023-12-16 RX ORDER — DILTIAZEM HCL 120 MG
60 CAPSULE, EXT RELEASE 24 HR ORAL EVERY 8 HOURS
Refills: 0 | Status: DISCONTINUED | OUTPATIENT
Start: 2023-12-16 | End: 2023-12-23

## 2023-12-16 RX ORDER — HEPARIN SODIUM 5000 [USP'U]/ML
INJECTION INTRAVENOUS; SUBCUTANEOUS
Qty: 25000 | Refills: 0 | Status: DISCONTINUED | OUTPATIENT
Start: 2023-12-16 | End: 2023-12-17

## 2023-12-16 RX ORDER — AMPICILLIN SODIUM AND SULBACTAM SODIUM 250; 125 MG/ML; MG/ML
3 INJECTION, POWDER, FOR SUSPENSION INTRAMUSCULAR; INTRAVENOUS EVERY 6 HOURS
Refills: 0 | Status: DISCONTINUED | OUTPATIENT
Start: 2023-12-16 | End: 2023-12-23

## 2023-12-16 RX ORDER — POLYETHYLENE GLYCOL 3350 17 G/17G
17 POWDER, FOR SOLUTION ORAL DAILY
Refills: 0 | Status: DISCONTINUED | OUTPATIENT
Start: 2023-12-16 | End: 2023-12-18

## 2023-12-16 RX ORDER — SODIUM,POTASSIUM PHOSPHATES 278-250MG
1 POWDER IN PACKET (EA) ORAL ONCE
Refills: 0 | Status: COMPLETED | OUTPATIENT
Start: 2023-12-16 | End: 2023-12-16

## 2023-12-16 RX ORDER — HEPARIN SODIUM 5000 [USP'U]/ML
4400 INJECTION INTRAVENOUS; SUBCUTANEOUS EVERY 6 HOURS
Refills: 0 | Status: DISCONTINUED | OUTPATIENT
Start: 2023-12-16 | End: 2023-12-21

## 2023-12-16 RX ADMIN — AMPICILLIN SODIUM AND SULBACTAM SODIUM 200 GRAM(S): 250; 125 INJECTION, POWDER, FOR SUSPENSION INTRAMUSCULAR; INTRAVENOUS at 18:36

## 2023-12-16 RX ADMIN — Medication 60 MILLIGRAM(S): at 21:49

## 2023-12-16 RX ADMIN — AMPICILLIN SODIUM AND SULBACTAM SODIUM 200 GRAM(S): 250; 125 INJECTION, POWDER, FOR SUSPENSION INTRAMUSCULAR; INTRAVENOUS at 12:43

## 2023-12-16 RX ADMIN — HEPARIN SODIUM 900 UNIT(S)/HR: 5000 INJECTION INTRAVENOUS; SUBCUTANEOUS at 15:02

## 2023-12-16 RX ADMIN — HEPARIN SODIUM 900 UNIT(S)/HR: 5000 INJECTION INTRAVENOUS; SUBCUTANEOUS at 19:12

## 2023-12-16 RX ADMIN — LIDOCAINE 1 PATCH: 4 CREAM TOPICAL at 11:35

## 2023-12-16 RX ADMIN — Medication 1 PACKET(S): at 21:50

## 2023-12-16 RX ADMIN — HEPARIN SODIUM 4400 UNIT(S): 5000 INJECTION INTRAVENOUS; SUBCUTANEOUS at 15:10

## 2023-12-16 RX ADMIN — POLYETHYLENE GLYCOL 3350 17 GRAM(S): 17 POWDER, FOR SOLUTION ORAL at 12:52

## 2023-12-16 RX ADMIN — HEPARIN SODIUM 4400 UNIT(S): 5000 INJECTION INTRAVENOUS; SUBCUTANEOUS at 21:51

## 2023-12-16 RX ADMIN — Medication 60 MILLIGRAM(S): at 13:19

## 2023-12-16 RX ADMIN — PANTOPRAZOLE SODIUM 40 MILLIGRAM(S): 20 TABLET, DELAYED RELEASE ORAL at 21:53

## 2023-12-16 RX ADMIN — PANTOPRAZOLE SODIUM 40 MILLIGRAM(S): 20 TABLET, DELAYED RELEASE ORAL at 11:35

## 2023-12-16 RX ADMIN — AMPICILLIN SODIUM AND SULBACTAM SODIUM 200 GRAM(S): 250; 125 INJECTION, POWDER, FOR SUSPENSION INTRAMUSCULAR; INTRAVENOUS at 23:50

## 2023-12-16 RX ADMIN — Medication 1 EACH: at 23:49

## 2023-12-16 RX ADMIN — I.V. FAT EMULSION 20.9 GM/KG/DAY: 20 EMULSION INTRAVENOUS at 23:48

## 2023-12-16 RX ADMIN — Medication 20 MILLIGRAM(S): at 09:17

## 2023-12-16 RX ADMIN — Medication 5 MILLIGRAM(S): at 09:05

## 2023-12-16 RX ADMIN — HEPARIN SODIUM 1100 UNIT(S)/HR: 5000 INJECTION INTRAVENOUS; SUBCUTANEOUS at 21:45

## 2023-12-16 NOTE — PROGRESS NOTE ADULT - SUBJECTIVE AND OBJECTIVE BOX
SURGERY DAILY PROGRESS NOTE:     Subjective:  Patient seen and examined this AM at bedside. No acute events overnight and patient resting comfortably. Denies fever/chills, shortness of breath, chest pain. VS reviewed    Objective:    MEDICATIONS  (STANDING):  cefTRIAXone Injectable. 1000 milliGRAM(s) IV Push every 24 hours  dextrose 5% + sodium chloride 0.45% with potassium chloride 20 mEq/L 1000 milliLiter(s) (35 mL/Hr) IV Continuous <Continuous>  enoxaparin Injectable 40 milliGRAM(s) SubCutaneous every 24 hours  lidocaine   4% Patch 1 Patch Transdermal daily  pantoprazole  Injectable 40 milliGRAM(s) IV Push two times a day  Parenteral Nutrition - Adult 1 Each (75 mL/Hr) TPN Continuous <Continuous>  Parenteral Nutrition - Adult 1 Each (75 mL/Hr) TPN Continuous <Continuous>    MEDICATIONS  (PRN):  acetaminophen   IVPB .. 1000 milliGRAM(s) IV Intermittent once PRN Temp greater or equal to 38C (100.4F), Mild Pain (1 - 3)  ketorolac   Injectable 15 milliGRAM(s) IV Push every 8 hours PRN Severe Pain (7 - 10)  morphine  - Injectable 2 milliGRAM(s) IV Push every 4 hours PRN Moderate Pain (4 - 6)  ondansetron Injectable 4 milliGRAM(s) IV Push every 6 hours PRN Nausea      Vital Signs Last 24 Hrs  T(C): 36.7 (15 Dec 2023 20:11), Max: 37 (15 Dec 2023 08:20)  T(F): 98 (15 Dec 2023 20:11), Max: 98.6 (15 Dec 2023 08:20)  HR: 101 (15 Dec 2023 20:11) (95 - 101)  BP: 146/49 (15 Dec 2023 20:11) (132/59 - 146/49)  BP(mean): --  RR: 18 (15 Dec 2023 20:11) (18 - 18)  SpO2: 91% (15 Dec 2023 20:11) (91% - 96%)    Parameters below as of 15 Dec 2023 20:11  Patient On (Oxygen Delivery Method): nasal cannula  O2 Flow (L/min): 2        PHYSICAL EXAM   GENERAL: NAD, well developed, thin  HEAD: Atraumatic, normocephalic  EYES: EOMI, PERRLA, conjunctiva and sclera clear  ENT: moist mucous membrane, NC in place  NECK: supple, No JVD, midline trachea  CHEST/LUNG: No increased WOB, symmetric excursions  Heart: RRR ppp, 1+ dependent peripheral edema  ABDOMEN: Round, distended, soft, nontender, Prevena with suction, drain bulb w/ serous, opaque output, stoma pink with stool and gas in bag  EXTREMITIES: Brisk cap refill. no clubbing or cyanosis  NERVOUS SYSTEM: Alert, arousable, no neuro-deficits  SKIN: warm to touch, no rash or lesions        I&O's Detail    14 Dec 2023 07:01  -  15 Dec 2023 07:00  --------------------------------------------------------  IN:    IV PiggyBack: 650 mL  Total IN: 650 mL    OUT:    Colostomy (mL): 0 mL    Drain (mL): 75 mL    Indwelling Catheter - Urethral (mL): 3400 mL    Nasogastric/Oral tube (mL): 100 mL    Voided (mL): 0 mL  Total OUT: 3575 mL    Total NET: -2925 mL      15 Dec 2023 07:01  -  16 Dec 2023 03:21  --------------------------------------------------------  IN:  Total IN: 0 mL    OUT:    Drain (mL): 130 mL    Intermittent Catheterization - Urethral (mL): 700 mL  Total OUT: 830 mL    Total NET: -830 mL      LABS:                        9.6    12.91 )-----------( 178      ( 15 Dec 2023 04:58 )             28.7     12-15    138  |  103  |  25<H>  ----------------------------<  168<H>  3.7   |  28  |  0.85    Ca    7.7<L>      15 Dec 2023 17:05  Phos  1.8     12-15  Mg     2.0     12-15    TPro  5.9<L>  /  Alb  2.6<L>  /  TBili  2.7<H>  /  DBili  x   /  AST  28  /  ALT  16  /  AlkPhos  51  12-15      Urinalysis Basic - ( 15 Dec 2023 17:05 )    Color: x / Appearance: x / SG: x / pH: x  Gluc: 168 mg/dL / Ketone: x  / Bili: x / Urobili: x   Blood: x / Protein: x / Nitrite: x   Leuk Esterase: x / RBC: x / WBC x   Sq Epi: x / Non Sq Epi: x / Bacteria: x

## 2023-12-16 NOTE — CONSULT NOTE ADULT - ASSESSMENT
82 y/o female with a PMHx of COPD, HLD presents to the ED c/o abd pain, n/v and constipation x2 days. Denies CP, SOB, urinary symptoms. NKDA. Nonsmoker. No EtOH use. No other complaints at this time. Pt found to have septic shock d/t stercoral ulcer and colonic perforation eval by surgery taken to OR on 12/11 s/p Hartmanns procedure, abd washout noted to have feculent peritonitis, cx sent, blood cx growing Bacteroides, Body fluid cx growing proteus, bacteroides, Acidaminococcus has been on IV zosyn perioperatively.    84 y/o female with a PMHx of COPD, HLD presents to the ED c/o abd pain, n/v and constipation x2 days. Denies CP, SOB, urinary symptoms. NKDA. Nonsmoker. No EtOH use. No other complaints at this time. Pt found to have septic shock d/t stercoral ulcer and colonic perforation eval by surgery taken to OR on 12/11 s/p Hartmanns procedure, abd washout noted to have feculent peritonitis, cx sent, blood cx growing Bacteroides, Body fluid cx growing proteus, bacteroides, Acidaminococcus has been on IV zosyn perioperatively.    84 y/o female with a PMHx of COPD, HLD presents to the ED c/o abd pain, n/v and constipation x2 days. Denies CP, SOB, urinary symptoms. NKDA. Nonsmoker. No EtOH use. No other complaints at this time. Pt found to have septic shock d/t stercoral ulcer and colonic perforation eval by surgery taken to OR on 12/11 s/p Hartmanns procedure, abd washout noted to have feculent peritonitis, cx sent, blood cx growing Bacteroides, Body fluid cx growing proteus, bacteroides, Acidaminococcus has been on IV zosyn perioperatively.     1. Sepsis with Bacteroides species. Abdominal perforation. Feculent peritonitis. S/p Hartmanns procedure/abd washout  - imaging reviewed  - OR cultures growing bacteroides, acidaminococcus, proteus   - blood cx 12/11 growing bacteroides  - s/p zosyn 12/11-12/14  - now on rocephin since 12/14  - change abx to unasyn 3gmq6h  - repeat blood cx  - on dc po augmentin [id number] 14 days from day of negative cx    - surgical f/u noted  - tolerating abx well so far; no side effects noted  - reason for abx use and side effects reviewed with patient  - supportive care  - fu cbc    2. other issues -care per medicine  82 y/o female with a PMHx of COPD, HLD presents to the ED c/o abd pain, n/v and constipation x2 days. Denies CP, SOB, urinary symptoms. NKDA. Nonsmoker. No EtOH use. No other complaints at this time. Pt found to have septic shock d/t stercoral ulcer and colonic perforation eval by surgery taken to OR on 12/11 s/p Hartmanns procedure, abd washout noted to have feculent peritonitis, cx sent, blood cx growing Bacteroides, Body fluid cx growing proteus, bacteroides, Acidaminococcus has been on IV zosyn perioperatively.     1. Sepsis with Bacteroides species. Abdominal perforation. Feculent peritonitis. S/p Hartmanns procedure/abd washout  - imaging reviewed  - OR cultures growing bacteroides, acidaminococcus, proteus   - blood cx 12/11 growing bacteroides  - s/p zosyn 12/11-12/14  - now on rocephin since 12/14  - change abx to unasyn 3gmq6h  - repeat blood cx  - on dc po augmentin [id number] 14 days from day of negative cx    - surgical f/u noted  - tolerating abx well so far; no side effects noted  - reason for abx use and side effects reviewed with patient  - supportive care  - fu cbc    2. other issues -care per medicine

## 2023-12-16 NOTE — PROGRESS NOTE ADULT - SUBJECTIVE AND OBJECTIVE BOX
HOSPITALIST ATTENDING PROGRESS NOTE    Chart and meds reviewed.      Subjective: Patient seen and examined. denies fever, chills, chest pain, palpitations, shortness of breath, numbness, weakness or tingling     Hospital Course reviewed   Found to have perforated viscus s/p emergent OR  ex lap, abdominal washout, Christina's colectomy, Found to have perforation of sigmoid and proximal rectum  Postop course complicated by rapid A-fib s/p Cardizem gtt.  Cultures noted  S/p Zosyn, Currently on Rocephin  Currently on PPN  Currently has CORKY drain  Currently has a Daugherty  NG tube was removed 12/15        Additional results/Imaging, I have personally reviewed:    LABS:                            10.0   12.38 )-----------( 194      ( 16 Dec 2023 04:39 )             29.9     12-16    140  |  108  |  22  ----------------------------<  150<H>  3.7   |  26  |  0.68    Ca    7.4<L>      16 Dec 2023 04:39  Phos  2.0     12-16  Mg     1.9     12-16    TPro  5.7<L>  /  Alb  2.2<L>  /  TBili  1.5<H>  /  DBili  x   /  AST  30  /  ALT  22  /  AlkPhos  57  12-16        LIVER FUNCTIONS - ( 16 Dec 2023 04:39 )  Alb: 2.2 g/dL / Pro: 5.7 gm/dL / ALK PHOS: 57 U/L / ALT: 22 U/L / AST: 30 U/L / GGT: x           PT/INR - ( 16 Dec 2023 10:50 )   PT: 10.8 sec;   INR: 0.95 ratio         PTT - ( 16 Dec 2023 10:50 )  PTT:23.0 sec  Urinalysis Basic - ( 16 Dec 2023 04:39 )    Color: x / Appearance: x / SG: x / pH: x  Gluc: 150 mg/dL / Ketone: x  / Bili: x / Urobili: x   Blood: x / Protein: x / Nitrite: x   Leuk Esterase: x / RBC: x / WBC x   Sq Epi: x / Non Sq Epi: x / Bacteria: x              All other systems reviewed and found to be negative with the exception of what has been described above.    MEDICATIONS  (STANDING):  ampicillin/sulbactam  IVPB 3 Gram(s) IV Intermittent every 6 hours  diltiazem    Tablet 60 milliGRAM(s) Oral every 8 hours  heparin   Injectable 4400 Unit(s) IV Push once  heparin  Infusion.  Unit(s)/Hr (9 mL/Hr) IV Continuous <Continuous>  lidocaine   4% Patch 1 Patch Transdermal daily  lipid, fat emulsion (Fish Oil and Plant Based) 20% Infusion 0.69 Gm/kG/Day (20.9 mL/Hr) IV Continuous <Continuous>  metoprolol tartrate Injectable 5 milliGRAM(s) IV Push every 5 minutes  pantoprazole  Injectable 40 milliGRAM(s) IV Push two times a day  Parenteral Nutrition - Adult 1 Each (75 mL/Hr) TPN Continuous <Continuous>  Parenteral Nutrition - Adult 1 Each (75 mL/Hr) TPN Continuous <Continuous>  polyethylene glycol 3350 17 Gram(s) Oral daily  potassium phosphate / sodium phosphate Powder (PHOS-NaK) 1 Packet(s) Oral once    MEDICATIONS  (PRN):  acetaminophen   IVPB .. 1000 milliGRAM(s) IV Intermittent once PRN Temp greater or equal to 38C (100.4F), Mild Pain (1 - 3)  heparin   Injectable 4400 Unit(s) IV Push every 6 hours PRN For aPTT less than 40  ketorolac   Injectable 15 milliGRAM(s) IV Push every 8 hours PRN Severe Pain (7 - 10)  morphine  - Injectable 2 milliGRAM(s) IV Push every 4 hours PRN Moderate Pain (4 - 6)  ondansetron Injectable 4 milliGRAM(s) IV Push every 6 hours PRN Nausea      VITALS:  T(F): 98.1 (12-16-23 @ 07:33), Max: 98.1 (12-16-23 @ 07:33)  HR: 90 (12-16-23 @ 07:33) (90 - 101)  BP: 144/56 (12-16-23 @ 07:33) (144/56 - 146/49)  RR: 18 (12-15-23 @ 20:11) (18 - 18)  SpO2: 91% (12-15-23 @ 20:11) (91% - 91%)  Wt(kg): --    I&O's Summary    15 Dec 2023 07:01  -  16 Dec 2023 07:00  --------------------------------------------------------  IN: 0 mL / OUT: 2195 mL / NET: -2195 mL        CAPILLARY BLOOD GLUCOSE          PHYSICAL EXAM:  GEN: NAD   HEENT: EOMI, normal hearing, moist mucous membranes  NECK : Soft and supple, no JVD  LUNG: CTABL, No wheezing, rales or rhonchi  CVS: S1S2+, (+)tachycardic, +regular rhythm   GI: BS+, soft, NT/ND, no guarding, no rebound, +Prevena dressing in place c/d/i, +daugherty in place   EXTREMITIES: No peripheral edema  VASCULAR: 2+ peripheral pulses  NEURO: AAOx3, grossly non-focal   SKIN: No rashes      CULTURES:      Telemetry, personally reviewed

## 2023-12-16 NOTE — CONSULT NOTE ADULT - ASSESSMENT
Post op PAF cardioverted with cardizem to NSR.  Start oral cardizem.  Start IV heparin.  When cleared by surgery to start DOAC will transition.  Will need fu for MV post recovery as had moderate MR and Pulm. HTN and Moderately severe TR as well.      D/w pt., staff and surgical resident.

## 2023-12-16 NOTE — PROGRESS NOTE ADULT - ATTENDING COMMENTS
Leyva replacement done for urinary retention.  Patient denies N/V and stoma is now productive of both gas and stool.  Pain present but controlled.  Abdomen is soft, nondistended, mildly appropriately tender near incision.  Drain with serous output.  Hgb stable and leukocytosis continues to trend downward.  Cultures as noted in resident documentation.    -- Clear liquid diet today  -- Add Miralax - patient had significant constipation preoperatively and I strongly suspect stercoral colitis was her underlying pathology.  She will need to continue stool softeners even upon discharge.  -- IS, pain control - multimodal  -- PT - currently anticipating need for RONALD.  Patient would strongly prefer to go home if possible.  Discussed with her possibility of RONALD and she understands - will request PT re-evaluation early next week pending her progress.  -- Will eventually need oral anticoagulation - currently on heparin, will monitor her PO tolerance and once her diet is advanced can convert to oral AC.

## 2023-12-16 NOTE — CONSULT NOTE ADULT - SUBJECTIVE AND OBJECTIVE BOX
Patient is a 83y old  Female who presents with a chief complaint of colonic perforation with pneumoperitoneaum (16 Dec 2023 10:07)    HPI:  84 y/o female with a PMHx of COPD, HLD presents to the ED c/o abd pain, n/v and constipation x2 days. Denies CP, SOB, urinary symptoms. NKDA. Nonsmoker. No EtOH use. No other complaints at this time. Pt found to have septic shock d/t stercoral ulcer and colonic perforation eval by surgery taken to OR on 12/11 s/p Hartmanns procedure, abd washout noted to have feculent peritonitis, cx sent, blood cx growing Bacteroides, Body fluid cx growing proteus, bacteroides, Acidaminococcus has been on IV zosyn perioperatively.     PMH: as above  PSH: as above  Meds: per reconciliation sheet, noted below  MEDICATIONS  (STANDING):  cefTRIAXone Injectable. 1000 milliGRAM(s) IV Push every 24 hours  dextrose 5% + sodium chloride 0.45% with potassium chloride 20 mEq/L 1000 milliLiter(s) (35 mL/Hr) IV Continuous <Continuous>  diltiazem    Tablet 60 milliGRAM(s) Oral every 8 hours  heparin   Injectable 4400 Unit(s) IV Push once  heparin  Infusion.  Unit(s)/Hr (9 mL/Hr) IV Continuous <Continuous>  lactated ringers. 500 milliLiter(s) (100 mL/Hr) IV Continuous <Continuous>  lidocaine   4% Patch 1 Patch Transdermal daily  lipid, fat emulsion (Fish Oil and Plant Based) 20% Infusion 0.69 Gm/kG/Day (20.9 mL/Hr) IV Continuous <Continuous>  metoprolol tartrate Injectable 5 milliGRAM(s) IV Push every 5 minutes  pantoprazole  Injectable 40 milliGRAM(s) IV Push two times a day  Parenteral Nutrition - Adult 1 Each (75 mL/Hr) TPN Continuous <Continuous>  Parenteral Nutrition - Adult 1 Each (75 mL/Hr) TPN Continuous <Continuous>    Allergies    No Known Allergies    Intolerances      Social: no smoking, no alcohol, no illegal drugs; no recent travel, no exposure to TB  FAMILY HISTORY:     no history of premature cardiovascular disease in first degree relatives    ROS: the patient denies fever, no chills, no HA, no dizziness, no sore throat, no blurry vision, no CP, no palpitations, no SOB, no cough, + abdominal pain, no diarrhea, no N/V, no dysuria, no leg pain, no claudication, no rash, no joint aches, no rectal pain or bleeding, no night sweats    All other systems reviewed and are negative    Vital Signs Last 24 Hrs  T(C): 36.7 (16 Dec 2023 07:33), Max: 36.7 (15 Dec 2023 20:11)  T(F): 98.1 (16 Dec 2023 07:33), Max: 98.1 (16 Dec 2023 07:33)  HR: 90 (16 Dec 2023 07:33) (90 - 101)  BP: 144/56 (16 Dec 2023 07:33) (144/56 - 146/49)  BP(mean): --  RR: 18 (15 Dec 2023 20:11) (18 - 18)  SpO2: 91% (15 Dec 2023 20:11) (91% - 91%)    Parameters below as of 15 Dec 2023 20:11  Patient On (Oxygen Delivery Method): nasal cannula  O2 Flow (L/min): 2    Daily     Daily     PE:  Constitutional: frail looking  HEENT: NC/AT, EOMI, PERRLA, conjunctivae clear; ears and nose atraumatic; pharynx benign  Neck: supple; thyroid not palpable  Back: no tenderness  Respiratory: respiratory effort normal; clear to auscultation  Cardiovascular: S1S2 regular, no murmurs  Abdomen: soft, not tender, not distended, positive BS; liver and spleen WNL  Genitourinary: no suprapubic tenderness  Lymphatic: no LN palpable  Musculoskeletal: no muscle tenderness, no joint swelling or tenderness  Extremities: no pedal edema  Neurological/ Psychiatric:   moving all extremities  Skin: no rashes; no palpable lesions    Labs: all available labs reviewed                        10.0   12.38 )-----------( 194      ( 16 Dec 2023 04:39 )             29.9     12-16    140  |  108  |  22  ----------------------------<  150<H>  3.7   |  26  |  0.68    Ca    7.4<L>      16 Dec 2023 04:39  Phos  2.0     12-16  Mg     1.9     12-16    TPro  5.7<L>  /  Alb  2.2<L>  /  TBili  1.5<H>  /  DBili  x   /  AST  30  /  ALT  22  /  AlkPhos  57  12-16     LIVER FUNCTIONS - ( 16 Dec 2023 04:39 )  Alb: 2.2 g/dL / Pro: 5.7 gm/dL / ALK PHOS: 57 U/L / ALT: 22 U/L / AST: 30 U/L / GGT: x           Urinalysis Basic - ( 16 Dec 2023 04:39 )    Color: x / Appearance: x / SG: x / pH: x  Gluc: 150 mg/dL / Ketone: x  / Bili: x / Urobili: x   Blood: x / Protein: x / Nitrite: x   Leuk Esterase: x / RBC: x / WBC x   Sq Epi: x / Non Sq Epi: x / Bacteria: x    Culture - Urine (12.12.23 @ 09:10)   Specimen Source: Clean Catch Clean Catch (Midstream)  Culture Results:   No growthCulture - Surgical Swab (12.11.23 @ 19:09)   - Amoxicillin/Clavulanic Acid: S <=8/4  - Ampicillin: S <=8 These ampicillin results predict results for amoxicillin  - Ampicillin/Sulbactam: S <=4/2  - Aztreonam: S <=4  - Cefazolin: S <=2  - Cefepime: S <=2  - Cefoxitin: S <=8  - Ceftriaxone: S <=1  - Ciprofloxacin: S <=0.25  - Ertapenem: S <=0.5  - Gentamicin: S <=2  - Levofloxacin: S <=0.5  - Meropenem: S <=1  - Piperacillin/Tazobactam: S <=8  - Tobramycin: S <=2  - Trimethoprim/Sulfamethoxazole: S <=0.5/9.5  Specimen Source: .Surgical Swab culture, peritonitis  Culture Results:   Rare Proteus mirabilis   Few Bacteroides distasonis "Susceptibilities not performed"   Rare Most closely resembling Acidaminococcus species "Susceptibilities   not performed"  Organism Identification: Proteus mirabilis  Organism: Proteus mirabilis  Method Type: MICCulture - Blood (12.11.23 @ 10:57)   Specimen Source: .Blood None  Culture Results:   No growth at 4 daysCulture - Blood (12.11.23 @ 10:57)   Gram Stain:   Growth in anaerobic bottle: Gram Negative Rods  - Blood PCR Panel: NEG  Specimen Source: .Blood None  Organism: Blood Culture PCR  Culture Results:   Growth in anaerobic bottle: Bacteroides uniformis   Direct identification is available within approximately 3-5   hours either by Blood Panel Multiplexed PCR or Direct   MALDI-TOF. Details: [email or web address]  Organism Identification: Blood Culture PCR  Method Type: PCR      Radiology: all available radiological tests reviewed  < from: Xray Chest 1 View- PORTABLE-Urgent (Xray Chest 1 View- PORTABLE-Urgent .) (12.11.23 @ 20:17) >    ACC: [id number] EXAM:  XR CHEST PORTABLE URGENT 1V   ORDERED BY: HUGO GREEN     PROCEDURE DATE:  12/11/2023          INTERPRETATION:  Portable chest radiograph    CLINICAL INFORMATION: Post intubation chest radiograph    TECHNIQUE:  Portable  AP chest radiograph.    COMPARISON: 2/11/2023 chest x-ray .    FINDINGS:  CATHETERS AND TUBES: ET tube tip above tracheal bifurcation.  NG tube tip beyond GE junction.    PULMONARY: Mild LEFT effusion and/or basilar airspace disease obscures   LEFT CP angle and portions of LEFT diaphragm contour. Remaining lung   parenchyma grossly clear.  No pneumothorax.    HEART/VASCULAR: The heart and mediastinum size and configuration are   within normal limits.    BONES: Visualized osseous thorax intact.    IMPRESSION:   Catheters and tubes in place.  LEFT lower zone mild pleural effusion and/or patchy basilar airspace   disease..    --- End of Report ---      < end of copied text >  < from: CT Abdomen and Pelvis w/ Oral Cont and w/ IV Cont (12.11.23 @ 12:45) >  ACC: [id number] EXAM:  CT ABDOMEN AND PELVIS OC IC   ORDERED BY: TRAY GÓMEZ     PROCEDURE DATE:  12/11/2023          INTERPRETATION:  CLINICAL INFORMATION: Rule out small bowel obstruction.    COMPARISON: 04/16/2022.    CONTRAST/COMPLICATIONS:  IVContrast: Omnipaque 350  90 cc administered   10 cc discarded  Oral Contrast: Omnipaque 300  Complications: None reported at time of study completion    PROCEDURE:  CT of the Abdomen and Pelvis was performed.  Sagittal and coronal reformats were performed.    FINDINGS:  LOWER CHEST: Bibasilar subsegmental atelectasis, left more than right.   Mild hiatal hernia. Coronary artery calcification.    LIVER: Steatosis.  BILE DUCTS: Mildly dilated. CBD measures approximately 1 cm with smooth   distal tapering. An almost similar appearance was noted on the prior   study. Correlate clinically.  GALLBLADDER: Within normal limits.  SPLEEN: Within normal limits.  PANCREAS: Within normal limits.  ADRENALS: Within normal limits.  KIDNEYS/URETERS: Within normal limits.    BLADDER: Minimally distended.  REPRODUCTIVE ORGANS: Uterus and adnexa within normal limits.    BOWEL: No bowel obstruction. Appendix is not visualized. No evidence of   inflammation in the pericecal region. Evidence of uncomplicated   predominantly sigmoid diverticula. There are small foci of extraluminal   gas at the level of the rectum along with probable rectal pneumatosis.  PERITONEUM: Small volume abdominal and pelvic ascites. Also small volume   pneumoperitoneum.  VESSELS: Atherosclerotic changes. Common origin of the celiac and   superior mesenteric arteries.  RETROPERITONEUM/LYMPH NODES: No lymphadenopathy.  ABDOMINAL WALL: Within normal limits.  BONES: Degenerative changes. Stable moderate compression of L2.    IMPRESSION:  Evidence of pneumoperitoneum. Suspect rectal perforation considering the   presence of perirectal gas and pneumatosis.          Advanced directives addressed: full resuscitation Patient is a 83y old  Female who presents with a chief complaint of colonic perforation with pneumoperitoneaum (16 Dec 2023 10:07)    HPI:  84 y/o female with a PMHx of COPD, HLD presents to the ED c/o abd pain, n/v and constipation x2 days. Denies CP, SOB, urinary symptoms. NKDA. Nonsmoker. No EtOH use. No other complaints at this time. Pt found to have septic shock d/t stercoral ulcer and colonic perforation eval by surgery taken to OR on 12/11 s/p Hartmanns procedure, abd washout noted to have feculent peritonitis, cx sent, blood cx growing Bacteroides, Body fluid cx growing proteus, bacteroides, Acidaminococcus has been on IV zosyn perioperatively.     PMH: as above  PSH: as above  Meds: per reconciliation sheet, noted below  MEDICATIONS  (STANDING):  cefTRIAXone Injectable. 1000 milliGRAM(s) IV Push every 24 hours  dextrose 5% + sodium chloride 0.45% with potassium chloride 20 mEq/L 1000 milliLiter(s) (35 mL/Hr) IV Continuous <Continuous>  diltiazem    Tablet 60 milliGRAM(s) Oral every 8 hours  heparin   Injectable 4400 Unit(s) IV Push once  heparin  Infusion.  Unit(s)/Hr (9 mL/Hr) IV Continuous <Continuous>  lactated ringers. 500 milliLiter(s) (100 mL/Hr) IV Continuous <Continuous>  lidocaine   4% Patch 1 Patch Transdermal daily  lipid, fat emulsion (Fish Oil and Plant Based) 20% Infusion 0.69 Gm/kG/Day (20.9 mL/Hr) IV Continuous <Continuous>  metoprolol tartrate Injectable 5 milliGRAM(s) IV Push every 5 minutes  pantoprazole  Injectable 40 milliGRAM(s) IV Push two times a day  Parenteral Nutrition - Adult 1 Each (75 mL/Hr) TPN Continuous <Continuous>  Parenteral Nutrition - Adult 1 Each (75 mL/Hr) TPN Continuous <Continuous>    Allergies    No Known Allergies    Intolerances      Social: no smoking, no alcohol, no illegal drugs; no recent travel, no exposure to TB  FAMILY HISTORY:     no history of premature cardiovascular disease in first degree relatives    ROS: the patient denies fever, no chills, no HA, no dizziness, no sore throat, no blurry vision, no CP, no palpitations, no SOB, no cough, + abdominal pain, no diarrhea, no N/V, no dysuria, no leg pain, no claudication, no rash, no joint aches, no rectal pain or bleeding, no night sweats    All other systems reviewed and are negative    Vital Signs Last 24 Hrs  T(C): 36.7 (16 Dec 2023 07:33), Max: 36.7 (15 Dec 2023 20:11)  T(F): 98.1 (16 Dec 2023 07:33), Max: 98.1 (16 Dec 2023 07:33)  HR: 90 (16 Dec 2023 07:33) (90 - 101)  BP: 144/56 (16 Dec 2023 07:33) (144/56 - 146/49)  BP(mean): --  RR: 18 (15 Dec 2023 20:11) (18 - 18)  SpO2: 91% (15 Dec 2023 20:11) (91% - 91%)    Parameters below as of 15 Dec 2023 20:11  Patient On (Oxygen Delivery Method): nasal cannula  O2 Flow (L/min): 2    Daily     Daily     PE:  Constitutional: frail looking  HEENT: NC/AT, EOMI, PERRLA, conjunctivae clear; ears and nose atraumatic; pharynx benign  Neck: supple; thyroid not palpable  Back: no tenderness  Respiratory: respiratory effort normal; clear to auscultation  Cardiovascular: S1S2 regular, no murmurs  Abdomen: soft, not tender, not distended, positive BS; liver and spleen WNL  Genitourinary: no suprapubic tenderness  Lymphatic: no LN palpable  Musculoskeletal: no muscle tenderness, no joint swelling or tenderness  Extremities: no pedal edema  Neurological/ Psychiatric:   moving all extremities  Skin: no rashes; no palpable lesions    Labs: all available labs reviewed                        10.0   12.38 )-----------( 194      ( 16 Dec 2023 04:39 )             29.9     12-16    140  |  108  |  22  ----------------------------<  150<H>  3.7   |  26  |  0.68    Ca    7.4<L>      16 Dec 2023 04:39  Phos  2.0     12-16  Mg     1.9     12-16    TPro  5.7<L>  /  Alb  2.2<L>  /  TBili  1.5<H>  /  DBili  x   /  AST  30  /  ALT  22  /  AlkPhos  57  12-16     LIVER FUNCTIONS - ( 16 Dec 2023 04:39 )  Alb: 2.2 g/dL / Pro: 5.7 gm/dL / ALK PHOS: 57 U/L / ALT: 22 U/L / AST: 30 U/L / GGT: x           Urinalysis Basic - ( 16 Dec 2023 04:39 )    Color: x / Appearance: x / SG: x / pH: x  Gluc: 150 mg/dL / Ketone: x  / Bili: x / Urobili: x   Blood: x / Protein: x / Nitrite: x   Leuk Esterase: x / RBC: x / WBC x   Sq Epi: x / Non Sq Epi: x / Bacteria: x    Culture - Urine (12.12.23 @ 09:10)   Specimen Source: Clean Catch Clean Catch (Midstream)  Culture Results:   No growthCulture - Surgical Swab (12.11.23 @ 19:09)   - Amoxicillin/Clavulanic Acid: S <=8/4  - Ampicillin: S <=8 These ampicillin results predict results for amoxicillin  - Ampicillin/Sulbactam: S <=4/2  - Aztreonam: S <=4  - Cefazolin: S <=2  - Cefepime: S <=2  - Cefoxitin: S <=8  - Ceftriaxone: S <=1  - Ciprofloxacin: S <=0.25  - Ertapenem: S <=0.5  - Gentamicin: S <=2  - Levofloxacin: S <=0.5  - Meropenem: S <=1  - Piperacillin/Tazobactam: S <=8  - Tobramycin: S <=2  - Trimethoprim/Sulfamethoxazole: S <=0.5/9.5  Specimen Source: .Surgical Swab culture, peritonitis  Culture Results:   Rare Proteus mirabilis   Few Bacteroides distasonis "Susceptibilities not performed"   Rare Most closely resembling Acidaminococcus species "Susceptibilities   not performed"  Organism Identification: Proteus mirabilis  Organism: Proteus mirabilis  Method Type: MICCulture - Blood (12.11.23 @ 10:57)   Specimen Source: .Blood None  Culture Results:   No growth at 4 daysCulture - Blood (12.11.23 @ 10:57)   Gram Stain:   Growth in anaerobic bottle: Gram Negative Rods  - Blood PCR Panel: NEG  Specimen Source: .Blood None  Organism: Blood Culture PCR  Culture Results:   Growth in anaerobic bottle: Bacteroides uniformis   Direct identification is available within approximately 3-5   hours either by Blood Panel Multiplexed PCR or Direct   MALDI-TOF. Details: [email or web address]  Organism Identification: Blood Culture PCR  Method Type: PCR      Radiology: all available radiological tests reviewed  < from: Xray Chest 1 View- PORTABLE-Urgent (Xray Chest 1 View- PORTABLE-Urgent .) (12.11.23 @ 20:17) >    ACC: [id number] EXAM:  XR CHEST PORTABLE URGENT 1V   ORDERED BY: HUGO GREEN     PROCEDURE DATE:  12/11/2023          INTERPRETATION:  Portable chest radiograph    CLINICAL INFORMATION: Post intubation chest radiograph    TECHNIQUE:  Portable  AP chest radiograph.    COMPARISON: 2/11/2023 chest x-ray .    FINDINGS:  CATHETERS AND TUBES: ET tube tip above tracheal bifurcation.  NG tube tip beyond GE junction.    PULMONARY: Mild LEFT effusion and/or basilar airspace disease obscures   LEFT CP angle and portions of LEFT diaphragm contour. Remaining lung   parenchyma grossly clear.  No pneumothorax.    HEART/VASCULAR: The heart and mediastinum size and configuration are   within normal limits.    BONES: Visualized osseous thorax intact.    IMPRESSION:   Catheters and tubes in place.  LEFT lower zone mild pleural effusion and/or patchy basilar airspace   disease..    --- End of Report ---      < end of copied text >  < from: CT Abdomen and Pelvis w/ Oral Cont and w/ IV Cont (12.11.23 @ 12:45) >  ACC: [id number] EXAM:  CT ABDOMEN AND PELVIS OC IC   ORDERED BY: TRAY GÓMEZ     PROCEDURE DATE:  12/11/2023          INTERPRETATION:  CLINICAL INFORMATION: Rule out small bowel obstruction.    COMPARISON: 04/16/2022.    CONTRAST/COMPLICATIONS:  IVContrast: Omnipaque 350  90 cc administered   10 cc discarded  Oral Contrast: Omnipaque 300  Complications: None reported at time of study completion    PROCEDURE:  CT of the Abdomen and Pelvis was performed.  Sagittal and coronal reformats were performed.    FINDINGS:  LOWER CHEST: Bibasilar subsegmental atelectasis, left more than right.   Mild hiatal hernia. Coronary artery calcification.    LIVER: Steatosis.  BILE DUCTS: Mildly dilated. CBD measures approximately 1 cm with smooth   distal tapering. An almost similar appearance was noted on the prior   study. Correlate clinically.  GALLBLADDER: Within normal limits.  SPLEEN: Within normal limits.  PANCREAS: Within normal limits.  ADRENALS: Within normal limits.  KIDNEYS/URETERS: Within normal limits.    BLADDER: Minimally distended.  REPRODUCTIVE ORGANS: Uterus and adnexa within normal limits.    BOWEL: No bowel obstruction. Appendix is not visualized. No evidence of   inflammation in the pericecal region. Evidence of uncomplicated   predominantly sigmoid diverticula. There are small foci of extraluminal   gas at the level of the rectum along with probable rectal pneumatosis.  PERITONEUM: Small volume abdominal and pelvic ascites. Also small volume   pneumoperitoneum.  VESSELS: Atherosclerotic changes. Common origin of the celiac and   superior mesenteric arteries.  RETROPERITONEUM/LYMPH NODES: No lymphadenopathy.  ABDOMINAL WALL: Within normal limits.  BONES: Degenerative changes. Stable moderate compression of L2.    IMPRESSION:  Evidence of pneumoperitoneum. Suspect rectal perforation considering the   presence of perirectal gas and pneumatosis.          Advanced directives addressed: full resuscitation Patient is a 83y old  Female who presents with a chief complaint of colonic perforation with pneumoperitoneaum (16 Dec 2023 10:07)    HPI:  82 y/o female with a PMHx of COPD, HLD presents to the ED c/o abd pain, n/v and constipation x2 days. Denies CP, SOB, urinary symptoms. NKDA. Nonsmoker. No EtOH use. No other complaints at this time. Pt found to have septic shock d/t stercoral ulcer and colonic perforation eval by surgery taken to OR on 12/11 s/p Hartmanns procedure, abd washout noted to have feculent peritonitis, cx sent, blood cx growing Bacteroides, Body fluid cx growing proteus, bacteroides, Acidaminococcus has been on IV zosyn perioperatively.     PMH: as above  PSH: as above  Meds: per reconciliation sheet, noted below  MEDICATIONS  (STANDING):  cefTRIAXone Injectable. 1000 milliGRAM(s) IV Push every 24 hours  dextrose 5% + sodium chloride 0.45% with potassium chloride 20 mEq/L 1000 milliLiter(s) (35 mL/Hr) IV Continuous <Continuous>  diltiazem    Tablet 60 milliGRAM(s) Oral every 8 hours  heparin   Injectable 4400 Unit(s) IV Push once  heparin  Infusion.  Unit(s)/Hr (9 mL/Hr) IV Continuous <Continuous>  lactated ringers. 500 milliLiter(s) (100 mL/Hr) IV Continuous <Continuous>  lidocaine   4% Patch 1 Patch Transdermal daily  lipid, fat emulsion (Fish Oil and Plant Based) 20% Infusion 0.69 Gm/kG/Day (20.9 mL/Hr) IV Continuous <Continuous>  metoprolol tartrate Injectable 5 milliGRAM(s) IV Push every 5 minutes  pantoprazole  Injectable 40 milliGRAM(s) IV Push two times a day  Parenteral Nutrition - Adult 1 Each (75 mL/Hr) TPN Continuous <Continuous>  Parenteral Nutrition - Adult 1 Each (75 mL/Hr) TPN Continuous <Continuous>    Allergies    No Known Allergies    Intolerances      Social: no smoking, no alcohol, no illegal drugs; no recent travel, no exposure to TB  FAMILY HISTORY:     no history of premature cardiovascular disease in first degree relatives    ROS: the patient denies fever, no chills, no HA, no dizziness, no sore throat, no blurry vision, no CP, no palpitations, no SOB, no cough, + abdominal pain, no diarrhea, no N/V, no dysuria, no leg pain, no claudication, no rash, no joint aches, no rectal pain or bleeding, no night sweats    All other systems reviewed and are negative    Vital Signs Last 24 Hrs  T(C): 36.7 (16 Dec 2023 07:33), Max: 36.7 (15 Dec 2023 20:11)  T(F): 98.1 (16 Dec 2023 07:33), Max: 98.1 (16 Dec 2023 07:33)  HR: 90 (16 Dec 2023 07:33) (90 - 101)  BP: 144/56 (16 Dec 2023 07:33) (144/56 - 146/49)  BP(mean): --  RR: 18 (15 Dec 2023 20:11) (18 - 18)  SpO2: 91% (15 Dec 2023 20:11) (91% - 91%)    Parameters below as of 15 Dec 2023 20:11  Patient On (Oxygen Delivery Method): nasal cannula  O2 Flow (L/min): 2    Daily     Daily     PE:  Constitutional: frail looking  HEENT: NC/AT, EOMI, PERRLA, conjunctivae clear; ears and nose atraumatic; pharynx benign  Neck: supple; thyroid not palpable  Back: no tenderness  Respiratory: respiratory effort normal; clear to auscultation  Cardiovascular: S1S2 regular, no murmurs  Abdomen: soft, not tender, not distended, positive BS; liver and spleen WNL surgical sites clean shanta drain in place   Genitourinary: no suprapubic tenderness  Lymphatic: no LN palpable  Musculoskeletal: no muscle tenderness, no joint swelling or tenderness  Extremities: no pedal edema  Neurological/ Psychiatric:   moving all extremities  Skin: no rashes; no palpable lesions    Labs: all available labs reviewed                        10.0   12.38 )-----------( 194      ( 16 Dec 2023 04:39 )             29.9     12-16    140  |  108  |  22  ----------------------------<  150<H>  3.7   |  26  |  0.68    Ca    7.4<L>      16 Dec 2023 04:39  Phos  2.0     12-16  Mg     1.9     12-16    TPro  5.7<L>  /  Alb  2.2<L>  /  TBili  1.5<H>  /  DBili  x   /  AST  30  /  ALT  22  /  AlkPhos  57  12-16     LIVER FUNCTIONS - ( 16 Dec 2023 04:39 )  Alb: 2.2 g/dL / Pro: 5.7 gm/dL / ALK PHOS: 57 U/L / ALT: 22 U/L / AST: 30 U/L / GGT: x           Urinalysis Basic - ( 16 Dec 2023 04:39 )    Color: x / Appearance: x / SG: x / pH: x  Gluc: 150 mg/dL / Ketone: x  / Bili: x / Urobili: x   Blood: x / Protein: x / Nitrite: x   Leuk Esterase: x / RBC: x / WBC x   Sq Epi: x / Non Sq Epi: x / Bacteria: x    Culture - Urine (12.12.23 @ 09:10)   Specimen Source: Clean Catch Clean Catch (Midstream)  Culture Results:   No growthCulture - Surgical Swab (12.11.23 @ 19:09)   - Amoxicillin/Clavulanic Acid: S <=8/4  - Ampicillin: S <=8 These ampicillin results predict results for amoxicillin  - Ampicillin/Sulbactam: S <=4/2  - Aztreonam: S <=4  - Cefazolin: S <=2  - Cefepime: S <=2  - Cefoxitin: S <=8  - Ceftriaxone: S <=1  - Ciprofloxacin: S <=0.25  - Ertapenem: S <=0.5  - Gentamicin: S <=2  - Levofloxacin: S <=0.5  - Meropenem: S <=1  - Piperacillin/Tazobactam: S <=8  - Tobramycin: S <=2  - Trimethoprim/Sulfamethoxazole: S <=0.5/9.5  Specimen Source: .Surgical Swab culture, peritonitis  Culture Results:   Rare Proteus mirabilis   Few Bacteroides distasonis "Susceptibilities not performed"   Rare Most closely resembling Acidaminococcus species "Susceptibilities   not performed"  Organism Identification: Proteus mirabilis  Organism: Proteus mirabilis  Method Type: MICCulture - Blood (12.11.23 @ 10:57)   Specimen Source: .Blood None  Culture Results:   No growth at 4 daysCulture - Blood (12.11.23 @ 10:57)   Gram Stain:   Growth in anaerobic bottle: Gram Negative Rods  - Blood PCR Panel: NEG  Specimen Source: .Blood None  Organism: Blood Culture PCR  Culture Results:   Growth in anaerobic bottle: Bacteroides uniformis   Direct identification is available within approximately 3-5   hours either by Blood Panel Multiplexed PCR or Direct   MALDI-TOF. Details: [email or web address]  Organism Identification: Blood Culture PCR  Method Type: PCR      Radiology: all available radiological tests reviewed  < from: Xray Chest 1 View- PORTABLE-Urgent (Xray Chest 1 View- PORTABLE-Urgent .) (12.11.23 @ 20:17) >    ACC: [id number] EXAM:  XR CHEST PORTABLE URGENT 1V   ORDERED BY: HUGO GREEN     PROCEDURE DATE:  12/11/2023          INTERPRETATION:  Portable chest radiograph    CLINICAL INFORMATION: Post intubation chest radiograph    TECHNIQUE:  Portable  AP chest radiograph.    COMPARISON: 2/11/2023 chest x-ray .    FINDINGS:  CATHETERS AND TUBES: ET tube tip above tracheal bifurcation.  NG tube tip beyond GE junction.    PULMONARY: Mild LEFT effusion and/or basilar airspace disease obscures   LEFT CP angle and portions of LEFT diaphragm contour. Remaining lung   parenchyma grossly clear.  No pneumothorax.    HEART/VASCULAR: The heart and mediastinum size and configuration are   within normal limits.    BONES: Visualized osseous thorax intact.    IMPRESSION:   Catheters and tubes in place.  LEFT lower zone mild pleural effusion and/or patchy basilar airspace   disease..    --- End of Report ---      < end of copied text >  < from: CT Abdomen and Pelvis w/ Oral Cont and w/ IV Cont (12.11.23 @ 12:45) >  ACC: [id number] EXAM:  CT ABDOMEN AND PELVIS OC IC   ORDERED BY: TRAY GÓMEZ     PROCEDURE DATE:  12/11/2023          INTERPRETATION:  CLINICAL INFORMATION: Rule out small bowel obstruction.    COMPARISON: 04/16/2022.    CONTRAST/COMPLICATIONS:  IVContrast: Omnipaque 350  90 cc administered   10 cc discarded  Oral Contrast: Omnipaque 300  Complications: None reported at time of study completion    PROCEDURE:  CT of the Abdomen and Pelvis was performed.  Sagittal and coronal reformats were performed.    FINDINGS:  LOWER CHEST: Bibasilar subsegmental atelectasis, left more than right.   Mild hiatal hernia. Coronary artery calcification.    LIVER: Steatosis.  BILE DUCTS: Mildly dilated. CBD measures approximately 1 cm with smooth   distal tapering. An almost similar appearance was noted on the prior   study. Correlate clinically.  GALLBLADDER: Within normal limits.  SPLEEN: Within normal limits.  PANCREAS: Within normal limits.  ADRENALS: Within normal limits.  KIDNEYS/URETERS: Within normal limits.    BLADDER: Minimally distended.  REPRODUCTIVE ORGANS: Uterus and adnexa within normal limits.    BOWEL: No bowel obstruction. Appendix is not visualized. No evidence of   inflammation in the pericecal region. Evidence of uncomplicated   predominantly sigmoid diverticula. There are small foci of extraluminal   gas at the level of the rectum along with probable rectal pneumatosis.  PERITONEUM: Small volume abdominal and pelvic ascites. Also small volume   pneumoperitoneum.  VESSELS: Atherosclerotic changes. Common origin of the celiac and   superior mesenteric arteries.  RETROPERITONEUM/LYMPH NODES: No lymphadenopathy.  ABDOMINAL WALL: Within normal limits.  BONES: Degenerative changes. Stable moderate compression of L2.    IMPRESSION:  Evidence of pneumoperitoneum. Suspect rectal perforation considering the   presence of perirectal gas and pneumatosis.          Advanced directives addressed: full resuscitation Patient is a 83y old  Female who presents with a chief complaint of colonic perforation with pneumoperitoneaum (16 Dec 2023 10:07)    HPI:  84 y/o female with a PMHx of COPD, HLD presents to the ED c/o abd pain, n/v and constipation x2 days. Denies CP, SOB, urinary symptoms. NKDA. Nonsmoker. No EtOH use. No other complaints at this time. Pt found to have septic shock d/t stercoral ulcer and colonic perforation eval by surgery taken to OR on 12/11 s/p Hartmanns procedure, abd washout noted to have feculent peritonitis, cx sent, blood cx growing Bacteroides, Body fluid cx growing proteus, bacteroides, Acidaminococcus has been on IV zosyn perioperatively.     PMH: as above  PSH: as above  Meds: per reconciliation sheet, noted below  MEDICATIONS  (STANDING):  cefTRIAXone Injectable. 1000 milliGRAM(s) IV Push every 24 hours  dextrose 5% + sodium chloride 0.45% with potassium chloride 20 mEq/L 1000 milliLiter(s) (35 mL/Hr) IV Continuous <Continuous>  diltiazem    Tablet 60 milliGRAM(s) Oral every 8 hours  heparin   Injectable 4400 Unit(s) IV Push once  heparin  Infusion.  Unit(s)/Hr (9 mL/Hr) IV Continuous <Continuous>  lactated ringers. 500 milliLiter(s) (100 mL/Hr) IV Continuous <Continuous>  lidocaine   4% Patch 1 Patch Transdermal daily  lipid, fat emulsion (Fish Oil and Plant Based) 20% Infusion 0.69 Gm/kG/Day (20.9 mL/Hr) IV Continuous <Continuous>  metoprolol tartrate Injectable 5 milliGRAM(s) IV Push every 5 minutes  pantoprazole  Injectable 40 milliGRAM(s) IV Push two times a day  Parenteral Nutrition - Adult 1 Each (75 mL/Hr) TPN Continuous <Continuous>  Parenteral Nutrition - Adult 1 Each (75 mL/Hr) TPN Continuous <Continuous>    Allergies    No Known Allergies    Intolerances      Social: no smoking, no alcohol, no illegal drugs; no recent travel, no exposure to TB  FAMILY HISTORY:     no history of premature cardiovascular disease in first degree relatives    ROS: the patient denies fever, no chills, no HA, no dizziness, no sore throat, no blurry vision, no CP, no palpitations, no SOB, no cough, + abdominal pain, no diarrhea, no N/V, no dysuria, no leg pain, no claudication, no rash, no joint aches, no rectal pain or bleeding, no night sweats    All other systems reviewed and are negative    Vital Signs Last 24 Hrs  T(C): 36.7 (16 Dec 2023 07:33), Max: 36.7 (15 Dec 2023 20:11)  T(F): 98.1 (16 Dec 2023 07:33), Max: 98.1 (16 Dec 2023 07:33)  HR: 90 (16 Dec 2023 07:33) (90 - 101)  BP: 144/56 (16 Dec 2023 07:33) (144/56 - 146/49)  BP(mean): --  RR: 18 (15 Dec 2023 20:11) (18 - 18)  SpO2: 91% (15 Dec 2023 20:11) (91% - 91%)    Parameters below as of 15 Dec 2023 20:11  Patient On (Oxygen Delivery Method): nasal cannula  O2 Flow (L/min): 2    Daily     Daily     PE:  Constitutional: frail looking  HEENT: NC/AT, EOMI, PERRLA, conjunctivae clear; ears and nose atraumatic; pharynx benign  Neck: supple; thyroid not palpable  Back: no tenderness  Respiratory: respiratory effort normal; clear to auscultation  Cardiovascular: S1S2 regular, no murmurs  Abdomen: soft, not tender, not distended, positive BS; liver and spleen WNL surgical sites clean shanta drain in place   Genitourinary: no suprapubic tenderness  Lymphatic: no LN palpable  Musculoskeletal: no muscle tenderness, no joint swelling or tenderness  Extremities: no pedal edema  Neurological/ Psychiatric:   moving all extremities  Skin: no rashes; no palpable lesions    Labs: all available labs reviewed                        10.0   12.38 )-----------( 194      ( 16 Dec 2023 04:39 )             29.9     12-16    140  |  108  |  22  ----------------------------<  150<H>  3.7   |  26  |  0.68    Ca    7.4<L>      16 Dec 2023 04:39  Phos  2.0     12-16  Mg     1.9     12-16    TPro  5.7<L>  /  Alb  2.2<L>  /  TBili  1.5<H>  /  DBili  x   /  AST  30  /  ALT  22  /  AlkPhos  57  12-16     LIVER FUNCTIONS - ( 16 Dec 2023 04:39 )  Alb: 2.2 g/dL / Pro: 5.7 gm/dL / ALK PHOS: 57 U/L / ALT: 22 U/L / AST: 30 U/L / GGT: x           Urinalysis Basic - ( 16 Dec 2023 04:39 )    Color: x / Appearance: x / SG: x / pH: x  Gluc: 150 mg/dL / Ketone: x  / Bili: x / Urobili: x   Blood: x / Protein: x / Nitrite: x   Leuk Esterase: x / RBC: x / WBC x   Sq Epi: x / Non Sq Epi: x / Bacteria: x    Culture - Urine (12.12.23 @ 09:10)   Specimen Source: Clean Catch Clean Catch (Midstream)  Culture Results:   No growthCulture - Surgical Swab (12.11.23 @ 19:09)   - Amoxicillin/Clavulanic Acid: S <=8/4  - Ampicillin: S <=8 These ampicillin results predict results for amoxicillin  - Ampicillin/Sulbactam: S <=4/2  - Aztreonam: S <=4  - Cefazolin: S <=2  - Cefepime: S <=2  - Cefoxitin: S <=8  - Ceftriaxone: S <=1  - Ciprofloxacin: S <=0.25  - Ertapenem: S <=0.5  - Gentamicin: S <=2  - Levofloxacin: S <=0.5  - Meropenem: S <=1  - Piperacillin/Tazobactam: S <=8  - Tobramycin: S <=2  - Trimethoprim/Sulfamethoxazole: S <=0.5/9.5  Specimen Source: .Surgical Swab culture, peritonitis  Culture Results:   Rare Proteus mirabilis   Few Bacteroides distasonis "Susceptibilities not performed"   Rare Most closely resembling Acidaminococcus species "Susceptibilities   not performed"  Organism Identification: Proteus mirabilis  Organism: Proteus mirabilis  Method Type: MICCulture - Blood (12.11.23 @ 10:57)   Specimen Source: .Blood None  Culture Results:   No growth at 4 daysCulture - Blood (12.11.23 @ 10:57)   Gram Stain:   Growth in anaerobic bottle: Gram Negative Rods  - Blood PCR Panel: NEG  Specimen Source: .Blood None  Organism: Blood Culture PCR  Culture Results:   Growth in anaerobic bottle: Bacteroides uniformis   Direct identification is available within approximately 3-5   hours either by Blood Panel Multiplexed PCR or Direct   MALDI-TOF. Details: [email or web address]  Organism Identification: Blood Culture PCR  Method Type: PCR      Radiology: all available radiological tests reviewed  < from: Xray Chest 1 View- PORTABLE-Urgent (Xray Chest 1 View- PORTABLE-Urgent .) (12.11.23 @ 20:17) >    ACC: [id number] EXAM:  XR CHEST PORTABLE URGENT 1V   ORDERED BY: HUGO GREEN     PROCEDURE DATE:  12/11/2023          INTERPRETATION:  Portable chest radiograph    CLINICAL INFORMATION: Post intubation chest radiograph    TECHNIQUE:  Portable  AP chest radiograph.    COMPARISON: 2/11/2023 chest x-ray .    FINDINGS:  CATHETERS AND TUBES: ET tube tip above tracheal bifurcation.  NG tube tip beyond GE junction.    PULMONARY: Mild LEFT effusion and/or basilar airspace disease obscures   LEFT CP angle and portions of LEFT diaphragm contour. Remaining lung   parenchyma grossly clear.  No pneumothorax.    HEART/VASCULAR: The heart and mediastinum size and configuration are   within normal limits.    BONES: Visualized osseous thorax intact.    IMPRESSION:   Catheters and tubes in place.  LEFT lower zone mild pleural effusion and/or patchy basilar airspace   disease..    --- End of Report ---      < end of copied text >  < from: CT Abdomen and Pelvis w/ Oral Cont and w/ IV Cont (12.11.23 @ 12:45) >  ACC: [id number] EXAM:  CT ABDOMEN AND PELVIS OC IC   ORDERED BY: TRAY GÓMEZ     PROCEDURE DATE:  12/11/2023          INTERPRETATION:  CLINICAL INFORMATION: Rule out small bowel obstruction.    COMPARISON: 04/16/2022.    CONTRAST/COMPLICATIONS:  IVContrast: Omnipaque 350  90 cc administered   10 cc discarded  Oral Contrast: Omnipaque 300  Complications: None reported at time of study completion    PROCEDURE:  CT of the Abdomen and Pelvis was performed.  Sagittal and coronal reformats were performed.    FINDINGS:  LOWER CHEST: Bibasilar subsegmental atelectasis, left more than right.   Mild hiatal hernia. Coronary artery calcification.    LIVER: Steatosis.  BILE DUCTS: Mildly dilated. CBD measures approximately 1 cm with smooth   distal tapering. An almost similar appearance was noted on the prior   study. Correlate clinically.  GALLBLADDER: Within normal limits.  SPLEEN: Within normal limits.  PANCREAS: Within normal limits.  ADRENALS: Within normal limits.  KIDNEYS/URETERS: Within normal limits.    BLADDER: Minimally distended.  REPRODUCTIVE ORGANS: Uterus and adnexa within normal limits.    BOWEL: No bowel obstruction. Appendix is not visualized. No evidence of   inflammation in the pericecal region. Evidence of uncomplicated   predominantly sigmoid diverticula. There are small foci of extraluminal   gas at the level of the rectum along with probable rectal pneumatosis.  PERITONEUM: Small volume abdominal and pelvic ascites. Also small volume   pneumoperitoneum.  VESSELS: Atherosclerotic changes. Common origin of the celiac and   superior mesenteric arteries.  RETROPERITONEUM/LYMPH NODES: No lymphadenopathy.  ABDOMINAL WALL: Within normal limits.  BONES: Degenerative changes. Stable moderate compression of L2.    IMPRESSION:  Evidence of pneumoperitoneum. Suspect rectal perforation considering the   presence of perirectal gas and pneumatosis.          Advanced directives addressed: full resuscitation

## 2023-12-16 NOTE — CHART NOTE - NSCHARTNOTEFT_GEN_A_CORE
Clinical Nutrition PN Follow Up Note    *82 YO F with a PMHx of COPD, HLD presents to the ED c/o abd pain, n/v and constipation x2 days. Admitted w/ colonic perforation, Extensive stercocolitis with 2 feculent perforation of sigmoid & rectum. Necrotic segment of colon also noticed now POD 2 s/p Christina's. Had post-op fecal peritonitis, 7L washout. Went into septic shock, requiring pressors. Tx to ICU w/ post-op resp failure. NGT to LWS, blood tinged. Off pressors as of this AM 12/13.    *PPN initiated 2/2 NPO s/p Christina procedure2/2 colonic perforation     *12/14: has been NPO x 3 days, nausea overnight. NGT still in place, no plan for clamp trial today.  Plan to initiate PPN today, posisble transfer out of ICU  *12/15: PPN day #2. Transferred out of ICU, off pressors, now on 3E w/ no acute events overnight. Hypokalemia and hypophosphatemia noted s/p HD yesterday, being repleted. Possibly going into refeeding, will slowly increase lytes in PN bag and recommend to c/w KCl and KPhos repletion outside of bag until WNL.     *current status: 12/16 -PPN Day #3 - No acute events overnight; plan to advance to clear liquids today as per surgery. NGT removed yesterday on 12/14. Will c/w PPN. If to remain on PPN >7 days, consider placing PICC line to initiate TPN and meet >80% ENN.     *new pertinent meds: Protonix, morphine, zofran prn, 80 mEq KCl repletion outside bag, 10 mEq KCl IVPB, 15 mmol KPhos IVPB, D5 + NaCl + 20 KCl mEq solution    *labs reviewed;   12-16    140  |  108  |  22  ----------------------------<  150<H>  3.7   |  26  |  0.68    Ca    7.4<L>      16 Dec 2023 04:39  Phos  2.0     12-16  Mg     1.9     12-16    TPro  5.7<L>  /  Alb  2.2<L>  /  TBili  1.5<H>  /  DBili  x   /  AST  30  /  ALT  22  /  AlkPhos  57  12-16      CAPILLARY BLOOD GLUCOSE  *NONE DOCUMENTED - Please obtain strict POCTs q 6 hrs as per PN protocol     POCT Blood Glucose.: 161 mg/dL (15 Dec 2023 11:51)  Glucose: POCT Blood Glucose.: 161 mg/dL (12-15-23 @ 11:51)    BP: 146/49 (12-15-23 @ 20:11) (96/47 - 146/49)Vital Signs Last 24 Hrs  T(C): 36.7 (12-15-23 @ 20:11), Max: 37 (12-15-23 @ 08:20)  T(F): 98 (12-15-23 @ 20:11), Max: 98.6 (12-15-23 @ 08:20)  HR: 101 (12-15-23 @ 20:11) (95 - 101)  BP: 146/49 (12-15-23 @ 20:11) (132/59 - 146/49)  RR: 18 (12-15-23 @ 20:11) (18 - 18)  SpO2: 91% (12-15-23 @ 20:11) (91% - 96%)    Lipid Panel: Date/Time: 12-15-23 @ 04:58  Triglycerides, Serum: 187      I&O's Detail    15 Dec 2023 07:01  -  16 Dec 2023 07:00  --------------------------------------------------------  IN:  Total IN: 0 mL    OUT:    Drain (mL): 170 mL    Indwelling Catheter - Urethral (mL): 1325 mL    Intermittent Catheterization - Urethral (mL): 700 mL  Total OUT: 2195 mL    Total NET: -2195 mL      *fluid status: negative; no ostomy output doc'd. Will monitor/ adjust total volume prn.   *no BM doc'd; abd discomfort noted as per EMR, pt not on a bowel regimen.  *edema: 1+ generalized doc'd 12/15    *malnutrition: Pt continues to meet criteria for severe protein-calorie malnutrition in context of acute illness r/t decreased ability to meet increased nutrient needs 2/2 colonic perf w/ septic shock AEB moderate muscle/ fat wasting    Estimated Needs: based on 63.5 Kg (UBW - likely dry wt)  Calories: 1905- 2222 Kcal (30- 35 Kcal/Kg)  Protein: 95- 127 g (1.5- 2 g/Kg)  Fluids: 1587- 1905 mL (25- 30 mL/Kg)    Diet, NPO (12-11-23 @ 13:56) [Active]    Weight History:  Weight (kg): 72.6 (12-11-23 @ 10:30)    **PPN recommendations as per pharmacy - see pharmacy communication note    Additional Recommendations:    1) Daily weights  2) Strict I & O's  3) Daily lyte checks including magnesium and phos - c/w KCl and KPhos repletion outside of bag until WNL   4) Weekly triglycerides/LFT checks  5) POCT q6hrs; maintain 140-180mg/dL  6) if on PN > 6 days, consider placing PICC line to meet 100% of nutr needs  7) Confirm goals of care regarding LONG-TERM nutrition support    *will continue to monitor and adjust PN prn*  Jennifer Padilla, MS, RDN [phone] Clinical Nutrition PN Follow Up Note    *82 YO F with a PMHx of COPD, HLD presents to the ED c/o abd pain, n/v and constipation x2 days. Admitted w/ colonic perforation, Extensive stercocolitis with 2 feculent perforation of sigmoid & rectum. Necrotic segment of colon also noticed now POD 2 s/p Christina's. Had post-op fecal peritonitis, 7L washout. Went into septic shock, requiring pressors. Tx to ICU w/ post-op resp failure. NGT to LWS, blood tinged. Off pressors as of this AM 12/13.    *PPN initiated 2/2 NPO s/p Christina procedure2/2 colonic perforation     *12/14: has been NPO x 3 days, nausea overnight. NGT still in place, no plan for clamp trial today.  Plan to initiate PPN today, posisble transfer out of ICU  *12/15: PPN day #2. Transferred out of ICU, off pressors, now on 3E w/ no acute events overnight. Hypokalemia and hypophosphatemia noted s/p HD yesterday, being repleted. Possibly going into refeeding, will slowly increase lytes in PN bag and recommend to c/w KCl and KPhos repletion outside of bag until WNL.     *current status: 12/16 -PPN Day #3 - No acute events overnight; plan to advance to clear liquids today as per surgery. NGT removed yesterday on 12/14. Will c/w PPN. If to remain on PPN >7 days, consider placing PICC line to initiate TPN and meet >80% ENN.     *new pertinent meds: Protonix, morphine, zofran prn, 80 mEq KCl repletion outside bag, 10 mEq KCl IVPB, 15 mmol KPhos IVPB, D5 + NaCl + 20 KCl mEq solution    *labs reviewed;   12-16    140  |  108  |  22  ----------------------------<  150<H>  3.7   |  26  |  0.68    Ca    7.4<L>      16 Dec 2023 04:39  Phos  2.0     12-16  Mg     1.9     12-16    TPro  5.7<L>  /  Alb  2.2<L>  /  TBili  1.5<H>  /  DBili  x   /  AST  30  /  ALT  22  /  AlkPhos  57  12-16      CAPILLARY BLOOD GLUCOSE  *NONE DOCUMENTED - Please obtain strict POCTs q 6 hrs as per PN protocol     POCT Blood Glucose.: 161 mg/dL (15 Dec 2023 11:51)  Glucose: POCT Blood Glucose.: 161 mg/dL (12-15-23 @ 11:51)    BP: 146/49 (12-15-23 @ 20:11) (96/47 - 146/49)Vital Signs Last 24 Hrs  T(C): 36.7 (12-15-23 @ 20:11), Max: 37 (12-15-23 @ 08:20)  T(F): 98 (12-15-23 @ 20:11), Max: 98.6 (12-15-23 @ 08:20)  HR: 101 (12-15-23 @ 20:11) (95 - 101)  BP: 146/49 (12-15-23 @ 20:11) (132/59 - 146/49)  RR: 18 (12-15-23 @ 20:11) (18 - 18)  SpO2: 91% (12-15-23 @ 20:11) (91% - 96%)    Lipid Panel: Date/Time: 12-15-23 @ 04:58  Triglycerides, Serum: 187      I&O's Detail    15 Dec 2023 07:01  -  16 Dec 2023 07:00  --------------------------------------------------------  IN:  Total IN: 0 mL    OUT:    Drain (mL): 170 mL    Indwelling Catheter - Urethral (mL): 1325 mL    Intermittent Catheterization - Urethral (mL): 700 mL  Total OUT: 2195 mL    Total NET: -2195 mL      *fluid status: negative; no ostomy output doc'd. Will monitor/ adjust total volume prn.   *no BM doc'd; abd discomfort noted as per EMR, pt not on a bowel regimen.  *edema: 1+ generalized doc'd 12/15    *malnutrition: Pt continues to meet criteria for severe protein-calorie malnutrition in context of acute illness r/t decreased ability to meet increased nutrient needs 2/2 colonic perf w/ septic shock AEB moderate muscle/ fat wasting    Estimated Needs: based on 63.5 Kg (UBW - likely dry wt)  Calories: 1905- 2222 Kcal (30- 35 Kcal/Kg)  Protein: 95- 127 g (1.5- 2 g/Kg)  Fluids: 1587- 1905 mL (25- 30 mL/Kg)    Diet, NPO (12-11-23 @ 13:56) [Active]    Weight History:  Weight (kg): 72.6 (12-11-23 @ 10:30)    **PPN recommendations as per pharmacy - see pharmacy communication note    Additional Recommendations:    1) Daily weights  2) Strict I & O's  3) Daily lyte checks including magnesium and phos - c/w KCl and KPhos repletion outside of bag until WNL   4) Weekly triglycerides/LFT checks  5) POCT q6hrs; maintain 140-180mg/dL  6) if on PN > 6 days, consider placing PICC line to meet 100% of nutr needs  7) Confirm goals of care regarding LONG-TERM nutrition support    *will continue to monitor and adjust PN prn*  Jennifer Padilla, MS, RDN [phone] Clinical Nutrition PN Follow Up Note    *82 YO F with a PMHx of COPD, HLD presents to the ED c/o abd pain, n/v and constipation x2 days. Admitted w/ colonic perforation, Extensive stercocolitis with 2 feculent perforation of sigmoid & rectum. Necrotic segment of colon also noticed now POD 2 s/p Christina's. Had post-op fecal peritonitis, 7L washout. Went into septic shock, requiring pressors. Tx to ICU w/ post-op resp failure. NGT to LWS, blood tinged. Off pressors as of this AM 12/13.    *PPN initiated 2/2 NPO s/p Christina procedure2/2 colonic perforation     *12/14: has been NPO x 3 days, nausea overnight. NGT still in place, no plan for clamp trial today.  Plan to initiate PPN today, posisble transfer out of ICU  *12/15: PPN day #2. Transferred out of ICU, off pressors, now on 3E w/ no acute events overnight. Hypokalemia and hypophosphatemia noted s/p HD yesterday, being repleted. Possibly going into refeeding, will slowly increase lytes in PN bag and recommend to c/w KCl and KPhos repletion outside of bag until WNL.     *current status: 12/16 -PPN Day #3 - No acute events overnight; plan to advance to clear liquids today as per surgery. NGT removed yesterday on 12/15. Will c/w PPN. If to remain on PPN >7 days, consider placing PICC line to initiate TPN and meet >80% ENN.     *new pertinent meds: Protonix, morphine, zofran prn, 80 mEq KCl repletion outside bag, 10 mEq KCl IVPB, 15 mmol KPhos IVPB, D5 + NaCl + 20 KCl mEq solution    *labs reviewed;   12-16    140  |  108  |  22  ----------------------------<  150<H>  3.7   |  26  |  0.68    Ca    7.4<L>      16 Dec 2023 04:39  Phos  2.0     12-16  Mg     1.9     12-16    TPro  5.7<L>  /  Alb  2.2<L>  /  TBili  1.5<H>  /  DBili  x   /  AST  30  /  ALT  22  /  AlkPhos  57  12-16      CAPILLARY BLOOD GLUCOSE  *NONE DOCUMENTED - Please obtain strict POCTs q 6 hrs as per PN protocol     POCT Blood Glucose.: 161 mg/dL (15 Dec 2023 11:51)  Glucose: POCT Blood Glucose.: 161 mg/dL (12-15-23 @ 11:51)    BP: 146/49 (12-15-23 @ 20:11) (96/47 - 146/49)Vital Signs Last 24 Hrs  T(C): 36.7 (12-15-23 @ 20:11), Max: 37 (12-15-23 @ 08:20)  T(F): 98 (12-15-23 @ 20:11), Max: 98.6 (12-15-23 @ 08:20)  HR: 101 (12-15-23 @ 20:11) (95 - 101)  BP: 146/49 (12-15-23 @ 20:11) (132/59 - 146/49)  RR: 18 (12-15-23 @ 20:11) (18 - 18)  SpO2: 91% (12-15-23 @ 20:11) (91% - 96%)    Lipid Panel: Date/Time: 12-15-23 @ 04:58  Triglycerides, Serum: 187      I&O's Detail    15 Dec 2023 07:01  -  16 Dec 2023 07:00  --------------------------------------------------------  IN:  Total IN: 0 mL    OUT:    Drain (mL): 170 mL    Indwelling Catheter - Urethral (mL): 1325 mL    Intermittent Catheterization - Urethral (mL): 700 mL  Total OUT: 2195 mL    Total NET: -2195 mL      *fluid status: negative; no ostomy output doc'd. Will monitor/ adjust total volume prn.   *no BM doc'd; abd discomfort noted as per EMR, pt not on a bowel regimen.  *edema: 1+ generalized doc'd 12/15    *malnutrition: Pt continues to meet criteria for severe protein-calorie malnutrition in context of acute illness r/t decreased ability to meet increased nutrient needs 2/2 colonic perf w/ septic shock AEB moderate muscle/ fat wasting    Estimated Needs: based on 63.5 Kg (UBW - likely dry wt)  Calories: 1905- 2222 Kcal (30- 35 Kcal/Kg)  Protein: 95- 127 g (1.5- 2 g/Kg)  Fluids: 1587- 1905 mL (25- 30 mL/Kg)    Diet, NPO (12-11-23 @ 13:56) [Active]    Weight History:  Weight (kg): 72.6 (12-11-23 @ 10:30)    **PPN recommendations as per pharmacy - see pharmacy communication note    Additional Recommendations:    1) Daily weights  2) Strict I & O's  3) Daily lyte checks including magnesium and phos - c/w KCl and KPhos repletion outside of bag until WNL   4) Weekly triglycerides/LFT checks  5) POCT q6hrs; maintain 140-180mg/dL  6) if on PN > 6 days, consider placing PICC line to meet 100% of nutr needs  7) Confirm goals of care regarding LONG-TERM nutrition support    *will continue to monitor and adjust PN prn*  Jennifer Padilla, MS, RDN [phone] Clinical Nutrition PN Follow Up Note    *82 YO F with a PMHx of COPD, HLD presents to the ED c/o abd pain, n/v and constipation x2 days. Admitted w/ colonic perforation, Extensive stercocolitis with 2 feculent perforation of sigmoid & rectum. Necrotic segment of colon also noticed now POD 2 s/p Christina's. Had post-op fecal peritonitis, 7L washout. Went into septic shock, requiring pressors. Tx to ICU w/ post-op resp failure. NGT to LWS, blood tinged. Off pressors as of this AM 12/13.    *PPN initiated 2/2 NPO s/p Christina procedure2/2 colonic perforation     *12/14: has been NPO x 3 days, nausea overnight. NGT still in place, no plan for clamp trial today.  Plan to initiate PPN today, posisble transfer out of ICU  *12/15: PPN day #2. Transferred out of ICU, off pressors, now on 3E w/ no acute events overnight. Hypokalemia and hypophosphatemia noted s/p HD yesterday, being repleted. Possibly going into refeeding, will slowly increase lytes in PN bag and recommend to c/w KCl and KPhos repletion outside of bag until WNL.     *current status: 12/16 -PPN Day #3 - No acute events overnight; plan to advance to clear liquids today as per surgery. NGT removed yesterday on 12/15. Will c/w PPN. If to remain on PPN >7 days, consider placing PICC line to initiate TPN and meet >80% ENN.     *new pertinent meds: Protonix, morphine, zofran prn, 80 mEq KCl repletion outside bag, 10 mEq KCl IVPB, 15 mmol KPhos IVPB, D5 + NaCl + 20 KCl mEq solution    *labs reviewed;   12-16    140  |  108  |  22  ----------------------------<  150<H>  3.7   |  26  |  0.68    Ca    7.4<L>      16 Dec 2023 04:39  Phos  2.0     12-16  Mg     1.9     12-16    TPro  5.7<L>  /  Alb  2.2<L>  /  TBili  1.5<H>  /  DBili  x   /  AST  30  /  ALT  22  /  AlkPhos  57  12-16      CAPILLARY BLOOD GLUCOSE  *NONE DOCUMENTED - Please obtain strict POCTs q 6 hrs as per PN protocol     POCT Blood Glucose.: 161 mg/dL (15 Dec 2023 11:51)  Glucose: POCT Blood Glucose.: 161 mg/dL (12-15-23 @ 11:51)    BP: 146/49 (12-15-23 @ 20:11) (96/47 - 146/49)Vital Signs Last 24 Hrs  T(C): 36.7 (12-15-23 @ 20:11), Max: 37 (12-15-23 @ 08:20)  T(F): 98 (12-15-23 @ 20:11), Max: 98.6 (12-15-23 @ 08:20)  HR: 101 (12-15-23 @ 20:11) (95 - 101)  BP: 146/49 (12-15-23 @ 20:11) (132/59 - 146/49)  RR: 18 (12-15-23 @ 20:11) (18 - 18)  SpO2: 91% (12-15-23 @ 20:11) (91% - 96%)    Lipid Panel: Date/Time: 12-15-23 @ 04:58  Triglycerides, Serum: 187      I&O's Detail    15 Dec 2023 07:01  -  16 Dec 2023 07:00  --------------------------------------------------------  IN:  Total IN: 0 mL    OUT:    Drain (mL): 170 mL    Indwelling Catheter - Urethral (mL): 1325 mL    Intermittent Catheterization - Urethral (mL): 700 mL  Total OUT: 2195 mL    Total NET: -2195 mL      *fluid status: negative; no ostomy output doc'd. Will monitor/ adjust total volume prn.   *no BM doc'd; abd discomfort noted as per EMR, pt not on a bowel regimen.  *edema: 1+ generalized doc'd 12/15    *malnutrition: Pt continues to meet criteria for severe protein-calorie malnutrition in context of acute illness r/t decreased ability to meet increased nutrient needs 2/2 colonic perf w/ septic shock AEB moderate muscle/ fat wasting    Estimated Needs: based on 63.5 Kg (UBW - likely dry wt)  Calories: 1905- 2222 Kcal (30- 35 Kcal/Kg)  Protein: 95- 127 g (1.5- 2 g/Kg)  Fluids: 1587- 1905 mL (25- 30 mL/Kg)    Diet, NPO (12-11-23 @ 13:56) [Active]    Weight History:  Weight (kg): 72.6 (12-11-23 @ 10:30)    **PPN recommendations as per pharmacy - see pharmacy communication note    Additional Recommendations:    1) Daily weights  2) Strict I & O's  3) Daily lyte checks including magnesium and phos - c/w KCl and KPhos repletion outside of bag until WNL   4) Weekly triglycerides/LFT checks  5) POCT q6hrs; maintain 140-180mg/dL  6) if on PN > 6 days, consider placing PICC line to meet 100% of nutr needs  7) Confirm goals of care regarding LONG-TERM nutrition support    *will continue to monitor and adjust PN prn*  Jennifer Padilla, MS, RDN [phone]

## 2023-12-16 NOTE — PROGRESS NOTE ADULT - ASSESSMENT
83F w/ colonic perforation 2/2 stercoral ulcer  now POD 5 s/p Christina  AFib has reverted to NSR, transferred to floor, +return of bowel function    Plan:  - Advance to clears  - On PPN  - Leyva reinserted 12/15, f/u Uro c/s  - Pain & nausea control PRN  - Incentive spirometry, wean oxygen  - Zosyn since 12/11, f/u intraop cx (w/ pan sensitive Proteus) & preop blood cx (one with GNR)  - Strict I/Os  - Replace electrolytes  - PT anticipates RONALD  - Will need ostomy teaching  - VTE ppx w/ SCDs & Lovenox    Discussed w/ CRS team 83F w/ colonic perforation 2/2 stercoral ulcer  now POD 5 s/p Christina  AFib has reverted to NSR, transferred to floor, +return of bowel function    Plan:  - Advance to clears  - On PPN  - Leyva reinserted 12/15, f/u Uro c/s  - Pain & nausea control PRN  - Incentive spirometry, wean oxygen  - Zosyn since 12/11, intraop cx : Proteus) & bld cx: Bacteroides, consult ID for rects  - Strict I/Os  - Replace electrolytes  - PT anticipates RONALD  - Will need ostomy teaching  - VTE ppx w/ SCDs & Lovenox    Discussed w/ CRS team

## 2023-12-16 NOTE — CONSULT NOTE ADULT - SUBJECTIVE AND OBJECTIVE BOX
HPI:  [age] y/o female with a PMHx of COPD, HLD presents to the ED c/o abd pain, n/v and constipation x2 days. Denies CP, SOB, urinary symptoms. NKDA. Nonsmoker. No EtOH use. No other complaints at this time. s/p Perf. colonic surgery with colostomy n place.  This Am Went in to rapid tachycardia with HR into 150's-160's.  Initially received Metoprolol which was unsuccessful in slowing her down.  Cardiology then consulted.  Tele shows in PAF.  IV cardizem given and converted to NSR.  Did c/o of SOB while HR rapid.      PAST MEDICAL & SURGICAL HISTORY:  HLD (hyperlipidemia)  History of COPD    SOCIAL HISTORY: Non-Smoker/Social ETOH/ No Ilic[employer] Drug use.    FAMILY HISTORY:    Allergies  No Known Allergies    Home Medications:  Multiple Vitamins oral tablet: 1 tab(s) orally once a day (11 Dec 2023 15:18)    HOSPITAL MEDICATIONS:   MEDICATIONS  (STANDING):  cefTRIAXone Injectable. 1000 milliGRAM(s) IV Push every 24 hours  dextrose 5% + sodium chloride 0.45% with potassium chloride 20 mEq/L 1000 milliLiter(s) (35 mL/Hr) IV Continuous <Continuous>  enoxaparin Injectable 40 milliGRAM(s) SubCutaneous every 24 hours  lactated ringers. 500 milliLiter(s) (100 mL/Hr) IV Continuous <Continuous>  lidocaine   4% Patch 1 Patch Transdermal daily  lipid, fat emulsion (Fish Oil and Plant Based) 20% Infusion 0.69 Gm/kG/Day (20.9 mL/Hr) IV Continuous <Continuous>  metoprolol tartrate Injectable 5 milliGRAM(s) IV Push every 5 minutes  pantoprazole  Injectable 40 milliGRAM(s) IV Push two times a day  Parenteral Nutrition - Adult 1 Each (75 mL/Hr) TPN Continuous <Continuous>  Parenteral Nutrition - Adult 1 Each (75 mL/Hr) TPN Continuous <Continuous>    MEDICATIONS  (PRN):  acetaminophen   IVPB .. 1000 milliGRAM(s) IV Intermittent once PRN Temp greater or equal to 38C (100.4F), Mild Pain (1 - 3)  ketorolac   Injectable 15 milliGRAM(s) IV Push every 8 hours PRN Severe Pain (7 - 10)  morphine  - Injectable 2 milliGRAM(s) IV Push every 4 hours PRN Moderate Pain (4 - 6)  ondansetron Injectable 4 milliGRAM(s) IV Push every 6 hours PRN Nausea      REVIEW OF SYSTEMS: 13 systems were reviewed and all negative except for comments above.    Vital Signs Last 24 Hrs  T(C): 36.7 (16 Dec 2023 07:33), Max: 36.7 (15 Dec 2023 20:11)  T(F): 98.1 (16 Dec 2023 07:33), Max: 98.1 (16 Dec 2023 07:33)  HR: 90 (16 Dec 2023 07:33) (90 - 101)  BP: 144/56 (16 Dec 2023 07:33) (144/56 - 146/49)  BP(mean): --  RR: 18 (15 Dec 2023 20:11) (18 - 18)  SpO2: 91% (15 Dec 2023 20:11) (91% - 91%)    Parameters below as of 15 Dec 2023 20:11  Patient On (Oxygen Delivery Method): nasal cannula  O2 Flow (L/min): 2  Daily     Daily I&O's Summary    15 Dec 2023 07:01  -  16 Dec 2023 07:00  --------------------------------------------------------  IN: 0 mL / OUT: 2195 mL / NET: -2195 mL        PHYSICAL EXAM:  Constitutional: NAD, awake and alert, well-developed  HEENT: PERRLA, EOMI,  No oral cyanosis. Oropharynx Clean and Dry.  Neck:  supple,  No JVD, No Thyroid enlargement. No Carotid Bruits bilaterally.  Respiratory: Breath sounds are clear bilaterally, No wheezing, rales or rhonchi  Cardiovascular: NL S1 and S2, IR, IR, no Murmurs, gallops or rubs  Gastrointestinal: Bowel Sounds present. colostomy in LLQ.    Extremities: No peripheral edema. No clubbing or cyanosis.  Vascular: 1+ peripheral pulses in LE   Neurological: A/O x 3  Musculoskeletal: no calf tenderness.  Skin: No rashes.      LABS: All Labs Reviewed:                        10.0   12.38 )-----------( 194      ( 16 Dec 2023 04:39 )             29.9                         9.6    12.91 )-----------( 178      ( 15 Dec 2023 04:58 )             28.7                         10.2   13.05 )-----------( 146      ( 14 Dec 2023 06:38 )             30.4     16 Dec 2023 04:39    140    |  108    |  22     ----------------------------<  150    3.7     |  26     |  0.68   15 Dec 2023 17:05    138    |  103    |  25     ----------------------------<  168    3.7     |  28     |  0.85   15 Dec 2023 04:58    137    |  100    |  26     ----------------------------<  129    3.2     |  30     |  1.04     Ca    7.4        16 Dec 2023 04:39  Ca    7.7        15 Dec 2023 17:05  Ca    7.8        15 Dec 2023 04:58  Phos  2.0       16 Dec 2023 04:39  Phos  1.8       15 Dec 2023 04:58  Phos  2.7       14 Dec 2023 06:38  Mg     1.9       16 Dec 2023 04:39  Mg     2.0       15 Dec 2023 04:58  Mg     2.1       14 Dec 2023 06:38    TPro  5.7    /  Alb  2.2    /  TBili  1.5    /  DBili  x      /  AST  30     /  ALT  22     /  AlkPhos  57     16 Dec 2023 04:39  TPro  5.9    /  Alb  2.6    /  TBili  2.7    /  DBili  x      /  AST  28     /  ALT  16     /  AlkPhos  51     15 Dec 2023 04:58    Troponin:   RADIOLOGY:    < from: Xray Chest 1 View- PORTABLE-Urgent (Xray Chest 1 View- PORTABLE-Urgent .) (12.11.23 @ 20:17) >  IMPRESSION:   Catheters and tubes in place.  LEFT lower zone mild pleural effusion and/or patchy basilar airspace   disease..    < end of copied text >  < from: CT Abdomen and Pelvis w/ Oral Cont and w/ IV Cont (12.11.23 @ 12:45) >  IMPRESSION:  Evidence of pneumoperitoneum. Suspect rectal perforation considering the   presence of perirectal gas and pneumatosis.    Additional findings as above.    < end of copied text >    EKG:  Rapid afib with RBBB    s/p cardizem NSR with RBBB  ECHO:  < from: TTE Echo Complete w/o Contrast w/ Doppler (12.12.23 @ 14:00) >   Summary     Mild mitral annular calcification is present.   Moderate (2+) mitral regurgitation is present.   Mild aortic sclerosis is present with normal valvular opening.   Moderate to severe (3+) tricuspid valve regurgitation is present.   Mild pulmonary hypertension. Pulmonary pressures can be underestimated by   this study.   Normal appearing pulmonic valve structure and function.   Normal appearing left atrium.   The left ventricle is normal in size, wall thickness, wall motion and   contractility.   Estimatedleft ventricular ejection fraction is 55-60 %.   The right atrium appears mildly dilated.   Normal appearing right ventricle structure and function.    < end of copied text >

## 2023-12-16 NOTE — PROGRESS NOTE ADULT - ASSESSMENT
83F presented with abd pain, n/v and constipation, noted to have colonic perforation 2/2 stercoral ulcer    #Septic shock Septic shock from perforated colon and feculent peritonitis   -s/p Christina procedure  -S/p NGT  -Ostomy care  -Monitor CORKY output  -BCx: Gram Negative Rods, pending identification. will need repeat BCx   -Intraop cultures: Proteus Mirabilis   -s/p Zosyn   ID consulted recommendations appreciated   - change abx to unasyn 3gmq6h from rocephin   on dc po augmentin [id number] 14 days from day of negative cx    -repeat blood cultures   -currently on PPN  -strict i&o's   -daugherty in place, urology consult placed for urinary retention   -Pain management  -Serial abdominal exam  -management per CRSx    #pAfib, currently in NSR   -TTE: 2+ MR, 3+ TR, pHTN (RVSP: 41), EF: 55 to 60%  -S/p Lopressor and amiodarone  -S/p Cardizem gtt.  Start oral cardizem and IV heparin drip as per cardiology  transition to DOAC when cleared by surgery   -Currently in NSR (intermittent episodes of sinus tach on telemetry)    Will need fu for MV post recovery as had moderate MR and Pulm.  -[id number] Score: 3 (Age, Sex),   -Monitor electrolytes, replace and trend   -monitor on tele   -Recommend cardiology eval      #COPD  -Mild  -Stable  -Currently not on any medication    #HLD  -Diet controlled, currently not on any medication    VTE prophylaxis  -Lovenox/SCDs   83F presented with abd pain, n/v and constipation, noted to have colonic perforation 2/2 stercoral ulcer    #Septic shock Septic shock from perforated colon and feculent peritonitis   -s/p Christina procedure  -S/p NGT  -Ostomy care  -Monitor CORKY output  -BCx: Gram Negative Rods, pending identification. will need repeat BCx   -Intraop cultures: Proteus Mirabilis   -s/p Zosyn   ID consulted recommendations appreciated   - change abx to unasyn 3gmq6h from rocephin   on dc po augmentin [id number] 14 days from day of negative cx    -repeat blood cultures   -currently on PPN  -strict i&o's   -daugherty in place, urology consult placed for urinary retention   -Pain management  -Serial abdominal exam  -management per CRSx    #pAfib, currently in NSR   -TTE: 2+ MR, 3+ TR, pHTN (RVSP: 41), EF: 55 to 60%  -S/p Lopressor and amiodarone  -S/p Cardizem gtt.  Start oral cardizem and IV heparin drip as per cardiology  transition to DOAC when cleared by surgery   -Currently in NSR (intermittent episodes of sinus tach on telemetry)    Will need fu for MV post recovery as had moderate MR and Pulm.  -[id number] Score: 3 (Age, Sex),   -Monitor electrolytes, replace and trend   -monitor on tele   -Recommend cardiology eval      #COPD  -Mild  -Stable  -Currently not on any medication    #HLD  -Diet controlled, currently not on any medication    VTE prophylaxis  -Lovenox/SCDs

## 2023-12-17 LAB
ALBUMIN SERPL ELPH-MCNC: 2.1 G/DL — LOW (ref 3.3–5)
ALBUMIN SERPL ELPH-MCNC: 2.1 G/DL — LOW (ref 3.3–5)
ALP SERPL-CCNC: 47 U/L — SIGNIFICANT CHANGE UP (ref 40–120)
ALP SERPL-CCNC: 47 U/L — SIGNIFICANT CHANGE UP (ref 40–120)
ALT FLD-CCNC: 25 U/L — SIGNIFICANT CHANGE UP (ref 12–78)
ALT FLD-CCNC: 25 U/L — SIGNIFICANT CHANGE UP (ref 12–78)
ANION GAP SERPL CALC-SCNC: 4 MMOL/L — LOW (ref 5–17)
ANION GAP SERPL CALC-SCNC: 4 MMOL/L — LOW (ref 5–17)
APTT BLD: 51.7 SEC — HIGH (ref 24.5–35.6)
APTT BLD: 51.7 SEC — HIGH (ref 24.5–35.6)
APTT BLD: 52.9 SEC — HIGH (ref 24.5–35.6)
APTT BLD: 52.9 SEC — HIGH (ref 24.5–35.6)
APTT BLD: 65.3 SEC — HIGH (ref 24.5–35.6)
APTT BLD: 65.3 SEC — HIGH (ref 24.5–35.6)
AST SERPL-CCNC: 34 U/L — SIGNIFICANT CHANGE UP (ref 15–37)
AST SERPL-CCNC: 34 U/L — SIGNIFICANT CHANGE UP (ref 15–37)
BASOPHILS # BLD AUTO: 0 K/UL — SIGNIFICANT CHANGE UP (ref 0–0.2)
BASOPHILS # BLD AUTO: 0 K/UL — SIGNIFICANT CHANGE UP (ref 0–0.2)
BASOPHILS NFR BLD AUTO: 0 % — SIGNIFICANT CHANGE UP (ref 0–2)
BASOPHILS NFR BLD AUTO: 0 % — SIGNIFICANT CHANGE UP (ref 0–2)
BILIRUB SERPL-MCNC: 1 MG/DL — SIGNIFICANT CHANGE UP (ref 0.2–1.2)
BILIRUB SERPL-MCNC: 1 MG/DL — SIGNIFICANT CHANGE UP (ref 0.2–1.2)
BUN SERPL-MCNC: 19 MG/DL — SIGNIFICANT CHANGE UP (ref 7–23)
BUN SERPL-MCNC: 19 MG/DL — SIGNIFICANT CHANGE UP (ref 7–23)
CALCIUM SERPL-MCNC: 7.5 MG/DL — LOW (ref 8.5–10.1)
CALCIUM SERPL-MCNC: 7.5 MG/DL — LOW (ref 8.5–10.1)
CHLORIDE SERPL-SCNC: 106 MMOL/L — SIGNIFICANT CHANGE UP (ref 96–108)
CHLORIDE SERPL-SCNC: 106 MMOL/L — SIGNIFICANT CHANGE UP (ref 96–108)
CO2 SERPL-SCNC: 25 MMOL/L — SIGNIFICANT CHANGE UP (ref 22–31)
CO2 SERPL-SCNC: 25 MMOL/L — SIGNIFICANT CHANGE UP (ref 22–31)
CREAT SERPL-MCNC: 0.71 MG/DL — SIGNIFICANT CHANGE UP (ref 0.5–1.3)
CREAT SERPL-MCNC: 0.71 MG/DL — SIGNIFICANT CHANGE UP (ref 0.5–1.3)
EGFR: 84 ML/MIN/1.73M2 — SIGNIFICANT CHANGE UP
EGFR: 84 ML/MIN/1.73M2 — SIGNIFICANT CHANGE UP
EOSINOPHIL # BLD AUTO: 0.3 K/UL — SIGNIFICANT CHANGE UP (ref 0–0.5)
EOSINOPHIL # BLD AUTO: 0.3 K/UL — SIGNIFICANT CHANGE UP (ref 0–0.5)
EOSINOPHIL NFR BLD AUTO: 2 % — SIGNIFICANT CHANGE UP (ref 0–6)
EOSINOPHIL NFR BLD AUTO: 2 % — SIGNIFICANT CHANGE UP (ref 0–6)
GLUCOSE SERPL-MCNC: 150 MG/DL — HIGH (ref 70–99)
GLUCOSE SERPL-MCNC: 150 MG/DL — HIGH (ref 70–99)
HCT VFR BLD CALC: 29.8 % — LOW (ref 34.5–45)
HCT VFR BLD CALC: 29.8 % — LOW (ref 34.5–45)
HCT VFR BLD CALC: 31 % — LOW (ref 34.5–45)
HCT VFR BLD CALC: 31 % — LOW (ref 34.5–45)
HGB BLD-MCNC: 10.1 G/DL — LOW (ref 11.5–15.5)
HGB BLD-MCNC: 10.1 G/DL — LOW (ref 11.5–15.5)
HGB BLD-MCNC: 10.2 G/DL — LOW (ref 11.5–15.5)
HGB BLD-MCNC: 10.2 G/DL — LOW (ref 11.5–15.5)
LYMPHOCYTES # BLD AUTO: 1.68 K/UL — SIGNIFICANT CHANGE UP (ref 1–3.3)
LYMPHOCYTES # BLD AUTO: 1.68 K/UL — SIGNIFICANT CHANGE UP (ref 1–3.3)
LYMPHOCYTES # BLD AUTO: 11 % — LOW (ref 13–44)
LYMPHOCYTES # BLD AUTO: 11 % — LOW (ref 13–44)
MAGNESIUM SERPL-MCNC: 2.1 MG/DL — SIGNIFICANT CHANGE UP (ref 1.6–2.6)
MAGNESIUM SERPL-MCNC: 2.1 MG/DL — SIGNIFICANT CHANGE UP (ref 1.6–2.6)
MCHC RBC-ENTMCNC: 29.6 PG — SIGNIFICANT CHANGE UP (ref 27–34)
MCHC RBC-ENTMCNC: 29.6 PG — SIGNIFICANT CHANGE UP (ref 27–34)
MCHC RBC-ENTMCNC: 29.7 PG — SIGNIFICANT CHANGE UP (ref 27–34)
MCHC RBC-ENTMCNC: 29.7 PG — SIGNIFICANT CHANGE UP (ref 27–34)
MCHC RBC-ENTMCNC: 32.9 GM/DL — SIGNIFICANT CHANGE UP (ref 32–36)
MCHC RBC-ENTMCNC: 32.9 GM/DL — SIGNIFICANT CHANGE UP (ref 32–36)
MCHC RBC-ENTMCNC: 33.9 GM/DL — SIGNIFICANT CHANGE UP (ref 32–36)
MCHC RBC-ENTMCNC: 33.9 GM/DL — SIGNIFICANT CHANGE UP (ref 32–36)
MCV RBC AUTO: 87.4 FL — SIGNIFICANT CHANGE UP (ref 80–100)
MCV RBC AUTO: 87.4 FL — SIGNIFICANT CHANGE UP (ref 80–100)
MCV RBC AUTO: 90.1 FL — SIGNIFICANT CHANGE UP (ref 80–100)
MCV RBC AUTO: 90.1 FL — SIGNIFICANT CHANGE UP (ref 80–100)
MONOCYTES # BLD AUTO: 0.76 K/UL — SIGNIFICANT CHANGE UP (ref 0–0.9)
MONOCYTES # BLD AUTO: 0.76 K/UL — SIGNIFICANT CHANGE UP (ref 0–0.9)
MONOCYTES NFR BLD AUTO: 5 % — SIGNIFICANT CHANGE UP (ref 2–14)
MONOCYTES NFR BLD AUTO: 5 % — SIGNIFICANT CHANGE UP (ref 2–14)
NEUTROPHILS # BLD AUTO: 11.27 K/UL — HIGH (ref 1.8–7.4)
NEUTROPHILS # BLD AUTO: 11.27 K/UL — HIGH (ref 1.8–7.4)
NEUTROPHILS NFR BLD AUTO: 74 % — SIGNIFICANT CHANGE UP (ref 43–77)
NEUTROPHILS NFR BLD AUTO: 74 % — SIGNIFICANT CHANGE UP (ref 43–77)
NRBC # BLD: SIGNIFICANT CHANGE UP /100 WBCS (ref 0–0)
NRBC # BLD: SIGNIFICANT CHANGE UP /100 WBCS (ref 0–0)
PHOSPHATE SERPL-MCNC: 3.1 MG/DL — SIGNIFICANT CHANGE UP (ref 2.5–4.5)
PHOSPHATE SERPL-MCNC: 3.1 MG/DL — SIGNIFICANT CHANGE UP (ref 2.5–4.5)
PLATELET # BLD AUTO: 216 K/UL — SIGNIFICANT CHANGE UP (ref 150–400)
PLATELET # BLD AUTO: 216 K/UL — SIGNIFICANT CHANGE UP (ref 150–400)
PLATELET # BLD AUTO: 221 K/UL — SIGNIFICANT CHANGE UP (ref 150–400)
PLATELET # BLD AUTO: 221 K/UL — SIGNIFICANT CHANGE UP (ref 150–400)
POTASSIUM SERPL-MCNC: 3.9 MMOL/L — SIGNIFICANT CHANGE UP (ref 3.5–5.3)
POTASSIUM SERPL-MCNC: 3.9 MMOL/L — SIGNIFICANT CHANGE UP (ref 3.5–5.3)
POTASSIUM SERPL-SCNC: 3.9 MMOL/L — SIGNIFICANT CHANGE UP (ref 3.5–5.3)
POTASSIUM SERPL-SCNC: 3.9 MMOL/L — SIGNIFICANT CHANGE UP (ref 3.5–5.3)
PROT SERPL-MCNC: 5.5 GM/DL — LOW (ref 6–8.3)
PROT SERPL-MCNC: 5.5 GM/DL — LOW (ref 6–8.3)
RBC # BLD: 3.41 M/UL — LOW (ref 3.8–5.2)
RBC # BLD: 3.41 M/UL — LOW (ref 3.8–5.2)
RBC # BLD: 3.44 M/UL — LOW (ref 3.8–5.2)
RBC # BLD: 3.44 M/UL — LOW (ref 3.8–5.2)
RBC # FLD: 14.5 % — SIGNIFICANT CHANGE UP (ref 10.3–14.5)
RBC # FLD: 14.5 % — SIGNIFICANT CHANGE UP (ref 10.3–14.5)
RBC # FLD: 14.6 % — HIGH (ref 10.3–14.5)
RBC # FLD: 14.6 % — HIGH (ref 10.3–14.5)
SODIUM SERPL-SCNC: 135 MMOL/L — SIGNIFICANT CHANGE UP (ref 135–145)
SODIUM SERPL-SCNC: 135 MMOL/L — SIGNIFICANT CHANGE UP (ref 135–145)
WBC # BLD: 15.23 K/UL — HIGH (ref 3.8–10.5)
WBC # BLD: 15.23 K/UL — HIGH (ref 3.8–10.5)
WBC # BLD: 16.15 K/UL — HIGH (ref 3.8–10.5)
WBC # BLD: 16.15 K/UL — HIGH (ref 3.8–10.5)
WBC # FLD AUTO: 15.23 K/UL — HIGH (ref 3.8–10.5)
WBC # FLD AUTO: 15.23 K/UL — HIGH (ref 3.8–10.5)
WBC # FLD AUTO: 16.15 K/UL — HIGH (ref 3.8–10.5)
WBC # FLD AUTO: 16.15 K/UL — HIGH (ref 3.8–10.5)

## 2023-12-17 PROCEDURE — 99233 SBSQ HOSP IP/OBS HIGH 50: CPT

## 2023-12-17 PROCEDURE — 93971 EXTREMITY STUDY: CPT | Mod: 26,LT

## 2023-12-17 RX ORDER — ELECTROLYTE SOLUTION,INJ
1 VIAL (ML) INTRAVENOUS
Refills: 0 | Status: DISCONTINUED | OUTPATIENT
Start: 2023-12-17 | End: 2023-12-17

## 2023-12-17 RX ORDER — HEPARIN SODIUM 5000 [USP'U]/ML
1100 INJECTION INTRAVENOUS; SUBCUTANEOUS
Qty: 25000 | Refills: 0 | Status: DISCONTINUED | OUTPATIENT
Start: 2023-12-17 | End: 2023-12-19

## 2023-12-17 RX ORDER — I.V. FAT EMULSION 20 G/100ML
20.83 EMULSION INTRAVENOUS
Qty: 50 | Refills: 0 | Status: DISCONTINUED | OUTPATIENT
Start: 2023-12-17 | End: 2023-12-17

## 2023-12-17 RX ADMIN — HEPARIN SODIUM 1100 UNIT(S)/HR: 5000 INJECTION INTRAVENOUS; SUBCUTANEOUS at 12:39

## 2023-12-17 RX ADMIN — PANTOPRAZOLE SODIUM 40 MILLIGRAM(S): 20 TABLET, DELAYED RELEASE ORAL at 09:51

## 2023-12-17 RX ADMIN — Medication 1 EACH: at 23:48

## 2023-12-17 RX ADMIN — HEPARIN SODIUM 1100 UNIT(S)/HR: 5000 INJECTION INTRAVENOUS; SUBCUTANEOUS at 06:33

## 2023-12-17 RX ADMIN — I.V. FAT EMULSION 20.8 ML/HR: 20 EMULSION INTRAVENOUS at 23:48

## 2023-12-17 RX ADMIN — POLYETHYLENE GLYCOL 3350 17 GRAM(S): 17 POWDER, FOR SOLUTION ORAL at 09:51

## 2023-12-17 RX ADMIN — Medication 60 MILLIGRAM(S): at 06:39

## 2023-12-17 RX ADMIN — AMPICILLIN SODIUM AND SULBACTAM SODIUM 200 GRAM(S): 250; 125 INJECTION, POWDER, FOR SUSPENSION INTRAMUSCULAR; INTRAVENOUS at 21:32

## 2023-12-17 RX ADMIN — Medication 60 MILLIGRAM(S): at 14:18

## 2023-12-17 RX ADMIN — PANTOPRAZOLE SODIUM 40 MILLIGRAM(S): 20 TABLET, DELAYED RELEASE ORAL at 21:33

## 2023-12-17 RX ADMIN — HEPARIN SODIUM 1250 UNIT(S)/HR: 5000 INJECTION INTRAVENOUS; SUBCUTANEOUS at 14:07

## 2023-12-17 RX ADMIN — HEPARIN SODIUM 1250 UNIT(S)/HR: 5000 INJECTION INTRAVENOUS; SUBCUTANEOUS at 19:18

## 2023-12-17 RX ADMIN — Medication 60 MILLIGRAM(S): at 21:32

## 2023-12-17 RX ADMIN — HEPARIN SODIUM 1250 UNIT(S)/HR: 5000 INJECTION INTRAVENOUS; SUBCUTANEOUS at 21:24

## 2023-12-17 RX ADMIN — HEPARIN SODIUM 1100 UNIT(S)/HR: 5000 INJECTION INTRAVENOUS; SUBCUTANEOUS at 07:15

## 2023-12-17 RX ADMIN — LIDOCAINE 1 PATCH: 4 CREAM TOPICAL at 09:52

## 2023-12-17 RX ADMIN — MORPHINE SULFATE 2 MILLIGRAM(S): 50 CAPSULE, EXTENDED RELEASE ORAL at 23:53

## 2023-12-17 RX ADMIN — AMPICILLIN SODIUM AND SULBACTAM SODIUM 200 GRAM(S): 250; 125 INJECTION, POWDER, FOR SUSPENSION INTRAMUSCULAR; INTRAVENOUS at 14:02

## 2023-12-17 RX ADMIN — AMPICILLIN SODIUM AND SULBACTAM SODIUM 200 GRAM(S): 250; 125 INJECTION, POWDER, FOR SUSPENSION INTRAMUSCULAR; INTRAVENOUS at 06:40

## 2023-12-17 NOTE — PROGRESS NOTE ADULT - ASSESSMENT
83F presented with abd pain, n/v and constipation, noted to have colonic perforation 2/2 stercoral ulcer    #Septic shock Septic shock from perforated colon and feculent peritonitis   -s/p Christina procedure  -S/p NGT  -Ostomy care  -Monitor CORKY output  -BCx: Gram Negative Rods, pending identification. will need repeat BCx   -Intraop cultures: Proteus Mirabilis   -s/p Zosyn   ID consulted recommendations appreciated   - change abx to unasyn 3gmq6h from rocephin   on dc po augmentin [id number] 14 days from day of negative cx    -repeat blood cultures   -currently on PPN  -strict i&o's   -daugherty in place, urology consult placed for urinary retention   -Pain management  -Serial abdominal exam  -management per CRSx    #pAfib, currently in NSR   -TTE: 2+ MR, 3+ TR, pHTN (RVSP: 41), EF: 55 to 60%  -S/p Lopressor and amiodarone  -S/p Cardizem gtt.  Start oral cardizem and IV heparin drip as per cardiology  transition to DOAC when cleared by surgery   -Currently in NSR (intermittent episodes of sinus tach on telemetry)    Will need fu for MV post recovery as had moderate MR and Pulm.  -[id number] Score: 3 (Age, Sex),   -Monitor electrolytes, replace and trend   -monitor on tele   Cardiology consulted recommendations appreciated.      #COPD  -Mild  -Stable  -Currently not on any medication    #HLD  -Diet controlled, currently not on any medication    VTE prophylaxis  On IV heparin   83F presented with abd pain, n/v and constipation, noted to have colonic perforation 2/2 stercoral ulcer    #Septic shock Septic shock from perforated colon and feculent peritonitis   -s/p Christina procedure  -S/p NGT  -Ostomy care  -Monitor CORKY output  -BCx: Gram Negative Rods, pending identification. will need repeat BCx   -Intraop cultures: Proteus Mirabilis   -s/p Zosyn   ID consulted recommendations appreciated   - change abx to unasyn 3gmq6h from rocephin   on dc po augmentin [id number] 14 days from day of negative cx    -repeat blood cultures   -currently on PPN  -strict i&o's   -daugherty in place, urology consult placed for urinary retention   -Pain management  -Serial abdominal exam  -management per CRSx    #pAfib, currently in NSR   -TTE: 2+ MR, 3+ TR, pHTN (RVSP: 41), EF: 55 to 60%  -S/p Lopressor and amiodarone  -S/p Cardizem gtt.  Start oral cardizem and IV heparin drip as per cardiology  transition to DOAC when cleared by surgery   -Currently in NSR (intermittent episodes of sinus tach on telemetry)    Will need fu for MV post recovery as had moderate MR and Pulm.  -[id number] Score: 3 (Age, Sex),   -Monitor electrolytes, replace and trend   -monitor on tele   Cardiology consulted recommendations appreciated.      #COPD  -Mild  -Stable  -Currently not on any medication    #HLD  -Diet controlled, currently not on any medication    VTE prophylaxis  On IV heparin

## 2023-12-17 NOTE — PROGRESS NOTE ADULT - SUBJECTIVE AND OBJECTIVE BOX
HPI:  [age] y/o female with a PMHx of COPD, HLD presents to the ED c/o abd pain, n/v and constipation x2 days. Denies CP, SOB, urinary symptoms. NKDA. Nonsmoker. No EtOH use. No other complaints at this time. s/p Perf. colonic surgery with colostomy n place.  This Am Went in to rapid tachycardia with HR into 150's-160's.  Initially received Metoprolol which was unsuccessful in slowing her down.  Cardiology then consulted.  Tele shows in PAF.  IV cardizem given and converted to NSR.  Did c/o of SOB while HR rapid.      12/17/2023: Doing well today.  No further SVT/ PAS on current regime a medication.  Slated for ultrasound her upper extremity for swelling.  Stomach less painful.    PAST MEDICAL & SURGICAL HISTORY:  HLD (hyperlipidemia)  History of COPD    SOCIAL HISTORY: Non-Smoker/Social ETOH/ No Ilic[employer] Drug use.    FAMILY HISTORY:    Allergies  No Known Allergies    Home Medications:  Multiple Vitamins oral tablet: 1 tab(s) orally once a day (11 Dec 2023 15:18)    MEDICATIONS  (STANDING):  ampicillin/sulbactam  IVPB 3 Gram(s) IV Intermittent every 6 hours  diltiazem    Tablet 60 milliGRAM(s) Oral every 8 hours  heparin  Infusion. 1100 Unit(s)/Hr (11 mL/Hr) IV Continuous <Continuous>  lidocaine   4% Patch 1 Patch Transdermal daily  lipid, fat emulsion (Fish Oil and Plant Based) 20% Infusion 0.69 Gm/kG/Day (20.9 mL/Hr) IV Continuous <Continuous>  lipid, fat emulsion (Fish Oil and Plant Based) 20% Infusion 20.83 mL/Hr (20.8 mL/Hr) IV Continuous <Continuous>  metoprolol tartrate Injectable 5 milliGRAM(s) IV Push every 5 minutes  pantoprazole  Injectable 40 milliGRAM(s) IV Push two times a day  Parenteral Nutrition - Adult 1 Each (75 mL/Hr) TPN Continuous <Continuous>  Parenteral Nutrition - Adult 1 Each (75 mL/Hr) TPN Continuous <Continuous>  polyethylene glycol 3350 17 Gram(s) Oral daily    MEDICATIONS  (PRN):  acetaminophen   IVPB .. 1000 milliGRAM(s) IV Intermittent once PRN Temp greater or equal to 38C (100.4F), Mild Pain (1 - 3)  heparin   Injectable 4400 Unit(s) IV Push every 6 hours PRN For aPTT less than 40  morphine  - Injectable 2 milliGRAM(s) IV Push every 4 hours PRN Moderate Pain (4 - 6)  ondansetron Injectable 4 milliGRAM(s) IV Push every 6 hours PRN Nausea      Vital Signs Last 24 Hrs  T(C): 36.5 (17 Dec 2023 07:56), Max: 36.5 (17 Dec 2023 07:56)  T(F): 97.7 (17 Dec 2023 07:56), Max: 97.7 (17 Dec 2023 07:56)  HR: 103 (17 Dec 2023 14:18) (88 - 103)  BP: 142/41 (17 Dec 2023 14:18) (113/61 - 142/41)  BP(mean): --  RR: 18 (17 Dec 2023 07:56) (18 - 18)  SpO2: 98% (17 Dec 2023 07:56) (98% - 99%)    Parameters below as of 17 Dec 2023 07:56  Patient On (Oxygen Delivery Method): nasal cannula  O2 Flow (L/min): 3      I&O's Detail    16 Dec 2023 07:01  -  17 Dec 2023 07:00  --------------------------------------------------------  IN:  Total IN: 0 mL    OUT:    Drain (mL): 240 mL    Indwelling Catheter - Urethral (mL): 700 mL    Intermittent Catheterization - Urethral (mL): 1800 mL  Total OUT: 2740 mL    Total NET: -2740 mL      17 Dec 2023 07:01  -  17 Dec 2023 14:40  --------------------------------------------------------  IN:  Total IN: 0 mL    OUT:    Drain (mL): 50 mL  Total OUT: 50 mL    Total NET: -50 mL          Daily     Daily         PHYSICAL EXAM:  Constitutional: NAD, awake and alert, well-developed  HEENT: PERRLA, EOMI,  No oral cyanosis. Oropharynx Clean and Dry.  Neck:  supple,  No JVD, No Thyroid enlargement. No Carotid Bruits bilaterally.  Respiratory: Breath sounds are clear bilaterally, No wheezing, rales or rhonchi  Cardiovascular: NL S1 and S2, IR, IR, no Murmurs, gallops or rubs  Gastrointestinal: Bowel Sounds present. colostomy in LLQ.    Extremities: No peripheral edema. No clubbing or cyanosis.  Vascular: 1+ peripheral pulses in LE   Neurological: A/O x 3  Musculoskeletal: no calf tenderness.  Skin: No rashes.      LABS: All Labs Reviewed:                        10.1   16.15 )-----------( 221      ( 17 Dec 2023 12:40 )             29.8     12-17    135  |  106  |  19  ----------------------------<  150<H>  3.9   |  25  |  0.71    Ca    7.5<L>      17 Dec 2023 05:01  Phos  3.1     12-17  Mg     2.1     12-17    TPro  5.5<L>  /  Alb  2.1<L>  /  TBili  1.0  /  DBili  x   /  AST  34  /  ALT  25  /  AlkPhos  47  12-17        - Troponin:   LIVER FUNCTIONS - ( 17 Dec 2023 05:01 )  Alb: 2.1 g/dL / Pro: 5.5 gm/dL / ALK PHOS: 47 U/L / ALT: 25 U/L / AST: 34 U/L / GGT: x           PT/INR - ( 16 Dec 2023 10:50 )   PT: 10.8 sec;   INR: 0.95 ratio         PTT - ( 17 Dec 2023 12:40 )  PTT:51.7 sec  12-15 Chol -- LDL -- HDL -- Trig 187<H>    RADIOLOGY:    < from: Xray Chest 1 View- PORTABLE-Urgent (Xray Chest 1 View- PORTABLE-Urgent .) (12.11.23 @ 20:17) >  IMPRESSION:   Catheters and tubes in place.  LEFT lower zone mild pleural effusion and/or patchy basilar airspace   disease..    < end of copied text >  < from: CT Abdomen and Pelvis w/ Oral Cont and w/ IV Cont (12.11.23 @ 12:45) >  IMPRESSION:  Evidence of pneumoperitoneum. Suspect rectal perforation considering the   presence of perirectal gas and pneumatosis.    Additional findings as above.    < end of copied text >    EKG:  Rapid afib with RBBB    s/p cardizem NSR with RBBB  ECHO:  < from: TTE Echo Complete w/o Contrast w/ Doppler (12.12.23 @ 14:00) >   Summary     Mild mitral annular calcification is present.   Moderate (2+) mitral regurgitation is present.   Mild aortic sclerosis is present with normal valvular opening.   Moderate to severe (3+) tricuspid valve regurgitation is present.   Mild pulmonary hypertension. Pulmonary pressures can be underestimated by   this study.   Normal appearing pulmonic valve structure and function.   Normal appearing left atrium.   The left ventricle is normal in size, wall thickness, wall motion and   contractility.   Estimatedleft ventricular ejection fraction is 55-60 %.   The right atrium appears mildly dilated.   Normal appearing right ventricle structure and function.    < end of copied text >

## 2023-12-17 NOTE — PROGRESS NOTE ADULT - ASSESSMENT
83 year old female presented with colonic perforation 2/2 stercoral ulcer, now POD 6 s/p Christina  AFib better controlled with Diltiazem and PRN Metoprolol. Patient on Heparin drip.     Plan:  Advance to fulls  - Pain & nausea control PRN  Cardiology recs  Hospitalist recs  - Incentive spirometry, wean oxygen  - Zosyn since 12/11, intraop cx : Proteus) & bld cx: Bacteroides, now on Unasyn  - Replace electrolytes PRN  - PT anticipates RONALD  - Will need ostomy teaching  - VTE ppx w/ SCDs & Lovenox    Discussed with attending.  83 year old female presented with colonic perforation 2/2 stercoral ulcer, now POD 6 s/p Christina  AFib better controlled with Diltiazem and PRN Metoprolol. Patient on Heparin drip.     Plan:  Advance to fulls  Monitor ostomy function, continue Miralax  Pain & nausea control PRN  Cardiology recs  Hospitalist recs  Incentive spirometry, wean oxygen  Zosyn since 12/11, intraop cx : Proteus) & bld cx: Bacteroides, now on Unasyn  Replace electrolytes PRN  PT anticipates RONALD  Will need ostomy teaching  VTE ppx w/ SCDs & Lovenox    Discussed with attending.

## 2023-12-17 NOTE — PROGRESS NOTE ADULT - SUBJECTIVE AND OBJECTIVE BOX
Pt. seen and examined at bedside this morning. Patient's ostomy functional. She tolerated clear liquids, has not ambulated. She denies fever, chest pain, SOB, nausea, vomiting.    Physical Exam:  General: AOx3, Well developed, NAD  HEENT: NC/AT, normal pinnae and tragi  Chest: Normal respiratory effort, equal chest rise  Heart: regular rate, normotensive  Abdomen: Ostomy pink and patent, semisolid stool, gas in bag, incision healing well, no erythema or induration  Neuro/Psych: No localized deficits. Normal speech, normal tone, normal affect  Skin: Normal, warm, no rashes, no lesions noted  Extremities: Warm, well perfused, no edema    Vitals:  T(C): 36.3 ( @ 20:30), Max: 36.7 ( @ 07:33)  HR: 90 ( @ 20:30) (90 - 97)  BP: 141/49 ( @ 20:30) (127/56 - 144/56)  RR: 18 ( @ 20:30) (18 - 18)  SpO2: 99% ( @ 20:30) (99% - 99%)    15 @ 07:01  -   @ 07:00  --------------------------------------------------------  IN:  Total IN: 0 mL    OUT:    Drain (mL): 170 mL    Indwelling Catheter - Urethral (mL): 1325 mL    Intermittent Catheterization - Urethral (mL): 700 mL  Total OUT: 2195 mL    Total NET: -2195 mL       @ 07:01  -  17 @ 02:10  --------------------------------------------------------  IN:  Total IN: 0 mL    OUT:    Drain (mL): 150 mL    Intermittent Catheterization - Urethral (mL): 1800 mL  Total OUT: 1950 mL    Total NET: -1950 mL           @ 21:06                    10.0  CBC: 13.58>)-------(<202                     30.0                 - | - | -    CMP:  ----------------------< -               - | - | -                      Ca:-  Phos:-  Mg:-               -|      |-        LFTs:  ------|-|-----             -|      |-   @ 04:39                    10.0  CBC: 12.38>)-------(<194                     29.9                 140 | 108 | 22    CMP:  ----------------------< 150               3.7 | 26 | 0.68                      Ca:7.4  Phos:2.0  M.9               1.5|      |30        LFTs:  ------|57|-----             -|      |-  12-15 @ 17:05                    -  CBC: ->)-------(<-                     -                 138 | 103 | 25    CMP:  ----------------------< 168               3.7 | 28 | 0.85                      Ca:7.7  Phos:-  Mg:-               -|      |-        LFTs:  ------|-|-----             -|      |-      Culture - Urine (collected 23 @ 09:10)  Source: Clean Catch Clean Catch (Midstream)  Final Report (23 @ 16:36):    No growth

## 2023-12-17 NOTE — PROGRESS NOTE ADULT - SUBJECTIVE AND OBJECTIVE BOX
HOSPITALIST ATTENDING PROGRESS NOTE    Chart and meds reviewed.      Subjective: Patient seen and examined. Left arm started swelling yesterday.  Left upper extremity Dopplers pending.  Patient currently on IV infusion.  WBCs increased from yesterday to 15.23.  Hemoglobin hematocrit stable at 10.2/31.0.  Denies any fevers chills nausea vomiting chest pain shortness of breath.  As per colorectal surgery.  If WBCs continue to trend upwards.  CT abdomen will be ordered as patient is high risk for abscess formation.  Continue IV Unasyn at this time.      Additional results/Imaging, I have personally reviewed:    LABS:                            10.2   15.23 )-----------( 216      ( 17 Dec 2023 05:01 )             31.0     12-17    135  |  106  |  19  ----------------------------<  150<H>  3.9   |  25  |  0.71    Ca    7.5<L>      17 Dec 2023 05:01  Phos  3.1     12-17  Mg     2.1     12-17    TPro  5.5<L>  /  Alb  2.1<L>  /  TBili  1.0  /  DBili  x   /  AST  34  /  ALT  25  /  AlkPhos  47  12-17        LIVER FUNCTIONS - ( 17 Dec 2023 05:01 )  Alb: 2.1 g/dL / Pro: 5.5 gm/dL / ALK PHOS: 47 U/L / ALT: 25 U/L / AST: 34 U/L / GGT: x           PT/INR - ( 16 Dec 2023 10:50 )   PT: 10.8 sec;   INR: 0.95 ratio         PTT - ( 17 Dec 2023 05:01 )  PTT:65.3 sec  Urinalysis Basic - ( 17 Dec 2023 05:01 )    Color: x / Appearance: x / SG: x / pH: x  Gluc: 150 mg/dL / Ketone: x  / Bili: x / Urobili: x   Blood: x / Protein: x / Nitrite: x   Leuk Esterase: x / RBC: x / WBC x   Sq Epi: x / Non Sq Epi: x / Bacteria: x              All other systems reviewed and found to be negative with the exception of what has been described above.    MEDICATIONS  (STANDING):  ampicillin/sulbactam  IVPB 3 Gram(s) IV Intermittent every 6 hours  diltiazem    Tablet 60 milliGRAM(s) Oral every 8 hours  heparin  Infusion. 1100 Unit(s)/Hr (11 mL/Hr) IV Continuous <Continuous>  lidocaine   4% Patch 1 Patch Transdermal daily  lipid, fat emulsion (Fish Oil and Plant Based) 20% Infusion 20.83 mL/Hr (20.8 mL/Hr) IV Continuous <Continuous>  lipid, fat emulsion (Fish Oil and Plant Based) 20% Infusion 0.69 Gm/kG/Day (20.9 mL/Hr) IV Continuous <Continuous>  metoprolol tartrate Injectable 5 milliGRAM(s) IV Push every 5 minutes  pantoprazole  Injectable 40 milliGRAM(s) IV Push two times a day  Parenteral Nutrition - Adult 1 Each (75 mL/Hr) TPN Continuous <Continuous>  Parenteral Nutrition - Adult 1 Each (75 mL/Hr) TPN Continuous <Continuous>  polyethylene glycol 3350 17 Gram(s) Oral daily    MEDICATIONS  (PRN):  acetaminophen   IVPB .. 1000 milliGRAM(s) IV Intermittent once PRN Temp greater or equal to 38C (100.4F), Mild Pain (1 - 3)  heparin   Injectable 4400 Unit(s) IV Push every 6 hours PRN For aPTT less than 40  morphine  - Injectable 2 milliGRAM(s) IV Push every 4 hours PRN Moderate Pain (4 - 6)  ondansetron Injectable 4 milliGRAM(s) IV Push every 6 hours PRN Nausea      VITALS:  T(F): 97.7 (12-17-23 @ 07:56), Max: 97.7 (12-17-23 @ 07:56)  HR: 98 (12-17-23 @ 07:56) (88 - 98)  BP: 113/61 (12-17-23 @ 07:56) (113/61 - 141/49)  RR: 18 (12-17-23 @ 07:56) (18 - 18)  SpO2: 98% (12-17-23 @ 07:56) (98% - 99%)  Wt(kg): --    I&O's Summary    16 Dec 2023 07:01  -  17 Dec 2023 07:00  --------------------------------------------------------  IN: 0 mL / OUT: 2740 mL / NET: -2740 mL    17 Dec 2023 07:01  -  17 Dec 2023 12:05  --------------------------------------------------------  IN: 0 mL / OUT: 50 mL / NET: -50 mL        CAPILLARY BLOOD GLUCOSE          PHYSICAL EXAM:  GEN: NAD   HEENT: EOMI, normal hearing, moist mucous membranes  NECK : Soft and supple, no JVD  LUNG: CTABL, No wheezing, rales or rhonchi  CVS: S1S2+, (+)tachycardic, +regular rhythm   GI: BS+, soft, NT/ND, no guarding, no rebound, +Prevena dressing in place c/d/i, +daugherty in place   EXTREMITIES: Left arm swollen.  VASCULAR: 2+ peripheral pulses  NEURO: AAOx3, grossly non-focal   SKIN: No rashes

## 2023-12-17 NOTE — PROGRESS NOTE ADULT - ATTENDING COMMENTS
Patient seen and examined, chart reviewed.  No acute issues overnight.  No N/V.  Tolerated clear liquids though does not like the taste/consistency.  Pain present but well-controlled.  Not ambulating much.  Leyva in place for retention.  Stoma with more output.  On exam abdomen is soft, nondistended, appropriately tender. Midline incision C/D/I without erythema or discharge (Prevena removed yesterday).  Drain serosanguinous.  Ostomy with gas and loose brown output.  LUE swollen - currently wrapped and elevated.  Electrolytes within acceptable limits.  WBC slightly increased compared to yesterday.    -- Advance diet  -- Multimodal pain control  -- IS, ambulation - PT recommending RONALD initially  -- Duplex for LUE pending.  She is already on heparin gtt for anticoagulation.  -- Recheck labs in AM.  If WBC continues to trend upward will continue workup and order CT scan - patient is at high risk of abscess formation given her initial presentation with feculent peritonitis.  -- Antibiotics as per ID, appreciate recs

## 2023-12-17 NOTE — CHART NOTE - NSCHARTNOTEFT_GEN_A_CORE
Clinical Nutrition PN Follow Up Note    *82 YO F with a PMHx of COPD, HLD presents to the ED c/o abd pain, n/v and constipation x2 days. Admitted w/ colonic perforation, Extensive stercocolitis with 2 feculent perforation of sigmoid & rectum. Necrotic segment of colon also noticed now POD 2 s/p Christina's. Had post-op fecal peritonitis, 7L washout. Went into septic shock, requiring pressors. Tx to ICU w/ post-op resp failure. NGT to LWS, blood tinged. Off pressors as of this AM 12/13.    *PPN initiated 2/2 NPO s/p Christina procedure2/2 colonic perforation     *12/14: has been NPO x 3 days, nausea overnight. NGT still in place, no plan for clamp trial today.  Plan to initiate PPN today, posisble transfer out of ICU  *12/15: Transferred out of ICU, off pressors, now on 3E w/ no acute events overnight. Hypokalemia and hypophosphatemia noted s/p HD yesterday, being repleted. Possibly going into refeeding, will slowly increase lytes in PN bag and recommend to c/w KCl and KPhos repletion outside of bag until WNL.     *current status: 12/17 -PPN Day #4 - No acute events overnight; advanced to clears yesterday - is tolerating as per surgery note and plan to advance to fulls today. NGT removed on 12/15. Ostomy functional - w/ semisolid stool, gas in bag and incision healing well. Will c/w PPN. If to remain on PPN >7 days, consider placing PICC line to initiate TPN and meet >80% ENN.     *new pertinent meds: Protonix, morphine, zofran prn, 80 mEq KCl repletion outside bag, 10 mEq KCl IVPB, 15 mmol KPhos IVPB, D5 + NaCl + 20 KCl mEq solution    *labs reviewed;   12-17    135  |  106  |  19  ----------------------------<  150<H>  3.9   |  25  |  0.71    Ca    7.5<L>      17 Dec 2023 05:01  Phos  3.1     12-17  Mg     2.1     12-17    TPro  5.5<L>  /  Alb  2.1<L>  /  TBili  1.0  /  DBili  x   /  AST  34  /  ALT  25  /  AlkPhos  47  12-17        CAPILLARY BLOOD GLUCOSE  *NONE DOCUMENTED - Please obtain strict POCTs q 6 hrs as per PN protocol     Glucose: POCT Blood Glucose.: 161 mg/dL (12-15-23 @ 11:51)  BP: 137/55 (12-17-23 @ 06:30) (108/96 - 146/49)Vital Signs Last 24 Hrs  T(C): 36.3 (12-16-23 @ 20:30), Max: 36.7 (12-16-23 @ 07:33)  T(F): 97.4 (12-16-23 @ 20:30), Max: 98.1 (12-16-23 @ 07:33)  HR: 88 (12-17-23 @ 06:30) (88 - 97)  BP: 137/55 (12-17-23 @ 06:30) (127/56 - 144/56)  RR: 18 (12-16-23 @ 20:30) (18 - 18)  SpO2: 99% (12-16-23 @ 20:30) (99% - 99%)    Lipid Panel: Date/Time: 12-15-23 @ 04:58  Triglycerides, Serum: 187      I&O's Detail    16 Dec 2023 07:01  -  17 Dec 2023 07:00  --------------------------------------------------------  IN:  Total IN: 0 mL    OUT:    Drain (mL): 240 mL    Indwelling Catheter - Urethral (mL): 700 mL    Intermittent Catheterization - Urethral (mL): 1800 mL  Total OUT: 2740 mL    Total NET: -2740 mL    *fluid status: negative; no ostomy output doc'd.  *no BM doc'd; abd discomfort noted as per EMR, pt not on a bowel regimen.  *edema: +2 R arm & +2 L leg, +2 L arm doc'd on 12/16  *PI: stage I buttocks     *malnutrition: Pt continues to meet criteria for severe protein-calorie malnutrition in context of acute illness r/t decreased ability to meet increased nutrient needs 2/2 colonic perf w/ septic shock AEB moderate muscle/ fat wasting    Estimated Needs: based on 63.5 Kg (UBW - likely dry wt)  Calories: 1905- 2222 Kcal (30- 35 Kcal/Kg)  Protein: 95- 127 g (1.5- 2 g/Kg)  Fluids: 1587- 1905 mL (25- 30 mL/Kg)    Diet, Clear Liquid:   Supplement Feeding Modality:  Oral  Ensure Clear Cans or Servings Per Day:  3       Frequency:  Three Times a day (12-16-23 @ 10:43) [Active]  Parenteral Nutrition - Adult 1 Each (75 mL/Hr) TPN Continuous <Continuous>, 12-16-23 @ 22:00, 22:00, Stop order after: 1 Days      Weight History:  Weight (kg): 72.6 (12-11-23 @ 10:30)    **PPN recommendations as per pharmacy - see pharmacy communication note    Additional Recommendations:    1) Daily weights  2) Strict I & O's  3) Daily lyte checks including magnesium and phos - c/w KCl and KPhos repletion outside of bag until WNL   4) Weekly triglycerides/LFT checks  5) POCT q6hrs; maintain 140-180mg/dL  6) if on PN > 6 days, consider placing PICC line to meet 100% of nutr needs  7) Confirm goals of care regarding LONG-TERM nutrition support    *will continue to monitor and adjust PN prn*  Jennifer Padilla, MS, RDN [phone] Clinical Nutrition PN Follow Up Note    *84 YO F with a PMHx of COPD, HLD presents to the ED c/o abd pain, n/v and constipation x2 days. Admitted w/ colonic perforation, Extensive stercocolitis with 2 feculent perforation of sigmoid & rectum. Necrotic segment of colon also noticed now POD 2 s/p Christina's. Had post-op fecal peritonitis, 7L washout. Went into septic shock, requiring pressors. Tx to ICU w/ post-op resp failure. NGT to LWS, blood tinged. Off pressors as of this AM 12/13.    *PPN initiated 2/2 NPO s/p Christina procedure2/2 colonic perforation     *12/14: has been NPO x 3 days, nausea overnight. NGT still in place, no plan for clamp trial today.  Plan to initiate PPN today, posisble transfer out of ICU  *12/15: Transferred out of ICU, off pressors, now on 3E w/ no acute events overnight. Hypokalemia and hypophosphatemia noted s/p HD yesterday, being repleted. Possibly going into refeeding, will slowly increase lytes in PN bag and recommend to c/w KCl and KPhos repletion outside of bag until WNL.     *current status: 12/17 -PPN Day #4 - No acute events overnight; advanced to clears yesterday - is tolerating as per surgery note and plan to advance to fulls today. NGT removed on 12/15. Ostomy functional - w/ semisolid stool, gas in bag and incision healing well. Will c/w PPN. If to remain on PPN >7 days, consider placing PICC line to initiate TPN and meet >80% ENN.     *new pertinent meds: Protonix, morphine, zofran prn, 80 mEq KCl repletion outside bag, 10 mEq KCl IVPB, 15 mmol KPhos IVPB, D5 + NaCl + 20 KCl mEq solution    *labs reviewed;   12-17    135  |  106  |  19  ----------------------------<  150<H>  3.9   |  25  |  0.71    Ca    7.5<L>      17 Dec 2023 05:01  Phos  3.1     12-17  Mg     2.1     12-17    TPro  5.5<L>  /  Alb  2.1<L>  /  TBili  1.0  /  DBili  x   /  AST  34  /  ALT  25  /  AlkPhos  47  12-17        CAPILLARY BLOOD GLUCOSE  *NONE DOCUMENTED - Please obtain strict POCTs q 6 hrs as per PN protocol     Glucose: POCT Blood Glucose.: 161 mg/dL (12-15-23 @ 11:51)  BP: 137/55 (12-17-23 @ 06:30) (108/96 - 146/49)Vital Signs Last 24 Hrs  T(C): 36.3 (12-16-23 @ 20:30), Max: 36.7 (12-16-23 @ 07:33)  T(F): 97.4 (12-16-23 @ 20:30), Max: 98.1 (12-16-23 @ 07:33)  HR: 88 (12-17-23 @ 06:30) (88 - 97)  BP: 137/55 (12-17-23 @ 06:30) (127/56 - 144/56)  RR: 18 (12-16-23 @ 20:30) (18 - 18)  SpO2: 99% (12-16-23 @ 20:30) (99% - 99%)    Lipid Panel: Date/Time: 12-15-23 @ 04:58  Triglycerides, Serum: 187      I&O's Detail    16 Dec 2023 07:01  -  17 Dec 2023 07:00  --------------------------------------------------------  IN:  Total IN: 0 mL    OUT:    Drain (mL): 240 mL    Indwelling Catheter - Urethral (mL): 700 mL    Intermittent Catheterization - Urethral (mL): 1800 mL  Total OUT: 2740 mL    Total NET: -2740 mL    *fluid status: negative; no ostomy output doc'd.  *no BM doc'd; abd discomfort noted as per EMR, pt not on a bowel regimen.  *edema: +2 R arm & +2 L leg, +2 L arm doc'd on 12/16  *PI: stage I buttocks     *malnutrition: Pt continues to meet criteria for severe protein-calorie malnutrition in context of acute illness r/t decreased ability to meet increased nutrient needs 2/2 colonic perf w/ septic shock AEB moderate muscle/ fat wasting    Estimated Needs: based on 63.5 Kg (UBW - likely dry wt)  Calories: 1905- 2222 Kcal (30- 35 Kcal/Kg)  Protein: 95- 127 g (1.5- 2 g/Kg)  Fluids: 1587- 1905 mL (25- 30 mL/Kg)    Diet, Clear Liquid:   Supplement Feeding Modality:  Oral  Ensure Clear Cans or Servings Per Day:  3       Frequency:  Three Times a day (12-16-23 @ 10:43) [Active]  Parenteral Nutrition - Adult 1 Each (75 mL/Hr) TPN Continuous <Continuous>, 12-16-23 @ 22:00, 22:00, Stop order after: 1 Days      Weight History:  Weight (kg): 72.6 (12-11-23 @ 10:30)    **PPN recommendations as per pharmacy - see pharmacy communication note    Additional Recommendations:    1) Daily weights  2) Strict I & O's  3) Daily lyte checks including magnesium and phos - c/w KCl and KPhos repletion outside of bag until WNL   4) Weekly triglycerides/LFT checks  5) POCT q6hrs; maintain 140-180mg/dL  6) if on PN > 6 days, consider placing PICC line to meet 100% of nutr needs  7) Confirm goals of care regarding LONG-TERM nutrition support    *will continue to monitor and adjust PN prn*  Jennifer Padilla, MS, RDN [phone]

## 2023-12-17 NOTE — PROGRESS NOTE ADULT - ASSESSMENT
Post op PAF cardioverted with cardizem to NSR.  Start oral cardizem.  Start IV heparin.  When cleared by surgery to start DOAC will transition.  Will need fu for MV post recovery as had moderate MR and Pulm. HTN and Moderately severe TR as well.        12/17/2023:  Stable on current regime.  Continue diltiazem.  Continue heparin.  Await surgical clearance to start DOAC    D/w pt., and staff.

## 2023-12-17 NOTE — PHARMACOTHERAPY INTERVENTION NOTE - COMMENTS
Pharmacy PN Follow Up Note      *labs reviewed; Na, K, Mg, Phos WNL; Bilirubin WNL, will add trace to bag today; advanced to clears yest'day, tolerating as per surgery note, and plan to advance to fulls today;    12-17    135  |  106  |  19  ----------------------------<  150<H>  3.9   |  25  |  0.71    Ca    7.5<L>      17 Dec 2023 05:01  Phos  3.1     12-17  Mg     2.1     12-17    TPro  5.5<L>  /  Alb  2.1<L>  /  TBili  1.0  /  DBili  x   /  AST  34  /  ALT  25  /  AlkPhos  47  12-17      BMI:   HbA1c:   Glucose: POCT Blood Glucose.: 161 mg/dL (12-15-23 @ 11:51)    BP: 113/61 (12-17-23 @ 07:56) (108/96 - 146/49)Vital Signs Last 24 Hrs  T(C): 36.5 (12-17-23 @ 07:56), Max: 36.5 (12-17-23 @ 07:56)  T(F): 97.7 (12-17-23 @ 07:56), Max: 97.7 (12-17-23 @ 07:56)  HR: 98 (12-17-23 @ 07:56) (88 - 98)  BP: 113/61 (12-17-23 @ 07:56) (113/61 - 141/49)  BP(mean): --  RR: 18 (12-17-23 @ 07:56) (18 - 18)  SpO2: 98% (12-17-23 @ 07:56) (98% - 99%)      Lipid Panel: Date/Time: 12-15-23 @ 04:58  Triglycerides, Serum: 187          *I&O's Detail    16 Dec 2023 07:01  -  17 Dec 2023 07:00  --------------------------------------------------------  IN:  Total IN: 0 mL    OUT:    Drain (mL): 240 mL    Indwelling Catheter - Urethral (mL): 700 mL    Intermittent Catheterization - Urethral (mL): 1800 mL  Total OUT: 2740 mL    Total NET: -2740 mL              *will continue to monitor and adjust PN prn*    PPN Recommendations: via peripheral venous line  Total Volume: 1800ml   65g  Amino Acids   100g Dextrose   50g Lipids 20%  98mEq Sodium Chloride  14mEq Sodium Acetate  20mmol Sodium Phosphate  40mEq Potassium Chloride  40mEq Potassium Acetate  10mmol Potassium Phosphate  0mEq Calcium Gluconate  10mEq Magnesium Sulfate  200 mg Thiamine  1ml Trace Elements  10ml MVI    Total Calories: 1100kcal            (Provides 1100 kcal and  65g protein)    (osmolarity<900mOsm/L)                  
  Pharmacy PPN Follow Up Note      *labs reviewed; all lytes WNL except for Potassium low normal (will increase in bag) and  Magnesium (will increase in bag) and Phosphorus (will supplement outside of PN bag)    12-16    140  |  108  |  22  ----------------------------<  150<H>  3.7   |  26  |  0.68    Ca    7.4<L>      16 Dec 2023 04:39  Phos  2.0     12-16  Mg     1.9     12-16    TPro  5.7<L>  /  Alb  2.2<L>  /  TBili  1.5<H>  /  DBili  x   /  AST  30  /  ALT  22  /  AlkPhos  57  12-16      BMI:   HbA1c:   Glucose: POCT Blood Glucose.: 161 mg/dL (12-15-23 @ 11:51)    BP: 144/56 (12-16-23 @ 07:33) (96/47 - 146/49)Vital Signs Last 24 Hrs  T(C): 36.7 (12-16-23 @ 07:33), Max: 36.7 (12-15-23 @ 20:11)  T(F): 98.1 (12-16-23 @ 07:33), Max: 98.1 (12-16-23 @ 07:33)  HR: 90 (12-16-23 @ 07:33) (90 - 101)  BP: 144/56 (12-16-23 @ 07:33) (144/56 - 146/49)  BP(mean): --  RR: 18 (12-15-23 @ 20:11) (18 - 18)  SpO2: 91% (12-15-23 @ 20:11) (91% - 91%)      Lipid Panel: Date/Time: 12-15-23 @ 04:58    Triglycerides, Serum: 187    POCT Blood Glucose.: 161 mg/dL (12-15-23 @ 11:51)        *I&O's Detail    15 Dec 2023 07:01  -  16 Dec 2023 07:00  --------------------------------------------------------  IN:  Total IN: 0 mL    OUT:    Drain (mL): 170 mL    Indwelling Catheter - Urethral (mL): 1325 mL    Intermittent Catheterization - Urethral (mL): 700 mL  Total OUT: 2195 mL    Total NET: -2195 mL              *will continue to monitor and adjust PN prn*    PPN Recommendations: via Peripheral Line  Total Volume:1800 ml   65g  Amino Acids   100g Dextrose   50g Lipids 20%  98mEq Sodium Chloride  14mEq Sodium Acetate  20mmol Sodium Phosphate  40mEq Potassium Chloride  40mEq Potassium Acetate  10mmol Potassium Phosphate  0mEq Calcium Gluconate  10mEq Magnesium Sulfate  200 mg Thiamine  0ml Trace Elements  10ml MVI  5mg Zinc  60 mcg Selenium  10 mcg Chromium  Tottal Calories 1100kcal                (osmolarity <900)        sign            
Medication reconciliation completed.  Patient was unable to provide medication information, spoke to pt's son Christophe at bedside and they provided current medication list; confirmed with Dr. First [facility].

## 2023-12-18 DIAGNOSIS — R33.9 RETENTION OF URINE, UNSPECIFIED: ICD-10-CM

## 2023-12-18 LAB
ALBUMIN SERPL ELPH-MCNC: 2 G/DL — LOW (ref 3.3–5)
ALBUMIN SERPL ELPH-MCNC: 2 G/DL — LOW (ref 3.3–5)
ALP SERPL-CCNC: 53 U/L — SIGNIFICANT CHANGE UP (ref 40–120)
ALP SERPL-CCNC: 53 U/L — SIGNIFICANT CHANGE UP (ref 40–120)
ALT FLD-CCNC: 33 U/L — SIGNIFICANT CHANGE UP (ref 12–78)
ALT FLD-CCNC: 33 U/L — SIGNIFICANT CHANGE UP (ref 12–78)
ANION GAP SERPL CALC-SCNC: 4 MMOL/L — LOW (ref 5–17)
ANION GAP SERPL CALC-SCNC: 4 MMOL/L — LOW (ref 5–17)
ANION GAP SERPL CALC-SCNC: 5 MMOL/L — SIGNIFICANT CHANGE UP (ref 5–17)
ANION GAP SERPL CALC-SCNC: 5 MMOL/L — SIGNIFICANT CHANGE UP (ref 5–17)
APTT BLD: 60.8 SEC — HIGH (ref 24.5–35.6)
APTT BLD: 60.8 SEC — HIGH (ref 24.5–35.6)
AST SERPL-CCNC: 33 U/L — SIGNIFICANT CHANGE UP (ref 15–37)
AST SERPL-CCNC: 33 U/L — SIGNIFICANT CHANGE UP (ref 15–37)
BASOPHILS # BLD AUTO: 0.05 K/UL — SIGNIFICANT CHANGE UP (ref 0–0.2)
BASOPHILS # BLD AUTO: 0.05 K/UL — SIGNIFICANT CHANGE UP (ref 0–0.2)
BASOPHILS NFR BLD AUTO: 0.3 % — SIGNIFICANT CHANGE UP (ref 0–2)
BASOPHILS NFR BLD AUTO: 0.3 % — SIGNIFICANT CHANGE UP (ref 0–2)
BILIRUB SERPL-MCNC: 0.7 MG/DL — SIGNIFICANT CHANGE UP (ref 0.2–1.2)
BILIRUB SERPL-MCNC: 0.7 MG/DL — SIGNIFICANT CHANGE UP (ref 0.2–1.2)
BUN SERPL-MCNC: 21 MG/DL — SIGNIFICANT CHANGE UP (ref 7–23)
CALCIUM SERPL-MCNC: 7.4 MG/DL — LOW (ref 8.5–10.1)
CALCIUM SERPL-MCNC: 7.4 MG/DL — LOW (ref 8.5–10.1)
CALCIUM SERPL-MCNC: 7.6 MG/DL — LOW (ref 8.5–10.1)
CALCIUM SERPL-MCNC: 7.6 MG/DL — LOW (ref 8.5–10.1)
CHLORIDE SERPL-SCNC: 105 MMOL/L — SIGNIFICANT CHANGE UP (ref 96–108)
CHLORIDE SERPL-SCNC: 105 MMOL/L — SIGNIFICANT CHANGE UP (ref 96–108)
CHLORIDE SERPL-SCNC: 106 MMOL/L — SIGNIFICANT CHANGE UP (ref 96–108)
CHLORIDE SERPL-SCNC: 106 MMOL/L — SIGNIFICANT CHANGE UP (ref 96–108)
CO2 SERPL-SCNC: 26 MMOL/L — SIGNIFICANT CHANGE UP (ref 22–31)
CO2 SERPL-SCNC: 26 MMOL/L — SIGNIFICANT CHANGE UP (ref 22–31)
CO2 SERPL-SCNC: 27 MMOL/L — SIGNIFICANT CHANGE UP (ref 22–31)
CO2 SERPL-SCNC: 27 MMOL/L — SIGNIFICANT CHANGE UP (ref 22–31)
CREAT SERPL-MCNC: 0.77 MG/DL — SIGNIFICANT CHANGE UP (ref 0.5–1.3)
CREAT SERPL-MCNC: 0.77 MG/DL — SIGNIFICANT CHANGE UP (ref 0.5–1.3)
CREAT SERPL-MCNC: 0.84 MG/DL — SIGNIFICANT CHANGE UP (ref 0.5–1.3)
CREAT SERPL-MCNC: 0.84 MG/DL — SIGNIFICANT CHANGE UP (ref 0.5–1.3)
EGFR: 69 ML/MIN/1.73M2 — SIGNIFICANT CHANGE UP
EGFR: 69 ML/MIN/1.73M2 — SIGNIFICANT CHANGE UP
EGFR: 76 ML/MIN/1.73M2 — SIGNIFICANT CHANGE UP
EGFR: 76 ML/MIN/1.73M2 — SIGNIFICANT CHANGE UP
EOSINOPHIL # BLD AUTO: 0.2 K/UL — SIGNIFICANT CHANGE UP (ref 0–0.5)
EOSINOPHIL # BLD AUTO: 0.2 K/UL — SIGNIFICANT CHANGE UP (ref 0–0.5)
EOSINOPHIL NFR BLD AUTO: 1.1 % — SIGNIFICANT CHANGE UP (ref 0–6)
EOSINOPHIL NFR BLD AUTO: 1.1 % — SIGNIFICANT CHANGE UP (ref 0–6)
GLUCOSE SERPL-MCNC: 137 MG/DL — HIGH (ref 70–99)
GLUCOSE SERPL-MCNC: 137 MG/DL — HIGH (ref 70–99)
GLUCOSE SERPL-MCNC: 157 MG/DL — HIGH (ref 70–99)
GLUCOSE SERPL-MCNC: 157 MG/DL — HIGH (ref 70–99)
HCT VFR BLD CALC: 29 % — LOW (ref 34.5–45)
HCT VFR BLD CALC: 29 % — LOW (ref 34.5–45)
HCT VFR BLD CALC: 29.3 % — LOW (ref 34.5–45)
HCT VFR BLD CALC: 29.3 % — LOW (ref 34.5–45)
HGB BLD-MCNC: 9.6 G/DL — LOW (ref 11.5–15.5)
HGB BLD-MCNC: 9.6 G/DL — LOW (ref 11.5–15.5)
HGB BLD-MCNC: 9.7 G/DL — LOW (ref 11.5–15.5)
HGB BLD-MCNC: 9.7 G/DL — LOW (ref 11.5–15.5)
IMM GRANULOCYTES NFR BLD AUTO: 12.1 % — HIGH (ref 0–0.9)
IMM GRANULOCYTES NFR BLD AUTO: 12.1 % — HIGH (ref 0–0.9)
LYMPHOCYTES # BLD AUTO: 1.92 K/UL — SIGNIFICANT CHANGE UP (ref 1–3.3)
LYMPHOCYTES # BLD AUTO: 1.92 K/UL — SIGNIFICANT CHANGE UP (ref 1–3.3)
LYMPHOCYTES # BLD AUTO: 10.5 % — LOW (ref 13–44)
LYMPHOCYTES # BLD AUTO: 10.5 % — LOW (ref 13–44)
MAGNESIUM SERPL-MCNC: 2.2 MG/DL — SIGNIFICANT CHANGE UP (ref 1.6–2.6)
MAGNESIUM SERPL-MCNC: 2.2 MG/DL — SIGNIFICANT CHANGE UP (ref 1.6–2.6)
MCHC RBC-ENTMCNC: 29.2 PG — SIGNIFICANT CHANGE UP (ref 27–34)
MCHC RBC-ENTMCNC: 29.2 PG — SIGNIFICANT CHANGE UP (ref 27–34)
MCHC RBC-ENTMCNC: 29.5 PG — SIGNIFICANT CHANGE UP (ref 27–34)
MCHC RBC-ENTMCNC: 29.5 PG — SIGNIFICANT CHANGE UP (ref 27–34)
MCHC RBC-ENTMCNC: 33.1 GM/DL — SIGNIFICANT CHANGE UP (ref 32–36)
MCV RBC AUTO: 88.1 FL — SIGNIFICANT CHANGE UP (ref 80–100)
MCV RBC AUTO: 88.1 FL — SIGNIFICANT CHANGE UP (ref 80–100)
MCV RBC AUTO: 89.1 FL — SIGNIFICANT CHANGE UP (ref 80–100)
MCV RBC AUTO: 89.1 FL — SIGNIFICANT CHANGE UP (ref 80–100)
MONOCYTES # BLD AUTO: 1.35 K/UL — HIGH (ref 0–0.9)
MONOCYTES # BLD AUTO: 1.35 K/UL — HIGH (ref 0–0.9)
MONOCYTES NFR BLD AUTO: 7.4 % — SIGNIFICANT CHANGE UP (ref 2–14)
MONOCYTES NFR BLD AUTO: 7.4 % — SIGNIFICANT CHANGE UP (ref 2–14)
NEUTROPHILS # BLD AUTO: 12.52 K/UL — HIGH (ref 1.8–7.4)
NEUTROPHILS # BLD AUTO: 12.52 K/UL — HIGH (ref 1.8–7.4)
NEUTROPHILS NFR BLD AUTO: 68.6 % — SIGNIFICANT CHANGE UP (ref 43–77)
NEUTROPHILS NFR BLD AUTO: 68.6 % — SIGNIFICANT CHANGE UP (ref 43–77)
NT-PROBNP SERPL-SCNC: 1712 PG/ML — HIGH (ref 0–450)
NT-PROBNP SERPL-SCNC: 1712 PG/ML — HIGH (ref 0–450)
PHOSPHATE SERPL-MCNC: 3.4 MG/DL — SIGNIFICANT CHANGE UP (ref 2.5–4.5)
PHOSPHATE SERPL-MCNC: 3.4 MG/DL — SIGNIFICANT CHANGE UP (ref 2.5–4.5)
PLATELET # BLD AUTO: 244 K/UL — SIGNIFICANT CHANGE UP (ref 150–400)
PLATELET # BLD AUTO: 244 K/UL — SIGNIFICANT CHANGE UP (ref 150–400)
PLATELET # BLD AUTO: 247 K/UL — SIGNIFICANT CHANGE UP (ref 150–400)
PLATELET # BLD AUTO: 247 K/UL — SIGNIFICANT CHANGE UP (ref 150–400)
POTASSIUM SERPL-MCNC: 4.2 MMOL/L — SIGNIFICANT CHANGE UP (ref 3.5–5.3)
POTASSIUM SERPL-MCNC: 4.2 MMOL/L — SIGNIFICANT CHANGE UP (ref 3.5–5.3)
POTASSIUM SERPL-MCNC: 4.3 MMOL/L — SIGNIFICANT CHANGE UP (ref 3.5–5.3)
POTASSIUM SERPL-MCNC: 4.3 MMOL/L — SIGNIFICANT CHANGE UP (ref 3.5–5.3)
POTASSIUM SERPL-SCNC: 4.2 MMOL/L — SIGNIFICANT CHANGE UP (ref 3.5–5.3)
POTASSIUM SERPL-SCNC: 4.2 MMOL/L — SIGNIFICANT CHANGE UP (ref 3.5–5.3)
POTASSIUM SERPL-SCNC: 4.3 MMOL/L — SIGNIFICANT CHANGE UP (ref 3.5–5.3)
POTASSIUM SERPL-SCNC: 4.3 MMOL/L — SIGNIFICANT CHANGE UP (ref 3.5–5.3)
PROT SERPL-MCNC: 5.5 GM/DL — LOW (ref 6–8.3)
PROT SERPL-MCNC: 5.5 GM/DL — LOW (ref 6–8.3)
RBC # BLD: 3.29 M/UL — LOW (ref 3.8–5.2)
RBC # FLD: 14.6 % — HIGH (ref 10.3–14.5)
RBC # FLD: 14.6 % — HIGH (ref 10.3–14.5)
RBC # FLD: 14.7 % — HIGH (ref 10.3–14.5)
RBC # FLD: 14.7 % — HIGH (ref 10.3–14.5)
SODIUM SERPL-SCNC: 136 MMOL/L — SIGNIFICANT CHANGE UP (ref 135–145)
SODIUM SERPL-SCNC: 136 MMOL/L — SIGNIFICANT CHANGE UP (ref 135–145)
SODIUM SERPL-SCNC: 137 MMOL/L — SIGNIFICANT CHANGE UP (ref 135–145)
SODIUM SERPL-SCNC: 137 MMOL/L — SIGNIFICANT CHANGE UP (ref 135–145)
WBC # BLD: 18.24 K/UL — HIGH (ref 3.8–10.5)
WBC # BLD: 18.24 K/UL — HIGH (ref 3.8–10.5)
WBC # BLD: 18.57 K/UL — HIGH (ref 3.8–10.5)
WBC # BLD: 18.57 K/UL — HIGH (ref 3.8–10.5)
WBC # FLD AUTO: 18.24 K/UL — HIGH (ref 3.8–10.5)
WBC # FLD AUTO: 18.24 K/UL — HIGH (ref 3.8–10.5)
WBC # FLD AUTO: 18.57 K/UL — HIGH (ref 3.8–10.5)
WBC # FLD AUTO: 18.57 K/UL — HIGH (ref 3.8–10.5)

## 2023-12-18 PROCEDURE — 99233 SBSQ HOSP IP/OBS HIGH 50: CPT

## 2023-12-18 PROCEDURE — 74177 CT ABD & PELVIS W/CONTRAST: CPT | Mod: 26

## 2023-12-18 PROCEDURE — 99222 1ST HOSP IP/OBS MODERATE 55: CPT

## 2023-12-18 PROCEDURE — 71045 X-RAY EXAM CHEST 1 VIEW: CPT | Mod: 26

## 2023-12-18 RX ORDER — DEXTROSE MONOHYDRATE, SODIUM CHLORIDE, AND POTASSIUM CHLORIDE 50; .745; 4.5 G/1000ML; G/1000ML; G/1000ML
1000 INJECTION, SOLUTION INTRAVENOUS
Refills: 0 | Status: DISCONTINUED | OUTPATIENT
Start: 2023-12-18 | End: 2023-12-18

## 2023-12-18 RX ORDER — DEXTROSE MONOHYDRATE, SODIUM CHLORIDE, AND POTASSIUM CHLORIDE 50; .745; 4.5 G/1000ML; G/1000ML; G/1000ML
1000 INJECTION, SOLUTION INTRAVENOUS
Refills: 0 | Status: DISCONTINUED | OUTPATIENT
Start: 2023-12-18 | End: 2023-12-21

## 2023-12-18 RX ORDER — I.V. FAT EMULSION 20 G/100ML
0.69 EMULSION INTRAVENOUS
Qty: 50 | Refills: 0 | Status: DISCONTINUED | OUTPATIENT
Start: 2023-12-18 | End: 2023-12-18

## 2023-12-18 RX ORDER — ELECTROLYTE SOLUTION,INJ
1 VIAL (ML) INTRAVENOUS
Refills: 0 | Status: DISCONTINUED | OUTPATIENT
Start: 2023-12-18 | End: 2023-12-18

## 2023-12-18 RX ORDER — TAMSULOSIN HYDROCHLORIDE 0.4 MG/1
0.4 CAPSULE ORAL AT BEDTIME
Refills: 0 | Status: DISCONTINUED | OUTPATIENT
Start: 2023-12-18 | End: 2023-12-23

## 2023-12-18 RX ORDER — POLYETHYLENE GLYCOL 3350 17 G/17G
17 POWDER, FOR SOLUTION ORAL EVERY 12 HOURS
Refills: 0 | Status: DISCONTINUED | OUTPATIENT
Start: 2023-12-18 | End: 2023-12-23

## 2023-12-18 RX ADMIN — Medication 1 EACH: at 23:24

## 2023-12-18 RX ADMIN — LIDOCAINE 1 PATCH: 4 CREAM TOPICAL at 19:00

## 2023-12-18 RX ADMIN — LIDOCAINE 1 PATCH: 4 CREAM TOPICAL at 10:19

## 2023-12-18 RX ADMIN — I.V. FAT EMULSION 20.8 GM/KG/DAY: 20 EMULSION INTRAVENOUS at 23:25

## 2023-12-18 RX ADMIN — Medication 60 MILLIGRAM(S): at 13:26

## 2023-12-18 RX ADMIN — AMPICILLIN SODIUM AND SULBACTAM SODIUM 200 GRAM(S): 250; 125 INJECTION, POWDER, FOR SUSPENSION INTRAMUSCULAR; INTRAVENOUS at 22:37

## 2023-12-18 RX ADMIN — HEPARIN SODIUM 1250 UNIT(S)/HR: 5000 INJECTION INTRAVENOUS; SUBCUTANEOUS at 04:13

## 2023-12-18 RX ADMIN — HEPARIN SODIUM 1250 UNIT(S)/HR: 5000 INJECTION INTRAVENOUS; SUBCUTANEOUS at 19:23

## 2023-12-18 RX ADMIN — PANTOPRAZOLE SODIUM 40 MILLIGRAM(S): 20 TABLET, DELAYED RELEASE ORAL at 10:19

## 2023-12-18 RX ADMIN — AMPICILLIN SODIUM AND SULBACTAM SODIUM 200 GRAM(S): 250; 125 INJECTION, POWDER, FOR SUSPENSION INTRAMUSCULAR; INTRAVENOUS at 17:39

## 2023-12-18 RX ADMIN — Medication 60 MILLIGRAM(S): at 21:21

## 2023-12-18 RX ADMIN — DEXTROSE MONOHYDRATE, SODIUM CHLORIDE, AND POTASSIUM CHLORIDE 75 MILLILITER(S): 50; .745; 4.5 INJECTION, SOLUTION INTRAVENOUS at 16:54

## 2023-12-18 RX ADMIN — Medication 60 MILLIGRAM(S): at 04:19

## 2023-12-18 RX ADMIN — LIDOCAINE 1 PATCH: 4 CREAM TOPICAL at 23:47

## 2023-12-18 RX ADMIN — TAMSULOSIN HYDROCHLORIDE 0.4 MILLIGRAM(S): 0.4 CAPSULE ORAL at 21:21

## 2023-12-18 RX ADMIN — AMPICILLIN SODIUM AND SULBACTAM SODIUM 200 GRAM(S): 250; 125 INJECTION, POWDER, FOR SUSPENSION INTRAMUSCULAR; INTRAVENOUS at 10:18

## 2023-12-18 RX ADMIN — AMPICILLIN SODIUM AND SULBACTAM SODIUM 200 GRAM(S): 250; 125 INJECTION, POWDER, FOR SUSPENSION INTRAMUSCULAR; INTRAVENOUS at 04:15

## 2023-12-18 RX ADMIN — MORPHINE SULFATE 2 MILLIGRAM(S): 50 CAPSULE, EXTENDED RELEASE ORAL at 04:15

## 2023-12-18 RX ADMIN — PANTOPRAZOLE SODIUM 40 MILLIGRAM(S): 20 TABLET, DELAYED RELEASE ORAL at 21:21

## 2023-12-18 NOTE — CDI QUERY NOTE - NSCDIOTHERTXTBX_GEN_ALL_CORE_HH
Clinical documentation indicates that this patient has Sepsis and septic shock.  There is a need to further clarify the status of sepsis.  Please indicate status of the patient's sepsis    Sepsis POA ruled in  Sepsis not POA  Sepsis ruled out  Early Sepsis POA,   Other please specify      SUPPORTING DOCUMENTATION AND/OR CLINICAL EVIDENCE:      Vital Signs on Admission: 126/67, HR 67, RR 16 T 98.2     WBC Count: 10.22 K/uL (12.11.23 @ 10:57) WBC Count: 13.05 K/uL (12.14.23 @ 06:38) WBC: WBC Count: 18.57 K/uL *H* [3.80 - 10.50] (12-18-23)WBC Count: 18.24 K/uL *H* [3.80 - 10.50] (12-18-23)WBC Count: 16.15 K/uL *H* [3.80 - 10.50] (12-17-23)WBC Count: 15.23 K/uL *H* [3.80 - 10.50] (12-17-23WBC Count: 13.58 K/uL *H* [3.80 - 10.50] (12-16-23)WBC Count: 12.38 K/uL *H* [3.80 - 10.50] (12-16-23)WBC Count: 12.91 K/uL *H* [3.80 - 10.50] (12-15-23)WBC Count: 13.05 K/uL *H* [3.80 - 10.50] (12-14-23)  Lactate, Blood: 1.4 mmol/L (12.12.23 @ 15:25) Lactate, Blood: 2.1 mmol/L (12.12.23 @ 07:53) Lactate, Blood: 4.8: Test Name: LACTATE Called by: Franco_ Called to: Bhavna Montoya_RN Read back 2 Pt (12.11.23 @ 23:50) Lactate, Blood: 3.4 mmol/L (12.11.23 @ 20:10) Lactate, Blood: 3.6 mmol/L (12.11.23 @ 18:20) Lactate, Blood: 7.7:  Lactate, Blood: 9.5:     ED-Principal Discharge DX:	Perforated abdominal viscus.piperacillin/tazobactam IVPB.. 3.375 Gram(s) IV Intermittent every 8 hours-s/p 2L in the ED  vancomycin  IVPB. 1000 milliGRAM(s) IV Intermittent once  Evidence of pneumoperitoneum. Suspect rectal perforation considering the presence of perirectal gas and pneumatosis.    -Given concerns for perforated viscus and intraabdominal sepsis will take to OR emergently for exploratory laparotomy, colectomy, and ostomy. Pratibha Armenta)  (Signed 11-Dec-2023 15:47)  brief op report · Select type of infection:	feculent peritonitis/sepsis  12/11   -Septic shock  starting nor-epi  titrating to MAP 65  POCUS when she arrives to the unit JAZMIN improving   keep LR at 75.  De-resuscitate when BP stable Lactic acidosis clearing  Ostomy is pink Angus David (PA)  (Signed 11-Dec-2023 22:13)  -Sepsis Note [Charted Location: [facility] PACU 001 03] [Authored: 11-Dec-2023 22:14]- for Visit: [id number], Complete, Entered, Signed in Full, Available to PatientSEPSIS REASSESSMENT: I have reassessed the patient for Sepsis. I examined the patient on 11-Dec-2023 19:30 Sepsis Note [Charted Location: [facility] Angus David (PA)  (Signed 11-Dec-2023 22:14)  -ID· Reason for Referral/Consultation:	sepsis with bacteroidesabd perforation· Reason for Admission	colonic perforation with pneumoperitoneaum-1. Sepsis with Bacteroides species. Abdominal perforation. Feculent peritonitis. S/p Hartmanns procedure/abd washout- imaging reviewed- OR cultures growing bacteroides, acidaminococcus, proteus Angelina Mclaughlin P (DO)  (Signed 16-Dec-2023 11:11)  -Critial Care Septic shock from perforated colon and feculent peritonitis Prerenal JAZMIN Lactic acidosis Post-operative acute resp failure -Sundeep Finn (MD)  (Signed 12-Dec-2023 15:19)Judah Devine ()  (Signed 14-Dec-2023 16:08)    -Went into septic shock, requiring pressors. Tx to ICU w/ post-op resp failure. NGT to LWS, blood tinged. Off pressors as of this AM 12/13.Evelyn Rich (Registered Dietitian)  (Signed 15-Dec-2023 13:27)  -Septic shock Septic shock from perforated colon and feculent peritonitis-Ricardo Mares)  (Signed 15-Dec-2023 21:37)  -Pt found to have septic shock d/t stercoral ulcer and colonic perforation eval by surgery taken to OR on 12/11 s/p Hartmanns procedure, abd washout noted to have feculent peritonitis, cx sent, blood cx growing Bacteroides, Body fluid cx growing proteus, bacteroides, Acidaminococcus has been on IV zosyn perioperatively. Angelina Mclaughlin ()  (Signed 16-Dec-2023 11:11) Clinical documentation indicates that this patient has Sepsis and septic shock.  There is a need to further clarify the status of sepsis.  Please indicate status of the patient's sepsis    Sepsis POA ruled in  Sepsis not POA  Sepsis ruled out  Early Sepsis POA,   Other please specify      SUPPORTING DOCUMENTATION AND/OR CLINICAL EVIDENCE:      Vital Signs on Admission: 126/67, HR 67, RR 16 T 98.2     WBC Count: 10.22 K/uL (12.11.23 @ 10:57) WBC Count: 13.05 K/uL (12.14.23 @ 06:38) WBC: WBC Count: 18.57 K/uL *H* [3.80 - 10.50] (12-18-23)WBC Count: 18.24 K/uL *H* [3.80 - 10.50] (12-18-23)WBC Count: 16.15 K/uL *H* [3.80 - 10.50] (12-17-23)WBC Count: 15.23 K/uL *H* [3.80 - 10.50] (12-17-23WBC Count: 13.58 K/uL *H* [3.80 - 10.50] (12-16-23)WBC Count: 12.38 K/uL *H* [3.80 - 10.50] (12-16-23)WBC Count: 12.91 K/uL *H* [3.80 - 10.50] (12-15-23)WBC Count: 13.05 K/uL *H* [3.80 - 10.50] (12-14-23)  Lactate, Blood: 1.4 mmol/L (12.12.23 @ 15:25) Lactate, Blood: 2.1 mmol/L (12.12.23 @ 07:53) Lactate, Blood: 4.8: Test Name: LACTATE Called by: Franco_ Called to: Bhavna Montoya_RN Read back 2 Pt (12.11.23 @ 23:50) Lactate, Blood: 3.4 mmol/L (12.11.23 @ 20:10) Lactate, Blood: 3.6 mmol/L (12.11.23 @ 18:20) Lactate, Blood: 7.7:  Lactate, Blood: 9.5:     ED-Principal Discharge DX:	Perforated abdominal viscus.piperacillin/tazobactam IVPB.. 3.375 Gram(s) IV Intermittent every 8 hours-s/p 2L in the ED  vancomycin  IVPB. 1000 milliGRAM(s) IV Intermittent once  Evidence of pneumoperitoneum. Suspect rectal perforation considering the presence of perirectal gas and pneumatosis.    -Given concerns for perforated viscus and intraabdominal sepsis will take to OR emergently for exploratory laparotomy, colectomy, and ostomy. Pratibha Armenta)  (Signed 11-Dec-2023 15:47)  brief op report · Select type of infection:	feculent peritonitis/sepsis  12/11   -Septic shock  starting nor-epi  titrating to MAP 65  POCUS when she arrives to the unit JAZMIN improving   keep LR at 75.  De-resuscitate when BP stable Lactic acidosis clearing  Ostomy is pink Angus David (PA)  (Signed 11-Dec-2023 22:13)  -Sepsis Note [Charted Location: [facility] PACU 001 03] [Authored: 11-Dec-2023 22:14]- for Visit: [id number], Complete, Entered, Signed in Full, Available to PatientSEPSIS REASSESSMENT: I have reassessed the patient for Sepsis. I examined the patient on 11-Dec-2023 19:30 Sepsis Note [Charted Location: [facility] Angus David (PA)  (Signed 11-Dec-2023 22:14)  -ID· Reason for Referral/Consultation:	sepsis with bacteroidesabd perforation· Reason for Admission	colonic perforation with pneumoperitoneaum-1. Sepsis with Bacteroides species. Abdominal perforation. Feculent peritonitis. S/p Hartmanns procedure/abd washout- imaging reviewed- OR cultures growing bacteroides, acidaminococcus, proteus Angelina Mclaughlin P (DO)  (Signed 16-Dec-2023 11:11)  -Critial Care Septic shock from perforated colon and feculent peritonitis Prerenal JAZMIN Lactic acidosis Post-operative acute resp failure -Sundeep Finn (MD)  (Signed 12-Dec-2023 15:19)Judah Devine ()  (Signed 14-Dec-2023 16:08)    -Went into septic shock, requiring pressors. Tx to ICU w/ post-op resp failure. NGT to LWS, blood tinged. Off pressors as of this AM 12/13.Evelyn Rich (Registered Dietitian)  (Signed 15-Dec-2023 13:27)  -Septic shock Septic shock from perforated colon and feculent peritonitis-Ricardo Mares)  (Signed 15-Dec-2023 21:37)  -Pt found to have septic shock d/t stercoral ulcer and colonic perforation eval by surgery taken to OR on 12/11 s/p Hartmanns procedure, abd washout noted to have feculent peritonitis, cx sent, blood cx growing Bacteroides, Body fluid cx growing proteus, bacteroides, Acidaminococcus has been on IV zosyn perioperatively. Angelina Mclaughlin ()  (Signed 16-Dec-2023 11:11)

## 2023-12-18 NOTE — PROGRESS NOTE ADULT - SUBJECTIVE AND OBJECTIVE BOX
HOSPITALIST ATTENDING PROGRESS NOTE    Chart and meds reviewed.      Subjective: Patient seen and examined. resting comfortably . pain remains but continues to slowly improve. however WBCs have continued to trend upwards , 18.57 today, will repeat CT abdomen to check for abscess.       Additional results/Imaging, I have personally reviewed:    LABS:                            9.7    18.57 )-----------( 244      ( 18 Dec 2023 07:05 )             29.3     12-18    136  |  105  |  21  ----------------------------<  137<H>  4.2   |  26  |  0.77    Ca    7.6<L>      18 Dec 2023 07:05  Phos  3.4     12-18  Mg     2.2     12-18    TPro  5.5<L>  /  Alb  2.0<L>  /  TBili  0.7  /  DBili  x   /  AST  33  /  ALT  33  /  AlkPhos  53  12-18        LIVER FUNCTIONS - ( 18 Dec 2023 03:31 )  Alb: 2.0 g/dL / Pro: 5.5 gm/dL / ALK PHOS: 53 U/L / ALT: 33 U/L / AST: 33 U/L / GGT: x           PT/INR - ( 18 Dec 2023 12:19 )   PT: 11.4 sec;   INR: 1.01 ratio         PTT - ( 18 Dec 2023 12:19 )  PTT:69.5 sec  Urinalysis Basic - ( 18 Dec 2023 07:05 )    Color: x / Appearance: x / SG: x / pH: x  Gluc: 137 mg/dL / Ketone: x  / Bili: x / Urobili: x   Blood: x / Protein: x / Nitrite: x   Leuk Esterase: x / RBC: x / WBC x   Sq Epi: x / Non Sq Epi: x / Bacteria: x              All other systems reviewed and found to be negative with the exception of what has been described above.    MEDICATIONS  (STANDING):  ampicillin/sulbactam  IVPB 3 Gram(s) IV Intermittent every 6 hours  diltiazem    Tablet 60 milliGRAM(s) Oral every 8 hours  heparin  Infusion. 1100 Unit(s)/Hr (11 mL/Hr) IV Continuous <Continuous>  lidocaine   4% Patch 1 Patch Transdermal daily  lipid, fat emulsion (Fish Oil and Plant Based) 20% Infusion 20.83 mL/Hr (20.8 mL/Hr) IV Continuous <Continuous>  lipid, fat emulsion (Fish Oil and Plant Based) 20% Infusion 0.685 Gm/kG/Day (20.8 mL/Hr) IV Continuous <Continuous>  metoprolol tartrate Injectable 5 milliGRAM(s) IV Push every 5 minutes  pantoprazole  Injectable 40 milliGRAM(s) IV Push two times a day  Parenteral Nutrition - Adult 1 Each (75 mL/Hr) TPN Continuous <Continuous>  Parenteral Nutrition - Adult 1 Each (75 mL/Hr) TPN Continuous <Continuous>  polyethylene glycol 3350 17 Gram(s) Oral daily  tamsulosin 0.4 milliGRAM(s) Oral at bedtime    MEDICATIONS  (PRN):  acetaminophen   IVPB .. 1000 milliGRAM(s) IV Intermittent once PRN Temp greater or equal to 38C (100.4F), Mild Pain (1 - 3)  heparin   Injectable 4400 Unit(s) IV Push every 6 hours PRN For aPTT less than 40  morphine  - Injectable 2 milliGRAM(s) IV Push every 4 hours PRN Moderate Pain (4 - 6)  ondansetron Injectable 4 milliGRAM(s) IV Push every 6 hours PRN Nausea      VITALS:  T(F): 97.8 (12-18-23 @ 07:48), Max: 97.9 (12-17-23 @ 20:39)  HR: 88 (12-18-23 @ 13:23) (88 - 96)  BP: 134/48 (12-18-23 @ 13:23) (120/41 - 138/52)  RR: 18 (12-18-23 @ 07:48) (17 - 18)  SpO2: 94% (12-18-23 @ 07:48) (94% - 94%)  Wt(kg): --    I&O's Summary    17 Dec 2023 07:01  -  18 Dec 2023 07:00  --------------------------------------------------------  IN: 0 mL / OUT: 1625 mL / NET: -1625 mL    18 Dec 2023 07:01  -  18 Dec 2023 14:23  --------------------------------------------------------  IN: 0 mL / OUT: 50 mL / NET: -50 mL        CAPILLARY BLOOD GLUCOSE          PHYSICAL EXAM:  GEN: NAD   HEENT: EOMI, normal hearing, moist mucous membranes  NECK : Soft and supple, no JVD  LUNG: CTABL, No wheezing, rales or rhonchi  CVS: S1S2+, (+)tachycardic, +regular rhythm   GI: midline dressing c/d/i, Ostomy pink and patent, semisolid stool, gas in bag, incision healing well, no erythema or induration  EXTREMITIES: Left arm swollen.  VASCULAR: 2+ peripheral pulses  NEURO: AAOx3, grossly non-focal   SKIN: No rashes      CULTURES:      Telemetry, personally reviewed

## 2023-12-18 NOTE — CONSULT NOTE ADULT - NS ATTEND AMEND GEN_ALL_CORE FT
Patient was seen and examined by me, agree with above note, where necessary edits were made.     Son by the bedside.   Patient mentioned before admission would urinate every 1 to 2 hours or so during the day and nocturia of every hour or so.   Had urinary incontinence with urgency and with cough and sneeze.   Discussed concern for incomplete emptying of bladder and overflow urinary incontinence.   Discussed off label use of Flomax.  Trial of void in 4 days earliest or as out patient, check PVR, if significant reinsert Leyva.   Recommended Patient to lean forward to urinate at next trial of void.

## 2023-12-18 NOTE — PROGRESS NOTE ADULT - ATTENDING COMMENTS
S&E  CXR and CT A/P reviewed  Colostomy productive  NAD  soft, NTND  Labs reviewed  A/P -   ? pleural effusions. BNP ordered, noted to have mitral regurg. Also with increasing 02 requirements and SOB. May need diuresis.  Fluid collection noted in left paracolic gutter. Will await final read and may need IR drainage.  Cont Unasyn for now.   OOB to chair.

## 2023-12-18 NOTE — PROGRESS NOTE ADULT - ASSESSMENT
83 year old female presented with colonic perforation 2/2 stercoral ulcer, now POD 7 s/p Christina  AFib better controlled with Diltiazem and PRN Metoprolol. Patient on Heparin drip.     Plan:  Cont  FLD, advance as tolerated  Monitor ostomy function, continue Miralax  Pain & nausea control PRN  Cardiology recs appreciated  Hospitalist recs  Incentive spirometry, wean oxygen  Cont Unasyn  Replace electrolytes PRN  PT anticipates RONALD  Will need ostomy teaching  VTE ppx w/ SCDs & Lovenox    Discussed with attending.

## 2023-12-18 NOTE — PROGRESS NOTE ADULT - ASSESSMENT
83F presented with abd pain, n/v and constipation, noted to have colonic perforation 2/2 stercoral ulcer    #Septic shock Septic shock from perforated colon and feculent peritonitis   -s/p Christina procedure  -S/p NGT  -Ostomy care  -Monitor CORKY output  -BCx: Gram Negative Rods, pending identification. will need repeat BCx   -Intraop cultures: Proteus Mirabilis   -s/p Zosyn   ID consulted recommendations appreciated   - change abx to unasyn 3gmq6h from rocephin   on dc po augmentin [id number] 14 days from day of negative cx    -repeat blood cultures   -currently on PPN  -strict i&o's   -daugherty in place, urology consult placed for urinary retention   -Pain management  -Serial abdominal exam  -management per CRSx  Repeat CT abdomen today , continue to follow WBCs  Continue Antibiotics     #pAfib, currently in NSR   -TTE: 2+ MR, 3+ TR, pHTN (RVSP: 41), EF: 55 to 60%  -S/p Lopressor and amiodarone  -S/p Cardizem gtt.  Start oral cardizem and IV heparin drip as per cardiology  transition to DOAC when cleared by surgery   -Currently in NSR (intermittent episodes of sinus tach on telemetry)    Will need fu for MV post recovery as had moderate MR and Pulm.  -[id number] Score: 3 (Age, Sex),   -Monitor electrolytes, replace and trend   -monitor on tele   Cardiology consulted recommendations appreciated.    Daugherty in place  Urology consulted, recommendations appreciated  Recommend  - Keep indwelling daugherty in place  - Will place on Flomax discussed off label use in females pt would like to try it  - Trial of void in 4 days if inpatient vs. in 1 week outpatient. If inpatient TOV is performed check post void residual, if significant d/c with daugherty to leg bag  - Follow up with Dr. Ramos outpatient      #COPD  -Mild  -Stable  -Currently not on any medication    #HLD  -Diet controlled, currently not on any medication    VTE prophylaxis  On IV heparin     83F presented with abd pain, n/v and constipation, noted to have colonic perforation 2/2 stercoral ulcer    #Septic shock Septic shock from perforated colon and feculent peritonitis   -s/p Christina procedure  -S/p NGT  -Ostomy care  -Monitor CORKY output  -BCx: Gram Negative Rods, pending identification. will need repeat BCx   -Intraop cultures: Proteus Mirabilis   -s/p Zosyn   ID consulted recommendations appreciated   - change abx to unasyn 3gmq6h from rocephin   on dc po augmentin [id number] 14 days from day of negative cx    -repeat blood cultures   -currently on PPN  -strict i&o's   -daugherty in place, urology consult placed for urinary retention   -Pain management  -Serial abdominal exam  -management per CRSx  Repeat CT abdomen today , continue to follow WBCs  Continue Antibiotics     #pAfib, currently in NSR   -TTE: 2+ MR, 3+ TR, pHTN (RVSP: 41), EF: 55 to 60%  -S/p Lopressor and amiodarone  -S/p Cardizem gtt.  Start oral cardizem and IV heparin drip as per cardiology  transition to DOAC when cleared by surgery   -Currently in NSR (intermittent episodes of sinus tach on telemetry)    Will need fu for MV post recovery as had moderate MR and Pulm.  -[id number] Score: 3 (Age, Sex),   -Monitor electrolytes, replace and trend   -monitor on tele   Cardiology consulted recommendations appreciated.    Daugherty in place  Urology consulted, recommendations appreciated  Recommend  - Keep indwelling daugherty in place  - Will place on Flomax discussed off label use in females pt would like to try it  - Trial of void in 4 days if inpatient vs. in 1 week outpatient. If inpatient TOV is performed check post void residual, if significant d/c with daugherty to leg bag  - Follow up with Dr. Ramos outpatient      #COPD  -Mild  -Stable  -Currently not on any medication    #HLD  -Diet controlled, currently not on any medication    VTE prophylaxis  On IV heparin

## 2023-12-18 NOTE — CONSULT NOTE ADULT - SUBJECTIVE AND OBJECTIVE BOX
HPI:  [age] y/o female with a PMHx of COPD, HLD presents to the ED c/o abd pain, n/v and constipation x2 days. Denies CP, SOB, urinary symptoms. NKDA. Nonsmoker. No EtOH use. No other complaints at this time.   (11 Dec 2023 14:04)    82 yo female with PMH as above admitted for colonic perforation and is s/p Christina resection of colon on 12/11/23. Urology consulted for pt with post op urinary retention. As per RN pt was initially straight catheterized and had a daugherty placed for retention and most recently failed a trial of void as she was unable to void and daugherty was replaced this morning. Pt reports prior to this admission she was voiding freely but frequently reports she had a good flow and does not feel she had incomplete bladder emptying. She denies any hx of recurrent UTIs, hematuria or urinary retention in the past.     PAST MEDICAL & SURGICAL HISTORY:  HLD (hyperlipidemia)      History of COPD          REVIEW OF SYSTEMS     All other review of systems neg, except as noted in HPI  MEDICATIONS  (STANDING):  ampicillin/sulbactam  IVPB 3 Gram(s) IV Intermittent every 6 hours  diltiazem    Tablet 60 milliGRAM(s) Oral every 8 hours  heparin  Infusion. 1100 Unit(s)/Hr (11 mL/Hr) IV Continuous <Continuous>  lidocaine   4% Patch 1 Patch Transdermal daily  lipid, fat emulsion (Fish Oil and Plant Based) 20% Infusion 0.685 Gm/kG/Day (20.8 mL/Hr) IV Continuous <Continuous>  lipid, fat emulsion (Fish Oil and Plant Based) 20% Infusion 20.83 mL/Hr (20.8 mL/Hr) IV Continuous <Continuous>  metoprolol tartrate Injectable 5 milliGRAM(s) IV Push every 5 minutes  pantoprazole  Injectable 40 milliGRAM(s) IV Push two times a day  Parenteral Nutrition - Adult 1 Each (75 mL/Hr) TPN Continuous <Continuous>  Parenteral Nutrition - Adult 1 Each (75 mL/Hr) TPN Continuous <Continuous>  polyethylene glycol 3350 17 Gram(s) Oral daily    MEDICATIONS  (PRN):  acetaminophen   IVPB .. 1000 milliGRAM(s) IV Intermittent once PRN Temp greater or equal to 38C (100.4F), Mild Pain (1 - 3)  heparin   Injectable 4400 Unit(s) IV Push every 6 hours PRN For aPTT less than 40  morphine  - Injectable 2 milliGRAM(s) IV Push every 4 hours PRN Moderate Pain (4 - 6)  ondansetron Injectable 4 milliGRAM(s) IV Push every 6 hours PRN Nausea      Allergies    No Known Allergies    Intolerances        SOCIAL HISTORY:    FAMILY HISTORY:      Vital Signs Last 24 Hrs  T(C): 36.6 (18 Dec 2023 07:48), Max: 36.6 (17 Dec 2023 20:39)  T(F): 97.8 (18 Dec 2023 07:48), Max: 97.9 (17 Dec 2023 20:39)  HR: 88 (18 Dec 2023 07:48) (88 - 103)  BP: 138/52 (18 Dec 2023 07:48) (120/41 - 142/41)  BP(mean): --  RR: 18 (18 Dec 2023 07:48) (17 - 18)  SpO2: 94% (18 Dec 2023 07:48) (94% - 94%)    Parameters below as of 18 Dec 2023 07:48  Patient On (Oxygen Delivery Method): nasal cannula        PHYSICAL EXAM:  General: No distress, No anxiety  VITALS  T(C): 36.6 (12-18-23 @ 07:48), Max: 36.6 (12-17-23 @ 20:39)  HR: 88 (12-18-23 @ 07:48) (88 - 103)  BP: 138/52 (12-18-23 @ 07:48) (120/41 - 142/41)  RR: 18 (12-18-23 @ 07:48) (17 - 18)  SpO2: 94% (12-18-23 @ 07:48) (94% - 94%)            Skin     : No jaundice   HEENT: Normocephalic, no icterus , EOM full , No epistaxis  Lung    : No resp distress  Abdo:   : No distension, midline dressing in place c/d/i, +Ostomy   Back    : No CVAT b/l  Extremity: No calf tenderness   Genitalia Female: Daugherty with yellow urine  Neuro   : A&Ox3      LABS:                        9.7    18.57 )-----------( 244      ( 18 Dec 2023 07:05 )             29.3     12-18    136  |  105  |  21  ----------------------------<  137<H>  4.2   |  26  |  0.77    Ca    7.6<L>      18 Dec 2023 07:05  Phos  3.4     12-18  Mg     2.2     12-18    TPro  5.5<L>  /  Alb  2.0<L>  /  TBili  0.7  /  DBili  x   /  AST  33  /  ALT  33  /  AlkPhos  53  12-18    PT/INR - ( 18 Dec 2023 12:19 )   PT: 11.4 sec;   INR: 1.01 ratio         PTT - ( 18 Dec 2023 12:19 )  PTT:69.5 sec  Urinalysis Basic - ( 18 Dec 2023 07:05 )    Color: x / Appearance: x / SG: x / pH: x  Gluc: 137 mg/dL / Ketone: x  / Bili: x / Urobili: x   Blood: x / Protein: x / Nitrite: x   Leuk Esterase: x / RBC: x / WBC x   Sq Epi: x / Non Sq Epi: x / Bacteria: x        RADIOLOGY & ADDITIONAL STUDIES:< from: CT Abdomen and Pelvis w/ Oral Cont and w/ IV Cont (12.11.23 @ 12:45) >    ACC: [id number] EXAM:  CT ABDOMEN AND PELVIS OC IC   ORDERED BY: TRAY GÓMEZ     PROCEDURE DATE:  12/11/2023          INTERPRETATION:  CLINICAL INFORMATION: Rule out small bowel obstruction.    COMPARISON: 04/16/2022.    CONTRAST/COMPLICATIONS:  IVContrast: Omnipaque 350  90 cc administered   10 cc discarded  Oral Contrast: Omnipaque 300  Complications: None reported at time of study completion    PROCEDURE:  CT of the Abdomen and Pelvis was performed.  Sagittal and coronal reformats were performed.    FINDINGS:  LOWER CHEST: Bibasilar subsegmental atelectasis, left more than right.   Mild hiatal hernia. Coronary artery calcification.    LIVER: Steatosis.  BILE DUCTS: Mildly dilated. CBD measures approximately 1 cm with smooth   distal tapering. An almost similar appearance was noted on the prior   study. Correlate clinically.  GALLBLADDER: Within normal limits.  SPLEEN: Within normal limits.  PANCREAS: Within normal limits.  ADRENALS: Within normal limits.  KIDNEYS/URETERS: Within normal limits.    BLADDER: Minimally distended.  REPRODUCTIVE ORGANS: Uterus and adnexa within normal limits.    BOWEL: No bowel obstruction. Appendix is not visualized. No evidence of   inflammation in the pericecal region. Evidence of uncomplicated   predominantly sigmoid diverticula. There are small foci of extraluminal   gas at the level of the rectum along with probable rectal pneumatosis.  PERITONEUM: Small volume abdominal and pelvic ascites. Also small volume   pneumoperitoneum.  VESSELS: Atherosclerotic changes. Common origin of the celiac and   superior mesenteric arteries.  RETROPERITONEUM/LYMPH NODES: No lymphadenopathy.  ABDOMINAL WALL: Within normal limits.  BONES: Degenerative changes. Stable moderate compression of L2.    IMPRESSION:  Evidence of pneumoperitoneum. Suspect rectal perforation considering the   presence of perirectal gas and pneumatosis.    Additional findings as above.    Findings were discussed with Dr. TRAY GÓMEZ [id number] 12/11/2023 1:16   PM by Dr. Santiagowith read back confirmation.    --- End of Report ---            CHELSY SANTIAGO MD; Attending Radiologist  This document has been electronically signed. Dec 11 2023  1:17PM    < end of copied text >   HPI:  [age] y/o female with a PMHx of COPD, HLD presents to the ED c/o abd pain, n/v and constipation x2 days. Denies CP, SOB, urinary symptoms. NKDA. Nonsmoker. No EtOH use. No other complaints at this time.   (11 Dec 2023 14:04)    82 yo female with PMH as above admitted for colonic perforation and is s/p Christina resection of colon on 12/11/23. Urology consulted for pt with post op urinary retention. As per RN pt was initially straight catheterized and had a daugherty placed for retention and most recently failed a trial of void as she was unable to void and daugherty was replaced this morning. Pt reports prior to this admission she was voiding freely but frequently reports she had a good flow and does not feel she had incomplete bladder emptying. She denies any hx of recurrent UTIs, hematuria or urinary retention in the past.     PAST MEDICAL & SURGICAL HISTORY:  HLD (hyperlipidemia)      History of COPD          REVIEW OF SYSTEMS     All other review of systems neg, except as noted in HPI  MEDICATIONS  (STANDING):  ampicillin/sulbactam  IVPB 3 Gram(s) IV Intermittent every 6 hours  diltiazem    Tablet 60 milliGRAM(s) Oral every 8 hours  heparin  Infusion. 1100 Unit(s)/Hr (11 mL/Hr) IV Continuous <Continuous>  lidocaine   4% Patch 1 Patch Transdermal daily  lipid, fat emulsion (Fish Oil and Plant Based) 20% Infusion 0.685 Gm/kG/Day (20.8 mL/Hr) IV Continuous <Continuous>  lipid, fat emulsion (Fish Oil and Plant Based) 20% Infusion 20.83 mL/Hr (20.8 mL/Hr) IV Continuous <Continuous>  metoprolol tartrate Injectable 5 milliGRAM(s) IV Push every 5 minutes  pantoprazole  Injectable 40 milliGRAM(s) IV Push two times a day  Parenteral Nutrition - Adult 1 Each (75 mL/Hr) TPN Continuous <Continuous>  Parenteral Nutrition - Adult 1 Each (75 mL/Hr) TPN Continuous <Continuous>  polyethylene glycol 3350 17 Gram(s) Oral daily    MEDICATIONS  (PRN):  acetaminophen   IVPB .. 1000 milliGRAM(s) IV Intermittent once PRN Temp greater or equal to 38C (100.4F), Mild Pain (1 - 3)  heparin   Injectable 4400 Unit(s) IV Push every 6 hours PRN For aPTT less than 40  morphine  - Injectable 2 milliGRAM(s) IV Push every 4 hours PRN Moderate Pain (4 - 6)  ondansetron Injectable 4 milliGRAM(s) IV Push every 6 hours PRN Nausea      Allergies    No Known Allergies    Intolerances        SOCIAL HISTORY:    FAMILY HISTORY:      Vital Signs Last 24 Hrs  T(C): 36.6 (18 Dec 2023 07:48), Max: 36.6 (17 Dec 2023 20:39)  T(F): 97.8 (18 Dec 2023 07:48), Max: 97.9 (17 Dec 2023 20:39)  HR: 88 (18 Dec 2023 07:48) (88 - 103)  BP: 138/52 (18 Dec 2023 07:48) (120/41 - 142/41)  BP(mean): --  RR: 18 (18 Dec 2023 07:48) (17 - 18)  SpO2: 94% (18 Dec 2023 07:48) (94% - 94%)    Parameters below as of 18 Dec 2023 07:48  Patient On (Oxygen Delivery Method): nasal cannula        PHYSICAL EXAM:  General: No distress, No anxiety  VITALS  T(C): 36.6 (12-18-23 @ 07:48), Max: 36.6 (12-17-23 @ 20:39)  HR: 88 (12-18-23 @ 07:48) (88 - 103)  BP: 138/52 (12-18-23 @ 07:48) (120/41 - 142/41)  RR: 18 (12-18-23 @ 07:48) (17 - 18)  SpO2: 94% (12-18-23 @ 07:48) (94% - 94%)            Skin     : No jaundice   HEENT: Normocephalic, no icterus , EOM full , No epistaxis  Lung    : No resp distress  Abdo:   : No distension, midline dressing in place c/d/i, +Ostomy   Back    : No CVAT b/l  Extremity: No calf tenderness   Genitalia Female: Daugherty with yellow urine  Neuro   : A&Ox3      LABS:                        9.7    18.57 )-----------( 244      ( 18 Dec 2023 07:05 )             29.3     12-18    136  |  105  |  21  ----------------------------<  137<H>  4.2   |  26  |  0.77    Ca    7.6<L>      18 Dec 2023 07:05  Phos  3.4     12-18  Mg     2.2     12-18    TPro  5.5<L>  /  Alb  2.0<L>  /  TBili  0.7  /  DBili  x   /  AST  33  /  ALT  33  /  AlkPhos  53  12-18    PT/INR - ( 18 Dec 2023 12:19 )   PT: 11.4 sec;   INR: 1.01 ratio         PTT - ( 18 Dec 2023 12:19 )  PTT:69.5 sec  Urinalysis Basic - ( 18 Dec 2023 07:05 )    Color: x / Appearance: x / SG: x / pH: x  Gluc: 137 mg/dL / Ketone: x  / Bili: x / Urobili: x   Blood: x / Protein: x / Nitrite: x   Leuk Esterase: x / RBC: x / WBC x   Sq Epi: x / Non Sq Epi: x / Bacteria: x        RADIOLOGY & ADDITIONAL STUDIES:< from: CT Abdomen and Pelvis w/ Oral Cont and w/ IV Cont (12.11.23 @ 12:45) >    ACC: [id number] EXAM:  CT ABDOMEN AND PELVIS OC IC   ORDERED BY: TRAY GÓMEZ     PROCEDURE DATE:  12/11/2023          INTERPRETATION:  CLINICAL INFORMATION: Rule out small bowel obstruction.    COMPARISON: 04/16/2022.    CONTRAST/COMPLICATIONS:  IVContrast: Omnipaque 350  90 cc administered   10 cc discarded  Oral Contrast: Omnipaque 300  Complications: None reported at time of study completion    PROCEDURE:  CT of the Abdomen and Pelvis was performed.  Sagittal and coronal reformats were performed.    FINDINGS:  LOWER CHEST: Bibasilar subsegmental atelectasis, left more than right.   Mild hiatal hernia. Coronary artery calcification.    LIVER: Steatosis.  BILE DUCTS: Mildly dilated. CBD measures approximately 1 cm with smooth   distal tapering. An almost similar appearance was noted on the prior   study. Correlate clinically.  GALLBLADDER: Within normal limits.  SPLEEN: Within normal limits.  PANCREAS: Within normal limits.  ADRENALS: Within normal limits.  KIDNEYS/URETERS: Within normal limits.    BLADDER: Minimally distended.  REPRODUCTIVE ORGANS: Uterus and adnexa within normal limits.    BOWEL: No bowel obstruction. Appendix is not visualized. No evidence of   inflammation in the pericecal region. Evidence of uncomplicated   predominantly sigmoid diverticula. There are small foci of extraluminal   gas at the level of the rectum along with probable rectal pneumatosis.  PERITONEUM: Small volume abdominal and pelvic ascites. Also small volume   pneumoperitoneum.  VESSELS: Atherosclerotic changes. Common origin of the celiac and   superior mesenteric arteries.  RETROPERITONEUM/LYMPH NODES: No lymphadenopathy.  ABDOMINAL WALL: Within normal limits.  BONES: Degenerative changes. Stable moderate compression of L2.    IMPRESSION:  Evidence of pneumoperitoneum. Suspect rectal perforation considering the   presence of perirectal gas and pneumatosis.    Additional findings as above.    Findings were discussed with Dr. TRAY GÓMEZ [phone] 12/11/2023 1:16   PM by Dr. Santiagowith read back confirmation.    --- End of Report ---            CHELSY SANTIAGO MD; Attending Radiologist  This document has been electronically signed. Dec 11 2023  1:17PM    < end of copied text >

## 2023-12-18 NOTE — PROGRESS NOTE ADULT - SUBJECTIVE AND OBJECTIVE BOX
"        Ryan Trevizo   3/23/2017 2:00 PM   Office Visit   MRN: [id number]    Department:  [facility] Med - Internal Med   Dept Phone:  [phone]    Description:  Male : 1963   Provider:  Juancho Jenkins M.D.           Reason for Visit     Results Labs       Allergies as of 3/23/2017     Allergen Noted Reactions    Ritalin [Methylphenidate] 2014   Unspecified    \"Hyper\"      You were diagnosed with     Type 2 diabetes mellitus without complication, without long-term current use of insulin (CMS-HCC)   [6878449]       Hyperlipidemia, unspecified hyperlipidemia type   [1414184]         Vital Signs     Blood Pressure Pulse Temperature Respirations Height Weight    118/86 mmHg 103 36.8 °C (98.2 °F) 22 1.651 m (5' 5\") 84.732 kg (186 lb 12.8 oz)    Body Mass Index Oxygen Saturation Smoking Status             31.09 kg/m2 92% Never Smoker          Basic Information     Date Of Birth Sex Race Ethnicity Preferred Language    1963 Male White Non- English      Your appointments     2017  2:30 PM   PREVIOUS PATIENT with Joseph León M.D.   [facility] (--)    [address] [place] [address] [address]  [facility] [address] [phone]            2017  2:30 PM   [facility] (--)    [address] [place] [address]  [address]  [facility] [address] [phone]           You will be receiving a confirmation call a few days before your appointment from our automated call confirmation system.              Problem List              ICD-10-CM Priority Class Noted - Resolved    HTN (hypertension) I10 Low  7/15/2012 - Present    GERD (gastroesophageal reflux disease) K21.9 Low  7/15/2012 - Present    Hyperlipidemia E78.5 Low  2012 - Present    Ischemic Cardiomyopathy EF 45% I25.5   2012 - Present    Glaucoma (Chronic) H40.9 Low  Unknown - Present    Claudication (CMS-HCC) (Chronic) I73.9 "   Unknown - Present    CAD (coronary artery disease) I25.10 High  10/14/2013 - Present    Chest pain R07.9 High  12/18/2014 - Present    Type 2 diabetes mellitus without complication, without long-term current use of insulin (HCC) E11.9   2/16/2017 - Present      Health Maintenance        Date Due Completion Dates    IMM HEP B VACCINE (1 of 3 - Primary Series) 1963 ---    DIABETES MONOFILAMENT / LE EXAM 1963 ---    RETINAL SCREENING 5/26/1981 ---    IMM INFLUENZA (1) 9/1/2016 10/15/2015, 10/3/2015, 10/31/2014, 9/15/2012, 10/30/2011, 9/15/2011    A1C SCREENING 11/20/2016 5/20/2016, 4/8/2016, 7/25/2012, 7/14/2012    SERUM CREATININE 12/11/2017 12/11/2016, 5/20/2016, 5/15/2016, 5/5/2016, 4/11/2016, 4/10/2016, 4/9/2016, 4/8/2016, 4/8/2016, 4/5/2016, 4/4/2016, 10/8/2015, 7/15/2015, 12/19/2014, 12/18/2014, 6/13/2014, 6/12/2013, 1/24/2013, 9/15/2012, 9/14/2012, 7/30/2012, 7/30/2012, 7/29/2012, 7/28/2012, 7/27/2012, 7/25/2012, 7/20/2012, 7/19/2012, 7/17/2012, 7/17/2012, 7/16/2012, 7/15/2012, 7/14/2012, 8/1/2009, 7/12/2007, 4/21/2005    FASTING LIPID PROFILE 3/20/2018 3/20/2017, 12/19/2014, 6/13/2014, 1/24/2013, 7/15/2012, 8/1/2009    URINE ACR / MICROALBUMIN 3/20/2018 3/20/2017, 1/24/2013    IMM DTaP/Tdap/Td Vaccine (2 - Td) 6/15/2026 6/15/2016    COLONOSCOPY 2/6/2027 2/6/2017 (N/S)    Override on 2/6/2017: (N/S) (PER [facility] NO COLONOSCOPY)            Current Immunizations     Influenza TIV (IM) 10/31/2014, 9/15/2012  1:18 PM, 10/30/2011, 9/15/2011    Influenza Vaccine Quad Inj (Pf) 10/15/2015, 10/3/2015    Pneumococcal polysaccharide vaccine (PPSV-23) 7/30/2012 12:10 AM    Tdap Vaccine 6/15/2016      Below and/or attached are the medications your provider expects you to take. Review all of your home medications and newly ordered medications with your provider and/or pharmacist. Follow medication instructions as directed by your provider and/or pharmacist. Please keep your medication list with you and share with  your provider. Update the information when medications are discontinued, doses are changed, or new medications (including over-the-counter products) are added; and carry medication information at all times in the event of emergency situations     Allergies:  RITALIN - Unspecified               Medications  Valid as of: March 23, 2017 -  3:03 PM    Generic Name Brand Name Tablet Size Instructions for use    Aspirin (Tablet Delayed Response) ECOTRIN 81 MG Take 1 Tab by mouth every day.        Atorvastatin Calcium (Tab) LIPITOR 80 MG Take 1 Tab by mouth every evening.        Cholecalciferol (Tab) cholecalciferol 1000 UNIT Take 1 Tab by mouth every day.        Ibuprofen (Tab) MOTRIN 600 MG Take 1 Tab by mouth every 6 hours as needed. for knee pain        Isosorbide Mononitrate (TABLET SR 24 HR) IMDUR 30 MG Take 1 Tab by mouth every day.        Lisinopril (Tab) PRINIVIL 5 MG Take 1 Tab by mouth every day.        MetFORMIN HCl (Tab) GLUCOPHAGE 1000 MG Take 1 Tab by mouth 2 times a day, with meals.        Metoprolol Succinate (TABLET SR 24 HR) TOPROL XL 25 MG Take 1 Tab by mouth every day.        Pantoprazole Sodium (Tablet Delayed Response) PROTONIX 40 MG Take 1 Tab by mouth every day.        .                 Medicines prescribed today were sent to:     [facility] [address] [place] [address] Lizzie Lewis. [address] [address]    Phone: [phone] Fax: [phone]    Open 24 Hours?: No      Medication refill instructions:       If your prescription bottle indicates you have medication refills left, it is not necessary to call your provider’s office. Please contact your pharmacy and they will refill your medication.    If your prescription bottle indicates you do not have any refills left, you may request refills at any time through one of the following ways: The online [employer] system (except Urgent Care), by calling your provider’s office, or by asking your pharmacy to contact your  provider’s office with a refill request. Medication refills are processed only during regular business hours and may not be available until the next business day. Your provider may request additional information or to have a follow-up visit with you prior to refilling your medication.   *Please Note: Medication refills are assigned a new Rx number when refilled electronically. Your pharmacy may indicate that no refills were authorized even though a new prescription for the same medication is available at the pharmacy. Please request the medicine by name with the pharmacy before contacting your provider for a refill.        Instructions    - Increase metformin from 500mg twice daily to 1,000mg twice daily  - Increase atorvastatin from 40mg to 80mg and take at night    1800 Calorie Diet for Diabetes Meal Planning  The 1800 calorie diet is designed for eating up to 1800 calories each day. Following this diet and making healthy meal choices can help improve overall health. This diet controls blood sugar (glucose) levels and can also help lower blood pressure and cholesterol.  SERVING SIZES  Measuring foods and serving sizes helps to make sure you are getting the right amount of food. The list below tells how big or small some common serving sizes are:  · 1 oz.........4 stacked dice.   · 3 oz.........Deck of cards.   · 1 tsp........Tip of little finger.   · 1 tbs........Thumb.   · 2 tbs........Golf ball.   · ½ cup.......Half of a fist.   · 1 cup........A fist.   GUIDELINES FOR CHOOSING FOODS  The goal of this diet is to eat a variety of foods and limit calories to 1800 each day. This can be done by choosing foods that are low in calories and fat. The diet also suggests eating small amounts of food frequently. Doing this helps control your blood glucose levels so they do not get too high or too low. Each meal or snack may include a protein food source to help you feel more satisfied and to stabilize your blood glucose.  Try to eat about the same amount of food around the same time each day. This includes weekend days, travel days, and days off work. Space your meals about 4 to 5 hours apart and add a snack between them if you wish.   For example, a daily food plan could include breakfast, a morning snack, lunch, dinner, and an evening snack. Healthy meals and snacks include whole grains, vegetables, fruits, lean meats, poultry, fish, and dairy products. As you plan your meals, select a variety of foods. Choose from the bread and starch, vegetable, fruit, dairy, and meat/protein groups. Examples of foods from each group and their suggested serving sizes are listed below. Use measuring cups and spoons to become familiar with what a healthy portion looks like.  Bread and Starch  Each serving equals 15 grams of carbohydrates.  · 1 slice bread.   · ¼ bagel.   · ¾ cup cold cereal (unsweetened).   · ½ cup hot cereal or mashed potatoes.   · 1 small potato (size of a computer mouse).   ·  cup cooked pasta or rice.   · ½ English muffin.   · 1 cup broth-based soup.   · 3 cups of popcorn.   · 4 to 6 whole-wheat crackers.   · ½ cup cooked beans, peas, or corn.   Vegetable  Each serving equals 5 grams of carbohydrates.  · ½ cup cooked vegetables.   · 1 cup raw vegetables.   · ½ cup tomato or vegetable juice.   Fruit  Each serving equals 15 grams of carbohydrates.  · 1 small apple or orange.   · 1¼ cup watermelon or strawberries.   · ½ cup applesauce (no sugar added).   · 2 tbs raisins.   · ½ banana.   · ½ cup canned fruit, packed in water, its own juice, or sweetened with a sugar substitute.   · ½ cup unsweetened fruit juice.   Dairy  Each serving equals 12 to 15 grams of carbohydrates.  · 1 cup fat-free milk.   · 6 oz artificially sweetened yogurt or plain yogurt.   · 1 cup low-fat buttermilk.   · 1 cup soy milk.   · 1 cup almond milk.   Meat/Protein  · 1 large egg.   · 2 to 3 oz meat, poultry, or fish.   · ¼ cup low-fat cottage cheese.   · 1  tbs peanut butter.   · 1 oz low-fat cheese.   · ¼ cup tuna in water.   · ½ cup tofu.   Fat  · 1 tsp oil.   · 1 tsp trans-fat-free margarine.   · 1 tsp butter.   · 1 tsp mayonnaise.   · 2 tbs avocado.   · 1 tbs salad dressing.   · 1 tbs cream cheese.   · 2 tbs sour cream.   SAMPLE 1800 CALORIE DIET PLAN  Breakfast  · ¾ cup unsweetened cereal (1 carb serving).   · 1 cup fat-free milk (1 carb serving).   · 1 slice whole-wheat toast (1 carb serving).   · ½ small banana (1 carb serving).   · 1 scrambled egg.   · 1 tsp trans-fat-free margarine.   Lunch  · Tuna sandwich.   · 2 slices whole-wheat bread (2 carb servings).   · ½ cup canned tuna in water, drained.   · 1 tbs reduced fat mayonnaise.   · 1 stalk celery, chopped.   · 2 slices tomato.   · 1 lettuce leaf.   · 1 cup carrot sticks.   · 24 to 30 seedless grapes (2 carb servings).   · 6 oz light yogurt (1 carb serving).   Afternoon Snack  · 3 stephanie cracker squares (1 carb serving).   · Fat-free milk, 1 cup (1 carb serving).   · 1 tbs peanut butter.   Dinner  · 3 oz salmon, broiled with 1 tsp oil.   · 1 cup mashed potatoes (2 carb servings) with 1 tsp trans-fat-free margarine.   · 1 cup fresh or frozen green beans.   · 1 cup steamed asparagus.   · 1 cup fat-free milk (1 carb serving).   Evening Snack  · 3 cups air-popped popcorn (1 carb serving).   · 2 tbs parmesan cheese sprinkled on top.   MEAL PLAN  Use this worksheet to help you make a daily meal plan based on the 1800 calorie diet suggestions. If you are using this plan to help you control your blood glucose, you may interchange carbohydrate-containing foods (dairy, starches, and fruits). Select a variety of fresh foods of varying colors and flavors. The total amount of carbohydrate in your meals or snacks is more important than making sure you include all of the food groups every time you eat. Choose from the following foods to build your day's meals:  · 8 Starches.   · 4 Vegetables.   · 3 Fruits.   · 2 Dairy.    · 6 to 7 oz Meat/Protein.   · Up to 4 Fats.   Your dietician can use this worksheet to help you decide how many servings and which types of foods are right for you.  BREAKFAST  Food Group and Servings / Food Choice  Starch ________________________________________________________  Dairy _________________________________________________________  Fruit _________________________________________________________  Meat/Protein __________________________________________________  Fat ___________________________________________________________  LUNCH  Food Group and Servings / Food Choice  Starch ________________________________________________________  Meat/Protein __________________________________________________  Vegetable _____________________________________________________  Fruit _________________________________________________________  Dairy _________________________________________________________  Fat ___________________________________________________________  AFTERNOON SNACK  Food Group and Servings / Food Choice  Starch ________________________________________________________  Meat/Protein __________________________________________________  Fruit __________________________________________________________  Dairy _________________________________________________________  DINNER  Food Group and Servings / Food Choice  Starch _________________________________________________________  Meat/Protein ___________________________________________________  Dairy __________________________________________________________  Vegetable ______________________________________________________  Fruit ___________________________________________________________  Fat ____________________________________________________________  EVENING SNACK  Food Group and Servings / Food Choice  Fruit __________________________________________________________  Meat/Protein ___________________________________________________  Dairy  HPI:  [age] y/o female with a PMHx of COPD, HLD presents to the ED c/o abd pain, n/v and constipation x2 days. Denies CP, SOB, urinary symptoms. NKDA. Nonsmoker. No EtOH use. No other complaints at this time. s/p Perf. colonic surgery with colostomy n place.  This Am Went in to rapid tachycardia with HR into 150's-160's.  Initially received Metoprolol which was unsuccessful in slowing her down.  Cardiology then consulted.  Tele shows in PAF.  IV cardizem given and converted to NSR.  Did c/o of SOB while HR rapid.      12/17/2023: Doing well today.  No further SVT/ PAS on current regime a medication.  Slated for ultrasound her upper extremity for swelling.  Stomach less painful.  12/18/23: No arrhyhmia on meds.      PAST MEDICAL & SURGICAL HISTORY:  HLD (hyperlipidemia)  History of COPD    SOCIAL HISTORY: Non-Smoker/Social ETOH/ No Ilicit Drug use.    FAMILY HISTORY:    Allergies  No Known Allergies    Home Medications:  Multiple Vitamins oral tablet: 1 tab(s) orally once a day (11 Dec 2023 15:18)    MEDICATIONS  (STANDING):  ampicillin/sulbactam  IVPB 3 Gram(s) IV Intermittent every 6 hours  diltiazem    Tablet 60 milliGRAM(s) Oral every 8 hours  heparin  Infusion. 1100 Unit(s)/Hr (11 mL/Hr) IV Continuous <Continuous>  lidocaine   4% Patch 1 Patch Transdermal daily  lipid, fat emulsion (Fish Oil and Plant Based) 20% Infusion 0.69 Gm/kG/Day (20.9 mL/Hr) IV Continuous <Continuous>  lipid, fat emulsion (Fish Oil and Plant Based) 20% Infusion 20.83 mL/Hr (20.8 mL/Hr) IV Continuous <Continuous>  metoprolol tartrate Injectable 5 milliGRAM(s) IV Push every 5 minutes  pantoprazole  Injectable 40 milliGRAM(s) IV Push two times a day  Parenteral Nutrition - Adult 1 Each (75 mL/Hr) TPN Continuous <Continuous>  Parenteral Nutrition - Adult 1 Each (75 mL/Hr) TPN Continuous <Continuous>  polyethylene glycol 3350 17 Gram(s) Oral daily    MEDICATIONS  (PRN):  acetaminophen   IVPB .. 1000 milliGRAM(s) IV Intermittent once PRN Temp greater or equal to 38C (100.4F), Mild Pain (1 - 3)  heparin   Injectable 4400 Unit(s) IV Push every 6 hours PRN For aPTT less than 40  morphine  - Injectable 2 milliGRAM(s) IV Push every 4 hours PRN Moderate Pain (4 - 6)  ondansetron Injectable 4 milliGRAM(s) IV Push every 6 hours PRN Nausea      Vital Signs Last 24 Hrs  T(C): 36.5 (17 Dec 2023 07:56), Max: 36.5 (17 Dec 2023 07:56)  T(F): 97.7 (17 Dec 2023 07:56), Max: 97.7 (17 Dec 2023 07:56)  HR: 103 (17 Dec 2023 14:18) (88 - 103)  BP: 142/41 (17 Dec 2023 14:18) (113/61 - 142/41)  BP(mean): --  RR: 18 (17 Dec 2023 07:56) (18 - 18)  SpO2: 98% (17 Dec 2023 07:56) (98% - 99%)    Parameters below as of 17 Dec 2023 07:56  Patient On (Oxygen Delivery Method): nasal cannula  O2 Flow (L/min): 3      I&O's Detail    16 Dec 2023 07:01  -  17 Dec 2023 07:00  --------------------------------------------------------  IN:  Total IN: 0 mL    OUT:    Drain (mL): 240 mL    Indwelling Catheter - Urethral (mL): 700 mL    Intermittent Catheterization - Urethral (mL): 1800 mL  Total OUT: 2740 mL    Total NET: -2740 mL      17 Dec 2023 07:01  -  17 Dec 2023 14:40  --------------------------------------------------------  IN:  Total IN: 0 mL    OUT:    Drain (mL): 50 mL  Total OUT: 50 mL    Total NET: -50 mL          Daily     Daily         PHYSICAL EXAM:  Constitutional: NAD, awake and alert, well-developed  HEENT: PERRLA, EOMI,  No oral cyanosis. Oropharynx Clean and Dry.  Neck:  supple,  No JVD, No Thyroid enlargement. No Carotid Bruits bilaterally.  Respiratory: Breath sounds are clear bilaterally, No wheezing, rales or rhonchi  Cardiovascular: NL S1 and S2, IR, IR, no Murmurs, gallops or rubs  Gastrointestinal: Bowel Sounds present. colostomy in LLQ.    Extremities: No peripheral edema. No clubbing or cyanosis.  Vascular: 1+ peripheral pulses in LE   Neurological: A/O x 3  Musculoskeletal: no calf tenderness.  Skin: No rashes.      LABS: All Labs Reviewed:                        10.1   16.15 )-----------( 221      ( 17 Dec 2023 12:40 )             29.8     12-17    135  |  106  |  19  ----------------------------<  150<H>  3.9   |  25  |  0.71    Ca    7.5<L>      17 Dec 2023 05:01  Phos  3.1     12-17  Mg     2.1     12-17    TPro  5.5<L>  /  Alb  2.1<L>  /  TBili  1.0  /  DBili  x   /  AST  34  /  ALT  25  /  AlkPhos  47  12-17        - Troponin:   LIVER FUNCTIONS - ( 17 Dec 2023 05:01 )  Alb: 2.1 g/dL / Pro: 5.5 gm/dL / ALK PHOS: 47 U/L / ALT: 25 U/L / AST: 34 U/L / GGT: x           PT/INR - ( 16 Dec 2023 10:50 )   PT: 10.8 sec;   INR: 0.95 ratio         PTT - ( 17 Dec 2023 12:40 )  PTT:51.7 sec  12-15 Chol -- LDL -- HDL -- Trig 187<H>    RADIOLOGY:    < from: Xray Chest 1 View- PORTABLE-Urgent (Xray Chest 1 View- PORTABLE-Urgent .) (12.11.23 @ 20:17) >  IMPRESSION:   Catheters and tubes in place.  LEFT lower zone mild pleural effusion and/or patchy basilar airspace   disease..    < end of copied text >  < from: CT Abdomen and Pelvis w/ Oral Cont and w/ IV Cont (12.11.23 @ 12:45) >  IMPRESSION:  Evidence of pneumoperitoneum. Suspect rectal perforation considering the   presence of perirectal gas and pneumatosis.    Additional findings as above.    < end of copied text >    EKG:  Rapid afib with RBBB    s/p cardizem NSR with RBBB  ECHO:  < from: TTE Echo Complete w/o Contrast w/ Doppler (12.12.23 @ 14:00) >   Summary     Mild mitral annular calcification is present.   Moderate (2+) mitral regurgitation is present.   Mild aortic sclerosis is present with normal valvular opening.   Moderate to severe (3+) tricuspid valve regurgitation is present.   Mild pulmonary hypertension. Pulmonary pressures can be underestimated by   this study.   Normal appearing pulmonic valve structure and function.   Normal appearing left atrium.   The left ventricle is normal in size, wall thickness, wall motion and   contractility.   Estimatedleft ventricular ejection fraction is 55-60 %.   The right atrium appears mildly dilated.   Normal appearing right ventricle structure and function.    < end of copied text >     __________________________________________________________  Starch _________________________________________________________  DAILY TOTALS  Starch ____________________________  Vegetable _________________________  Fruit _____________________________  Dairy _____________________________  Meat/Protein______________________  Fat _______________________________  Document Released: 07/10/2006 Document Revised: 03/11/2013 Document Reviewed: 11/02/2012  [employer]® Patient Information ©2013 [employer].          MyChart Status: Patient Declined         HPI:  [age] y/o female with a PMHx of COPD, HLD presents to the ED c/o abd pain, n/v and constipation x2 days. Denies CP, SOB, urinary symptoms. NKDA. Nonsmoker. No EtOH use. No other complaints at this time. s/p Perf. colonic surgery with colostomy n place.  This Am Went in to rapid tachycardia with HR into 150's-160's.  Initially received Metoprolol which was unsuccessful in slowing her down.  Cardiology then consulted.  Tele shows in PAF.  IV cardizem given and converted to NSR.  Did c/o of SOB while HR rapid.      12/17/2023: Doing well today.  No further SVT/ PAS on current regime a medication.  Slated for ultrasound her upper extremity for swelling.  Stomach less painful.  12/18/23: No arrhyhmia on meds.  No palps., cp or SOB.      PAST MEDICAL & SURGICAL HISTORY:  HLD (hyperlipidemia)  History of COPD    SOCIAL HISTORY: Non-Smoker/Social ETOH/ No Ilicit Drug use.    FAMILY HISTORY:    Allergies  No Known Allergies    Home Medications:  Multiple Vitamins oral tablet: 1 tab(s) orally once a day (11 Dec 2023 15:18)      MEDICATIONS  (STANDING):  ampicillin/sulbactam  IVPB 3 Gram(s) IV Intermittent every 6 hours  dextrose 5% + sodium chloride 0.45% with potassium chloride 20 mEq/L 1000 milliLiter(s) (75 mL/Hr) IV Continuous <Continuous>  diltiazem    Tablet 60 milliGRAM(s) Oral every 8 hours  heparin  Infusion. 1100 Unit(s)/Hr (11 mL/Hr) IV Continuous <Continuous>  lidocaine   4% Patch 1 Patch Transdermal daily  lipid, fat emulsion (Fish Oil and Plant Based) 20% Infusion 0.685 Gm/kG/Day (20.8 mL/Hr) IV Continuous <Continuous>  metoprolol tartrate Injectable 5 milliGRAM(s) IV Push every 5 minutes  pantoprazole  Injectable 40 milliGRAM(s) IV Push two times a day  Parenteral Nutrition - Adult 1 Each (75 mL/Hr) TPN Continuous <Continuous>  Parenteral Nutrition - Adult 1 Each (75 mL/Hr) TPN Continuous <Continuous>  polyethylene glycol 3350 17 Gram(s) Oral every 12 hours  tamsulosin 0.4 milliGRAM(s) Oral at bedtime    MEDICATIONS  (PRN):  acetaminophen   IVPB .. 1000 milliGRAM(s) IV Intermittent once PRN Temp greater or equal to 38C (100.4F), Mild Pain (1 - 3)  heparin   Injectable 4400 Unit(s) IV Push every 6 hours PRN For aPTT less than 40  morphine  - Injectable 2 milliGRAM(s) IV Push every 4 hours PRN Moderate Pain (4 - 6)  ondansetron Injectable 4 milliGRAM(s) IV Push every 6 hours PRN Nausea      Vital Signs Last 24 Hrs  T(C): 36.4 (18 Dec 2023 20:26), Max: 36.6 (18 Dec 2023 07:48)  T(F): 97.5 (18 Dec 2023 20:26), Max: 97.8 (18 Dec 2023 07:48)  HR: 94 (18 Dec 2023 20:26) (88 - 94)  BP: 139/46 (18 Dec 2023 20:26) (120/41 - 139/46)  BP(mean): --  RR: 18 (18 Dec 2023 20:26) (18 - 18)  SpO2: 96% (18 Dec 2023 20:26) (94% - 96%)    Parameters below as of 18 Dec 2023 20:26  Patient On (Oxygen Delivery Method): nasal cannula  O2 Flow (L/min): 3      I&O's Detail    17 Dec 2023 07:01  -  18 Dec 2023 07:00  --------------------------------------------------------  IN:  Total IN: 0 mL    OUT:    Colostomy (mL): 200 mL    Drain (mL): 325 mL    Intermittent Catheterization - Urethral (mL): 1100 mL  Total OUT: 1625 mL    Total NET: -1625 mL      18 Dec 2023 07:01  -  18 Dec 2023 23:27  --------------------------------------------------------  IN:  Total IN: 0 mL    OUT:    Drain (mL): 120 mL    Indwelling Catheter - Urethral (mL): 1000 mL  Total OUT: 1120 mL    Total NET: -1120 mL          Daily     Daily         PHYSICAL EXAM:  Constitutional: NAD, awake and alert, well-developed  HEENT: PERRLA, EOMI,  No oral cyanosis. Oropharynx Clean and Dry.  Neck:  supple,  No JVD, No Thyroid enlargement. No Carotid Bruits bilaterally.  Respiratory: Breath sounds are clear bilaterally, No wheezing, rales or rhonchi  Cardiovascular: NL S1 and S2, IR, IR, no Murmurs, gallops or rubs  Gastrointestinal: Bowel Sounds present. colostomy in LLQ.    Extremities: No peripheral edema. No clubbing or cyanosis.  Vascular: 1+ peripheral pulses in LE   Neurological: A/O x 3  Musculoskeletal: no calf tenderness.  Skin: No rashes.      LABS: All Labs Reviewed:                                   9.7    18.57 )-----------( 244      ( 18 Dec 2023 07:05 )             29.3     12-18    136  |  105  |  21  ----------------------------<  137<H>  4.2   |  26  |  0.77    Ca    7.6<L>      18 Dec 2023 07:05  Phos  3.4     12-18  Mg     2.2     12-18    TPro  5.5<L>  /  Alb  2.0<L>  /  TBili  0.7  /  DBili  x   /  AST  33  /  ALT  33  /  AlkPhos  53  12-18        - Troponin:   LIVER FUNCTIONS - ( 18 Dec 2023 03:31 )  Alb: 2.0 g/dL / Pro: 5.5 gm/dL / ALK PHOS: 53 U/L / ALT: 33 U/L / AST: 33 U/L / GGT: x           PT/INR - ( 18 Dec 2023 12:19 )   PT: 11.4 sec;   INR: 1.01 ratio         PTT - ( 18 Dec 2023 12:19 )  PTT:69.5 sec  12-15 Chol -- LDL -- HDL -- Trig 187<H>      RADIOLOGY:    < from: Xray Chest 1 View- PORTABLE-Urgent (Xray Chest 1 View- PORTABLE-Urgent .) (12.11.23 @ 20:17) >  IMPRESSION:   Catheters and tubes in place.  LEFT lower zone mild pleural effusion and/or patchy basilar airspace   disease..    < end of copied text >  < from: CT Abdomen and Pelvis w/ Oral Cont and w/ IV Cont (12.11.23 @ 12:45) >  IMPRESSION:  Evidence of pneumoperitoneum. Suspect rectal perforation considering the   presence of perirectal gas and pneumatosis.    Additional findings as above.    < end of copied text >    EKG:  Rapid afib with RBBB    s/p cardizem NSR with RBBB  ECHO:  < from: TTE Echo Complete w/o Contrast w/ Doppler (12.12.23 @ 14:00) >   Summary     Mild mitral annular calcification is present.   Moderate (2+) mitral regurgitation is present.   Mild aortic sclerosis is present with normal valvular opening.   Moderate to severe (3+) tricuspid valve regurgitation is present.   Mild pulmonary hypertension. Pulmonary pressures can be underestimated by   this study.   Normal appearing pulmonic valve structure and function.   Normal appearing left atrium.   The left ventricle is normal in size, wall thickness, wall motion and   contractility.   Estimatedleft ventricular ejection fraction is 55-60 %.   The right atrium appears mildly dilated.   Normal appearing right ventricle structure and function.    < end of copied text >

## 2023-12-18 NOTE — CONSULT NOTE ADULT - ASSESSMENT
Interventional Radiology    Evaluate for Procedure: LLQ collection     HPI: 83 year old female presented with colonic perforation 2/2 stercoral ulcer, now s/p Christina procedure now with rising white count noted to have LLQ collection. IR consulted for drainage.     Allergies: No Known Allergies    Medications (Abx/Cardiac/Anticoagulation/Blood Products)    ampicillin/sulbactam  IVPB: 200 mL/Hr IV Intermittent (12-18 @ 10:18)  diltiazem    Tablet: 60 milliGRAM(s) Oral (12-18 @ 13:26)  heparin  Infusion.: 1100 Unit(s)/Hr IV Continuous (12-16 @ 19:13)  heparin  Infusion.: 1250 Unit(s)/Hr IV Continuous (12-17 @ 21:25)    Data:    T(C): 36.6  HR: 88  BP: 134/48  RR: 18  SpO2: 94%    -WBC 18.57 / HgB 9.7 / Hct 29.3 / Plt 244  -Na 136 / Cl 105 / BUN 21 / Glucose 137  -K 4.2 / CO2 26 / Cr 0.77  -ALT -- / Alk Phos -- / T.Bili --  -INR 1.01 / PTT 69.5          Radiology:     Assessment/Plan: 83 year old female presented with colonic perforation 2/2 stercoral ulcer, now s/p Christina procedure now with rising white count noted to have LLQ collection. IR consulted for drainage.       - Awaiting CT scan report to evaluate the collection.   - May plan for aspiration vs. drainage on 12/19.   - Pt needs to be NPO AMN.  - Please repeat PTT in AM and will determine how long to hold heparin drip.

## 2023-12-18 NOTE — CHART NOTE - NSCHARTNOTEFT_GEN_A_CORE
Clinical Nutrition PN Follow Up Note    *82 YO F with a PMHx of COPD, HLD presents to the ED c/o abd pain, n/v and constipation x2 days. Admitted w/ colonic perforation, Extensive stercocolitis with 2 feculent perforation of sigmoid & rectum. Necrotic segment of colon also noticed now POD 2 s/p Christina's. Had post-op fecal peritonitis, 7L washout. Went into septic shock, requiring pressors. Tx to ICU w/ post-op resp failure. NGT to LWS, blood tinged. Off pressors as of this AM 12/13.    *PPN initiated 2/2 NPO s/p Christina procedure2/2 colonic perforation     *12/14: has been NPO x 3 days, nausea overnight. NGT still in place, no plan for clamp trial today.  Plan to initiate PPN today, posisble transfer out of ICU  *12/15: PPN day #2. Transferred out of ICU, off pressors, now on 3E w/ no acute events overnight. Hypokalemia and hypophosphatemia noted s/p HD yesterday, being repleted. Possibly going into refeeding, will slowly increase lytes in PN bag and recommend to c/w KCl and KPhos repletion outside of bag until WNL. Remains NPO w/ NGT to LWS in place, reports intermittent nausea, on Zofran, and awaiting return of bowel function as per surgery. If to remain on PPN >7 days, consider placing PICC line to initiate TPN and meet >80% ENN.   *12/16 & 12/17: no acute events overnight. Tolerating CLD and plan to advance to FLD when medically feasible. Ostomy functioning.     *current status: PPN day #5. Tolerating FLD as per surgery note. Ostomy remains functioning. D/w surgical resident, plan to possibly advance to LRD today and if toelrating >/= 70% can d/c PPN. Will c/w PPN for now. Will provide colostomy education when able.     *new pertinent meds: Protonix, morphine, zofran prn, Unasyn, miralax    *labs reviewed; All lytes WNL. Na on low end of normal, will continue to monitor. Will c/w total volume 1800 mL in PN bag, will adjust prn as per labs. T Bili WNL for trace elements. corrected Ca WNL, rec'd add 10mEq of Calcium Gluconate outside of PN bag as CAPS is out. Triglyceride <400, will add 70 g lipids daily tomorrow in PN bag and adjust prn based on labs. No POCT noted, as per PN protocol, POCT required q6 hours to monitor glycemic control; should be maintained between 140- 180 mg/dL.     12-18    136  |  105  |  21  ----------------------------<  137<H>  4.2   |  26  |  0.77    Ca    7.6<L>      18 Dec 2023 07:05  Phos  3.4     12-18  Mg     2.2     12-18    TPro  5.5<L>  /  Alb  2.0<L>  /  TBili  0.7  /  DBili  x   /  AST  33  /  ALT  33  /  AlkPhos  53  12-18      Lipid Panel: Date/Time: 12-15-23 @ 04:58  Triglycerides, Serum: 187    I&O's Detail    17 Dec 2023 07:01  -  18 Dec 2023 07:00  --------------------------------------------------------  IN:  Total IN: 0 mL    OUT:    Colostomy (mL): 200 mL    Drain (mL): 325 mL    Intermittent Catheterization - Urethral (mL): 1100 mL  Total OUT: 1625 mL    Total NET: -1625 mL  *fluid status: negative; no input doc'd. Strict I&O's are rec'd when pt is on PN as per protocol   *BM (+) 12/17 - small loose brown/green stool.  on Miralax.   *edema: 1+ L/R leg, ankle, foot    *malnutrition: Pt continues to meet criteria for severe protein-calorie malnutrition in context of acute illness r/t decreased ability to meet increased nutrient needs 2/2 colonic perf w/ septic shock AEB moderate muscle/ fat wasting    Estimated Needs: based on 63.5 Kg (UBW - likely dry wt)  Calories: 1905- 2222 Kcal (30- 35 Kcal/Kg)  Protein: 95- 127 g (1.5- 2 g/Kg)  Fluids: 1587- 1905 mL (25- 30 mL/Kg)    Diet, NPO:   Except Medications (12-18-23 @ 09:04) [Active]  Parenteral Nutrition - Adult 1 Each (75 mL/Hr) TPN Continuous <Continuous>, 12-17-23 @ 22:00, 22:00, Stop order after: 1 Days    Weight History:  Weight (kg): 72.6 (12-11-23 @ 10:30)    PPN Recommendations: via peripheral venous line  Total Volume: 1800 mL x 24 hours  65 g  Amino Acids  100 g Dextrose  50 g Lipids 20%   98 mEq Sodium Chloride  14 mEq Sodium Acetate  20 mmol Sodium Phosphate  39 mEq Potassium Chloride  26 mEq Potassium Acetate  10 mmol Potassium Phosphate  0 mEq Calcium Gluconate (CAPS out of PN solution)  10 mEq Magnesium Sulfate  200 mg Thiamine  1 ml Trace Elements  10 ml MVI    Total Calories    1100      (Meets    53%  of  Estimated Energy needs  and    56%  Protein needs)   (osmolarity <900)    Additional Recommendations:    1) Daily weights  2) Strict I & O's - recommended as per PN protocol   3) Daily lyte checks including magnesium and phos  4) Weekly triglycerides/LFT checks  5) POCT q6hrs; maintain 140-180mg/dL  6) if on PN > 6 days, consider placing PICC line to meet 100% of nutr needs  7) ADAT as per surgery from FLD to LRD when medically feasible.   8) Will provide ostomy teaching when able.    *will continue to monitor and adjust PN prn*  Sadia Chong, LUBA, CDN [phone] Clinical Nutrition PN Follow Up Note    *82 YO F with a PMHx of COPD, HLD presents to the ED c/o abd pain, n/v and constipation x2 days. Admitted w/ colonic perforation, Extensive stercocolitis with 2 feculent perforation of sigmoid & rectum. Necrotic segment of colon also noticed now POD 2 s/p Christina's. Had post-op fecal peritonitis, 7L washout. Went into septic shock, requiring pressors. Tx to ICU w/ post-op resp failure. NGT to LWS, blood tinged. Off pressors as of this AM 12/13.    *PPN initiated 2/2 NPO s/p Christina procedure2/2 colonic perforation     *12/14: has been NPO x 3 days, nausea overnight. NGT still in place, no plan for clamp trial today.  Plan to initiate PPN today, posisble transfer out of ICU  *12/15: PPN day #2. Transferred out of ICU, off pressors, now on 3E w/ no acute events overnight. Hypokalemia and hypophosphatemia noted s/p HD yesterday, being repleted. Possibly going into refeeding, will slowly increase lytes in PN bag and recommend to c/w KCl and KPhos repletion outside of bag until WNL. Remains NPO w/ NGT to LWS in place, reports intermittent nausea, on Zofran, and awaiting return of bowel function as per surgery. If to remain on PPN >7 days, consider placing PICC line to initiate TPN and meet >80% ENN.   *12/16 & 12/17: no acute events overnight. Tolerating CLD and plan to advance to FLD when medically feasible. Ostomy functioning.     *current status: PPN day #5. Tolerating FLD as per surgery note. Ostomy remains functioning. D/w surgical resident, plan to possibly advance to LRD today and if toelrating >/= 70% can d/c PPN. Will c/w PPN for now. Will provide colostomy education when able.     *new pertinent meds: Protonix, morphine, zofran prn, Unasyn, miralax    *labs reviewed; All lytes WNL. Na on low end of normal, will continue to monitor. Will c/w total volume 1800 mL in PN bag, will adjust prn as per labs. T Bili WNL for trace elements. corrected Ca WNL, rec'd add 10mEq of Calcium Gluconate outside of PN bag as CAPS is out. Triglyceride <400, will add 70 g lipids daily tomorrow in PN bag and adjust prn based on labs. No POCT noted, as per PN protocol, POCT required q6 hours to monitor glycemic control; should be maintained between 140- 180 mg/dL.     12-18    136  |  105  |  21  ----------------------------<  137<H>  4.2   |  26  |  0.77    Ca    7.6<L>      18 Dec 2023 07:05  Phos  3.4     12-18  Mg     2.2     12-18    TPro  5.5<L>  /  Alb  2.0<L>  /  TBili  0.7  /  DBili  x   /  AST  33  /  ALT  33  /  AlkPhos  53  12-18      Lipid Panel: Date/Time: 12-15-23 @ 04:58  Triglycerides, Serum: 187    I&O's Detail    17 Dec 2023 07:01  -  18 Dec 2023 07:00  --------------------------------------------------------  IN:  Total IN: 0 mL    OUT:    Colostomy (mL): 200 mL    Drain (mL): 325 mL    Intermittent Catheterization - Urethral (mL): 1100 mL  Total OUT: 1625 mL    Total NET: -1625 mL  *fluid status: negative; no input doc'd. Strict I&O's are rec'd when pt is on PN as per protocol   *BM (+) 12/17 - small loose brown/green stool.  on Miralax.   *edema: 1+ L/R leg, ankle, foot    *malnutrition: Pt continues to meet criteria for severe protein-calorie malnutrition in context of acute illness r/t decreased ability to meet increased nutrient needs 2/2 colonic perf w/ septic shock AEB moderate muscle/ fat wasting    Estimated Needs: based on 63.5 Kg (UBW - likely dry wt)  Calories: 1905- 2222 Kcal (30- 35 Kcal/Kg)  Protein: 95- 127 g (1.5- 2 g/Kg)  Fluids: 1587- 1905 mL (25- 30 mL/Kg)    Diet, NPO:   Except Medications (12-18-23 @ 09:04) [Active]  Parenteral Nutrition - Adult 1 Each (75 mL/Hr) TPN Continuous <Continuous>, 12-17-23 @ 22:00, 22:00, Stop order after: 1 Days    Weight History:  Weight (kg): 72.6 (12-11-23 @ 10:30)    PPN Recommendations: via peripheral venous line  Total Volume: 1800 mL x 24 hours  65 g  Amino Acids  100 g Dextrose  50 g Lipids 20%   98 mEq Sodium Chloride  14 mEq Sodium Acetate  20 mmol Sodium Phosphate  39 mEq Potassium Chloride  26 mEq Potassium Acetate  10 mmol Potassium Phosphate  0 mEq Calcium Gluconate (CAPS out of PN solution)  10 mEq Magnesium Sulfate  200 mg Thiamine  1 ml Trace Elements  10 ml MVI    Total Calories    1100      (Meets    53%  of  Estimated Energy needs  and    56%  Protein needs)   (osmolarity <900)    Additional Recommendations:    1) Daily weights  2) Strict I & O's - recommended as per PN protocol   3) Daily lyte checks including magnesium and phos  4) Weekly triglycerides/LFT checks  5) POCT q6hrs; maintain 140-180mg/dL  6) if on PN > 6 days, consider placing PICC line to meet 100% of nutr needs  7) ADAT as per surgery from FLD to LRD when medically feasible.   8) Will provide ostomy teaching when able.    *will continue to monitor and adjust PN prn*  Sadia Chong, LUBA, CDN [phone]

## 2023-12-18 NOTE — PROGRESS NOTE ADULT - SUBJECTIVE AND OBJECTIVE BOX
Patient seen and examined on routine rounds.   She denies nausea, vomiting, fever or chills. Tolerating diet, pan well controlled   Nurse report no acute event overnight   VS reviewed    Physical Exam:  General: AOx3, Well developed, NAD  HEENT: NC/AT, normal pinnae and tragi  Chest: Normal respiratory effort, equal chest rise. On 4L INC  Heart: regular rate, normotensive  Abdomen: midline dressing c/d/i, Ostomy pink and patent, semisolid stool, gas in bag, incision healing well, no erythema or induration  Neuro/Psych: No localized deficits. Normal speech, normal tone, normal affect  Skin: Normal, warm, no rashes, no lesions noted  Extremities: Warm, well perfused, no edema    Vital Signs Last 24 Hrs  T(C): 36.6 (17 Dec 2023 20:39), Max: 36.6 (17 Dec 2023 20:39)  T(F): 97.9 (17 Dec 2023 20:39), Max: 97.9 (17 Dec 2023 20:39)  HR: 96 (17 Dec 2023 20:39) (88 - 103)  BP: 123/38 (17 Dec 2023 20:39) (113/61 - 142/41)  BP(mean): --  RR: 17 (17 Dec 2023 20:39) (17 - 18)  SpO2: 94% (17 Dec 2023 20:39) (94% - 98%)    Parameters below as of 17 Dec 2023 20:39  Patient On (Oxygen Delivery Method): nasal cannula  O2 Flow (L/min): 3                          10.1   16.15 )-----------( 221      ( 17 Dec 2023 12:40 )             29.8     12-17    135  |  106  |  19  ----------------------------<  150<H>  3.9   |  25  |  0.71    Ca    7.5<L>      17 Dec 2023 05:01  Phos  3.1     12-17  Mg     2.1     12-17    TPro  5.5<L>  /  Alb  2.1<L>  /  TBili  1.0  /  DBili  x   /  AST  34  /  ALT  25  /  AlkPhos  47  12-17    I&O's Detail    16 Dec 2023 07:01  -  17 Dec 2023 07:00  --------------------------------------------------------  IN:  Total IN: 0 mL    OUT:    Drain (mL): 240 mL    Indwelling Catheter - Urethral (mL): 700 mL    Intermittent Catheterization - Urethral (mL): 1800 mL  Total OUT: 2740 mL    Total NET: -2740 mL      17 Dec 2023 07:01  -  18 Dec 2023 01:43  --------------------------------------------------------  IN:  Total IN: 0 mL    OUT:    Colostomy (mL): 200 mL    Drain (mL): 125 mL  Total OUT: 325 mL    Total NET: -325 mL      MEDICATIONS  (STANDING):  ampicillin/sulbactam  IVPB 3 Gram(s) IV Intermittent every 6 hours  diltiazem    Tablet 60 milliGRAM(s) Oral every 8 hours  heparin  Infusion. 1100 Unit(s)/Hr (11 mL/Hr) IV Continuous <Continuous>  lidocaine   4% Patch 1 Patch Transdermal daily  lipid, fat emulsion (Fish Oil and Plant Based) 20% Infusion 20.83 mL/Hr (20.8 mL/Hr) IV Continuous <Continuous>  metoprolol tartrate Injectable 5 milliGRAM(s) IV Push every 5 minutes  pantoprazole  Injectable 40 milliGRAM(s) IV Push two times a day  Parenteral Nutrition - Adult 1 Each (75 mL/Hr) TPN Continuous <Continuous>  Parenteral Nutrition - Adult 1 Each (75 mL/Hr) TPN Continuous <Continuous>  polyethylene glycol 3350 17 Gram(s) Oral daily    MEDICATIONS  (PRN):  acetaminophen   IVPB .. 1000 milliGRAM(s) IV Intermittent once PRN Temp greater or equal to 38C (100.4F), Mild Pain (1 - 3)  heparin   Injectable 4400 Unit(s) IV Push every 6 hours PRN For aPTT less than 40  morphine  - Injectable 2 milliGRAM(s) IV Push every 4 hours PRN Moderate Pain (4 - 6)  ondansetron Injectable 4 milliGRAM(s) IV Push every 6 hours PRN Nausea

## 2023-12-18 NOTE — CONSULT NOTE ADULT - ASSESSMENT
82 yo female with PMH as above admitted for colonic perforation and is s/p Christina resection of colon on 12/11/23. Urology consulted for pt with post op urinary retention. As per RN pt was initially straight catheterized and had a daugherty placed for retention and most recently failed a trial of void as she was unable to void and daugherty was replaced this morning.   Recommend  - Keep indwelling daugherty in place  - Will place on Flomax discussed off label use in females pt would like to try it  - Trial of void in 4 days if inpatient vs. in 1 week outpatient. If inpatient TOV is performed check post void residual, if significant d/c with daugherty to leg bag  - Follow up with Dr. Ramos outpatient    Case discussed with Dr. Ramos

## 2023-12-18 NOTE — PROGRESS NOTE ADULT - ASSESSMENT
Post op PAF cardioverted with cardizem to NSR.  Start oral cardizem.  Start IV heparin.  When cleared by surgery to start DOAC will transition.  Will need fu for MV post recovery as had moderate MR and Pulm. HTN and Moderately severe TR as well.        12/17/2023:  Stable on current regime.  Continue diltiazem.  Continue heparin.  Await surgical clearance to start DOAC    D/w pt., and staff. Post op PAF cardioverted with cardizem to NSR.  Start oral cardizem.  Start IV heparin.  When cleared by surgery to start DOAC will transition.  Will need fu for MV post recovery as had moderate MR and Pulm. HTN and Moderately severe TR as well.      12/17/2023:  Stable on current regime.  Continue diltiazem.  Continue heparin.  Await surgical clearance to start DOAC    12/18/23: Doing well.  May have collection in belly and awaiting IR evaluation.  No further afib.  Still on hepairin.  Awaiting starting doac.     D/w pt., and staff.

## 2023-12-18 NOTE — CONSULT NOTE ADULT - CONSULT REASON
sepsis with bacteroides  abd perforation
Post op  ex lap Kent's procedure hypotension lactic acidosis
urinary retention postop
PAF

## 2023-12-18 NOTE — PROGRESS NOTE ADULT - SUBJECTIVE AND OBJECTIVE BOX
Date of service: 12-18-23 @ 13:11    pt seen and examined  has less abd discomfort  feels weak  no fevers     ROS: no fever or chills; denies dizziness, no HA, no SOB or cough, no diarrhea or constipation; no dysuria, no urinary frequency, no legs pain, no rashes    MEDICATIONS  (STANDING):  ampicillin/sulbactam  IVPB 3 Gram(s) IV Intermittent every 6 hours  diltiazem    Tablet 60 milliGRAM(s) Oral every 8 hours  heparin  Infusion. 1100 Unit(s)/Hr (11 mL/Hr) IV Continuous <Continuous>  lidocaine   4% Patch 1 Patch Transdermal daily  lipid, fat emulsion (Fish Oil and Plant Based) 20% Infusion 20.83 mL/Hr (20.8 mL/Hr) IV Continuous <Continuous>  lipid, fat emulsion (Fish Oil and Plant Based) 20% Infusion 0.685 Gm/kG/Day (20.8 mL/Hr) IV Continuous <Continuous>  metoprolol tartrate Injectable 5 milliGRAM(s) IV Push every 5 minutes  pantoprazole  Injectable 40 milliGRAM(s) IV Push two times a day  Parenteral Nutrition - Adult 1 Each (75 mL/Hr) TPN Continuous <Continuous>  Parenteral Nutrition - Adult 1 Each (75 mL/Hr) TPN Continuous <Continuous>  polyethylene glycol 3350 17 Gram(s) Oral daily    Vital Signs Last 24 Hrs  T(C): 36.6 (18 Dec 2023 07:48), Max: 36.6 (17 Dec 2023 20:39)  T(F): 97.8 (18 Dec 2023 07:48), Max: 97.9 (17 Dec 2023 20:39)  HR: 88 (18 Dec 2023 07:48) (88 - 103)  BP: 138/52 (18 Dec 2023 07:48) (120/41 - 142/41)  BP(mean): --  RR: 18 (18 Dec 2023 07:48) (17 - 18)  SpO2: 94% (18 Dec 2023 07:48) (94% - 94%)    Parameters below as of 18 Dec 2023 07:48  Patient On (Oxygen Delivery Method): nasal cannula      PE:  Constitutional: frail looking  HEENT: NC/AT, EOMI, PERRLA, conjunctivae clear; ears and nose atraumatic; pharynx benign  Neck: supple; thyroid not palpable  Back: no tenderness  Respiratory: respiratory effort normal; clear to auscultation  Cardiovascular: S1S2 regular, no murmurs  Abdomen: soft, not tender, not distended, positive BS; liver and spleen WNL surgical sites clean shanta drain in place   Genitourinary: no suprapubic tenderness  Lymphatic: no LN palpable  Musculoskeletal: no muscle tenderness, no joint swelling or tenderness  Extremities: no pedal edema  Neurological/ Psychiatric:   moving all extremities  Skin: no rashes; no palpable lesions    Labs: all available labs reviewed                                   9.7    18.57 )-----------( 244      ( 18 Dec 2023 07:05 )             29.3     12-18    136  |  105  |  21  ----------------------------<  137<H>  4.2   |  26  |  0.77    Ca    7.6<L>      18 Dec 2023 07:05  Phos  3.4     12-18  Mg     2.2     12-18    TPro  5.5<L>  /  Alb  2.0<L>  /  TBili  0.7  /  DBili  x   /  AST  33  /  ALT  33  /  AlkPhos  53  12-18        Alb: 2.2 g/dL / Pro: 5.7 gm/dL / ALK PHOS: 57 U/L / ALT: 22 U/L / AST: 30 U/L / GGT: x           Urinalysis Basic - ( 16 Dec 2023 04:39 )    Color: x / Appearance: x / SG: x / pH: x  Gluc: 150 mg/dL / Ketone: x  / Bili: x / Urobili: x   Blood: x / Protein: x / Nitrite: x   Leuk Esterase: x / RBC: x / WBC x   Sq Epi: x / Non Sq Epi: x / Bacteria: x    Culture - Urine (12.12.23 @ 09:10)   Specimen Source: Clean Catch Clean Catch (Midstream)  Culture Results:   No growth  Culture - Surgical Swab (12.11.23 @ 19:09)   - Amoxicillin/Clavulanic Acid: S <=8/4  - Ampicillin: S <=8 These ampicillin results predict results for amoxicillin  - Ampicillin/Sulbactam: S <=4/2  - Aztreonam: S <=4  - Cefazolin: S <=2  - Cefepime: S <=2  - Cefoxitin: S <=8  - Ceftriaxone: S <=1  - Ciprofloxacin: S <=0.25  - Ertapenem: S <=0.5  - Gentamicin: S <=2  - Levofloxacin: S <=0.5  - Meropenem: S <=1  - Piperacillin/Tazobactam: S <=8  - Tobramycin: S <=2  - Trimethoprim/Sulfamethoxazole: S <=0.5/9.5  Specimen Source: .Surgical Swab culture, peritonitis  Culture Results:   Rare Proteus mirabilis   Few Bacteroides distasonis "Susceptibilities not performed"   Rare Most closely resembling Acidaminococcus species "Susceptibilities   not performed"  Organism Identification: Proteus mirabilis  Organism: Proteus mirabilis  Method Type: MICCulture - Blood (12.11.23 @ 10:57)   Specimen Source: .Blood None  Culture Results:   No growth at 4 daysCulture - Blood (12.11.23 @ 10:57)   Gram Stain:   Growth in anaerobic bottle: Gram Negative Rods  - Blood PCR Panel: NEG  Specimen Source: .Blood None  Organism: Blood Culture PCR  Culture Results:   Growth in anaerobic bottle: Bacteroides uniformis   Direct identification is available within approximately 3-5   hours either by Blood Panel Multiplexed PCR or Direct   MALDI-TOF. Details: [email or web address]  Organism Identification: Blood Culture PCR  Method Type: PCR      Radiology: all available radiological tests reviewed  < from: Xray Chest 1 View- PORTABLE-Urgent (Xray Chest 1 View- PORTABLE-Urgent .) (12.11.23 @ 20:17) >    ACC: [id number] EXAM:  XR CHEST PORTABLE URGENT 1V   ORDERED BY: HUGO GREEN     PROCEDURE DATE:  12/11/2023          INTERPRETATION:  Portable chest radiograph    CLINICAL INFORMATION: Post intubation chest radiograph    TECHNIQUE:  Portable  AP chest radiograph.    COMPARISON: 2/11/2023 chest x-ray .    FINDINGS:  CATHETERS AND TUBES: ET tube tip above tracheal bifurcation.  NG tube tip beyond GE junction.    PULMONARY: Mild LEFT effusion and/or basilar airspace disease obscures   LEFT CP angle and portions of LEFT diaphragm contour. Remaining lung   parenchyma grossly clear.  No pneumothorax.    HEART/VASCULAR: The heart and mediastinum size and configuration are   within normal limits.    BONES: Visualized osseous thorax intact.    IMPRESSION:   Catheters and tubes in place.  LEFT lower zone mild pleural effusion and/or patchy basilar airspace   disease..    --- End of Report ---      < end of copied text >  < from: CT Abdomen and Pelvis w/ Oral Cont and w/ IV Cont (12.11.23 @ 12:45) >  ACC: [id number] EXAM:  CT ABDOMEN AND PELVIS OC IC   ORDERED BY: TRAY GÓMEZ     PROCEDURE DATE:  12/11/2023          INTERPRETATION:  CLINICAL INFORMATION: Rule out small bowel obstruction.    COMPARISON: 04/16/2022.    CONTRAST/COMPLICATIONS:  IVContrast: Omnipaque 350  90 cc administered   10 cc discarded  Oral Contrast: Omnipaque 300  Complications: None reported at time of study completion    PROCEDURE:  CT of the Abdomen and Pelvis was performed.  Sagittal and coronal reformats were performed.    FINDINGS:  LOWER CHEST: Bibasilar subsegmental atelectasis, left more than right.   Mild hiatal hernia. Coronary artery calcification.    LIVER: Steatosis.  BILE DUCTS: Mildly dilated. CBD measures approximately 1 cm with smooth   distal tapering. An almost similar appearance was noted on the prior   study. Correlate clinically.  GALLBLADDER: Within normal limits.  SPLEEN: Within normal limits.  PANCREAS: Within normal limits.  ADRENALS: Within normal limits.  KIDNEYS/URETERS: Within normal limits.    BLADDER: Minimally distended.  REPRODUCTIVE ORGANS: Uterus and adnexa within normal limits.    BOWEL: No bowel obstruction. Appendix is not visualized. No evidence of   inflammation in the pericecal region. Evidence of uncomplicated   predominantly sigmoid diverticula. There are small foci of extraluminal   gas at the level of the rectum along with probable rectal pneumatosis.  PERITONEUM: Small volume abdominal and pelvic ascites. Also small volume   pneumoperitoneum.  VESSELS: Atherosclerotic changes. Common origin of the celiac and   superior mesenteric arteries.  RETROPERITONEUM/LYMPH NODES: No lymphadenopathy.  ABDOMINAL WALL: Within normal limits.  BONES: Degenerative changes. Stable moderate compression of L2.    IMPRESSION:  Evidence of pneumoperitoneum. Suspect rectal perforation considering the   presence of perirectal gas and pneumatosis.          Advanced directives addressed: full resuscitation   Date of service: 12-18-23 @ 13:11    pt seen and examined  has less abd discomfort  feels weak  no fevers     ROS: no fever or chills; denies dizziness, no HA, no SOB or cough, no diarrhea or constipation; no dysuria, no urinary frequency, no legs pain, no rashes    MEDICATIONS  (STANDING):  ampicillin/sulbactam  IVPB 3 Gram(s) IV Intermittent every 6 hours  diltiazem    Tablet 60 milliGRAM(s) Oral every 8 hours  heparin  Infusion. 1100 Unit(s)/Hr (11 mL/Hr) IV Continuous <Continuous>  lidocaine   4% Patch 1 Patch Transdermal daily  lipid, fat emulsion (Fish Oil and Plant Based) 20% Infusion 20.83 mL/Hr (20.8 mL/Hr) IV Continuous <Continuous>  lipid, fat emulsion (Fish Oil and Plant Based) 20% Infusion 0.685 Gm/kG/Day (20.8 mL/Hr) IV Continuous <Continuous>  metoprolol tartrate Injectable 5 milliGRAM(s) IV Push every 5 minutes  pantoprazole  Injectable 40 milliGRAM(s) IV Push two times a day  Parenteral Nutrition - Adult 1 Each (75 mL/Hr) TPN Continuous <Continuous>  Parenteral Nutrition - Adult 1 Each (75 mL/Hr) TPN Continuous <Continuous>  polyethylene glycol 3350 17 Gram(s) Oral daily    Vital Signs Last 24 Hrs  T(C): 36.6 (18 Dec 2023 07:48), Max: 36.6 (17 Dec 2023 20:39)  T(F): 97.8 (18 Dec 2023 07:48), Max: 97.9 (17 Dec 2023 20:39)  HR: 88 (18 Dec 2023 07:48) (88 - 103)  BP: 138/52 (18 Dec 2023 07:48) (120/41 - 142/41)  BP(mean): --  RR: 18 (18 Dec 2023 07:48) (17 - 18)  SpO2: 94% (18 Dec 2023 07:48) (94% - 94%)    Parameters below as of 18 Dec 2023 07:48  Patient On (Oxygen Delivery Method): nasal cannula      PE:  Constitutional: frail looking  HEENT: NC/AT, EOMI, PERRLA, conjunctivae clear; ears and nose atraumatic; pharynx benign  Neck: supple; thyroid not palpable  Back: no tenderness  Respiratory: respiratory effort normal; clear to auscultation  Cardiovascular: S1S2 regular, no murmurs  Abdomen: soft, not tender, not distended, positive BS; liver and spleen WNL surgical sites clean shanta drain in place   Genitourinary: no suprapubic tenderness  Lymphatic: no LN palpable  Musculoskeletal: no muscle tenderness, no joint swelling or tenderness  Extremities: no pedal edema  Neurological/ Psychiatric:   moving all extremities  Skin: no rashes; no palpable lesions    Labs: all available labs reviewed                                   9.7    18.57 )-----------( 244      ( 18 Dec 2023 07:05 )             29.3     12-18    136  |  105  |  21  ----------------------------<  137<H>  4.2   |  26  |  0.77    Ca    7.6<L>      18 Dec 2023 07:05  Phos  3.4     12-18  Mg     2.2     12-18    TPro  5.5<L>  /  Alb  2.0<L>  /  TBili  0.7  /  DBili  x   /  AST  33  /  ALT  33  /  AlkPhos  53  12-18        Alb: 2.2 g/dL / Pro: 5.7 gm/dL / ALK PHOS: 57 U/L / ALT: 22 U/L / AST: 30 U/L / GGT: x           Urinalysis Basic - ( 16 Dec 2023 04:39 )    Color: x / Appearance: x / SG: x / pH: x  Gluc: 150 mg/dL / Ketone: x  / Bili: x / Urobili: x   Blood: x / Protein: x / Nitrite: x   Leuk Esterase: x / RBC: x / WBC x   Sq Epi: x / Non Sq Epi: x / Bacteria: x    Culture - Urine (12.12.23 @ 09:10)   Specimen Source: Clean Catch Clean Catch (Midstream)  Culture Results:   No growth  Culture - Surgical Swab (12.11.23 @ 19:09)   - Amoxicillin/Clavulanic Acid: S <=8/4  - Ampicillin: S <=8 These ampicillin results predict results for amoxicillin  - Ampicillin/Sulbactam: S <=4/2  - Aztreonam: S <=4  - Cefazolin: S <=2  - Cefepime: S <=2  - Cefoxitin: S <=8  - Ceftriaxone: S <=1  - Ciprofloxacin: S <=0.25  - Ertapenem: S <=0.5  - Gentamicin: S <=2  - Levofloxacin: S <=0.5  - Meropenem: S <=1  - Piperacillin/Tazobactam: S <=8  - Tobramycin: S <=2  - Trimethoprim/Sulfamethoxazole: S <=0.5/9.5  Specimen Source: .Surgical Swab culture, peritonitis  Culture Results:   Rare Proteus mirabilis   Few Bacteroides distasonis "Susceptibilities not performed"   Rare Most closely resembling Acidaminococcus species "Susceptibilities   not performed"  Organism Identification: Proteus mirabilis  Organism: Proteus mirabilis  Method Type: MICCulture - Blood (12.11.23 @ 10:57)   Specimen Source: .Blood None  Culture Results:   No growth at 4 daysCulture - Blood (12.11.23 @ 10:57)   Gram Stain:   Growth in anaerobic bottle: Gram Negative Rods  - Blood PCR Panel: NEG  Specimen Source: .Blood None  Organism: Blood Culture PCR  Culture Results:   Growth in anaerobic bottle: Bacteroides uniformis   Direct identification is available within approximately 3-5   hours either by Blood Panel Multiplexed PCR or Direct   MALDI-TOF. Details: [email or web address]  Organism Identification: Blood Culture PCR  Method Type: PCR      Radiology: all available radiological tests reviewed  < from: Xray Chest 1 View- PORTABLE-Urgent (Xray Chest 1 View- PORTABLE-Urgent .) (12.11.23 @ 20:17) >    ACC: [id number] EXAM:  XR CHEST PORTABLE URGENT 1V   ORDERED BY: HUGO GREEN     PROCEDURE DATE:  12/11/2023          INTERPRETATION:  Portable chest radiograph    CLINICAL INFORMATION: Post intubation chest radiograph    TECHNIQUE:  Portable  AP chest radiograph.    COMPARISON: 2/11/2023 chest x-ray .    FINDINGS:  CATHETERS AND TUBES: ET tube tip above tracheal bifurcation.  NG tube tip beyond GE junction.    PULMONARY: Mild LEFT effusion and/or basilar airspace disease obscures   LEFT CP angle and portions of LEFT diaphragm contour. Remaining lung   parenchyma grossly clear.  No pneumothorax.    HEART/VASCULAR: The heart and mediastinum size and configuration are   within normal limits.    BONES: Visualized osseous thorax intact.    IMPRESSION:   Catheters and tubes in place.  LEFT lower zone mild pleural effusion and/or patchy basilar airspace   disease..    --- End of Report ---      < end of copied text >  < from: CT Abdomen and Pelvis w/ Oral Cont and w/ IV Cont (12.11.23 @ 12:45) >  ACC: [id number] EXAM:  CT ABDOMEN AND PELVIS OC IC   ORDERED BY: TRAY GÓMEZ     PROCEDURE DATE:  12/11/2023          INTERPRETATION:  CLINICAL INFORMATION: Rule out small bowel obstruction.    COMPARISON: 04/16/2022.    CONTRAST/COMPLICATIONS:  IVContrast: Omnipaque 350  90 cc administered   10 cc discarded  Oral Contrast: Omnipaque 300  Complications: None reported at time of study completion    PROCEDURE:  CT of the Abdomen and Pelvis was performed.  Sagittal and coronal reformats were performed.    FINDINGS:  LOWER CHEST: Bibasilar subsegmental atelectasis, left more than right.   Mild hiatal hernia. Coronary artery calcification.    LIVER: Steatosis.  BILE DUCTS: Mildly dilated. CBD measures approximately 1 cm with smooth   distal tapering. An almost similar appearance was noted on the prior   study. Correlate clinically.  GALLBLADDER: Within normal limits.  SPLEEN: Within normal limits.  PANCREAS: Within normal limits.  ADRENALS: Within normal limits.  KIDNEYS/URETERS: Within normal limits.    BLADDER: Minimally distended.  REPRODUCTIVE ORGANS: Uterus and adnexa within normal limits.    BOWEL: No bowel obstruction. Appendix is not visualized. No evidence of   inflammation in the pericecal region. Evidence of uncomplicated   predominantly sigmoid diverticula. There are small foci of extraluminal   gas at the level of the rectum along with probable rectal pneumatosis.  PERITONEUM: Small volume abdominal and pelvic ascites. Also small volume   pneumoperitoneum.  VESSELS: Atherosclerotic changes. Common origin of the celiac and   superior mesenteric arteries.  RETROPERITONEUM/LYMPH NODES: No lymphadenopathy.  ABDOMINAL WALL: Within normal limits.  BONES: Degenerative changes. Stable moderate compression of L2.    IMPRESSION:  Evidence of pneumoperitoneum. Suspect rectal perforation considering the   presence of perirectal gas and pneumatosis.          Advanced directives addressed: full resuscitation

## 2023-12-18 NOTE — PROGRESS NOTE ADULT - ASSESSMENT
84 y/o female with a PMHx of COPD, HLD presents to the ED c/o abd pain, n/v and constipation x2 days. Denies CP, SOB, urinary symptoms. NKDA. Nonsmoker. No EtOH use. No other complaints at this time. Pt found to have septic shock d/t stercoral ulcer and colonic perforation eval by surgery taken to OR on 12/11 s/p Hartmanns procedure, abd washout noted to have feculent peritonitis, cx sent, blood cx growing Bacteroides, Body fluid cx growing proteus, bacteroides, Acidaminococcus has been on IV zosyn perioperatively.     1. Sepsis with Bacteroides species. Abdominal perforation. Feculent peritonitis. S/p Hartmanns procedure/abd washout  - imaging reviewed wbc ct f/u cbc  - OR cultures growing bacteroides, acidaminococcus, proteus   - blood cx 12/11 growing bacteroides  - s/p zosyn 12/11-12/14  - now on rocephin since 12/14  - on unasyn 3gmq6h #3   - continue with abx coverage   - repeat blood cx no growth 12/16   - on dc po augmentin [id number] 14 days until 12/30  - surgical f/u noted  - if wbc ct increases consider repeat imaging   - tolerating abx well so far; no side effects noted  - reason for abx use and side effects reviewed with patient  - fu cbc    2. other issues -care per medicine  84 y/o female with a PMHx of COPD, HLD presents to the ED c/o abd pain, n/v and constipation x2 days. Denies CP, SOB, urinary symptoms. NKDA. Nonsmoker. No EtOH use. No other complaints at this time. Pt found to have septic shock d/t stercoral ulcer and colonic perforation eval by surgery taken to OR on 12/11 s/p Hartmanns procedure, abd washout noted to have feculent peritonitis, cx sent, blood cx growing Bacteroides, Body fluid cx growing proteus, bacteroides, Acidaminococcus has been on IV zosyn perioperatively.     1. Sepsis with Bacteroides species. Abdominal perforation. Feculent peritonitis. S/p Hartmanns procedure/abd washout  - imaging reviewed wbc ct f/u cbc  - OR cultures growing bacteroides, acidaminococcus, proteus   - blood cx 12/11 growing bacteroides  - s/p zosyn 12/11-12/14  - now on rocephin since 12/14  - on unasyn 3gmq6h #3   - continue with abx coverage   - repeat blood cx no growth 12/16   - on dc po augmentin [id number] 14 days until 12/30  - surgical f/u noted  - if wbc ct increases consider repeat imaging   - tolerating abx well so far; no side effects noted  - reason for abx use and side effects reviewed with patient  - fu cbc    2. other issues -care per medicine

## 2023-12-19 LAB
ALBUMIN SERPL ELPH-MCNC: 1.8 G/DL — LOW (ref 3.3–5)
ALBUMIN SERPL ELPH-MCNC: 1.8 G/DL — LOW (ref 3.3–5)
ALP SERPL-CCNC: 44 U/L — SIGNIFICANT CHANGE UP (ref 40–120)
ALP SERPL-CCNC: 44 U/L — SIGNIFICANT CHANGE UP (ref 40–120)
ALT FLD-CCNC: 23 U/L — SIGNIFICANT CHANGE UP (ref 12–78)
ALT FLD-CCNC: 23 U/L — SIGNIFICANT CHANGE UP (ref 12–78)
ANION GAP SERPL CALC-SCNC: 4 MMOL/L — LOW (ref 5–17)
ANION GAP SERPL CALC-SCNC: 4 MMOL/L — LOW (ref 5–17)
APTT BLD: 63.7 SEC — HIGH (ref 24.5–35.6)
APTT BLD: 63.7 SEC — HIGH (ref 24.5–35.6)
AST SERPL-CCNC: 21 U/L — SIGNIFICANT CHANGE UP (ref 15–37)
AST SERPL-CCNC: 21 U/L — SIGNIFICANT CHANGE UP (ref 15–37)
BASOPHILS # BLD AUTO: 0 K/UL — SIGNIFICANT CHANGE UP (ref 0–0.2)
BASOPHILS # BLD AUTO: 0 K/UL — SIGNIFICANT CHANGE UP (ref 0–0.2)
BASOPHILS NFR BLD AUTO: 0 % — SIGNIFICANT CHANGE UP (ref 0–2)
BASOPHILS NFR BLD AUTO: 0 % — SIGNIFICANT CHANGE UP (ref 0–2)
BILIRUB SERPL-MCNC: 0.6 MG/DL — SIGNIFICANT CHANGE UP (ref 0.2–1.2)
BILIRUB SERPL-MCNC: 0.6 MG/DL — SIGNIFICANT CHANGE UP (ref 0.2–1.2)
BUN SERPL-MCNC: 20 MG/DL — SIGNIFICANT CHANGE UP (ref 7–23)
BUN SERPL-MCNC: 20 MG/DL — SIGNIFICANT CHANGE UP (ref 7–23)
CALCIUM SERPL-MCNC: 7.3 MG/DL — LOW (ref 8.5–10.1)
CALCIUM SERPL-MCNC: 7.3 MG/DL — LOW (ref 8.5–10.1)
CHLORIDE SERPL-SCNC: 104 MMOL/L — SIGNIFICANT CHANGE UP (ref 96–108)
CHLORIDE SERPL-SCNC: 104 MMOL/L — SIGNIFICANT CHANGE UP (ref 96–108)
CO2 SERPL-SCNC: 27 MMOL/L — SIGNIFICANT CHANGE UP (ref 22–31)
CO2 SERPL-SCNC: 27 MMOL/L — SIGNIFICANT CHANGE UP (ref 22–31)
CREAT SERPL-MCNC: 0.86 MG/DL — SIGNIFICANT CHANGE UP (ref 0.5–1.3)
CREAT SERPL-MCNC: 0.86 MG/DL — SIGNIFICANT CHANGE UP (ref 0.5–1.3)
EGFR: 67 ML/MIN/1.73M2 — SIGNIFICANT CHANGE UP
EGFR: 67 ML/MIN/1.73M2 — SIGNIFICANT CHANGE UP
EOSINOPHIL # BLD AUTO: 0.4 K/UL — SIGNIFICANT CHANGE UP (ref 0–0.5)
EOSINOPHIL # BLD AUTO: 0.4 K/UL — SIGNIFICANT CHANGE UP (ref 0–0.5)
EOSINOPHIL NFR BLD AUTO: 2 % — SIGNIFICANT CHANGE UP (ref 0–6)
EOSINOPHIL NFR BLD AUTO: 2 % — SIGNIFICANT CHANGE UP (ref 0–6)
GLUCOSE SERPL-MCNC: 148 MG/DL — HIGH (ref 70–99)
GLUCOSE SERPL-MCNC: 148 MG/DL — HIGH (ref 70–99)
HCT VFR BLD CALC: 27.1 % — LOW (ref 34.5–45)
HCT VFR BLD CALC: 27.1 % — LOW (ref 34.5–45)
HGB BLD-MCNC: 9.1 G/DL — LOW (ref 11.5–15.5)
HGB BLD-MCNC: 9.1 G/DL — LOW (ref 11.5–15.5)
INR BLD: 1.05 RATIO — SIGNIFICANT CHANGE UP (ref 0.85–1.18)
INR BLD: 1.05 RATIO — SIGNIFICANT CHANGE UP (ref 0.85–1.18)
LYMPHOCYTES # BLD AUTO: 13 % — SIGNIFICANT CHANGE UP (ref 13–44)
LYMPHOCYTES # BLD AUTO: 13 % — SIGNIFICANT CHANGE UP (ref 13–44)
LYMPHOCYTES # BLD AUTO: 2.6 K/UL — SIGNIFICANT CHANGE UP (ref 1–3.3)
LYMPHOCYTES # BLD AUTO: 2.6 K/UL — SIGNIFICANT CHANGE UP (ref 1–3.3)
MCHC RBC-ENTMCNC: 29.4 PG — SIGNIFICANT CHANGE UP (ref 27–34)
MCHC RBC-ENTMCNC: 29.4 PG — SIGNIFICANT CHANGE UP (ref 27–34)
MCHC RBC-ENTMCNC: 33.6 GM/DL — SIGNIFICANT CHANGE UP (ref 32–36)
MCHC RBC-ENTMCNC: 33.6 GM/DL — SIGNIFICANT CHANGE UP (ref 32–36)
MCV RBC AUTO: 87.7 FL — SIGNIFICANT CHANGE UP (ref 80–100)
MCV RBC AUTO: 87.7 FL — SIGNIFICANT CHANGE UP (ref 80–100)
MONOCYTES # BLD AUTO: 0.8 K/UL — SIGNIFICANT CHANGE UP (ref 0–0.9)
MONOCYTES # BLD AUTO: 0.8 K/UL — SIGNIFICANT CHANGE UP (ref 0–0.9)
MONOCYTES NFR BLD AUTO: 4 % — SIGNIFICANT CHANGE UP (ref 2–14)
MONOCYTES NFR BLD AUTO: 4 % — SIGNIFICANT CHANGE UP (ref 2–14)
NEUTROPHILS # BLD AUTO: 15.82 K/UL — HIGH (ref 1.8–7.4)
NEUTROPHILS # BLD AUTO: 15.82 K/UL — HIGH (ref 1.8–7.4)
NEUTROPHILS NFR BLD AUTO: 78 % — HIGH (ref 43–77)
NEUTROPHILS NFR BLD AUTO: 78 % — HIGH (ref 43–77)
NRBC # BLD: SIGNIFICANT CHANGE UP /100 WBCS (ref 0–0)
NRBC # BLD: SIGNIFICANT CHANGE UP /100 WBCS (ref 0–0)
PHOSPHATE SERPL-MCNC: 4 MG/DL — SIGNIFICANT CHANGE UP (ref 2.5–4.5)
PHOSPHATE SERPL-MCNC: 4 MG/DL — SIGNIFICANT CHANGE UP (ref 2.5–4.5)
PLATELET # BLD AUTO: 260 K/UL — SIGNIFICANT CHANGE UP (ref 150–400)
PLATELET # BLD AUTO: 260 K/UL — SIGNIFICANT CHANGE UP (ref 150–400)
POTASSIUM SERPL-MCNC: 4.5 MMOL/L — SIGNIFICANT CHANGE UP (ref 3.5–5.3)
POTASSIUM SERPL-MCNC: 4.5 MMOL/L — SIGNIFICANT CHANGE UP (ref 3.5–5.3)
POTASSIUM SERPL-SCNC: 4.5 MMOL/L — SIGNIFICANT CHANGE UP (ref 3.5–5.3)
POTASSIUM SERPL-SCNC: 4.5 MMOL/L — SIGNIFICANT CHANGE UP (ref 3.5–5.3)
PROT SERPL-MCNC: 5.2 GM/DL — LOW (ref 6–8.3)
PROT SERPL-MCNC: 5.2 GM/DL — LOW (ref 6–8.3)
PROTHROM AB SERPL-ACNC: 11.9 SEC — SIGNIFICANT CHANGE UP (ref 9.5–13)
PROTHROM AB SERPL-ACNC: 11.9 SEC — SIGNIFICANT CHANGE UP (ref 9.5–13)
RBC # BLD: 3.09 M/UL — LOW (ref 3.8–5.2)
RBC # BLD: 3.09 M/UL — LOW (ref 3.8–5.2)
RBC # FLD: 14.7 % — HIGH (ref 10.3–14.5)
RBC # FLD: 14.7 % — HIGH (ref 10.3–14.5)
SODIUM SERPL-SCNC: 135 MMOL/L — SIGNIFICANT CHANGE UP (ref 135–145)
SODIUM SERPL-SCNC: 135 MMOL/L — SIGNIFICANT CHANGE UP (ref 135–145)
WBC # BLD: 20.02 K/UL — HIGH (ref 3.8–10.5)
WBC # BLD: 20.02 K/UL — HIGH (ref 3.8–10.5)
WBC # FLD AUTO: 20.02 K/UL — HIGH (ref 3.8–10.5)
WBC # FLD AUTO: 20.02 K/UL — HIGH (ref 3.8–10.5)

## 2023-12-19 PROCEDURE — 99233 SBSQ HOSP IP/OBS HIGH 50: CPT

## 2023-12-19 PROCEDURE — 49406 IMAGE CATH FLUID PERI/RETRO: CPT

## 2023-12-19 RX ORDER — FENTANYL CITRATE 50 UG/ML
50 INJECTION INTRAVENOUS
Refills: 0 | Status: DISCONTINUED | OUTPATIENT
Start: 2023-12-19 | End: 2023-12-19

## 2023-12-19 RX ORDER — SODIUM CHLORIDE 9 MG/ML
1000 INJECTION INTRAMUSCULAR; INTRAVENOUS; SUBCUTANEOUS
Refills: 0 | Status: DISCONTINUED | OUTPATIENT
Start: 2023-12-19 | End: 2023-12-19

## 2023-12-19 RX ORDER — ONDANSETRON 8 MG/1
4 TABLET, FILM COATED ORAL EVERY 6 HOURS
Refills: 0 | Status: DISCONTINUED | OUTPATIENT
Start: 2023-12-19 | End: 2023-12-19

## 2023-12-19 RX ORDER — HEPARIN SODIUM 5000 [USP'U]/ML
INJECTION INTRAVENOUS; SUBCUTANEOUS
Qty: 25000 | Refills: 0 | Status: DISCONTINUED | OUTPATIENT
Start: 2023-12-19 | End: 2023-12-21

## 2023-12-19 RX ORDER — ELECTROLYTE SOLUTION,INJ
1 VIAL (ML) INTRAVENOUS
Refills: 0 | Status: DISCONTINUED | OUTPATIENT
Start: 2023-12-19 | End: 2023-12-19

## 2023-12-19 RX ORDER — MORPHINE SULFATE 50 MG/1
2 CAPSULE, EXTENDED RELEASE ORAL EVERY 4 HOURS
Refills: 0 | Status: DISCONTINUED | OUTPATIENT
Start: 2023-12-19 | End: 2023-12-20

## 2023-12-19 RX ORDER — I.V. FAT EMULSION 20 G/100ML
0.68 EMULSION INTRAVENOUS
Qty: 50 | Refills: 0 | Status: DISCONTINUED | OUTPATIENT
Start: 2023-12-19 | End: 2023-12-19

## 2023-12-19 RX ORDER — OXYCODONE HYDROCHLORIDE 5 MG/1
5 TABLET ORAL ONCE
Refills: 0 | Status: DISCONTINUED | OUTPATIENT
Start: 2023-12-19 | End: 2023-12-19

## 2023-12-19 RX ADMIN — PANTOPRAZOLE SODIUM 40 MILLIGRAM(S): 20 TABLET, DELAYED RELEASE ORAL at 22:04

## 2023-12-19 RX ADMIN — TAMSULOSIN HYDROCHLORIDE 0.4 MILLIGRAM(S): 0.4 CAPSULE ORAL at 22:04

## 2023-12-19 RX ADMIN — LIDOCAINE 1 PATCH: 4 CREAM TOPICAL at 20:47

## 2023-12-19 RX ADMIN — HEPARIN SODIUM 1300 UNIT(S)/HR: 5000 INJECTION INTRAVENOUS; SUBCUTANEOUS at 21:19

## 2023-12-19 RX ADMIN — POLYETHYLENE GLYCOL 3350 17 GRAM(S): 17 POWDER, FOR SOLUTION ORAL at 22:33

## 2023-12-19 RX ADMIN — AMPICILLIN SODIUM AND SULBACTAM SODIUM 200 GRAM(S): 250; 125 INJECTION, POWDER, FOR SUSPENSION INTRAMUSCULAR; INTRAVENOUS at 17:17

## 2023-12-19 RX ADMIN — Medication 60 MILLIGRAM(S): at 22:03

## 2023-12-19 RX ADMIN — PANTOPRAZOLE SODIUM 40 MILLIGRAM(S): 20 TABLET, DELAYED RELEASE ORAL at 09:24

## 2023-12-19 RX ADMIN — LIDOCAINE 1 PATCH: 4 CREAM TOPICAL at 09:24

## 2023-12-19 RX ADMIN — LIDOCAINE 1 PATCH: 4 CREAM TOPICAL at 21:23

## 2023-12-19 RX ADMIN — HEPARIN SODIUM 1250 UNIT(S)/HR: 5000 INJECTION INTRAVENOUS; SUBCUTANEOUS at 07:14

## 2023-12-19 RX ADMIN — AMPICILLIN SODIUM AND SULBACTAM SODIUM 200 GRAM(S): 250; 125 INJECTION, POWDER, FOR SUSPENSION INTRAMUSCULAR; INTRAVENOUS at 12:00

## 2023-12-19 RX ADMIN — AMPICILLIN SODIUM AND SULBACTAM SODIUM 200 GRAM(S): 250; 125 INJECTION, POWDER, FOR SUSPENSION INTRAMUSCULAR; INTRAVENOUS at 23:28

## 2023-12-19 RX ADMIN — I.V. FAT EMULSION 20.83 GM/KG/DAY: 20 EMULSION INTRAVENOUS at 22:26

## 2023-12-19 RX ADMIN — Medication 1 EACH: at 22:27

## 2023-12-19 RX ADMIN — MORPHINE SULFATE 2 MILLIGRAM(S): 50 CAPSULE, EXTENDED RELEASE ORAL at 02:57

## 2023-12-19 RX ADMIN — MORPHINE SULFATE 2 MILLIGRAM(S): 50 CAPSULE, EXTENDED RELEASE ORAL at 03:27

## 2023-12-19 RX ADMIN — AMPICILLIN SODIUM AND SULBACTAM SODIUM 200 GRAM(S): 250; 125 INJECTION, POWDER, FOR SUSPENSION INTRAMUSCULAR; INTRAVENOUS at 05:38

## 2023-12-19 RX ADMIN — Medication 60 MILLIGRAM(S): at 05:38

## 2023-12-19 RX ADMIN — HEPARIN SODIUM 1250 UNIT(S)/HR: 5000 INJECTION INTRAVENOUS; SUBCUTANEOUS at 06:19

## 2023-12-19 NOTE — PROGRESS NOTE ADULT - ASSESSMENT
83F presented with abd pain, n/v and constipation, noted to have colonic perforation 2/2 stercoral ulcer    #Septic shock Septic shock from perforated colon and feculent peritonitis   -s/p Christina procedure  -S/p NGT  -Ostomy care  -Monitor CORKY output  -BCx: Gram Negative Rods, pending identification. will need repeat BCx   -Intraop cultures: Proteus Mirabilis   -s/p Zosyn   ID consulted recommendations appreciated   - change abx to unasyn 3gmq6h from rocephin   on dc po augmentin [id number] 14 days from day of negative cx    -repeat blood cultures   -currently on PPN  -strict i&o's   -daugherty in place, urology consult placed for urinary retention   -Pain management  -Serial abdominal exam  -management per CRSx  Continue Antibiotics   CTAbdomen  IMPRESSION:  *  Status post Christina procedure.  *  Loculated pocket of peritoneal fluid in the anterior left lower   quadrant measuring 6.1 x 3.3 x 6.1 cm with associated peritoneal   thickening and enhancement that is suggestive of peritonitis. An infected   collection is considered. Adjacent small bowel loops with wall   thickening, which may be reactive.  *  Dilated common bileduct, which appears slightly increased since   12/11/2023, but not significantly changed in comparison to 4/16/2022.   Correlate with liver function tests and consider additional workup as   warranted.  *  Small bilateral pleural effusions.    -IR consulted, plan for IR drainage today.       #pAfib, currently in NSR   -TTE: 2+ MR, 3+ TR, pHTN (RVSP: 41), EF: 55 to 60%  -S/p Lopressor and amiodarone  -S/p Cardizem gtt.  Start oral cardizem and IV heparin drip as per cardiology  transition to DOAC when cleared by surgery   -Currently in NSR (intermittent episodes of sinus tach on telemetry)    Will need fu for MV post recovery as had moderate MR and Pulm.  -[id number] Score: 3 (Age, Sex),   -Monitor electrolytes, replace and trend   -monitor on tele   Cardiology consulted recommendations appreciated.    Daugherty in place  Urology consulted, recommendations appreciated  Recommend  - Keep indwelling daugherty in place  - Will place on Flomax discussed off label use in females pt would like to try it  - Trial of void in 4 days if inpatient vs. in 1 week outpatient. If inpatient TOV is performed check post void residual, if significant d/c with daugherty to leg bag  - Follow up with Dr. Ramos outpatient      #COPD  -Mild  -Stable  -Currently not on any medication    #HLD  -Diet controlled, currently not on any medication    VTE prophylaxis  On IV heparin   83F presented with abd pain, n/v and constipation, noted to have colonic perforation 2/2 stercoral ulcer    #Septic shock Septic shock from perforated colon and feculent peritonitis   -s/p Christina procedure  -S/p NGT  -Ostomy care  -Monitor CORKY output  -BCx: Gram Negative Rods, pending identification. will need repeat BCx   -Intraop cultures: Proteus Mirabilis   -s/p Zosyn   ID consulted recommendations appreciated   - change abx to unasyn 3gmq6h from rocephin   on dc po augmentin [id number] 14 days from day of negative cx    -repeat blood cultures   -currently on PPN  -strict i&o's   -daugherty in place, urology consult placed for urinary retention   -Pain management  -Serial abdominal exam  -management per CRSx  Continue Antibiotics   CTAbdomen  IMPRESSION:  *  Status post Christina procedure.  *  Loculated pocket of peritoneal fluid in the anterior left lower   quadrant measuring 6.1 x 3.3 x 6.1 cm with associated peritoneal   thickening and enhancement that is suggestive of peritonitis. An infected   collection is considered. Adjacent small bowel loops with wall   thickening, which may be reactive.  *  Dilated common bileduct, which appears slightly increased since   12/11/2023, but not significantly changed in comparison to 4/16/2022.   Correlate with liver function tests and consider additional workup as   warranted.  *  Small bilateral pleural effusions.    -IR consulted, plan for IR drainage today.       #pAfib, currently in NSR   -TTE: 2+ MR, 3+ TR, pHTN (RVSP: 41), EF: 55 to 60%  -S/p Lopressor and amiodarone  -S/p Cardizem gtt.  Start oral cardizem and IV heparin drip as per cardiology  transition to DOAC when cleared by surgery   -Currently in NSR (intermittent episodes of sinus tach on telemetry)    Will need fu for MV post recovery as had moderate MR and Pulm.  -[id number] Score: 3 (Age, Sex),   -Monitor electrolytes, replace and trend   -monitor on tele   Cardiology consulted recommendations appreciated.    Daugherty in place  Urology consulted, recommendations appreciated  Recommend  - Keep indwelling daugherty in place  - Will place on Flomax discussed off label use in females pt would like to try it  - Trial of void in 4 days if inpatient vs. in 1 week outpatient. If inpatient TOV is performed check post void residual, if significant d/c with daugherty to leg bag  - Follow up with Dr. Ramos outpatient      #COPD  -Mild  -Stable  -Currently not on any medication    #HLD  -Diet controlled, currently not on any medication    VTE prophylaxis  On IV heparin

## 2023-12-19 NOTE — PRE PROCEDURE NOTE - PRE PROCEDURE EVALUATION
83F presented with colonic perforation 2/2 stercoral ulcer, now s/p Christina procedure found have LLQ abdominal fluid collection. IR consulted for drainage.     -Hold heparin gtt 4 hour hold for procedure  -NPO, IVF  -Plan for IR drainage today

## 2023-12-19 NOTE — CHART NOTE - NSCHARTNOTEFT_GEN_A_CORE
Clinical Nutrition PN Follow Up Note    *84 YO F with a PMHx of COPD, HLD presents to the ED c/o abd pain, n/v and constipation x2 days. Admitted w/ colonic perforation, Extensive stercocolitis with 2 feculent perforation of sigmoid & rectum. Necrotic segment of colon also noticed now POD 2 s/p Christina's. Had post-op fecal peritonitis, 7L washout. Went into septic shock, requiring pressors. Tx to ICU w/ post-op resp failure. NGT to LWS, blood tinged. Off pressors as of this AM 12/13.    *PPN initiated 2/2 NPO s/p Christina procedure2/2 colonic perforation     *12/14: has been NPO x 3 days, nausea overnight. NGT still in place, no plan for clamp trial today.  Plan to initiate PPN today, posisble transfer out of ICU  *12/15: PPN day #2. Transferred out of ICU, off pressors, now on 3E w/ no acute events overnight. Hypokalemia and hypophosphatemia noted s/p HD yesterday, being repleted. Possibly going into refeeding, will slowly increase lytes in PN bag and recommend to c/w KCl and KPhos repletion outside of bag until WNL. Remains NPO w/ NGT to LWS in place, reports intermittent nausea, on Zofran, and awaiting return of bowel function as per surgery. If to remain on PPN >7 days, consider placing PICC line to initiate TPN and meet >80% ENN.   *12/16 & 12/17: no acute events overnight. Tolerating CLD and plan to advance to FLD when medically feasible. Ostomy functioning.   *12/18:  PPN day #5. Tolerating FLD as per surgery note. Ostomy remains functioning. D/w surgical resident, plan to possibly advance to LRD today and if toelrating >/= 70% can d/c PPN. Will c/w PPN for now. Will provide colostomy education when able.     *current status: PPN day #6. Found to have intra-abd Loculated pocket of peritoneal fluid in the anterior left lower quadrant with associated peritoneal thickening and enhancement that is suggestive of peritonitis. Pt currently NPO for IR drainage today. Noted to be tolerating diet. Plan to c/w PPN today.    *pertinent meds: Abx, Caridezem, Morphine, D5 + IVF + KCl, Miralax (Not Given)    *labs reviewed; All lytes WNL. Na on low end of normal, will continue to monitor. Will c/w total volume 1800 mL in PN bag, will adjust prn as per labs. K+ and Phos on higher end of normal limits, will decrease both in PN bag today. T Bili WNL for trace elements. corrected Ca WNL, rec'd add 10mEq of Calcium Gluconate outside of PN bag as CAPS is out. Triglyceride <400, will c/w 50 g lipids daily No POCT noted, as per PN protocol, POCT required q6 hours to monitor glycemic control; should be maintained between 140- 180 mg/dL.   12-19    135  |  104  |  20  ----------------------------<  148<H>  4.5   |  27  |  0.86    Ca    7.3<L>      19 Dec 2023 05:23  Phos  4.0     12-19  Mg     2.1     12-19    TPro  5.2<L>  /  Alb  1.8<L>  /  TBili  0.6  /  DBili  x   /  AST  21  /  ALT  23  /  AlkPhos  44  12-19    Lipid Panel: Date/Time: 12-15-23 @ 04:58  Triglycerides, Serum: 187    *I&O's Detail    18 Dec 2023 07:01  -  19 Dec 2023 07:00  --------------------------------------------------------  IN:  Total IN: 0 mL    OUT:    Colostomy (mL): 100 mL    Drain (mL): 220 mL    Indwelling Catheter - Urethral (mL): 1000 mL  Total OUT: 1320 mL    Total NET: -1320 mL  *fluid status: negative; no input doc'd. Strict I&O's are rec'd when pt is on PN as per protocol   *BM (+) 12/17 - small loose brown/green stool x2; ostomy. On Miralax, however not given.   *edema: 1+ generalized    *malnutrition: Pt continues to meet criteria for severe protein-calorie malnutrition in context of acute illness r/t decreased ability to meet increased nutrient needs 2/2 colonic perf w/ septic shock AEB moderate muscle/ fat wasting    Estimated Needs: based on 63.5 Kg (UBW - likely dry wt)  Calories: 1905- 2222 Kcal (30- 35 Kcal/Kg)  Protein: 95- 127 g (1.5- 2 g/Kg)  Fluids: 1587- 1905 mL (25- 30 mL/Kg)    Diet, NPO:   Except Medications (12-18-23 @ 09:04) [Active]  Parenteral Nutrition - Adult 1 Each (75 mL/Hr) TPN Continuous <Continuous>, 12-17-23 @ 22:00, 22:00, Stop order after: 1 Days    Weight History:  Weight (kg): 72.6 (12-11-23 @ 10:30)    PPN Recommendations: via peripheral venous line  Total Volume: 1800 mL x 24 hours  65 g  Amino Acids  100 g Dextrose  50 g Lipids 20%   98 mEq Sodium Chloride  14 mEq Sodium Acetate  20 mmol Sodium Phosphate  42 mEq Potassium Chloride  28 mEq Potassium Acetate  0 mmol Potassium Phosphate  0 mEq Calcium Gluconate (CAPS out of PN solution)  10 mEq Magnesium Sulfate  200 mg Thiamine  1 ml Trace Elements  10 ml MVI    Total Calories    1100      (Meets    53%  of  Estimated Energy needs  and    56%  Protein needs)   (osmolarity <900)    Additional Recommendations:    1) Daily weights  2) Strict I & O's - recommended as per PN protocol   3) Daily lyte checks including magnesium and phos  4) Weekly triglycerides/LFT checks  5) POCT q6hrs; maintain 140-180mg/dL  6) if on PN > 6 days, consider placing PICC line to meet 100% of nutr needs  7) ADAT as per surgery from FLD to LRD when medically feasible.   8) Will provide ostomy teaching when able.    *will continue to monitor and adjust PN prn*  Mariluz Yung MS, RDN, [phone]  Nutrition  Clinical Nutrition PN Follow Up Note    *84 YO F with a PMHx of COPD, HLD presents to the ED c/o abd pain, n/v and constipation x2 days. Admitted w/ colonic perforation, Extensive stercocolitis with 2 feculent perforation of sigmoid & rectum. Necrotic segment of colon also noticed now POD 2 s/p Christina's. Had post-op fecal peritonitis, 7L washout. Went into septic shock, requiring pressors. Tx to ICU w/ post-op resp failure. NGT to LWS, blood tinged. Off pressors as of this AM 12/13.    *PPN initiated 2/2 NPO s/p Christina procedure2/2 colonic perforation     *12/14: has been NPO x 3 days, nausea overnight. NGT still in place, no plan for clamp trial today.  Plan to initiate PPN today, posisble transfer out of ICU  *12/15: PPN day #2. Transferred out of ICU, off pressors, now on 3E w/ no acute events overnight. Hypokalemia and hypophosphatemia noted s/p HD yesterday, being repleted. Possibly going into refeeding, will slowly increase lytes in PN bag and recommend to c/w KCl and KPhos repletion outside of bag until WNL. Remains NPO w/ NGT to LWS in place, reports intermittent nausea, on Zofran, and awaiting return of bowel function as per surgery. If to remain on PPN >7 days, consider placing PICC line to initiate TPN and meet >80% ENN.   *12/16 & 12/17: no acute events overnight. Tolerating CLD and plan to advance to FLD when medically feasible. Ostomy functioning.   *12/18:  PPN day #5. Tolerating FLD as per surgery note. Ostomy remains functioning. D/w surgical resident, plan to possibly advance to LRD today and if toelrating >/= 70% can d/c PPN. Will c/w PPN for now. Will provide colostomy education when able.     *current status: PPN day #6. Found to have intra-abd Loculated pocket of peritoneal fluid in the anterior left lower quadrant with associated peritoneal thickening and enhancement that is suggestive of peritonitis. Pt currently NPO for IR drainage today. Noted to be tolerating diet. Plan to c/w PPN today.    *pertinent meds: Abx, Caridezem, Morphine, D5 + IVF + KCl, Miralax (Not Given)    *labs reviewed; All lytes WNL. Na on low end of normal, will continue to monitor. Will c/w total volume 1800 mL in PN bag, will adjust prn as per labs. K+ and Phos on higher end of normal limits, will decrease both in PN bag today. T Bili WNL for trace elements. corrected Ca WNL, rec'd add 10mEq of Calcium Gluconate outside of PN bag as CAPS is out. Triglyceride <400, will c/w 50 g lipids daily No POCT noted, as per PN protocol, POCT required q6 hours to monitor glycemic control; should be maintained between 140- 180 mg/dL.   12-19    135  |  104  |  20  ----------------------------<  148<H>  4.5   |  27  |  0.86    Ca    7.3<L>      19 Dec 2023 05:23  Phos  4.0     12-19  Mg     2.1     12-19    TPro  5.2<L>  /  Alb  1.8<L>  /  TBili  0.6  /  DBili  x   /  AST  21  /  ALT  23  /  AlkPhos  44  12-19    Lipid Panel: Date/Time: 12-15-23 @ 04:58  Triglycerides, Serum: 187    *I&O's Detail    18 Dec 2023 07:01  -  19 Dec 2023 07:00  --------------------------------------------------------  IN:  Total IN: 0 mL    OUT:    Colostomy (mL): 100 mL    Drain (mL): 220 mL    Indwelling Catheter - Urethral (mL): 1000 mL  Total OUT: 1320 mL    Total NET: -1320 mL  *fluid status: negative; no input doc'd. Strict I&O's are rec'd when pt is on PN as per protocol   *BM (+) 12/17 - small loose brown/green stool x2; ostomy. On Miralax, however not given.   *edema: 1+ generalized    *malnutrition: Pt continues to meet criteria for severe protein-calorie malnutrition in context of acute illness r/t decreased ability to meet increased nutrient needs 2/2 colonic perf w/ septic shock AEB moderate muscle/ fat wasting    Estimated Needs: based on 63.5 Kg (UBW - likely dry wt)  Calories: 1905- 2222 Kcal (30- 35 Kcal/Kg)  Protein: 95- 127 g (1.5- 2 g/Kg)  Fluids: 1587- 1905 mL (25- 30 mL/Kg)    Diet, NPO:   Except Medications (12-18-23 @ 09:04) [Active]  Parenteral Nutrition - Adult 1 Each (75 mL/Hr) TPN Continuous <Continuous>, 12-17-23 @ 22:00, 22:00, Stop order after: 1 Days    Weight History:  Weight (kg): 72.6 (12-11-23 @ 10:30)    PPN Recommendations: via peripheral venous line  Total Volume: 1800 mL x 24 hours  65 g  Amino Acids  100 g Dextrose  50 g Lipids 20%   98 mEq Sodium Chloride  14 mEq Sodium Acetate  20 mmol Sodium Phosphate  42 mEq Potassium Chloride  28 mEq Potassium Acetate  0 mmol Potassium Phosphate  0 mEq Calcium Gluconate (CAPS out of PN solution)  10 mEq Magnesium Sulfate  200 mg Thiamine  1 ml Trace Elements  10 ml MVI    Total Calories    1100      (Meets    53%  of  Estimated Energy needs  and    56%  Protein needs)   (osmolarity <900)    Additional Recommendations:    1) Daily weights  2) Strict I & O's - recommended as per PN protocol   3) Daily lyte checks including magnesium and phos  4) Weekly triglycerides/LFT checks  5) POCT q6hrs; maintain 140-180mg/dL  6) if on PN > 6 days, consider placing PICC line to meet 100% of nutr needs  7) ADAT as per surgery from FLD to LRD when medically feasible.   8) Will provide ostomy teaching when able.    *will continue to monitor and adjust PN prn*  Mariluz Yung MS, RDN, [phone]  Nutrition

## 2023-12-19 NOTE — PROGRESS NOTE ADULT - SUBJECTIVE AND OBJECTIVE BOX
HOSPITALIST ATTENDING PROGRESS NOTE    Chart and meds reviewed.      Subjective: Patient seen and examined. WBCs continue elevated.  20.02 this time.  Patient continues to report abdominal pain.  CT scan showed loculated pocket of peritoneal fluid in the anterior left lower quadrant suggestive of peritonitis and infection with collection.  Discussed with surgery team.  Plan to have IR drain abscess today.      Additional results/Imaging, I have personally reviewed:    LABS:                            9.1    20.02 )-----------( 260      ( 19 Dec 2023 05:23 )             27.1     12-19    135  |  104  |  20  ----------------------------<  148<H>  4.5   |  27  |  0.86    Ca    7.3<L>      19 Dec 2023 05:23  Phos  4.0     12-19  Mg     2.1     12-19    TPro  5.2<L>  /  Alb  1.8<L>  /  TBili  0.6  /  DBili  x   /  AST  21  /  ALT  23  /  AlkPhos  44  12-19        LIVER FUNCTIONS - ( 19 Dec 2023 05:23 )  Alb: 1.8 g/dL / Pro: 5.2 gm/dL / ALK PHOS: 44 U/L / ALT: 23 U/L / AST: 21 U/L / GGT: x           PT/INR - ( 19 Dec 2023 05:23 )   PT: 11.9 sec;   INR: 1.05 ratio         PTT - ( 19 Dec 2023 05:23 )  PTT:63.7 sec  Urinalysis Basic - ( 19 Dec 2023 05:23 )    Color: x / Appearance: x / SG: x / pH: x  Gluc: 148 mg/dL / Ketone: x  / Bili: x / Urobili: x   Blood: x / Protein: x / Nitrite: x   Leuk Esterase: x / RBC: x / WBC x   Sq Epi: x / Non Sq Epi: x / Bacteria: x              All other systems reviewed and found to be negative with the exception of what has been described above.    MEDICATIONS  (STANDING):  ampicillin/sulbactam  IVPB 3 Gram(s) IV Intermittent every 6 hours  dextrose 5% + sodium chloride 0.45% with potassium chloride 20 mEq/L 1000 milliLiter(s) (75 mL/Hr) IV Continuous <Continuous>  diltiazem    Tablet 60 milliGRAM(s) Oral every 8 hours  lidocaine   4% Patch 1 Patch Transdermal daily  lipid, fat emulsion (Fish Oil and Plant Based) 20% Infusion 0.6849 Gm/kG/Day (20.83 mL/Hr) IV Continuous <Continuous>  lipid, fat emulsion (Fish Oil and Plant Based) 20% Infusion 0.685 Gm/kG/Day (20.8 mL/Hr) IV Continuous <Continuous>  metoprolol tartrate Injectable 5 milliGRAM(s) IV Push every 5 minutes  pantoprazole  Injectable 40 milliGRAM(s) IV Push two times a day  Parenteral Nutrition - Adult 1 Each (75 mL/Hr) TPN Continuous <Continuous>  Parenteral Nutrition - Adult 1 Each (75 mL/Hr) TPN Continuous <Continuous>  polyethylene glycol 3350 17 Gram(s) Oral every 12 hours  tamsulosin 0.4 milliGRAM(s) Oral at bedtime    MEDICATIONS  (PRN):  acetaminophen   IVPB .. 1000 milliGRAM(s) IV Intermittent once PRN Temp greater or equal to 38C (100.4F), Mild Pain (1 - 3)  heparin   Injectable 4400 Unit(s) IV Push every 6 hours PRN For aPTT less than 40  morphine  - Injectable 2 milliGRAM(s) IV Push every 4 hours PRN Moderate Pain (4 - 6)  ondansetron Injectable 4 milliGRAM(s) IV Push every 6 hours PRN Nausea      VITALS:  T(F): 97.8 (12-19-23 @ 08:24), Max: 97.8 (12-19-23 @ 08:24)  HR: 96 (12-19-23 @ 08:24) (94 - 96)  BP: 141/45 (12-19-23 @ 08:24) (139/46 - 141/45)  RR: 18 (12-19-23 @ 08:24) (18 - 18)  SpO2: 97% (12-19-23 @ 08:24) (96% - 97%)  Wt(kg): --    I&O's Summary    18 Dec 2023 07:01  -  19 Dec 2023 07:00  --------------------------------------------------------  IN: 0 mL / OUT: 1320 mL / NET: -1320 mL    19 Dec 2023 07:01  -  19 Dec 2023 14:43  --------------------------------------------------------  IN: 0 mL / OUT: 70 mL / NET: -70 mL        CAPILLARY BLOOD GLUCOSE          PHYSICAL EXAM:  GEN: NAD   HEENT: EOMI, normal hearing, moist mucous membranes  NECK : Soft and supple, no JVD  LUNG: CTABL, No wheezing, rales or rhonchi  CVS: S1S2+, , +regular rhythm   GI: midline dressing c/d/i, Ostomy pink and patent, semisolid stool, gas in bag, incision healing well, no erythema or induration  EXTREMITIES: Left arm swollen.  VASCULAR: 2+ peripheral pulses  NEURO: AAOx3, grossly non-focal   SKIN: No rashes    CULTURES:      Telemetry, personally reviewed

## 2023-12-19 NOTE — PROCEDURE NOTE - PROCEDURE FINDINGS AND DETAILS
Left lower quadrant fluid identified on CT and ultrasound. 10F drain placed with return of cloudy yellow fluid. Total 15cc removed and sent for culture.

## 2023-12-19 NOTE — PROGRESS NOTE ADULT - SUBJECTIVE AND OBJECTIVE BOX
Patient seen and examined on routine rounds.   She denies nausea, vomiting, fever or chills. Tolerating diet, pan well controlled   Nurse report no acute event overnight   VS reviewed    Physical Exam:  General: AOx3, Well developed, NAD  HEENT: NC/AT, normal pinnae and tragi  Chest: Normal respiratory effort, equal chest rise. On 4L INC  Heart: regular rate, normotensive  Abdomen: midline dressing c/d/i, Ostomy pink and patent, semisolid stool, gas in bag, incision healing well, no erythema or induration  Neuro/Psych: No localized deficits. Normal speech, normal tone, normal affect  Skin: Normal, warm, no rashes, no lesions noted  Extremities: Warm, well perfused, no edema    Vital Signs Last 24 Hrs  T(C): 36.4 (18 Dec 2023 20:26), Max: 36.6 (18 Dec 2023 07:48)  T(F): 97.5 (18 Dec 2023 20:26), Max: 97.8 (18 Dec 2023 07:48)  HR: 94 (18 Dec 2023 20:26) (88 - 94)  BP: 139/46 (18 Dec 2023 20:26) (120/41 - 139/46)  BP(mean): --  RR: 18 (18 Dec 2023 20:26) (18 - 18)  SpO2: 96% (18 Dec 2023 20:26) (94% - 96%)    Parameters below as of 18 Dec 2023 20:26  Patient On (Oxygen Delivery Method): nasal cannula  O2 Flow (L/min): 3                          9.7    18.57 )-----------( 244      ( 18 Dec 2023 07:05 )             29.3     12-18    136  |  105  |  21  ----------------------------<  137<H>  4.2   |  26  |  0.77    Ca    7.6<L>      18 Dec 2023 07:05  Phos  3.4     12-18  Mg     2.2     12-18    TPro  5.5<L>  /  Alb  2.0<L>  /  TBili  0.7  /  DBili  x   /  AST  33  /  ALT  33  /  AlkPhos  53  12-18    I&O's Detail    17 Dec 2023 07:01  -  18 Dec 2023 07:00  --------------------------------------------------------  IN:  Total IN: 0 mL    OUT:    Colostomy (mL): 200 mL    Drain (mL): 325 mL    Intermittent Catheterization - Urethral (mL): 1100 mL  Total OUT: 1625 mL    Total NET: -1625 mL      18 Dec 2023 07:01  -  19 Dec 2023 02:27  --------------------------------------------------------  IN:  Total IN: 0 mL    OUT:    Drain (mL): 120 mL    Indwelling Catheter - Urethral (mL): 1000 mL  Total OUT: 1120 mL    Total NET: -1120 mL      MEDICATIONS  (STANDING):  ampicillin/sulbactam  IVPB 3 Gram(s) IV Intermittent every 6 hours  dextrose 5% + sodium chloride 0.45% with potassium chloride 20 mEq/L 1000 milliLiter(s) (75 mL/Hr) IV Continuous <Continuous>  diltiazem    Tablet 60 milliGRAM(s) Oral every 8 hours  heparin  Infusion. 1100 Unit(s)/Hr (11 mL/Hr) IV Continuous <Continuous>  lidocaine   4% Patch 1 Patch Transdermal daily  lipid, fat emulsion (Fish Oil and Plant Based) 20% Infusion 0.685 Gm/kG/Day (20.8 mL/Hr) IV Continuous <Continuous>  metoprolol tartrate Injectable 5 milliGRAM(s) IV Push every 5 minutes  pantoprazole  Injectable 40 milliGRAM(s) IV Push two times a day  Parenteral Nutrition - Adult 1 Each (75 mL/Hr) TPN Continuous <Continuous>  Parenteral Nutrition - Adult 1 Each (75 mL/Hr) TPN Continuous <Continuous>  polyethylene glycol 3350 17 Gram(s) Oral every 12 hours  tamsulosin 0.4 milliGRAM(s) Oral at bedtime    MEDICATIONS  (PRN):  acetaminophen   IVPB .. 1000 milliGRAM(s) IV Intermittent once PRN Temp greater or equal to 38C (100.4F), Mild Pain (1 - 3)  heparin   Injectable 4400 Unit(s) IV Push every 6 hours PRN For aPTT less than 40  ondansetron Injectable 4 milliGRAM(s) IV Push every 6 hours PRN Nausea      IMPRESSION:  *  Status post Christina procedure.  *  Loculated pocket of peritoneal fluid in the anterior left lower   quadrant measuring 6.1 x 3.3 x 6.1 cm with associated peritoneal   thickening and enhancement that is suggestive of peritonitis. An infected   collection is considered. Adjacent small bowel loops with wall   thickening, which may be reactive.  *  Dilated common bileduct, which appears slightly increased since   12/11/2023, but not significantly changed in comparison to 4/16/2022.   Correlate with liver function tests and consider additional workup as   warranted.  *  Small bilateral pleural effusions.

## 2023-12-19 NOTE — PROGRESS NOTE ADULT - ASSESSMENT
83 year old female presented with colonic perforation 2/2 stercoral ulcer, now POD 8 s/p Christina  AFib better controlled with Diltiazem and PRN Metoprolol. Patient on Heparin drip.   Found to have intra-abd Loculated pocket of peritoneal fluid in the anterior left lower   quadrant measuring 6.1 x 3.3 x 6.1 cm with associated peritoneal   thickening and enhancement that is suggestive of peritonitis    Plan:  Keep NPO for possible IR drainage, will hold hep gtt per IR  Resume FLD after procedure   Monitor ostomy function, continue Miralax  Pain & nausea control PRN  Cardiology recs appreciated  Hospitalist recs  Incentive spirometry, wean oxygen  Cont Unasyn per ID recs  Replace electrolytes PRN  PT anticipates RONALD  Will need ostomy teaching  Urology rec:- Trial of void in 4 days if inpatient vs. in 1 week outpatient. If inpatient TOV is performed check post void residual, if significant d/c with daugherty to leg bag    Discussed with attending.

## 2023-12-19 NOTE — PROGRESS NOTE ADULT - ATTENDING COMMENTS
No significant complaints other than that she is hungry.  No N/V, tolerated diet when taking it.  CT yesterday with fluid collection - for IR today.  Abdomen soft, nondistended, no significant tenderness, midline incision C/D/I with staples in place, CORKY serous.  No rebound/guarding.  Labs - continued worsening of leukocytosis to 20, likely related to intraabdominal collection.  Finding not unexpected given significant fecal contamination noted upon entry into peritoneal cavity for surgery.      -- Continue antibiotics, anticipate will have another culture pending  -- NPO for IR procedure.  Can have low residue diet afterward.  -- Anticoagulation may resume post-procedure anytime from my standpoint - team to also discuss with IR  -- Multimodal pain control  -- Ostomy care and education  -- Recheck labs in AM

## 2023-12-20 ENCOUNTER — NON-APPOINTMENT (OUTPATIENT)
Age: 83
End: 2023-12-20

## 2023-12-20 LAB
ALBUMIN SERPL ELPH-MCNC: 1.8 G/DL — LOW (ref 3.3–5)
ALP SERPL-CCNC: 44 U/L — SIGNIFICANT CHANGE UP (ref 40–120)
ALP SERPL-CCNC: 44 U/L — SIGNIFICANT CHANGE UP (ref 40–120)
ALP SERPL-CCNC: 45 U/L — SIGNIFICANT CHANGE UP (ref 40–120)
ALP SERPL-CCNC: 45 U/L — SIGNIFICANT CHANGE UP (ref 40–120)
ALT FLD-CCNC: 21 U/L — SIGNIFICANT CHANGE UP (ref 12–78)
ALT FLD-CCNC: 21 U/L — SIGNIFICANT CHANGE UP (ref 12–78)
ALT FLD-CCNC: 22 U/L — SIGNIFICANT CHANGE UP (ref 12–78)
ALT FLD-CCNC: 22 U/L — SIGNIFICANT CHANGE UP (ref 12–78)
ANION GAP SERPL CALC-SCNC: 4 MMOL/L — LOW (ref 5–17)
ANION GAP SERPL CALC-SCNC: 4 MMOL/L — LOW (ref 5–17)
ANION GAP SERPL CALC-SCNC: 5 MMOL/L — SIGNIFICANT CHANGE UP (ref 5–17)
ANION GAP SERPL CALC-SCNC: 5 MMOL/L — SIGNIFICANT CHANGE UP (ref 5–17)
APTT BLD: 39.2 SEC — HIGH (ref 24.5–35.6)
APTT BLD: 39.2 SEC — HIGH (ref 24.5–35.6)
APTT BLD: 69.4 SEC — HIGH (ref 24.5–35.6)
APTT BLD: 69.4 SEC — HIGH (ref 24.5–35.6)
APTT BLD: 77.7 SEC — HIGH (ref 24.5–35.6)
APTT BLD: 77.7 SEC — HIGH (ref 24.5–35.6)
AST SERPL-CCNC: 22 U/L — SIGNIFICANT CHANGE UP (ref 15–37)
BASOPHILS # BLD AUTO: 0.05 K/UL — SIGNIFICANT CHANGE UP (ref 0–0.2)
BASOPHILS # BLD AUTO: 0.05 K/UL — SIGNIFICANT CHANGE UP (ref 0–0.2)
BASOPHILS NFR BLD AUTO: 0.3 % — SIGNIFICANT CHANGE UP (ref 0–2)
BASOPHILS NFR BLD AUTO: 0.3 % — SIGNIFICANT CHANGE UP (ref 0–2)
BILIRUB SERPL-MCNC: 0.5 MG/DL — SIGNIFICANT CHANGE UP (ref 0.2–1.2)
BUN SERPL-MCNC: 20 MG/DL — SIGNIFICANT CHANGE UP (ref 7–23)
BUN SERPL-MCNC: 20 MG/DL — SIGNIFICANT CHANGE UP (ref 7–23)
BUN SERPL-MCNC: 21 MG/DL — SIGNIFICANT CHANGE UP (ref 7–23)
BUN SERPL-MCNC: 21 MG/DL — SIGNIFICANT CHANGE UP (ref 7–23)
CALCIUM SERPL-MCNC: 7.4 MG/DL — LOW (ref 8.5–10.1)
CALCIUM SERPL-MCNC: 7.4 MG/DL — LOW (ref 8.5–10.1)
CALCIUM SERPL-MCNC: 7.6 MG/DL — LOW (ref 8.5–10.1)
CALCIUM SERPL-MCNC: 7.6 MG/DL — LOW (ref 8.5–10.1)
CHLORIDE SERPL-SCNC: 104 MMOL/L — SIGNIFICANT CHANGE UP (ref 96–108)
CHLORIDE SERPL-SCNC: 104 MMOL/L — SIGNIFICANT CHANGE UP (ref 96–108)
CHLORIDE SERPL-SCNC: 105 MMOL/L — SIGNIFICANT CHANGE UP (ref 96–108)
CHLORIDE SERPL-SCNC: 105 MMOL/L — SIGNIFICANT CHANGE UP (ref 96–108)
CO2 SERPL-SCNC: 25 MMOL/L — SIGNIFICANT CHANGE UP (ref 22–31)
CO2 SERPL-SCNC: 25 MMOL/L — SIGNIFICANT CHANGE UP (ref 22–31)
CO2 SERPL-SCNC: 26 MMOL/L — SIGNIFICANT CHANGE UP (ref 22–31)
CO2 SERPL-SCNC: 26 MMOL/L — SIGNIFICANT CHANGE UP (ref 22–31)
CREAT SERPL-MCNC: 0.78 MG/DL — SIGNIFICANT CHANGE UP (ref 0.5–1.3)
CREAT SERPL-MCNC: 0.78 MG/DL — SIGNIFICANT CHANGE UP (ref 0.5–1.3)
CREAT SERPL-MCNC: 0.81 MG/DL — SIGNIFICANT CHANGE UP (ref 0.5–1.3)
CREAT SERPL-MCNC: 0.81 MG/DL — SIGNIFICANT CHANGE UP (ref 0.5–1.3)
EGFR: 72 ML/MIN/1.73M2 — SIGNIFICANT CHANGE UP
EGFR: 72 ML/MIN/1.73M2 — SIGNIFICANT CHANGE UP
EGFR: 75 ML/MIN/1.73M2 — SIGNIFICANT CHANGE UP
EGFR: 75 ML/MIN/1.73M2 — SIGNIFICANT CHANGE UP
EOSINOPHIL # BLD AUTO: 0.28 K/UL — SIGNIFICANT CHANGE UP (ref 0–0.5)
EOSINOPHIL # BLD AUTO: 0.28 K/UL — SIGNIFICANT CHANGE UP (ref 0–0.5)
EOSINOPHIL NFR BLD AUTO: 1.5 % — SIGNIFICANT CHANGE UP (ref 0–6)
EOSINOPHIL NFR BLD AUTO: 1.5 % — SIGNIFICANT CHANGE UP (ref 0–6)
GLUCOSE SERPL-MCNC: 145 MG/DL — HIGH (ref 70–99)
GLUCOSE SERPL-MCNC: 145 MG/DL — HIGH (ref 70–99)
GLUCOSE SERPL-MCNC: 214 MG/DL — HIGH (ref 70–99)
GLUCOSE SERPL-MCNC: 214 MG/DL — HIGH (ref 70–99)
GRAM STN FLD: SIGNIFICANT CHANGE UP
GRAM STN FLD: SIGNIFICANT CHANGE UP
HCT VFR BLD CALC: 27.2 % — LOW (ref 34.5–45)
HCT VFR BLD CALC: 28 % — LOW (ref 34.5–45)
HCT VFR BLD CALC: 28 % — LOW (ref 34.5–45)
HGB BLD-MCNC: 9.1 G/DL — LOW (ref 11.5–15.5)
HGB BLD-MCNC: 9.1 G/DL — LOW (ref 11.5–15.5)
HGB BLD-MCNC: 9.2 G/DL — LOW (ref 11.5–15.5)
HGB BLD-MCNC: 9.2 G/DL — LOW (ref 11.5–15.5)
HGB BLD-MCNC: 9.4 G/DL — LOW (ref 11.5–15.5)
HGB BLD-MCNC: 9.4 G/DL — LOW (ref 11.5–15.5)
IMM GRANULOCYTES NFR BLD AUTO: 5 % — HIGH (ref 0–0.9)
IMM GRANULOCYTES NFR BLD AUTO: 5 % — HIGH (ref 0–0.9)
LYMPHOCYTES # BLD AUTO: 1.57 K/UL — SIGNIFICANT CHANGE UP (ref 1–3.3)
LYMPHOCYTES # BLD AUTO: 1.57 K/UL — SIGNIFICANT CHANGE UP (ref 1–3.3)
LYMPHOCYTES # BLD AUTO: 8.3 % — LOW (ref 13–44)
LYMPHOCYTES # BLD AUTO: 8.3 % — LOW (ref 13–44)
MAGNESIUM SERPL-MCNC: 2.1 MG/DL — SIGNIFICANT CHANGE UP (ref 1.6–2.6)
MAGNESIUM SERPL-MCNC: 2.1 MG/DL — SIGNIFICANT CHANGE UP (ref 1.6–2.6)
MCHC RBC-ENTMCNC: 29.4 PG — SIGNIFICANT CHANGE UP (ref 27–34)
MCHC RBC-ENTMCNC: 29.4 PG — SIGNIFICANT CHANGE UP (ref 27–34)
MCHC RBC-ENTMCNC: 29.7 PG — SIGNIFICANT CHANGE UP (ref 27–34)
MCHC RBC-ENTMCNC: 29.7 PG — SIGNIFICANT CHANGE UP (ref 27–34)
MCHC RBC-ENTMCNC: 29.9 PG — SIGNIFICANT CHANGE UP (ref 27–34)
MCHC RBC-ENTMCNC: 29.9 PG — SIGNIFICANT CHANGE UP (ref 27–34)
MCHC RBC-ENTMCNC: 33.5 GM/DL — SIGNIFICANT CHANGE UP (ref 32–36)
MCHC RBC-ENTMCNC: 33.5 GM/DL — SIGNIFICANT CHANGE UP (ref 32–36)
MCHC RBC-ENTMCNC: 33.6 GM/DL — SIGNIFICANT CHANGE UP (ref 32–36)
MCHC RBC-ENTMCNC: 33.6 GM/DL — SIGNIFICANT CHANGE UP (ref 32–36)
MCHC RBC-ENTMCNC: 33.8 GM/DL — SIGNIFICANT CHANGE UP (ref 32–36)
MCHC RBC-ENTMCNC: 33.8 GM/DL — SIGNIFICANT CHANGE UP (ref 32–36)
MCV RBC AUTO: 87.7 FL — SIGNIFICANT CHANGE UP (ref 80–100)
MCV RBC AUTO: 87.7 FL — SIGNIFICANT CHANGE UP (ref 80–100)
MCV RBC AUTO: 88.3 FL — SIGNIFICANT CHANGE UP (ref 80–100)
MCV RBC AUTO: 88.3 FL — SIGNIFICANT CHANGE UP (ref 80–100)
MCV RBC AUTO: 88.6 FL — SIGNIFICANT CHANGE UP (ref 80–100)
MCV RBC AUTO: 88.6 FL — SIGNIFICANT CHANGE UP (ref 80–100)
MONOCYTES # BLD AUTO: 1.3 K/UL — HIGH (ref 0–0.9)
MONOCYTES # BLD AUTO: 1.3 K/UL — HIGH (ref 0–0.9)
MONOCYTES NFR BLD AUTO: 6.9 % — SIGNIFICANT CHANGE UP (ref 2–14)
MONOCYTES NFR BLD AUTO: 6.9 % — SIGNIFICANT CHANGE UP (ref 2–14)
NEUTROPHILS # BLD AUTO: 14.75 K/UL — HIGH (ref 1.8–7.4)
NEUTROPHILS # BLD AUTO: 14.75 K/UL — HIGH (ref 1.8–7.4)
NEUTROPHILS NFR BLD AUTO: 78 % — HIGH (ref 43–77)
NEUTROPHILS NFR BLD AUTO: 78 % — HIGH (ref 43–77)
PHOSPHATE SERPL-MCNC: 3.9 MG/DL — SIGNIFICANT CHANGE UP (ref 2.5–4.5)
PHOSPHATE SERPL-MCNC: 3.9 MG/DL — SIGNIFICANT CHANGE UP (ref 2.5–4.5)
PLATELET # BLD AUTO: 279 K/UL — SIGNIFICANT CHANGE UP (ref 150–400)
PLATELET # BLD AUTO: 279 K/UL — SIGNIFICANT CHANGE UP (ref 150–400)
PLATELET # BLD AUTO: 283 K/UL — SIGNIFICANT CHANGE UP (ref 150–400)
PLATELET # BLD AUTO: 283 K/UL — SIGNIFICANT CHANGE UP (ref 150–400)
PLATELET # BLD AUTO: 288 K/UL — SIGNIFICANT CHANGE UP (ref 150–400)
PLATELET # BLD AUTO: 288 K/UL — SIGNIFICANT CHANGE UP (ref 150–400)
POTASSIUM SERPL-MCNC: 4.3 MMOL/L — SIGNIFICANT CHANGE UP (ref 3.5–5.3)
POTASSIUM SERPL-MCNC: 4.3 MMOL/L — SIGNIFICANT CHANGE UP (ref 3.5–5.3)
POTASSIUM SERPL-MCNC: 4.7 MMOL/L — SIGNIFICANT CHANGE UP (ref 3.5–5.3)
POTASSIUM SERPL-MCNC: 4.7 MMOL/L — SIGNIFICANT CHANGE UP (ref 3.5–5.3)
POTASSIUM SERPL-SCNC: 4.3 MMOL/L — SIGNIFICANT CHANGE UP (ref 3.5–5.3)
POTASSIUM SERPL-SCNC: 4.3 MMOL/L — SIGNIFICANT CHANGE UP (ref 3.5–5.3)
POTASSIUM SERPL-SCNC: 4.7 MMOL/L — SIGNIFICANT CHANGE UP (ref 3.5–5.3)
POTASSIUM SERPL-SCNC: 4.7 MMOL/L — SIGNIFICANT CHANGE UP (ref 3.5–5.3)
PROT SERPL-MCNC: 5.4 GM/DL — LOW (ref 6–8.3)
PROT SERPL-MCNC: 5.4 GM/DL — LOW (ref 6–8.3)
PROT SERPL-MCNC: 5.6 GM/DL — LOW (ref 6–8.3)
PROT SERPL-MCNC: 5.6 GM/DL — LOW (ref 6–8.3)
RBC # BLD: 3.08 M/UL — LOW (ref 3.8–5.2)
RBC # BLD: 3.08 M/UL — LOW (ref 3.8–5.2)
RBC # BLD: 3.1 M/UL — LOW (ref 3.8–5.2)
RBC # BLD: 3.1 M/UL — LOW (ref 3.8–5.2)
RBC # BLD: 3.16 M/UL — LOW (ref 3.8–5.2)
RBC # BLD: 3.16 M/UL — LOW (ref 3.8–5.2)
RBC # FLD: 14.6 % — HIGH (ref 10.3–14.5)
RBC # FLD: 14.8 % — HIGH (ref 10.3–14.5)
RBC # FLD: 14.8 % — HIGH (ref 10.3–14.5)
SODIUM SERPL-SCNC: 134 MMOL/L — LOW (ref 135–145)
SODIUM SERPL-SCNC: 134 MMOL/L — LOW (ref 135–145)
SODIUM SERPL-SCNC: 135 MMOL/L — SIGNIFICANT CHANGE UP (ref 135–145)
SODIUM SERPL-SCNC: 135 MMOL/L — SIGNIFICANT CHANGE UP (ref 135–145)
SPECIMEN SOURCE: SIGNIFICANT CHANGE UP
SPECIMEN SOURCE: SIGNIFICANT CHANGE UP
SURGICAL PATHOLOGY STUDY: SIGNIFICANT CHANGE UP
SURGICAL PATHOLOGY STUDY: SIGNIFICANT CHANGE UP
WBC # BLD: 18.9 K/UL — HIGH (ref 3.8–10.5)
WBC # BLD: 18.9 K/UL — HIGH (ref 3.8–10.5)
WBC # BLD: 18.91 K/UL — HIGH (ref 3.8–10.5)
WBC # BLD: 18.91 K/UL — HIGH (ref 3.8–10.5)
WBC # BLD: 20.49 K/UL — HIGH (ref 3.8–10.5)
WBC # BLD: 20.49 K/UL — HIGH (ref 3.8–10.5)
WBC # FLD AUTO: 18.9 K/UL — HIGH (ref 3.8–10.5)
WBC # FLD AUTO: 18.9 K/UL — HIGH (ref 3.8–10.5)
WBC # FLD AUTO: 18.91 K/UL — HIGH (ref 3.8–10.5)
WBC # FLD AUTO: 18.91 K/UL — HIGH (ref 3.8–10.5)
WBC # FLD AUTO: 20.49 K/UL — HIGH (ref 3.8–10.5)
WBC # FLD AUTO: 20.49 K/UL — HIGH (ref 3.8–10.5)

## 2023-12-20 PROCEDURE — 99232 SBSQ HOSP IP/OBS MODERATE 35: CPT

## 2023-12-20 PROCEDURE — 71045 X-RAY EXAM CHEST 1 VIEW: CPT | Mod: 26

## 2023-12-20 RX ORDER — I.V. FAT EMULSION 20 G/100ML
0.68 EMULSION INTRAVENOUS
Qty: 50 | Refills: 0 | Status: DISCONTINUED | OUTPATIENT
Start: 2023-12-20 | End: 2023-12-21

## 2023-12-20 RX ORDER — ELECTROLYTE SOLUTION,INJ
1 VIAL (ML) INTRAVENOUS
Refills: 0 | Status: DISCONTINUED | OUTPATIENT
Start: 2023-12-20 | End: 2023-12-21

## 2023-12-20 RX ORDER — FLUCONAZOLE 150 MG/1
TABLET ORAL
Refills: 0 | Status: DISCONTINUED | OUTPATIENT
Start: 2023-12-20 | End: 2023-12-23

## 2023-12-20 RX ORDER — FLUCONAZOLE 150 MG/1
200 TABLET ORAL ONCE
Refills: 0 | Status: COMPLETED | OUTPATIENT
Start: 2023-12-20 | End: 2023-12-20

## 2023-12-20 RX ORDER — FLUCONAZOLE 150 MG/1
200 TABLET ORAL EVERY 24 HOURS
Refills: 0 | Status: DISCONTINUED | OUTPATIENT
Start: 2023-12-21 | End: 2023-12-23

## 2023-12-20 RX ADMIN — AMPICILLIN SODIUM AND SULBACTAM SODIUM 200 GRAM(S): 250; 125 INJECTION, POWDER, FOR SUSPENSION INTRAMUSCULAR; INTRAVENOUS at 05:25

## 2023-12-20 RX ADMIN — PANTOPRAZOLE SODIUM 40 MILLIGRAM(S): 20 TABLET, DELAYED RELEASE ORAL at 09:19

## 2023-12-20 RX ADMIN — HEPARIN SODIUM 1600 UNIT(S)/HR: 5000 INJECTION INTRAVENOUS; SUBCUTANEOUS at 18:49

## 2023-12-20 RX ADMIN — HEPARIN SODIUM 1600 UNIT(S)/HR: 5000 INJECTION INTRAVENOUS; SUBCUTANEOUS at 07:08

## 2023-12-20 RX ADMIN — MORPHINE SULFATE 2 MILLIGRAM(S): 50 CAPSULE, EXTENDED RELEASE ORAL at 22:25

## 2023-12-20 RX ADMIN — LIDOCAINE 1 PATCH: 4 CREAM TOPICAL at 19:40

## 2023-12-20 RX ADMIN — LIDOCAINE 1 PATCH: 4 CREAM TOPICAL at 21:20

## 2023-12-20 RX ADMIN — AMPICILLIN SODIUM AND SULBACTAM SODIUM 200 GRAM(S): 250; 125 INJECTION, POWDER, FOR SUSPENSION INTRAMUSCULAR; INTRAVENOUS at 23:29

## 2023-12-20 RX ADMIN — I.V. FAT EMULSION 20.83 GM/KG/DAY: 20 EMULSION INTRAVENOUS at 22:27

## 2023-12-20 RX ADMIN — PANTOPRAZOLE SODIUM 40 MILLIGRAM(S): 20 TABLET, DELAYED RELEASE ORAL at 22:24

## 2023-12-20 RX ADMIN — AMPICILLIN SODIUM AND SULBACTAM SODIUM 200 GRAM(S): 250; 125 INJECTION, POWDER, FOR SUSPENSION INTRAMUSCULAR; INTRAVENOUS at 13:19

## 2023-12-20 RX ADMIN — HEPARIN SODIUM 4400 UNIT(S): 5000 INJECTION INTRAVENOUS; SUBCUTANEOUS at 04:08

## 2023-12-20 RX ADMIN — Medication 60 MILLIGRAM(S): at 22:23

## 2023-12-20 RX ADMIN — AMPICILLIN SODIUM AND SULBACTAM SODIUM 200 GRAM(S): 250; 125 INJECTION, POWDER, FOR SUSPENSION INTRAMUSCULAR; INTRAVENOUS at 17:53

## 2023-12-20 RX ADMIN — Medication 60 MILLIGRAM(S): at 05:25

## 2023-12-20 RX ADMIN — POLYETHYLENE GLYCOL 3350 17 GRAM(S): 17 POWDER, FOR SOLUTION ORAL at 09:20

## 2023-12-20 RX ADMIN — FLUCONAZOLE 200 MILLIGRAM(S): 150 TABLET ORAL at 14:01

## 2023-12-20 RX ADMIN — HEPARIN SODIUM 1600 UNIT(S)/HR: 5000 INJECTION INTRAVENOUS; SUBCUTANEOUS at 23:56

## 2023-12-20 RX ADMIN — HEPARIN SODIUM 1600 UNIT(S)/HR: 5000 INJECTION INTRAVENOUS; SUBCUTANEOUS at 04:06

## 2023-12-20 RX ADMIN — HEPARIN SODIUM 1600 UNIT(S)/HR: 5000 INJECTION INTRAVENOUS; SUBCUTANEOUS at 19:06

## 2023-12-20 RX ADMIN — HEPARIN SODIUM 1600 UNIT(S)/HR: 5000 INJECTION INTRAVENOUS; SUBCUTANEOUS at 10:35

## 2023-12-20 RX ADMIN — LIDOCAINE 1 PATCH: 4 CREAM TOPICAL at 09:20

## 2023-12-20 RX ADMIN — HEPARIN SODIUM 1600 UNIT(S)/HR: 5000 INJECTION INTRAVENOUS; SUBCUTANEOUS at 07:06

## 2023-12-20 RX ADMIN — Medication 1 EACH: at 22:27

## 2023-12-20 RX ADMIN — Medication 60 MILLIGRAM(S): at 13:19

## 2023-12-20 RX ADMIN — TAMSULOSIN HYDROCHLORIDE 0.4 MILLIGRAM(S): 0.4 CAPSULE ORAL at 22:24

## 2023-12-20 RX ADMIN — POLYETHYLENE GLYCOL 3350 17 GRAM(S): 17 POWDER, FOR SOLUTION ORAL at 22:24

## 2023-12-20 NOTE — PROGRESS NOTE ADULT - ASSESSMENT
84 y/o female with a PMHx of COPD, HLD presents to the ED c/o abd pain, n/v and constipation x2 days. Denies CP, SOB, urinary symptoms. NKDA. Nonsmoker. No EtOH use. No other complaints at this time. Pt found to have septic shock d/t stercoral ulcer and colonic perforation eval by surgery taken to OR on 12/11 s/p Hartmanns procedure, abd washout noted to have feculent peritonitis, cx sent, blood cx growing Bacteroides, Body fluid cx growing proteus, bacteroides, Acidaminococcus has been on IV zosyn perioperatively.     1. Sepsis with Bacteroides species. Abdominal perforation. Feculent peritonitis. S/p Hartmanns procedure/abd washout  - imaging reviewed repeat CT noted, + LLQ abd collection s/p IR drainage 12/19 f/u cx  - OR cultures growing bacteroides, acidaminococcus, proteus   - blood cx 12/11 growing bacteroides  - s/p zosyn 12/11-12/14  - now on rocephin since 12/14  - on unasyn 3gmq6h #5  - continue with abx coverage   - repeat blood cx no growth 12/16   - on dc po augmentin [id number] 14 days until 12/30  - surgical f/u noted  - tolerating abx well so far; no side effects noted  - reason for abx use and side effects reviewed with patient  - fu cbc    2. other issues -care per medicine  84 y/o female with a PMHx of COPD, HLD presents to the ED c/o abd pain, n/v and constipation x2 days. Denies CP, SOB, urinary symptoms. NKDA. Nonsmoker. No EtOH use. No other complaints at this time. Pt found to have septic shock d/t stercoral ulcer and colonic perforation eval by surgery taken to OR on 12/11 s/p Hartmanns procedure, abd washout noted to have feculent peritonitis, cx sent, blood cx growing Bacteroides, Body fluid cx growing proteus, bacteroides, Acidaminococcus has been on IV zosyn perioperatively.     1. Sepsis with Bacteroides species. Abdominal perforation. Feculent peritonitis. S/p Hartmanns procedure/abd washout  - imaging reviewed repeat CT noted, + LLQ abd collection s/p IR drainage 12/19 f/u cx  - OR cultures growing bacteroides, acidaminococcus, proteus   - blood cx 12/11 growing bacteroides  - s/p zosyn 12/11-12/14  - now on rocephin since 12/14  - on unasyn 3gmq6h #5  - continue with abx coverage   - repeat blood cx no growth 12/16   - on dc po augmentin [id number] 14 days until 12/30  - surgical f/u noted  - tolerating abx well so far; no side effects noted  - reason for abx use and side effects reviewed with patient  - fu cbc    2. other issues -care per medicine  82 y/o female with a PMHx of COPD, HLD presents to the ED c/o abd pain, n/v and constipation x2 days. Denies CP, SOB, urinary symptoms. NKDA. Nonsmoker. No EtOH use. No other complaints at this time. Pt found to have septic shock d/t stercoral ulcer and colonic perforation eval by surgery taken to OR on 12/11 s/p Hartmanns procedure, abd washout noted to have feculent peritonitis, cx sent, blood cx growing Bacteroides, Body fluid cx growing proteus, bacteroides, Acidaminococcus has been on IV zosyn perioperatively.     1. Sepsis with Bacteroides species. Abdominal perforation. Feculent peritonitis. S/p Hartmanns procedure/abd washout  - imaging reviewed repeat CT noted, + LLQ abd collection s/p IR drainage 12/19 f/u cx, f/u repeat xray   - OR cultures growing bacteroides, acidaminococcus, proteus   - blood cx 12/11 growing bacteroides  - s/p zosyn 12/11-12/14  - now on rocephin since 12/14  - on unasyn 3gmq6h #5  - add antifungal coverage diflucan 200mg daily   - continue with abx coverage   - repeat blood cx no growth 12/16   - on dc po augmentin [id number] 14 days until 12/30  - surgical f/u noted  - tolerating abx well so far; no side effects noted  - reason for abx use and side effects reviewed with patient  - fu cbc, if wbc ct increases may require repeat imaging, case d/w crs    2. other issues -care per medicine  82 y/o female with a PMHx of COPD, HLD presents to the ED c/o abd pain, n/v and constipation x2 days. Denies CP, SOB, urinary symptoms. NKDA. Nonsmoker. No EtOH use. No other complaints at this time. Pt found to have septic shock d/t stercoral ulcer and colonic perforation eval by surgery taken to OR on 12/11 s/p Hartmanns procedure, abd washout noted to have feculent peritonitis, cx sent, blood cx growing Bacteroides, Body fluid cx growing proteus, bacteroides, Acidaminococcus has been on IV zosyn perioperatively.     1. Sepsis with Bacteroides species. Abdominal perforation. Feculent peritonitis. S/p Hartmanns procedure/abd washout  - imaging reviewed repeat CT noted, + LLQ abd collection s/p IR drainage 12/19 f/u cx, f/u repeat xray   - OR cultures growing bacteroides, acidaminococcus, proteus   - blood cx 12/11 growing bacteroides  - s/p zosyn 12/11-12/14  - now on rocephin since 12/14  - on unasyn 3gmq6h #5  - add antifungal coverage diflucan 200mg daily   - continue with abx coverage   - repeat blood cx no growth 12/16   - on dc po augmentin [id number] 14 days until 12/30  - surgical f/u noted  - tolerating abx well so far; no side effects noted  - reason for abx use and side effects reviewed with patient  - fu cbc, if wbc ct increases may require repeat imaging, case d/w crs    2. other issues -care per medicine

## 2023-12-20 NOTE — PROGRESS NOTE ADULT - SUBJECTIVE AND OBJECTIVE BOX
83F presented with colonic perforation 2/2 stercoral ulcer, now s/p Christina procedure found have LLQ abdominal fluid collection. Pt now s/p IR drainage yesterday with removal of 15cc cloudy yellow fluid, sent for analysis.     Vital Signs Last 24 Hrs  T(C): 36.3 (20 Dec 2023 09:10), Max: 36.7 (19 Dec 2023 16:00)  T(F): 97.3 (20 Dec 2023 09:10), Max: 98.1 (19 Dec 2023 16:00)  HR: 92 (20 Dec 2023 09:10) (80 - 92)  BP: 145/44 (20 Dec 2023 09:10) (105/95 - 148/57)  BP(mean): --  RR: 18 (20 Dec 2023 09:10) (15 - 18)  SpO2: 99% (20 Dec 2023 09:10) (98% - 100%)    Parameters below as of 20 Dec 2023 09:10  Patient On (Oxygen Delivery Method): nasal cannula  O2 Flow (L/min): 3    GENERAL APPEARANCE: Well developed, in no acute distress.    ABDOMEN: Soft and nontender with normal bowel sounds.     NEUROLOGIC: Alert and oriented x 3. Normal affect.     CBC                        9.4    20.49 )-----------( 288      ( 20 Dec 2023 09:47 )             28.0       Chemistry  12-20    134<L>  |  104  |  20  ----------------------------<  214<H>  4.3   |  25  |  0.81    Ca    7.6<L>      20 Dec 2023 09:47  Phos  3.9     12-20  Mg     2.1     12-20    TPro  5.6<L>  /  Alb  1.8<L>  /  TBili  0.5  /  DBili  x   /  AST  22  /  ALT  21  /  AlkPhos  44  12-20    Coags  PT/INR - ( 19 Dec 2023 05:23 )   PT: 11.9 sec;   INR: 1.05 ratio    PTT - ( 20 Dec 2023 09:47 )  PTT:69.4 sec

## 2023-12-20 NOTE — PROGRESS NOTE ADULT - ASSESSMENT
83F presented with colonic perforation 2/2 stercoral ulcer, now s/p Christina procedure found have LLQ abdominal fluid collection. Pt now s/p IR drainage yesterday with removal of 15cc cloudy yellow fluid, sent for analysis.   - 30 cc output overnight  - Leukocytosis slightly worse today at 20, but pt without complaints  - Continue to monitor  - Plan for tube check when outputs are < 10cc over a 24 hr period

## 2023-12-20 NOTE — PROGRESS NOTE ADULT - SUBJECTIVE AND OBJECTIVE BOX
Patient seen and examined on routine rounds.   She denies nausea, vomiting, fever or chills. Tolerating diet, pain well controlled   s/p IR drain placement 12/19  Nurse report no acute event overnight   VS reviewed    Physical Exam:  General: AOx3, Well developed, NAD  HEENT: NC/AT, normal pinnae and tragi  Chest: Normal respiratory effort, equal chest rise. On 4L INC  Heart: regular rate, normotensive  Abdomen: midline dressing c/d/i, Ostomy pink and patent, semisolid stool, gas in bag, incision healing well, no erythema or induration  Neuro/Psych: No localized deficits. Normal speech, normal tone, normal affect  Skin: Normal, warm, no rashes, no lesions noted  Extremities: Warm, well perfused, no edema    Vital Signs Last 24 Hrs  T(C): 36.1 (19 Dec 2023 20:19), Max: 36.7 (19 Dec 2023 16:00)  T(F): 97 (19 Dec 2023 20:19), Max: 98.1 (19 Dec 2023 16:00)  HR: 92 (19 Dec 2023 20:19) (80 - 96)  BP: 133/42 (19 Dec 2023 20:19) (105/95 - 148/57)  BP(mean): --  RR: 18 (19 Dec 2023 20:19) (15 - 18)  SpO2: 100% (19 Dec 2023 20:19) (97% - 100%)    Parameters below as of 19 Dec 2023 20:19  Patient On (Oxygen Delivery Method): nasal cannula  O2 Flow (L/min): 3                          9.2    18.90 )-----------( 283      ( 20 Dec 2023 03:29 )             27.2   12-20    135  |  105  |  21  ----------------------------<  145<H>  4.7   |  26  |  0.78    Ca    7.4<L>      20 Dec 2023 03:29  Phos  3.9     12-20  Mg     2.1     12-20    TPro  5.4<L>  /  Alb  1.8<L>  /  TBili  0.5  /  DBili  x   /  AST  22  /  ALT  22  /  AlkPhos  45  12-20  I&O's Detail    18 Dec 2023 07:01  -  19 Dec 2023 07:00  --------------------------------------------------------  IN:  Total IN: 0 mL    OUT:    Colostomy (mL): 100 mL    Drain (mL): 220 mL    Indwelling Catheter - Urethral (mL): 1000 mL  Total OUT: 1320 mL    Total NET: -1320 mL      19 Dec 2023 07:01  -  20 Dec 2023 05:06  --------------------------------------------------------  IN:    Other (mL): 300 mL  Total IN: 300 mL    OUT:    Blood Loss (mL): 70 mL    Drain (mL): 85 mL    Indwelling Catheter - Urethral (mL): 400 mL    Other (mL): 800 mL  Total OUT: 1355 mL    Total NET: -1055 mL      MEDICATIONS  (STANDING):  ampicillin/sulbactam  IVPB 3 Gram(s) IV Intermittent every 6 hours  dextrose 5% + sodium chloride 0.45% with potassium chloride 20 mEq/L 1000 milliLiter(s) (75 mL/Hr) IV Continuous <Continuous>  diltiazem    Tablet 60 milliGRAM(s) Oral every 8 hours  heparin  Infusion.  Unit(s)/Hr (13 mL/Hr) IV Continuous <Continuous>  lidocaine   4% Patch 1 Patch Transdermal daily  lipid, fat emulsion (Fish Oil and Plant Based) 20% Infusion 0.6849 Gm/kG/Day (20.83 mL/Hr) IV Continuous <Continuous>  metoprolol tartrate Injectable 5 milliGRAM(s) IV Push every 5 minutes  pantoprazole  Injectable 40 milliGRAM(s) IV Push two times a day  Parenteral Nutrition - Adult 1 Each (75 mL/Hr) TPN Continuous <Continuous>  polyethylene glycol 3350 17 Gram(s) Oral every 12 hours  tamsulosin 0.4 milliGRAM(s) Oral at bedtime    MEDICATIONS  (PRN):  acetaminophen   IVPB .. 1000 milliGRAM(s) IV Intermittent once PRN Temp greater or equal to 38C (100.4F), Mild Pain (1 - 3)  heparin   Injectable 4400 Unit(s) IV Push every 6 hours PRN For aPTT less than 40  morphine  - Injectable 2 milliGRAM(s) IV Push every 4 hours PRN Moderate Pain (4 - 6)  ondansetron Injectable 4 milliGRAM(s) IV Push every 6 hours PRN Nausea

## 2023-12-20 NOTE — CHART NOTE - NSCHARTNOTEFT_GEN_A_CORE
Clinical Nutrition PN Follow Up Note    *84 YO F with a PMHx of COPD, HLD presents to the ED c/o abd pain, n/v and constipation x2 days. Admitted w/ colonic perforation, Extensive stercocolitis with 2 feculent perforation of sigmoid & rectum. Necrotic segment of colon also noticed now POD 2 s/p Christina's. Had post-op fecal peritonitis, 7L washout. Went into septic shock, requiring pressors. Tx to ICU w/ post-op resp failure. NGT to LWS, blood tinged. Off pressors as of this AM 12/13.    *PPN initiated 2/2 NPO s/p Christina procedure2/2 colonic perforation     *12/14: has been NPO x 3 days, nausea overnight. NGT still in place, no plan for clamp trial today.  Plan to initiate PPN today, posisble transfer out of ICU  *12/15: PPN day #2. Transferred out of ICU, off pressors, now on 3E w/ no acute events overnight. Hypokalemia and hypophosphatemia noted s/p HD yesterday, being repleted. Possibly going into refeeding, will slowly increase lytes in PN bag and recommend to c/w KCl and KPhos repletion outside of bag until WNL. Remains NPO w/ NGT to LWS in place, reports intermittent nausea, on Zofran, and awaiting return of bowel function as per surgery. If to remain on PPN >7 days, consider placing PICC line to initiate TPN and meet >80% ENN.   *12/16 & 12/17: no acute events overnight. Tolerating CLD and plan to advance to FLD when medically feasible. Ostomy functioning.   *12/18:  PPN day #5. Tolerating FLD as per surgery note. Ostomy remains functioning. D/w surgical resident, plan to possibly advance to LRD today and if toelrating >/= 70% can d/c PPN. Will c/w PPN for now. Will provide colostomy education when able.   *12/19: PPN day #6. Found to have intra-abd Loculated pocket of peritoneal fluid in the anterior left lower quadrant with associated peritoneal thickening and enhancement that is suggestive of peritonitis. Pt currently NPO for IR drainage today. Noted to be tolerating diet. Plan to c/w PPN today.    *current status: PPN day #7. S/p IR drainage of left lower quadrant fluid (12/19). Diet advanced to Low Fiber this morning. Plan to discontinue PPN. Will trial Premier Protein BID in effort to optimize PO intake. Please see additional recommendations below.     *pertinent meds: Abx, Cardizem, Morphine, Protonix, Miralax    *labs reviewed;   12-20    135  |  105  |  21  ----------------------------<  145<H>  4.7   |  26  |  0.78    Ca    7.4<L>      20 Dec 2023 03:29  Phos  3.9     12-20  Mg     2.1     12-20    TPro  5.4<L>  /  Alb  1.8<L>  /  TBili  0.5  /  DBili  x   /  AST  22  /  ALT  22  /  AlkPhos  45  12-20    Lipid Panel: Date/Time: 12-15-23 @ 04:58  Triglycerides, Serum: 187    *I&O's Detail    19 Dec 2023 07:01  -  20 Dec 2023 07:00  --------------------------------------------------------  IN:    Other (mL): 300 mL  Total IN: 300 mL    OUT:    Blood Loss (mL): 70 mL    Colostomy (mL): 100 mL    Drain (mL): 135 mL    Indwelling Catheter - Urethral (mL): 1500 mL    Other (mL): 800 mL    T-Tube (mL): 30 mL  Total OUT: 2635 mL    Total NET: -2335 mL  *fluid status: negative; PPN not doc'd; Strict I&O's are rec'd when pt is on PN as per protocol   *BM (+) 12/17 - small loose brown/green stool x2; ostomy. On Miralax.  *edema: 1+ generalized    *malnutrition: Pt continues to meet criteria for severe protein-calorie malnutrition in context of acute illness r/t decreased ability to meet increased nutrient needs 2/2 colonic perf w/ septic shock AEB moderate muscle/ fat wasting    Estimated Needs: based on 63.5 Kg (UBW - likely dry wt)  Calories: 1905- 2222 Kcal (30- 35 Kcal/Kg)  Protein: 95- 127 g (1.5- 2 g/Kg)  Fluids: 1587- 1905 mL (25- 30 mL/Kg)    Diet, NPO:   Except Medications (12-18-23 @ 09:04) [Active]  Parenteral Nutrition - Adult 1 Each (75 mL/Hr) TPN Continuous <Continuous>, 12-17-23 @ 22:00, 22:00, Stop order after: 1 Days    Weight History:  Weight (kg): 72.6 (12-11-23 @ 10:30)    Additional Recommendations:  1) C/w Low Fiber diet  2) Premier protein shake BID to optimize nutritional needs (provides 160 kcal, 30 g protein/ shake)   3) Obtain vitamin D 25OH level to assess nutriture  4) Please obtain daily weights  5) Consider adding thiamine 100 mg daily 2/2 poor PO intake/ malnutrition  6) MVI w/ minerals daily to ensure 100% RDA met  7) Encourage protein-rich foods, maximize food preferences  8) Monitor bowel movements, if no BM for >3 days, consider implementing bowel regimen.  9) Confirm goals of care regarding nutrition support   Mariluz Yung MS, RDN, [phone]  Nutrition  Clinical Nutrition PN Follow Up Note    *82 YO F with a PMHx of COPD, HLD presents to the ED c/o abd pain, n/v and constipation x2 days. Admitted w/ colonic perforation, Extensive stercocolitis with 2 feculent perforation of sigmoid & rectum. Necrotic segment of colon also noticed now POD 2 s/p Christina's. Had post-op fecal peritonitis, 7L washout. Went into septic shock, requiring pressors. Tx to ICU w/ post-op resp failure. NGT to LWS, blood tinged. Off pressors as of this AM 12/13.    *PPN initiated 2/2 NPO s/p Christina procedure2/2 colonic perforation     *12/14: has been NPO x 3 days, nausea overnight. NGT still in place, no plan for clamp trial today.  Plan to initiate PPN today, posisble transfer out of ICU  *12/15: PPN day #2. Transferred out of ICU, off pressors, now on 3E w/ no acute events overnight. Hypokalemia and hypophosphatemia noted s/p HD yesterday, being repleted. Possibly going into refeeding, will slowly increase lytes in PN bag and recommend to c/w KCl and KPhos repletion outside of bag until WNL. Remains NPO w/ NGT to LWS in place, reports intermittent nausea, on Zofran, and awaiting return of bowel function as per surgery. If to remain on PPN >7 days, consider placing PICC line to initiate TPN and meet >80% ENN.   *12/16 & 12/17: no acute events overnight. Tolerating CLD and plan to advance to FLD when medically feasible. Ostomy functioning.   *12/18:  PPN day #5. Tolerating FLD as per surgery note. Ostomy remains functioning. D/w surgical resident, plan to possibly advance to LRD today and if toelrating >/= 70% can d/c PPN. Will c/w PPN for now. Will provide colostomy education when able.   *12/19: PPN day #6. Found to have intra-abd Loculated pocket of peritoneal fluid in the anterior left lower quadrant with associated peritoneal thickening and enhancement that is suggestive of peritonitis. Pt currently NPO for IR drainage today. Noted to be tolerating diet. Plan to c/w PPN today.    *current status: PPN day #7. S/p IR drainage of left lower quadrant fluid (12/19). Diet advanced to Low Fiber this morning. Plan to discontinue PPN. Will trial Premier Protein BID in effort to optimize PO intake. Please see additional recommendations below.     *pertinent meds: Abx, Cardizem, Morphine, Protonix, Miralax    *labs reviewed;   12-20    135  |  105  |  21  ----------------------------<  145<H>  4.7   |  26  |  0.78    Ca    7.4<L>      20 Dec 2023 03:29  Phos  3.9     12-20  Mg     2.1     12-20    TPro  5.4<L>  /  Alb  1.8<L>  /  TBili  0.5  /  DBili  x   /  AST  22  /  ALT  22  /  AlkPhos  45  12-20    Lipid Panel: Date/Time: 12-15-23 @ 04:58  Triglycerides, Serum: 187    *I&O's Detail    19 Dec 2023 07:01  -  20 Dec 2023 07:00  --------------------------------------------------------  IN:    Other (mL): 300 mL  Total IN: 300 mL    OUT:    Blood Loss (mL): 70 mL    Colostomy (mL): 100 mL    Drain (mL): 135 mL    Indwelling Catheter - Urethral (mL): 1500 mL    Other (mL): 800 mL    T-Tube (mL): 30 mL  Total OUT: 2635 mL    Total NET: -2335 mL  *fluid status: negative; PPN not doc'd; Strict I&O's are rec'd when pt is on PN as per protocol   *BM (+) 12/17 - small loose brown/green stool x2; ostomy. On Miralax.  *edema: 1+ generalized    *malnutrition: Pt continues to meet criteria for severe protein-calorie malnutrition in context of acute illness r/t decreased ability to meet increased nutrient needs 2/2 colonic perf w/ septic shock AEB moderate muscle/ fat wasting    Estimated Needs: based on 63.5 Kg (UBW - likely dry wt)  Calories: 1905- 2222 Kcal (30- 35 Kcal/Kg)  Protein: 95- 127 g (1.5- 2 g/Kg)  Fluids: 1587- 1905 mL (25- 30 mL/Kg)    Diet, NPO:   Except Medications (12-18-23 @ 09:04) [Active]  Parenteral Nutrition - Adult 1 Each (75 mL/Hr) TPN Continuous <Continuous>, 12-17-23 @ 22:00, 22:00, Stop order after: 1 Days    Weight History:  Weight (kg): 72.6 (12-11-23 @ 10:30)    Additional Recommendations:  1) C/w Low Fiber diet  2) Premier protein shake BID to optimize nutritional needs (provides 160 kcal, 30 g protein/ shake)   3) Obtain vitamin D 25OH level to assess nutriture  4) Please obtain daily weights  5) Consider adding thiamine 100 mg daily 2/2 poor PO intake/ malnutrition  6) MVI w/ minerals daily to ensure 100% RDA met  7) Encourage protein-rich foods, maximize food preferences  8) Monitor bowel movements, if no BM for >3 days, consider implementing bowel regimen.  9) Confirm goals of care regarding nutrition support   Mariluz Yung MS, RDN, [phone]  Nutrition  Clinical Nutrition PN Follow Up Note    *82 YO F with a PMHx of COPD, HLD presents to the ED c/o abd pain, n/v and constipation x2 days. Admitted w/ colonic perforation, Extensive stercocolitis with 2 feculent perforation of sigmoid & rectum. Necrotic segment of colon also noticed now POD 2 s/p Christina's. Had post-op fecal peritonitis, 7L washout. Went into septic shock, requiring pressors. Tx to ICU w/ post-op resp failure. NGT to LWS, blood tinged. Off pressors as of this AM 12/13.    *PPN initiated 2/2 NPO s/p Christina procedure2/2 colonic perforation     *12/14: has been NPO x 3 days, nausea overnight. NGT still in place, no plan for clamp trial today.  Plan to initiate PPN today, posisble transfer out of ICU  *12/15: PPN day #2. Transferred out of ICU, off pressors, now on 3E w/ no acute events overnight. Hypokalemia and hypophosphatemia noted s/p HD yesterday, being repleted. Possibly going into refeeding, will slowly increase lytes in PN bag and recommend to c/w KCl and KPhos repletion outside of bag until WNL. Remains NPO w/ NGT to LWS in place, reports intermittent nausea, on Zofran, and awaiting return of bowel function as per surgery. If to remain on PPN >7 days, consider placing PICC line to initiate TPN and meet >80% ENN.   *12/16 & 12/17: no acute events overnight. Tolerating CLD and plan to advance to FLD when medically feasible. Ostomy functioning.   *12/18:  PPN day #5. Tolerating FLD as per surgery note. Ostomy remains functioning. D/w surgical resident, plan to possibly advance to LRD today and if toelrating >/= 70% can d/c PPN. Will c/w PPN for now. Will provide colostomy education when able.   *12/19: PPN day #6. Found to have intra-abd Loculated pocket of peritoneal fluid in the anterior left lower quadrant with associated peritoneal thickening and enhancement that is suggestive of peritonitis. Pt currently NPO for IR drainage today. Noted to be tolerating diet. Plan to c/w PPN today.    *current status: PPN day #7. S/p IR drainage of left lower quadrant fluid (12/19). Diet advanced to Low Fiber this morning. Discussed with Surgical Resident this morning, surgical resident wants to continue with PPN today and she is concerned that pt is not consuming enough PO. Discussed that an NGT is a more appropriate route of nutrition support as her gut is functioning, however surgical resident wants to continue with PPN instead. Will C/w PPN today.    *pertinent meds: Abx, Cardizem, Morphine, Protonix, Miralax    *labs reviewed; All lytes WNL. Na on low end of normal, will continue to monitor. Will c/w total volume 1800 mL in PN bag, will adjust prn as per labs. K+ on higher end of normal limits, will decrease in PN bag today. T Bili WNL for trace elements. corrected Ca WNL, rec'd add 10mEq of Calcium Gluconate outside of PN bag as CAPS is out. Triglyceride <400, will c/w 50 g lipids daily No POCT noted, as per PN protocol, POCT required q6 hours to monitor glycemic control; should be maintained between 140- 180 mg/dL.   12-20    135  |  105  |  21  ----------------------------<  145<H>  4.7   |  26  |  0.78    Ca    7.4<L>      20 Dec 2023 03:29  Phos  3.9     12-20  Mg     2.1     12-20    TPro  5.4<L>  /  Alb  1.8<L>  /  TBili  0.5  /  DBili  x   /  AST  22  /  ALT  22  /  AlkPhos  45  12-20    Lipid Panel: Date/Time: 12-15-23 @ 04:58  Triglycerides, Serum: 187    *I&O's Detail    19 Dec 2023 07:01  -  20 Dec 2023 07:00  --------------------------------------------------------  IN:    Other (mL): 300 mL  Total IN: 300 mL    OUT:    Blood Loss (mL): 70 mL    Colostomy (mL): 100 mL    Drain (mL): 135 mL    Indwelling Catheter - Urethral (mL): 1500 mL    Other (mL): 800 mL    T-Tube (mL): 30 mL  Total OUT: 2635 mL    Total NET: -2335 mL  *fluid status: negative; PPN not doc'd; Strict I&O's are rec'd when pt is on PN as per protocol   *BM (+) 12/17 - small loose brown/green stool x2; ostomy. On Miralax.  *edema: 1+ generalized    *malnutrition: Pt continues to meet criteria for severe protein-calorie malnutrition in context of acute illness r/t decreased ability to meet increased nutrient needs 2/2 colonic perf w/ septic shock AEB moderate muscle/ fat wasting    Estimated Needs: based on 63.5 Kg (UBW - likely dry wt)  Calories: 1905- 2222 Kcal (30- 35 Kcal/Kg)  Protein: 95- 127 g (1.5- 2 g/Kg)  Fluids: 1587- 1905 mL (25- 30 mL/Kg)    Diet, NPO:   Except Medications (12-18-23 @ 09:04) [Active]  Parenteral Nutrition - Adult 1 Each (75 mL/Hr) TPN Continuous <Continuous>, 12-17-23 @ 22:00, 22:00, Stop order after: 1 Days    Weight History:  Weight (kg): 72.6 (12-11-23 @ 10:30)    PPN Recommendations: via peripheral venous line  Total Volume: 1800 mL x 24 hours  65 g  Amino Acids  100 g Dextrose  50 g Lipids 20%   98 mEq Sodium Chloride  14 mEq Sodium Acetate  20 mmol Sodium Phosphate  32 mEq Potassium Chloride  18 mEq Potassium Acetate  0 mmol Potassium Phosphate  0 mEq Calcium Gluconate (CAPS out of PN solution)  10 mEq Magnesium Sulfate  200 mg Thiamine  1 ml Trace Elements  10 ml MVI    Total Calories    1100      (Meets    53%  of  Estimated Energy needs  and    56%  Protein needs)   (osmolarity <900)    Additional Recommendations:  1) Daily weights  2) Strict I & O's - recommended as per PN protocol   3) Daily lyte checks including magnesium and phos  4) Weekly triglycerides/LFT checks  5) POCT q6hrs; maintain 140-180mg/dL  6) if on PN > 6 days, consider placing PICC line to meet 100% of nutr needs  7) ADAT as per surgery from FLD to LRD when medically feasible.   8) Will provide ostomy teaching when able.    *will continue to monitor and adjust PN prn*  Mariluz Yung MS, RDN, [phone]  Nutrition  Clinical Nutrition PN Follow Up Note    *82 YO F with a PMHx of COPD, HLD presents to the ED c/o abd pain, n/v and constipation x2 days. Admitted w/ colonic perforation, Extensive stercocolitis with 2 feculent perforation of sigmoid & rectum. Necrotic segment of colon also noticed now POD 2 s/p Christina's. Had post-op fecal peritonitis, 7L washout. Went into septic shock, requiring pressors. Tx to ICU w/ post-op resp failure. NGT to LWS, blood tinged. Off pressors as of this AM 12/13.    *PPN initiated 2/2 NPO s/p Christina procedure2/2 colonic perforation     *12/14: has been NPO x 3 days, nausea overnight. NGT still in place, no plan for clamp trial today.  Plan to initiate PPN today, posisble transfer out of ICU  *12/15: PPN day #2. Transferred out of ICU, off pressors, now on 3E w/ no acute events overnight. Hypokalemia and hypophosphatemia noted s/p HD yesterday, being repleted. Possibly going into refeeding, will slowly increase lytes in PN bag and recommend to c/w KCl and KPhos repletion outside of bag until WNL. Remains NPO w/ NGT to LWS in place, reports intermittent nausea, on Zofran, and awaiting return of bowel function as per surgery. If to remain on PPN >7 days, consider placing PICC line to initiate TPN and meet >80% ENN.   *12/16 & 12/17: no acute events overnight. Tolerating CLD and plan to advance to FLD when medically feasible. Ostomy functioning.   *12/18:  PPN day #5. Tolerating FLD as per surgery note. Ostomy remains functioning. D/w surgical resident, plan to possibly advance to LRD today and if toelrating >/= 70% can d/c PPN. Will c/w PPN for now. Will provide colostomy education when able.   *12/19: PPN day #6. Found to have intra-abd Loculated pocket of peritoneal fluid in the anterior left lower quadrant with associated peritoneal thickening and enhancement that is suggestive of peritonitis. Pt currently NPO for IR drainage today. Noted to be tolerating diet. Plan to c/w PPN today.    *current status: PPN day #7. S/p IR drainage of left lower quadrant fluid (12/19). Diet advanced to Low Fiber this morning. Discussed with Surgical Resident this morning, surgical resident wants to continue with PPN today and she is concerned that pt is not consuming enough PO. Discussed that an NGT is a more appropriate route of nutrition support as her gut is functioning, however surgical resident wants to continue with PPN instead. Will C/w PPN today.    *pertinent meds: Abx, Cardizem, Morphine, Protonix, Miralax    *labs reviewed; All lytes WNL. Na on low end of normal, will continue to monitor. Will c/w total volume 1800 mL in PN bag, will adjust prn as per labs. K+ on higher end of normal limits, will decrease in PN bag today. T Bili WNL for trace elements. corrected Ca WNL, rec'd add 10mEq of Calcium Gluconate outside of PN bag as CAPS is out. Triglyceride <400, will c/w 50 g lipids daily No POCT noted, as per PN protocol, POCT required q6 hours to monitor glycemic control; should be maintained between 140- 180 mg/dL.   12-20    135  |  105  |  21  ----------------------------<  145<H>  4.7   |  26  |  0.78    Ca    7.4<L>      20 Dec 2023 03:29  Phos  3.9     12-20  Mg     2.1     12-20    TPro  5.4<L>  /  Alb  1.8<L>  /  TBili  0.5  /  DBili  x   /  AST  22  /  ALT  22  /  AlkPhos  45  12-20    Lipid Panel: Date/Time: 12-15-23 @ 04:58  Triglycerides, Serum: 187    *I&O's Detail    19 Dec 2023 07:01  -  20 Dec 2023 07:00  --------------------------------------------------------  IN:    Other (mL): 300 mL  Total IN: 300 mL    OUT:    Blood Loss (mL): 70 mL    Colostomy (mL): 100 mL    Drain (mL): 135 mL    Indwelling Catheter - Urethral (mL): 1500 mL    Other (mL): 800 mL    T-Tube (mL): 30 mL  Total OUT: 2635 mL    Total NET: -2335 mL  *fluid status: negative; PPN not doc'd; Strict I&O's are rec'd when pt is on PN as per protocol   *BM (+) 12/17 - small loose brown/green stool x2; ostomy. On Miralax.  *edema: 1+ generalized    *malnutrition: Pt continues to meet criteria for severe protein-calorie malnutrition in context of acute illness r/t decreased ability to meet increased nutrient needs 2/2 colonic perf w/ septic shock AEB moderate muscle/ fat wasting    Estimated Needs: based on 63.5 Kg (UBW - likely dry wt)  Calories: 1905- 2222 Kcal (30- 35 Kcal/Kg)  Protein: 95- 127 g (1.5- 2 g/Kg)  Fluids: 1587- 1905 mL (25- 30 mL/Kg)    Diet, NPO:   Except Medications (12-18-23 @ 09:04) [Active]  Parenteral Nutrition - Adult 1 Each (75 mL/Hr) TPN Continuous <Continuous>, 12-17-23 @ 22:00, 22:00, Stop order after: 1 Days    Weight History:  Weight (kg): 72.6 (12-11-23 @ 10:30)    PPN Recommendations: via peripheral venous line  Total Volume: 1800 mL x 24 hours  65 g  Amino Acids  100 g Dextrose  50 g Lipids 20%   98 mEq Sodium Chloride  14 mEq Sodium Acetate  20 mmol Sodium Phosphate  32 mEq Potassium Chloride  18 mEq Potassium Acetate  0 mmol Potassium Phosphate  0 mEq Calcium Gluconate (CAPS out of PN solution)  10 mEq Magnesium Sulfate  200 mg Thiamine  1 ml Trace Elements  10 ml MVI    Total Calories    1100      (Meets    53%  of  Estimated Energy needs  and    56%  Protein needs)   (osmolarity <900)    Additional Recommendations:  1) Daily weights  2) Strict I & O's - recommended as per PN protocol   3) Daily lyte checks including magnesium and phos  4) Weekly triglycerides/LFT checks  5) POCT q6hrs; maintain 140-180mg/dL  6) if on PN > 6 days, consider placing PICC line to meet 100% of nutr needs  7) ADAT as per surgery from FLD to LRD when medically feasible.   8) Will provide ostomy teaching when able.    *will continue to monitor and adjust PN prn*  Mariluz Yung MS, RDN, [phone]  Nutrition

## 2023-12-20 NOTE — PROGRESS NOTE ADULT - ASSESSMENT
83 year old female presented with colonic perforation 2/2 stercoral ulcer, now POD 9 s/p Christina  AFib better controlled with Diltiazem and PRN Metoprolol. Patient on Heparin drip.   s/p IR drain placement 12/19 for intra-abdominal collection       Plan  Resume low residue diet   Cont hep gtt  Monitor ostomy function, IR drain   Pain & nausea control PRN  Cardiology recs appreciated  Hospitalist recs  Incentive spirometry, wean oxygen  Cont Unasyn per ID recs  Replace electrolytes PRN  PT anticipates RONALD  Will need ostomy teaching  Urology rec:- Trial of void in 4 days if inpatient vs. in 1 week outpatient. If inpatient TOV is performed check post void residual, if significant d/c with daugherty to leg bag    Discussed with attending.

## 2023-12-20 NOTE — PROGRESS NOTE ADULT - SUBJECTIVE AND OBJECTIVE BOX
HOSPITALIST ATTENDING PROGRESS NOTE    Chart and meds reviewed.      Subjective: Patient seen and examined.      Additional results/Imaging, I have personally reviewed:    LABS:                            8.9    19.79 )-----------( 339      ( 21 Dec 2023 05:08 )             26.3     12-21    137  |  107  |  25<H>  ----------------------------<  154<H>  4.2   |  24  |  0.88    Ca    7.5<L>      21 Dec 2023 05:08  Phos  3.3     12-21  Mg     2.1     12-21    TPro  5.8<L>  /  Alb  1.9<L>  /  TBili  0.4  /  DBili  x   /  AST  25  /  ALT  27  /  AlkPhos  60  12-21        LIVER FUNCTIONS - ( 21 Dec 2023 05:08 )  Alb: 1.9 g/dL / Pro: 5.8 gm/dL / ALK PHOS: 60 U/L / ALT: 27 U/L / AST: 25 U/L / GGT: x           PTT - ( 21 Dec 2023 05:08 )  PTT:119.4 sec  Urinalysis Basic - ( 21 Dec 2023 05:08 )    Color: x / Appearance: x / SG: x / pH: x  Gluc: 154 mg/dL / Ketone: x  / Bili: x / Urobili: x   Blood: x / Protein: x / Nitrite: x   Leuk Esterase: x / RBC: x / WBC x   Sq Epi: x / Non Sq Epi: x / Bacteria: x              All other systems reviewed and found to be negative with the exception of what has been described above.    MEDICATIONS  (STANDING):  ampicillin/sulbactam  IVPB 3 Gram(s) IV Intermittent every 6 hours  diltiazem    Tablet 60 milliGRAM(s) Oral every 8 hours  fluconAZOLE IVPB      fluconAZOLE IVPB 200 milliGRAM(s) IV Intermittent every 24 hours  lidocaine   4% Patch 1 Patch Transdermal daily  metoprolol tartrate Injectable 5 milliGRAM(s) IV Push every 5 minutes  pantoprazole  Injectable 40 milliGRAM(s) IV Push two times a day  polyethylene glycol 3350 17 Gram(s) Oral every 12 hours  rivaroxaban 15 milliGRAM(s) Oral two times a day with meals  tamsulosin 0.4 milliGRAM(s) Oral at bedtime    MEDICATIONS  (PRN):  acetaminophen   IVPB .. 1000 milliGRAM(s) IV Intermittent once PRN Temp greater or equal to 38C (100.4F), Mild Pain (1 - 3)  ondansetron Injectable 4 milliGRAM(s) IV Push every 6 hours PRN Nausea      VITALS:  T(F): 97.9 (12-21-23 @ 08:51), Max: 98.5 (12-20-23 @ 20:19)  HR: 94 (12-21-23 @ 08:51) (94 - 100)  BP: 123/41 (12-21-23 @ 08:51) (123/41 - 148/52)  RR: 18 (12-21-23 @ 08:51) (18 - 18)  SpO2: 94% (12-21-23 @ 08:51) (94% - 99%)  Wt(kg): --    I&O's Summary    20 Dec 2023 07:01  -  21 Dec 2023 07:00  --------------------------------------------------------  IN: 0 mL / OUT: 3045 mL / NET: -3045 mL        CAPILLARY BLOOD GLUCOSE          PHYSICAL EXAM:  GEN: NAD   HEENT: EOMI, normal hearing, moist mucous membranes  NECK : Soft and supple, no JVD  LUNG: CTABL, No wheezing, rales or rhonchi  CVS: S1S2+, , +regular rhythm   GI: midline dressing c/d/i, Ostomy pink and patent, semisolid stool, gas in bag, incision healing well, no erythema or induration  EXTREMITIES: Left arm swollen.  VASCULAR: 2+ peripheral pulses  NEURO: AAOx3, grossly non-focal   SKIN: No rashes      CULTURES:  Blood, Urine: Negative (12-20 @ 13:20)      Telemetry, personally reviewed

## 2023-12-20 NOTE — PROGRESS NOTE ADULT - ASSESSMENT
83F presented with abd pain, n/v and constipation, noted to have colonic perforation 2/2 stercoral ulcer    #Septic shock Septic shock from perforated colon and feculent peritonitis   -s/p Christina procedure  -S/p NGT  -Ostomy care  -Monitor CORKY output  -BCx: Gram Negative Rods, pending identification. will need repeat BCx   -Intraop cultures: Proteus Mirabilis   -s/p Zosyn   ID consulted recommendations appreciated   - change abx to unasyn 3gmq6h from rocephin   on dc po augmentin [id number] 14 days from day of negative cx    -repeat blood cultures   -currently on PPN  -strict i&o's   -daugherty in place, urology consult placed for urinary retention   -Pain management  -Serial abdominal exam  -management per CRSx  Continue Antibiotics   CTAbdomen  IMPRESSION:  *  Status post Christina procedure.  *  Loculated pocket of peritoneal fluid in the anterior left lower   quadrant measuring 6.1 x 3.3 x 6.1 cm with associated peritoneal   thickening and enhancement that is suggestive of peritonitis. An infected   collection is considered. Adjacent small bowel loops with wall   thickening, which may be reactive.  *  Dilated common bileduct, which appears slightly increased since   12/11/2023, but not significantly changed in comparison to 4/16/2022.   Correlate with liver function tests and consider additional workup as   warranted.  *  Small bilateral pleural effusions.    -SP IR drainage. 12/19 continue to monitor leukocytosis   - now on rocephin since 12/14  - on unasyn 3gmq6h #5  - on antifungal coverage diflucan 200mg daily #1    #pAfib, currently in NSR   -TTE: 2+ MR, 3+ TR, pHTN (RVSP: 41), EF: 55 to 60%  -S/p Lopressor and amiodarone  -S/p Cardizem gtt.  Start oral cardizem and IV heparin drip as per cardiology  transition to DOAC when cleared by surgery   -Currently in NSR (intermittent episodes of sinus tach on telemetry)    Will need fu for MV post recovery as had moderate MR and Pulm.  -[id number] Score: 3 (Age, Sex),   -Monitor electrolytes, replace and trend   -monitor on tele   Cardiology consulted recommendations appreciated.    Daugherty in place  Urology consulted, recommendations appreciated  Recommend  - Keep indwelling daugherty in place  - Will place on Flomax discussed off label use in females pt would like to try it  - Trial of void in 4 days if inpatient vs. in 1 week outpatient. If inpatient TOV is performed check post void residual, if significant d/c with daugherty to leg bag  - Follow up with Dr. Ramos outpatient      #COPD  -Mild  -Stable  -Currently not on any medication    #HLD  -Diet controlled, currently not on any medication  Resume diet, DC TPN as per surgery    VTE prophylaxis  On IV heparin   83F presented with abd pain, n/v and constipation, noted to have colonic perforation 2/2 stercoral ulcer    #Septic shock Septic shock from perforated colon and feculent peritonitis   -s/p Christina procedure  -S/p NGT  -Ostomy care  -Monitor CORKY output  -BCx: Gram Negative Rods, pending identification. will need repeat BCx   -Intraop cultures: Proteus Mirabilis   -s/p Zosyn   ID consulted recommendations appreciated   - change abx to unasyn 3gmq6h from rocephin   on dc po augmentin [id number] 14 days from day of negative cx    -repeat blood cultures   -currently on PPN  -strict i&o's   -daugherty in place, urology consult placed for urinary retention   -Pain management  -Serial abdominal exam  -management per CRSx  Continue Antibiotics   CTAbdomen  IMPRESSION:  *  Status post Christina procedure.  *  Loculated pocket of peritoneal fluid in the anterior left lower   quadrant measuring 6.1 x 3.3 x 6.1 cm with associated peritoneal   thickening and enhancement that is suggestive of peritonitis. An infected   collection is considered. Adjacent small bowel loops with wall   thickening, which may be reactive.  *  Dilated common bileduct, which appears slightly increased since   12/11/2023, but not significantly changed in comparison to 4/16/2022.   Correlate with liver function tests and consider additional workup as   warranted.  *  Small bilateral pleural effusions.    -SP IR drainage. 12/19 continue to monitor leukocytosis   - now on rocephin since 12/14  - on unasyn 3gmq6h #5  - on antifungal coverage diflucan 200mg daily #1    #pAfib, currently in NSR   -TTE: 2+ MR, 3+ TR, pHTN (RVSP: 41), EF: 55 to 60%  -S/p Lopressor and amiodarone  -S/p Cardizem gtt.  Start oral cardizem and IV heparin drip as per cardiology  transition to DOAC when cleared by surgery   -Currently in NSR (intermittent episodes of sinus tach on telemetry)    Will need fu for MV post recovery as had moderate MR and Pulm.  -[id number] Score: 3 (Age, Sex),   -Monitor electrolytes, replace and trend   -monitor on tele   Cardiology consulted recommendations appreciated.    Daugherty in place  Urology consulted, recommendations appreciated  Recommend  - Keep indwelling daugherty in place  - Will place on Flomax discussed off label use in females pt would like to try it  - Trial of void in 4 days if inpatient vs. in 1 week outpatient. If inpatient TOV is performed check post void residual, if significant d/c with daugherty to leg bag  - Follow up with Dr. Ramos outpatient      #COPD  -Mild  -Stable  -Currently not on any medication    #HLD  -Diet controlled, currently not on any medication  Resume diet, DC TPN as per surgery    VTE prophylaxis  On IV heparin

## 2023-12-20 NOTE — PROGRESS NOTE ADULT - SUBJECTIVE AND OBJECTIVE BOX
Date of service: 12-20-23 @ 12:51    pt seen and examined  found to have LLQ abd collection   s/p IR drainage 12/19   no fevers     ROS: no fever or chills; denies dizziness, no HA, no SOB or cough, no diarrhea or constipation; no dysuria, no urinary frequency, no legs pain, no rashes    MEDICATIONS  (STANDING):  ampicillin/sulbactam  IVPB 3 Gram(s) IV Intermittent every 6 hours  dextrose 5% + sodium chloride 0.45% with potassium chloride 20 mEq/L 1000 milliLiter(s) (75 mL/Hr) IV Continuous <Continuous>  diltiazem    Tablet 60 milliGRAM(s) Oral every 8 hours  heparin  Infusion.  Unit(s)/Hr (13 mL/Hr) IV Continuous <Continuous>  lidocaine   4% Patch 1 Patch Transdermal daily  lipid, fat emulsion (Fish Oil and Plant Based) 20% Infusion 0.6849 Gm/kG/Day (20.83 mL/Hr) IV Continuous <Continuous>  lipid, fat emulsion (Fish Oil and Plant Based) 20% Infusion 0.6849 Gm/kG/Day (20.83 mL/Hr) IV Continuous <Continuous>  metoprolol tartrate Injectable 5 milliGRAM(s) IV Push every 5 minutes  pantoprazole  Injectable 40 milliGRAM(s) IV Push two times a day  Parenteral Nutrition - Adult 1 Each (75 mL/Hr) TPN Continuous <Continuous>  Parenteral Nutrition - Adult 1 Each (75 mL/Hr) TPN Continuous <Continuous>  polyethylene glycol 3350 17 Gram(s) Oral every 12 hours  tamsulosin 0.4 milliGRAM(s) Oral at bedtime    Vital Signs Last 24 Hrs  T(C): 36.3 (20 Dec 2023 09:10), Max: 36.7 (19 Dec 2023 16:00)  T(F): 97.3 (20 Dec 2023 09:10), Max: 98.1 (19 Dec 2023 16:00)  HR: 92 (20 Dec 2023 09:10) (80 - 92)  BP: 145/44 (20 Dec 2023 09:10) (105/95 - 148/57)  BP(mean): --  RR: 18 (20 Dec 2023 09:10) (15 - 18)  SpO2: 99% (20 Dec 2023 09:10) (98% - 100%)    Parameters below as of 20 Dec 2023 09:10  Patient On (Oxygen Delivery Method): nasal cannula  O2 Flow (L/min): 3      PE:  Constitutional: frail looking  HEENT: NC/AT, EOMI, PERRLA, conjunctivae clear; ears and nose atraumatic; pharynx benign  Neck: supple; thyroid not palpable  Back: no tenderness  Respiratory: respiratory effort normal; clear to auscultation  Cardiovascular: S1S2 regular, no murmurs  Abdomen: soft, not tender, not distended, positive BS; liver and spleen WNL surgical sites clean shanta drain in place   Genitourinary: no suprapubic tenderness  Lymphatic: no LN palpable  Musculoskeletal: no muscle tenderness, no joint swelling or tenderness  Extremities: no pedal edema  Neurological/ Psychiatric:   moving all extremities  Skin: no rashes; no palpable lesions    Labs: all available labs reviewed                                              9.4 20.49 )-----------( 288      ( 20 Dec 2023 09:47 )             28.0     12-20    134<L>  |  104  |  20  ----------------------------<  214<H>  4.3   |  25  |  0.81    Ca    7.6<L>      20 Dec 2023 09:47  Phos  3.9     12-20  Mg     2.1     12-20    TPro  5.6<L>  /  Alb  1.8<L>  /  TBili  0.5  /  DBili  x   /  AST  22  /  ALT  21  /  AlkPhos  44  12-20          Alb: 2.2 g/dL / Pro: 5.7 gm/dL / ALK PHOS: 57 U/L / ALT: 22 U/L / AST: 30 U/L / GGT: x           Urinalysis Basic - ( 16 Dec 2023 04:39 )    Color: x / Appearance: x / SG: x / pH: x  Gluc: 150 mg/dL / Ketone: x  / Bili: x / Urobili: x   Blood: x / Protein: x / Nitrite: x   Leuk Esterase: x / RBC: x / WBC x   Sq Epi: x / Non Sq Epi: x / Bacteria: x    Culture - Urine (12.12.23 @ 09:10)   Specimen Source: Clean Catch Clean Catch (Midstream)  Culture Results:   No growth  Culture - Surgical Swab (12.11.23 @ 19:09)   - Amoxicillin/Clavulanic Acid: S <=8/4  - Ampicillin: S <=8 These ampicillin results predict results for amoxicillin  - Ampicillin/Sulbactam: S <=4/2  - Aztreonam: S <=4  - Cefazolin: S <=2  - Cefepime: S <=2  - Cefoxitin: S <=8  - Ceftriaxone: S <=1  - Ciprofloxacin: S <=0.25  - Ertapenem: S <=0.5  - Gentamicin: S <=2  - Levofloxacin: S <=0.5  - Meropenem: S <=1  - Piperacillin/Tazobactam: S <=8  - Tobramycin: S <=2  - Trimethoprim/Sulfamethoxazole: S <=0.5/9.5  Specimen Source: .Surgical Swab culture, peritonitis  Culture Results:   Rare Proteus mirabilis   Few Bacteroides distasonis "Susceptibilities not performed"   Rare Most closely resembling Acidaminococcus species "Susceptibilities   not performed"  Organism Identification: Proteus mirabilis  Organism: Proteus mirabilis  Method Type: MICCulture - Blood (12.11.23 @ 10:57)   Specimen Source: .Blood None  Culture Results:   No growth at 4 daysCulture - Blood (12.11.23 @ 10:57)   Gram Stain:   Growth in anaerobic bottle: Gram Negative Rods  - Blood PCR Panel: NEG  Specimen Source: .Blood None  Organism: Blood Culture PCR  Culture Results:   Growth in anaerobic bottle: Bacteroides uniformis   Direct identification is available within approximately 3-5   hours either by Blood Panel Multiplexed PCR or Direct   MALDI-TOF. Details: [email or web address]  Organism Identification: Blood Culture PCR  Method Type: PCR      Radiology: all available radiological tests reviewed  < from: Xray Chest 1 View- PORTABLE-Urgent (Xray Chest 1 View- PORTABLE-Urgent .) (12.11.23 @ 20:17) >    ACC: [id number] EXAM:  XR CHEST PORTABLE URGENT 1V   ORDERED BY: HUGO GREEN     PROCEDURE DATE:  12/11/2023          INTERPRETATION:  Portable chest radiograph    CLINICAL INFORMATION: Post intubation chest radiograph    TECHNIQUE:  Portable  AP chest radiograph.    COMPARISON: 2/11/2023 chest x-ray .    FINDINGS:  CATHETERS AND TUBES: ET tube tip above tracheal bifurcation.  NG tube tip beyond GE junction.    PULMONARY: Mild LEFT effusion and/or basilar airspace disease obscures   LEFT CP angle and portions of LEFT diaphragm contour. Remaining lung   parenchyma grossly clear.  No pneumothorax.    HEART/VASCULAR: The heart and mediastinum size and configuration are   within normal limits.    BONES: Visualized osseous thorax intact.    IMPRESSION:   Catheters and tubes in place.  LEFT lower zone mild pleural effusion and/or patchy basilar airspace   disease..    --- End of Report ---      < end of copied text >  < from: CT Abdomen and Pelvis w/ Oral Cont and w/ IV Cont (12.11.23 @ 12:45) >  ACC: [id number] EXAM:  CT ABDOMEN AND PELVIS OC IC   ORDERED BY: TRAY GÓMEZ     PROCEDURE DATE:  12/11/2023          INTERPRETATION:  CLINICAL INFORMATION: Rule out small bowel obstruction.    COMPARISON: 04/16/2022.    CONTRAST/COMPLICATIONS:  IVContrast: Omnipaque 350  90 cc administered   10 cc discarded  Oral Contrast: Omnipaque 300  Complications: None reported at time of study completion    PROCEDURE:  CT of the Abdomen and Pelvis was performed.  Sagittal and coronal reformats were performed.    FINDINGS:  LOWER CHEST: Bibasilar subsegmental atelectasis, left more than right.   Mild hiatal hernia. Coronary artery calcification.    LIVER: Steatosis.  BILE DUCTS: Mildly dilated. CBD measures approximately 1 cm with smooth   distal tapering. An almost similar appearance was noted on the prior   study. Correlate clinically.  GALLBLADDER: Within normal limits.  SPLEEN: Within normal limits.  PANCREAS: Within normal limits.  ADRENALS: Within normal limits.  KIDNEYS/URETERS: Within normal limits.    BLADDER: Minimally distended.  REPRODUCTIVE ORGANS: Uterus and adnexa within normal limits.    BOWEL: No bowel obstruction. Appendix is not visualized. No evidence of   inflammation in the pericecal region. Evidence of uncomplicated   predominantly sigmoid diverticula. There are small foci of extraluminal   gas at the level of the rectum along with probable rectal pneumatosis.  PERITONEUM: Small volume abdominal and pelvic ascites. Also small volume   pneumoperitoneum.  VESSELS: Atherosclerotic changes. Common origin of the celiac and   superior mesenteric arteries.  RETROPERITONEUM/LYMPH NODES: No lymphadenopathy.  ABDOMINAL WALL: Within normal limits.  BONES: Degenerative changes. Stable moderate compression of L2.    IMPRESSION:  Evidence of pneumoperitoneum. Suspect rectal perforation considering the   presence of perirectal gas and pneumatosis.          Advanced directives addressed: full resuscitation Date of service: 12-20-23 @ 12:51    pt seen and examined  found to have LLQ abd collection   s/p IR drainage 12/19   no fevers     ROS: no fever or chills; denies dizziness, no HA, no SOB or cough, no diarrhea or constipation; no dysuria, no urinary frequency, no legs pain, no rashes    MEDICATIONS  (STANDING):  ampicillin/sulbactam  IVPB 3 Gram(s) IV Intermittent every 6 hours  dextrose 5% + sodium chloride 0.45% with potassium chloride 20 mEq/L 1000 milliLiter(s) (75 mL/Hr) IV Continuous <Continuous>  diltiazem    Tablet 60 milliGRAM(s) Oral every 8 hours  heparin  Infusion.  Unit(s)/Hr (13 mL/Hr) IV Continuous <Continuous>  lidocaine   4% Patch 1 Patch Transdermal daily  lipid, fat emulsion (Fish Oil and Plant Based) 20% Infusion 0.6849 Gm/kG/Day (20.83 mL/Hr) IV Continuous <Continuous>  lipid, fat emulsion (Fish Oil and Plant Based) 20% Infusion 0.6849 Gm/kG/Day (20.83 mL/Hr) IV Continuous <Continuous>  metoprolol tartrate Injectable 5 milliGRAM(s) IV Push every 5 minutes  pantoprazole  Injectable 40 milliGRAM(s) IV Push two times a day  Parenteral Nutrition - Adult 1 Each (75 mL/Hr) TPN Continuous <Continuous>  Parenteral Nutrition - Adult 1 Each (75 mL/Hr) TPN Continuous <Continuous>  polyethylene glycol 3350 17 Gram(s) Oral every 12 hours  tamsulosin 0.4 milliGRAM(s) Oral at bedtime    Vital Signs Last 24 Hrs  T(C): 36.3 (20 Dec 2023 09:10), Max: 36.7 (19 Dec 2023 16:00)  T(F): 97.3 (20 Dec 2023 09:10), Max: 98.1 (19 Dec 2023 16:00)  HR: 92 (20 Dec 2023 09:10) (80 - 92)  BP: 145/44 (20 Dec 2023 09:10) (105/95 - 148/57)  BP(mean): --  RR: 18 (20 Dec 2023 09:10) (15 - 18)  SpO2: 99% (20 Dec 2023 09:10) (98% - 100%)    Parameters below as of 20 Dec 2023 09:10  Patient On (Oxygen Delivery Method): nasal cannula  O2 Flow (L/min): 3      PE:  Constitutional: frail looking  HEENT: NC/AT, EOMI, PERRLA, conjunctivae clear; ears and nose atraumatic; pharynx benign  Neck: supple; thyroid not palpable  Back: no tenderness  Respiratory: respiratory effort normal; clear to auscultation  Cardiovascular: S1S2 regular, no murmurs  Abdomen: soft, not tender, not distended, positive BS; liver and spleen WNL surgical sites clean shanta drain in place   Genitourinary: no suprapubic tenderness  Lymphatic: no LN palpable  Musculoskeletal: no muscle tenderness, no joint swelling or tenderness  Extremities: no pedal edema  Neurological/ Psychiatric:   moving all extremities  Skin: no rashes; no palpable lesions    Labs: all available labs reviewed                                              9.4 20.49 )-----------( 288      ( 20 Dec 2023 09:47 )             28.0     12-20    134<L>  |  104  |  20  ----------------------------<  214<H>  4.3   |  25  |  0.81    Ca    7.6<L>      20 Dec 2023 09:47  Phos  3.9     12-20  Mg     2.1     12-20    TPro  5.6<L>  /  Alb  1.8<L>  /  TBili  0.5  /  DBili  x   /  AST  22  /  ALT  21  /  AlkPhos  44  12-20          Alb: 2.2 g/dL / Pro: 5.7 gm/dL / ALK PHOS: 57 U/L / ALT: 22 U/L / AST: 30 U/L / GGT: x           Urinalysis Basic - ( 16 Dec 2023 04:39 )    Color: x / Appearance: x / SG: x / pH: x  Gluc: 150 mg/dL / Ketone: x  / Bili: x / Urobili: x   Blood: x / Protein: x / Nitrite: x   Leuk Esterase: x / RBC: x / WBC x   Sq Epi: x / Non Sq Epi: x / Bacteria: x    Culture - Urine (12.12.23 @ 09:10)   Specimen Source: Clean Catch Clean Catch (Midstream)  Culture Results:   No growth  Culture - Surgical Swab (12.11.23 @ 19:09)   - Amoxicillin/Clavulanic Acid: S <=8/4  - Ampicillin: S <=8 These ampicillin results predict results for amoxicillin  - Ampicillin/Sulbactam: S <=4/2  - Aztreonam: S <=4  - Cefazolin: S <=2  - Cefepime: S <=2  - Cefoxitin: S <=8  - Ceftriaxone: S <=1  - Ciprofloxacin: S <=0.25  - Ertapenem: S <=0.5  - Gentamicin: S <=2  - Levofloxacin: S <=0.5  - Meropenem: S <=1  - Piperacillin/Tazobactam: S <=8  - Tobramycin: S <=2  - Trimethoprim/Sulfamethoxazole: S <=0.5/9.5  Specimen Source: .Surgical Swab culture, peritonitis  Culture Results:   Rare Proteus mirabilis   Few Bacteroides distasonis "Susceptibilities not performed"   Rare Most closely resembling Acidaminococcus species "Susceptibilities   not performed"  Organism Identification: Proteus mirabilis  Organism: Proteus mirabilis  Method Type: MICCulture - Blood (12.11.23 @ 10:57)   Specimen Source: .Blood None  Culture Results:   No growth at 4 daysCulture - Blood (12.11.23 @ 10:57)   Gram Stain:   Growth in anaerobic bottle: Gram Negative Rods  - Blood PCR Panel: NEG  Specimen Source: .Blood None  Organism: Blood Culture PCR  Culture Results:   Growth in anaerobic bottle: Bacteroides uniformis   Direct identification is available within approximately 3-5   hours either by Blood Panel Multiplexed PCR or Direct   MALDI-TOF. Details: [email or web address]  Organism Identification: Blood Culture PCR  Method Type: PCR      Radiology: all available radiological tests reviewed  < from: Xray Chest 1 View- PORTABLE-Urgent (Xray Chest 1 View- PORTABLE-Urgent .) (12.11.23 @ 20:17) >    ACC: [id number] EXAM:  XR CHEST PORTABLE URGENT 1V   ORDERED BY: HUGO GREEN     PROCEDURE DATE:  12/11/2023          INTERPRETATION:  Portable chest radiograph    CLINICAL INFORMATION: Post intubation chest radiograph    TECHNIQUE:  Portable  AP chest radiograph.    COMPARISON: 2/11/2023 chest x-ray .    FINDINGS:  CATHETERS AND TUBES: ET tube tip above tracheal bifurcation.  NG tube tip beyond GE junction.    PULMONARY: Mild LEFT effusion and/or basilar airspace disease obscures   LEFT CP angle and portions of LEFT diaphragm contour. Remaining lung   parenchyma grossly clear.  No pneumothorax.    HEART/VASCULAR: The heart and mediastinum size and configuration are   within normal limits.    BONES: Visualized osseous thorax intact.    IMPRESSION:   Catheters and tubes in place.  LEFT lower zone mild pleural effusion and/or patchy basilar airspace   disease..    --- End of Report ---      < end of copied text >  < from: CT Abdomen and Pelvis w/ Oral Cont and w/ IV Cont (12.11.23 @ 12:45) >  ACC: [id number] EXAM:  CT ABDOMEN AND PELVIS OC IC   ORDERED BY: TRAY GÓMEZ     PROCEDURE DATE:  12/11/2023          INTERPRETATION:  CLINICAL INFORMATION: Rule out small bowel obstruction.    COMPARISON: 04/16/2022.    CONTRAST/COMPLICATIONS:  IVContrast: Omnipaque 350  90 cc administered   10 cc discarded  Oral Contrast: Omnipaque 300  Complications: None reported at time of study completion    PROCEDURE:  CT of the Abdomen and Pelvis was performed.  Sagittal and coronal reformats were performed.    FINDINGS:  LOWER CHEST: Bibasilar subsegmental atelectasis, left more than right.   Mild hiatal hernia. Coronary artery calcification.    LIVER: Steatosis.  BILE DUCTS: Mildly dilated. CBD measures approximately 1 cm with smooth   distal tapering. An almost similar appearance was noted on the prior   study. Correlate clinically.  GALLBLADDER: Within normal limits.  SPLEEN: Within normal limits.  PANCREAS: Within normal limits.  ADRENALS: Within normal limits.  KIDNEYS/URETERS: Within normal limits.    BLADDER: Minimally distended.  REPRODUCTIVE ORGANS: Uterus and adnexa within normal limits.    BOWEL: No bowel obstruction. Appendix is not visualized. No evidence of   inflammation in the pericecal region. Evidence of uncomplicated   predominantly sigmoid diverticula. There are small foci of extraluminal   gas at the level of the rectum along with probable rectal pneumatosis.  PERITONEUM: Small volume abdominal and pelvic ascites. Also small volume   pneumoperitoneum.  VESSELS: Atherosclerotic changes. Common origin of the celiac and   superior mesenteric arteries.  RETROPERITONEUM/LYMPH NODES: No lymphadenopathy.  ABDOMINAL WALL: Within normal limits.  BONES: Degenerative changes. Stable moderate compression of L2.    IMPRESSION:  Evidence of pneumoperitoneum. Suspect rectal perforation considering the   presence of perirectal gas and pneumatosis.          Advanced directives addressed: full resuscitation

## 2023-12-21 LAB
APTT BLD: 94.7 SEC — HIGH (ref 24.5–35.6)
APTT BLD: 94.7 SEC — HIGH (ref 24.5–35.6)
BASOPHILS # BLD AUTO: 0.04 K/UL — SIGNIFICANT CHANGE UP (ref 0–0.2)
BASOPHILS # BLD AUTO: 0.04 K/UL — SIGNIFICANT CHANGE UP (ref 0–0.2)
BASOPHILS NFR BLD AUTO: 0.2 % — SIGNIFICANT CHANGE UP (ref 0–2)
BASOPHILS NFR BLD AUTO: 0.2 % — SIGNIFICANT CHANGE UP (ref 0–2)
CULTURE RESULTS: SIGNIFICANT CHANGE UP
EOSINOPHIL # BLD AUTO: 0.28 K/UL — SIGNIFICANT CHANGE UP (ref 0–0.5)
EOSINOPHIL # BLD AUTO: 0.28 K/UL — SIGNIFICANT CHANGE UP (ref 0–0.5)
EOSINOPHIL NFR BLD AUTO: 1.4 % — SIGNIFICANT CHANGE UP (ref 0–6)
EOSINOPHIL NFR BLD AUTO: 1.4 % — SIGNIFICANT CHANGE UP (ref 0–6)
HCT VFR BLD CALC: 26.3 % — LOW (ref 34.5–45)
HCT VFR BLD CALC: 26.3 % — LOW (ref 34.5–45)
HGB BLD-MCNC: 8.9 G/DL — LOW (ref 11.5–15.5)
HGB BLD-MCNC: 8.9 G/DL — LOW (ref 11.5–15.5)
IMM GRANULOCYTES NFR BLD AUTO: 4.4 % — HIGH (ref 0–0.9)
IMM GRANULOCYTES NFR BLD AUTO: 4.4 % — HIGH (ref 0–0.9)
LYMPHOCYTES # BLD AUTO: 1.64 K/UL — SIGNIFICANT CHANGE UP (ref 1–3.3)
LYMPHOCYTES # BLD AUTO: 1.64 K/UL — SIGNIFICANT CHANGE UP (ref 1–3.3)
LYMPHOCYTES # BLD AUTO: 8.3 % — LOW (ref 13–44)
LYMPHOCYTES # BLD AUTO: 8.3 % — LOW (ref 13–44)
MCHC RBC-ENTMCNC: 30.2 PG — SIGNIFICANT CHANGE UP (ref 27–34)
MCHC RBC-ENTMCNC: 30.2 PG — SIGNIFICANT CHANGE UP (ref 27–34)
MCHC RBC-ENTMCNC: 33.8 GM/DL — SIGNIFICANT CHANGE UP (ref 32–36)
MCHC RBC-ENTMCNC: 33.8 GM/DL — SIGNIFICANT CHANGE UP (ref 32–36)
MCV RBC AUTO: 89.2 FL — SIGNIFICANT CHANGE UP (ref 80–100)
MCV RBC AUTO: 89.2 FL — SIGNIFICANT CHANGE UP (ref 80–100)
MONOCYTES # BLD AUTO: 1.44 K/UL — HIGH (ref 0–0.9)
MONOCYTES # BLD AUTO: 1.44 K/UL — HIGH (ref 0–0.9)
MONOCYTES NFR BLD AUTO: 7.3 % — SIGNIFICANT CHANGE UP (ref 2–14)
MONOCYTES NFR BLD AUTO: 7.3 % — SIGNIFICANT CHANGE UP (ref 2–14)
NEUTROPHILS # BLD AUTO: 15.52 K/UL — HIGH (ref 1.8–7.4)
NEUTROPHILS # BLD AUTO: 15.52 K/UL — HIGH (ref 1.8–7.4)
NEUTROPHILS NFR BLD AUTO: 78.4 % — HIGH (ref 43–77)
NEUTROPHILS NFR BLD AUTO: 78.4 % — HIGH (ref 43–77)
PLATELET # BLD AUTO: 339 K/UL — SIGNIFICANT CHANGE UP (ref 150–400)
PLATELET # BLD AUTO: 339 K/UL — SIGNIFICANT CHANGE UP (ref 150–400)
RBC # BLD: 2.95 M/UL — LOW (ref 3.8–5.2)
RBC # BLD: 2.95 M/UL — LOW (ref 3.8–5.2)
RBC # FLD: 15.1 % — HIGH (ref 10.3–14.5)
RBC # FLD: 15.1 % — HIGH (ref 10.3–14.5)
SPECIMEN SOURCE: SIGNIFICANT CHANGE UP
WBC # BLD: 19.79 K/UL — HIGH (ref 3.8–10.5)
WBC # BLD: 19.79 K/UL — HIGH (ref 3.8–10.5)
WBC # FLD AUTO: 19.79 K/UL — HIGH (ref 3.8–10.5)
WBC # FLD AUTO: 19.79 K/UL — HIGH (ref 3.8–10.5)

## 2023-12-21 PROCEDURE — 99233 SBSQ HOSP IP/OBS HIGH 50: CPT

## 2023-12-21 RX ORDER — RIVAROXABAN 15 MG-20MG
15 KIT ORAL
Refills: 0 | Status: DISCONTINUED | OUTPATIENT
Start: 2023-12-21 | End: 2023-12-23

## 2023-12-21 RX ADMIN — POLYETHYLENE GLYCOL 3350 17 GRAM(S): 17 POWDER, FOR SOLUTION ORAL at 13:04

## 2023-12-21 RX ADMIN — Medication 60 MILLIGRAM(S): at 22:15

## 2023-12-21 RX ADMIN — RIVAROXABAN 15 MILLIGRAM(S): KIT at 18:47

## 2023-12-21 RX ADMIN — LIDOCAINE 1 PATCH: 4 CREAM TOPICAL at 19:30

## 2023-12-21 RX ADMIN — AMPICILLIN SODIUM AND SULBACTAM SODIUM 200 GRAM(S): 250; 125 INJECTION, POWDER, FOR SUSPENSION INTRAMUSCULAR; INTRAVENOUS at 23:31

## 2023-12-21 RX ADMIN — POLYETHYLENE GLYCOL 3350 17 GRAM(S): 17 POWDER, FOR SOLUTION ORAL at 22:14

## 2023-12-21 RX ADMIN — AMPICILLIN SODIUM AND SULBACTAM SODIUM 200 GRAM(S): 250; 125 INJECTION, POWDER, FOR SUSPENSION INTRAMUSCULAR; INTRAVENOUS at 13:05

## 2023-12-21 RX ADMIN — LIDOCAINE 1 PATCH: 4 CREAM TOPICAL at 13:02

## 2023-12-21 RX ADMIN — FLUCONAZOLE 100 MILLIGRAM(S): 150 TABLET ORAL at 14:01

## 2023-12-21 RX ADMIN — HEPARIN SODIUM 1400 UNIT(S)/HR: 5000 INJECTION INTRAVENOUS; SUBCUTANEOUS at 07:31

## 2023-12-21 RX ADMIN — PANTOPRAZOLE SODIUM 40 MILLIGRAM(S): 20 TABLET, DELAYED RELEASE ORAL at 13:03

## 2023-12-21 RX ADMIN — PANTOPRAZOLE SODIUM 40 MILLIGRAM(S): 20 TABLET, DELAYED RELEASE ORAL at 22:15

## 2023-12-21 RX ADMIN — Medication 60 MILLIGRAM(S): at 06:02

## 2023-12-21 RX ADMIN — AMPICILLIN SODIUM AND SULBACTAM SODIUM 200 GRAM(S): 250; 125 INJECTION, POWDER, FOR SUSPENSION INTRAMUSCULAR; INTRAVENOUS at 06:02

## 2023-12-21 RX ADMIN — TAMSULOSIN HYDROCHLORIDE 0.4 MILLIGRAM(S): 0.4 CAPSULE ORAL at 22:15

## 2023-12-21 RX ADMIN — HEPARIN SODIUM 1400 UNIT(S)/HR: 5000 INJECTION INTRAVENOUS; SUBCUTANEOUS at 06:22

## 2023-12-21 RX ADMIN — Medication 60 MILLIGRAM(S): at 13:04

## 2023-12-21 RX ADMIN — DEXTROSE MONOHYDRATE, SODIUM CHLORIDE, AND POTASSIUM CHLORIDE 75 MILLILITER(S): 50; .745; 4.5 INJECTION, SOLUTION INTRAVENOUS at 06:42

## 2023-12-21 RX ADMIN — AMPICILLIN SODIUM AND SULBACTAM SODIUM 200 GRAM(S): 250; 125 INJECTION, POWDER, FOR SUSPENSION INTRAMUSCULAR; INTRAVENOUS at 18:49

## 2023-12-21 NOTE — PROGRESS NOTE ADULT - ATTENDING COMMENTS
S&E  As above  IR drain placed yest. Output is serous and with NGTD. Persistent leukocytosis but feels well.  Restart low fiber diet. D/C PPN.  Begin NOAC for DVT  SW consult for RONALD placement.  TOV tomorrow.

## 2023-12-21 NOTE — PROGRESS NOTE ADULT - SUBJECTIVE AND OBJECTIVE BOX
Patient seen and examined on routine rounds.   She denies nausea, vomiting, fever or chills. Tolerating diet, pain well controlled   Rodriguez drained removed 12/20  s/p IR drain placement 12/19  Nurse report no acute event overnight   VS reviewed    Physical Exam:  General: AOx3, Well developed, NAD  HEENT: NC/AT, normal pinnae and tragi  Chest: Normal respiratory effort, equal chest rise. On 4L INC  Heart: regular rate, normotensive  Abdomen: midline dressing c/d/i, Ostomy pink and patent, semisolid stool, gas in bag, incision healing well, no erythema or induration  Neuro/Psych: No localized deficits. Normal speech, normal tone, normal affect  Skin: Normal, warm, no rashes, no lesions noted  Extremities: Warm, well perfused, no edema    Vital Signs Last 24 Hrs  T(C): 36.9 (20 Dec 2023 20:19), Max: 36.9 (20 Dec 2023 20:19)  T(F): 98.5 (20 Dec 2023 20:19), Max: 98.5 (20 Dec 2023 20:19)  HR: 100 (20 Dec 2023 22:23) (85 - 100)  BP: 148/52 (20 Dec 2023 22:23) (131/45 - 148/52)  BP(mean): 68 (20 Dec 2023 22:23) (68 - 68)  RR: 18 (20 Dec 2023 22:23) (18 - 18)  SpO2: 95% (20 Dec 2023 22:23) (95% - 99%)    Parameters below as of 20 Dec 2023 22:23  Patient On (Oxygen Delivery Method): nasal cannula  O2 Flow (L/min): 3    MEDICATIONS  (STANDING):  ampicillin/sulbactam  IVPB 3 Gram(s) IV Intermittent every 6 hours  dextrose 5% + sodium chloride 0.45% with potassium chloride 20 mEq/L 1000 milliLiter(s) (75 mL/Hr) IV Continuous <Continuous>  diltiazem    Tablet 60 milliGRAM(s) Oral every 8 hours  fluconAZOLE IVPB 200 milliGRAM(s) IV Intermittent every 24 hours  fluconAZOLE IVPB      heparin  Infusion.  Unit(s)/Hr (13 mL/Hr) IV Continuous <Continuous>  lidocaine   4% Patch 1 Patch Transdermal daily  lipid, fat emulsion (Fish Oil and Plant Based) 20% Infusion 0.6849 Gm/kG/Day (20.83 mL/Hr) IV Continuous <Continuous>  metoprolol tartrate Injectable 5 milliGRAM(s) IV Push every 5 minutes  pantoprazole  Injectable 40 milliGRAM(s) IV Push two times a day  Parenteral Nutrition - Adult 1 Each (75 mL/Hr) TPN Continuous <Continuous>  Parenteral Nutrition - Adult 1 Each (75 mL/Hr) TPN Continuous <Continuous>  polyethylene glycol 3350 17 Gram(s) Oral every 12 hours  tamsulosin 0.4 milliGRAM(s) Oral at bedtime    MEDICATIONS  (PRN):  acetaminophen   IVPB .. 1000 milliGRAM(s) IV Intermittent once PRN Temp greater or equal to 38C (100.4F), Mild Pain (1 - 3)  heparin   Injectable 4400 Unit(s) IV Push every 6 hours PRN For aPTT less than 40  morphine  - Injectable 2 milliGRAM(s) IV Push every 4 hours PRN Moderate Pain (4 - 6)  ondansetron Injectable 4 milliGRAM(s) IV Push every 6 hours PRN Nausea  I&O's Detail    19 Dec 2023 07:01  -  20 Dec 2023 07:00  --------------------------------------------------------  IN:    Other (mL): 300 mL  Total IN: 300 mL    OUT:    Blood Loss (mL): 70 mL    Colostomy (mL): 100 mL    Drain (mL): 135 mL    Indwelling Catheter - Urethral (mL): 1500 mL    Other (mL): 800 mL    T-Tube (mL): 30 mL  Total OUT: 2635 mL    Total NET: -2335 mL      20 Dec 2023 07:01  -  21 Dec 2023 04:00  --------------------------------------------------------  IN:  Total IN: 0 mL    OUT:    Colostomy (mL): 325 mL    Drain (mL): 120 mL    Indwelling Catheter - Urethral (mL): 1475 mL    T-Tube (mL): 50 mL  Total OUT: 1970 mL    Total NET: -1970 mL

## 2023-12-21 NOTE — PROGRESS NOTE ADULT - ASSESSMENT
82 y/o female with a PMHx of COPD, HLD presents to the ED c/o abd pain, n/v and constipation x2 days. Denies CP, SOB, urinary symptoms. NKDA. Nonsmoker. No EtOH use. No other complaints at this time. Pt found to have septic shock d/t stercoral ulcer and colonic perforation eval by surgery taken to OR on 12/11 s/p Hartmanns procedure, abd washout noted to have feculent peritonitis, cx sent, blood cx growing Bacteroides, Body fluid cx growing proteus, bacteroides, Acidaminococcus has been on IV zosyn perioperatively.     1. Sepsis with Bacteroides species. Abdominal perforation. Feculent peritonitis. S/p Hartmanns procedure/abd washout  - imaging reviewed repeat CT noted, + LLQ abd collection s/p IR drainage 12/19 f/u cx no growth   - OR cultures growing bacteroides, acidaminococcus, proteus   - blood cx 12/11 growing bacteroides  - s/p zosyn 12/11-12/14  - now on rocephin since 12/14  - on unasyn 3gmq6h #6  - on antifungal coverage diflucan 200mg daily #2   - continue with abx coverage   - repeat blood cx no growth 12/16   - on dc po augmentin [id number] 14 days until 12/30  - surgical f/u noted  - tolerating abx well so far; no side effects noted  - reason for abx use and side effects reviewed with patient  - fu cbc, if wbc ct increases may require repeat imaging     2. other issues -care per medicine  84 y/o female with a PMHx of COPD, HLD presents to the ED c/o abd pain, n/v and constipation x2 days. Denies CP, SOB, urinary symptoms. NKDA. Nonsmoker. No EtOH use. No other complaints at this time. Pt found to have septic shock d/t stercoral ulcer and colonic perforation eval by surgery taken to OR on 12/11 s/p Hartmanns procedure, abd washout noted to have feculent peritonitis, cx sent, blood cx growing Bacteroides, Body fluid cx growing proteus, bacteroides, Acidaminococcus has been on IV zosyn perioperatively.     1. Sepsis with Bacteroides species. Abdominal perforation. Feculent peritonitis. S/p Hartmanns procedure/abd washout  - imaging reviewed repeat CT noted, + LLQ abd collection s/p IR drainage 12/19 f/u cx no growth   - OR cultures growing bacteroides, acidaminococcus, proteus   - blood cx 12/11 growing bacteroides  - s/p zosyn 12/11-12/14  - now on rocephin since 12/14  - on unasyn 3gmq6h #6  - on antifungal coverage diflucan 200mg daily #2   - continue with abx coverage   - repeat blood cx no growth 12/16   - on dc po augmentin [id number] 14 days until 12/30  - surgical f/u noted  - tolerating abx well so far; no side effects noted  - reason for abx use and side effects reviewed with patient  - fu cbc, if wbc ct increases may require repeat imaging     2. other issues -care per medicine

## 2023-12-21 NOTE — PROGRESS NOTE ADULT - SUBJECTIVE AND OBJECTIVE BOX
Date of service: 12-21-23 @ 13:09    pt seen and examined  found to have LLQ abd collection   s/p IR drainage 12/19   feels better tolerating diet  no fevers     ROS: no fever or chills; denies dizziness, no HA, no SOB or cough, no diarrhea or constipation; no dysuria, no urinary frequency, no legs pain, no rashes      MEDICATIONS  (STANDING):  ampicillin/sulbactam  IVPB 3 Gram(s) IV Intermittent every 6 hours  diltiazem    Tablet 60 milliGRAM(s) Oral every 8 hours  fluconAZOLE IVPB 200 milliGRAM(s) IV Intermittent every 24 hours  fluconAZOLE IVPB      lidocaine   4% Patch 1 Patch Transdermal daily  metoprolol tartrate Injectable 5 milliGRAM(s) IV Push every 5 minutes  pantoprazole  Injectable 40 milliGRAM(s) IV Push two times a day  polyethylene glycol 3350 17 Gram(s) Oral every 12 hours  rivaroxaban 15 milliGRAM(s) Oral two times a day with meals  tamsulosin 0.4 milliGRAM(s) Oral at bedtime      Vital Signs Last 24 Hrs  T(C): 36.6 (21 Dec 2023 08:51), Max: 36.9 (20 Dec 2023 20:19)  T(F): 97.9 (21 Dec 2023 08:51), Max: 98.5 (20 Dec 2023 20:19)  HR: 94 (21 Dec 2023 08:51) (94 - 100)  BP: 123/41 (21 Dec 2023 08:51) (123/41 - 148/52)  BP(mean): 70 (21 Dec 2023 05:58) (68 - 70)  RR: 18 (21 Dec 2023 08:51) (18 - 18)  SpO2: 94% (21 Dec 2023 08:51) (94% - 99%)    Parameters below as of 21 Dec 2023 08:51  Patient On (Oxygen Delivery Method): nasal cannula  O2 Flow (L/min): 3        PE:  Constitutional: frail looking  HEENT: NC/AT, EOMI, PERRLA, conjunctivae clear; ears and nose atraumatic; pharynx benign  Neck: supple; thyroid not palpable  Back: no tenderness  Respiratory: respiratory effort normal; clear to auscultation  Cardiovascular: S1S2 regular, no murmurs  Abdomen: soft, not tender, not distended, positive BS; liver and spleen WNL surgical sites clean shanta drain in place   Genitourinary: no suprapubic tenderness  Lymphatic: no LN palpable  Musculoskeletal: no muscle tenderness, no joint swelling or tenderness  Extremities: no pedal edema  Neurological/ Psychiatric:   moving all extremities  Skin: no rashes; no palpable lesions    Labs: all available labs reviewed                                              8.9    19.79 )-----------( 339      ( 21 Dec 2023 05:08 )             26.3     12-21    137  |  107  |  25<H>  ----------------------------<  154<H>  4.2   |  24  |  0.88    Ca    7.5<L>      21 Dec 2023 05:08  Phos  3.3     12-21  Mg     2.1     12-21    TPro  5.8<L>  /  Alb  1.9<L>  /  TBili  0.4  /  DBili  x   /  AST  25  /  ALT  27  /  AlkPhos  60  12-21      Urinalysis Basic - ( 16 Dec 2023 04:39 )    Color: x / Appearance: x / SG: x / pH: x  Gluc: 150 mg/dL / Ketone: x  / Bili: x / Urobili: x   Blood: x / Protein: x / Nitrite: x   Leuk Esterase: x / RBC: x / WBC x   Sq Epi: x / Non Sq Epi: x / Bacteria: x    Culture - Urine (12.12.23 @ 09:10)   Specimen Source: Clean Catch Clean Catch (Midstream)  Culture Results:   No growth  Culture - Surgical Swab (12.11.23 @ 19:09)   - Amoxicillin/Clavulanic Acid: S <=8/4  - Ampicillin: S <=8 These ampicillin results predict results for amoxicillin  - Ampicillin/Sulbactam: S <=4/2  - Aztreonam: S <=4  - Cefazolin: S <=2  - Cefepime: S <=2  - Cefoxitin: S <=8  - Ceftriaxone: S <=1  - Ciprofloxacin: S <=0.25  - Ertapenem: S <=0.5  - Gentamicin: S <=2  - Levofloxacin: S <=0.5  - Meropenem: S <=1  - Piperacillin/Tazobactam: S <=8  - Tobramycin: S <=2  - Trimethoprim/Sulfamethoxazole: S <=0.5/9.5  Specimen Source: .Surgical Swab culture, peritonitis  Culture Results:   Rare Proteus mirabilis   Few Bacteroides distasonis "Susceptibilities not performed"   Rare Most closely resembling Acidaminococcus species "Susceptibilities   not performed"  Organism Identification: Proteus mirabilis  Organism: Proteus mirabilis  Method Type: MICCulture - Blood (12.11.23 @ 10:57)   Specimen Source: .Blood None  Culture Results:   No growth at 4 daysCulture - Blood (12.11.23 @ 10:57)   Gram Stain:   Growth in anaerobic bottle: Gram Negative Rods  - Blood PCR Panel: NEG  Specimen Source: .Blood None  Organism: Blood Culture PCR  Culture Results:   Growth in anaerobic bottle: Bacteroides uniformis   Direct identification is available within approximately 3-5   hours either by Blood Panel Multiplexed PCR or Direct   MALDI-TOF. Details: [email or web address]  Organism Identification: Blood Culture PCR  Method Type: PCR      Radiology: all available radiological tests reviewed  < from: Xray Chest 1 View- PORTABLE-Urgent (Xray Chest 1 View- PORTABLE-Urgent .) (12.11.23 @ 20:17) >    ACC: [id number] EXAM:  XR CHEST PORTABLE URGENT 1V   ORDERED BY: HUGO GREEN     PROCEDURE DATE:  12/11/2023          INTERPRETATION:  Portable chest radiograph    CLINICAL INFORMATION: Post intubation chest radiograph    TECHNIQUE:  Portable  AP chest radiograph.    COMPARISON: 2/11/2023 chest x-ray .    FINDINGS:  CATHETERS AND TUBES: ET tube tip above tracheal bifurcation.  NG tube tip beyond GE junction.    PULMONARY: Mild LEFT effusion and/or basilar airspace disease obscures   LEFT CP angle and portions of LEFT diaphragm contour. Remaining lung   parenchyma grossly clear.  No pneumothorax.    HEART/VASCULAR: The heart and mediastinum size and configuration are   within normal limits.    BONES: Visualized osseous thorax intact.    IMPRESSION:   Catheters and tubes in place.  LEFT lower zone mild pleural effusion and/or patchy basilar airspace   disease..    --- End of Report ---      < end of copied text >  < from: CT Abdomen and Pelvis w/ Oral Cont and w/ IV Cont (12.11.23 @ 12:45) >  ACC: [id number] EXAM:  CT ABDOMEN AND PELVIS OC IC   ORDERED BY: TRAY GÓMEZ     PROCEDURE DATE:  12/11/2023          INTERPRETATION:  CLINICAL INFORMATION: Rule out small bowel obstruction.    COMPARISON: 04/16/2022.    CONTRAST/COMPLICATIONS:  IVContrast: Omnipaque 350  90 cc administered   10 cc discarded  Oral Contrast: Omnipaque 300  Complications: None reported at time of study completion    PROCEDURE:  CT of the Abdomen and Pelvis was performed.  Sagittal and coronal reformats were performed.    FINDINGS:  LOWER CHEST: Bibasilar subsegmental atelectasis, left more than right.   Mild hiatal hernia. Coronary artery calcification.    LIVER: Steatosis.  BILE DUCTS: Mildly dilated. CBD measures approximately 1 cm with smooth   distal tapering. An almost similar appearance was noted on the prior   study. Correlate clinically.  GALLBLADDER: Within normal limits.  SPLEEN: Within normal limits.  PANCREAS: Within normal limits.  ADRENALS: Within normal limits.  KIDNEYS/URETERS: Within normal limits.    BLADDER: Minimally distended.  REPRODUCTIVE ORGANS: Uterus and adnexa within normal limits.    BOWEL: No bowel obstruction. Appendix is not visualized. No evidence of   inflammation in the pericecal region. Evidence of uncomplicated   predominantly sigmoid diverticula. There are small foci of extraluminal   gas at the level of the rectum along with probable rectal pneumatosis.  PERITONEUM: Small volume abdominal and pelvic ascites. Also small volume   pneumoperitoneum.  VESSELS: Atherosclerotic changes. Common origin of the celiac and   superior mesenteric arteries.  RETROPERITONEUM/LYMPH NODES: No lymphadenopathy.  ABDOMINAL WALL: Within normal limits.  BONES: Degenerative changes. Stable moderate compression of L2.    IMPRESSION:  Evidence of pneumoperitoneum. Suspect rectal perforation considering the   presence of perirectal gas and pneumatosis.          Advanced directives addressed: full resuscitation Date of service: 12-21-23 @ 13:09    pt seen and examined  found to have LLQ abd collection   s/p IR drainage 12/19   feels better tolerating diet  no fevers     ROS: no fever or chills; denies dizziness, no HA, no SOB or cough, no diarrhea or constipation; no dysuria, no urinary frequency, no legs pain, no rashes      MEDICATIONS  (STANDING):  ampicillin/sulbactam  IVPB 3 Gram(s) IV Intermittent every 6 hours  diltiazem    Tablet 60 milliGRAM(s) Oral every 8 hours  fluconAZOLE IVPB 200 milliGRAM(s) IV Intermittent every 24 hours  fluconAZOLE IVPB      lidocaine   4% Patch 1 Patch Transdermal daily  metoprolol tartrate Injectable 5 milliGRAM(s) IV Push every 5 minutes  pantoprazole  Injectable 40 milliGRAM(s) IV Push two times a day  polyethylene glycol 3350 17 Gram(s) Oral every 12 hours  rivaroxaban 15 milliGRAM(s) Oral two times a day with meals  tamsulosin 0.4 milliGRAM(s) Oral at bedtime      Vital Signs Last 24 Hrs  T(C): 36.6 (21 Dec 2023 08:51), Max: 36.9 (20 Dec 2023 20:19)  T(F): 97.9 (21 Dec 2023 08:51), Max: 98.5 (20 Dec 2023 20:19)  HR: 94 (21 Dec 2023 08:51) (94 - 100)  BP: 123/41 (21 Dec 2023 08:51) (123/41 - 148/52)  BP(mean): 70 (21 Dec 2023 05:58) (68 - 70)  RR: 18 (21 Dec 2023 08:51) (18 - 18)  SpO2: 94% (21 Dec 2023 08:51) (94% - 99%)    Parameters below as of 21 Dec 2023 08:51  Patient On (Oxygen Delivery Method): nasal cannula  O2 Flow (L/min): 3        PE:  Constitutional: frail looking  HEENT: NC/AT, EOMI, PERRLA, conjunctivae clear; ears and nose atraumatic; pharynx benign  Neck: supple; thyroid not palpable  Back: no tenderness  Respiratory: respiratory effort normal; clear to auscultation  Cardiovascular: S1S2 regular, no murmurs  Abdomen: soft, not tender, not distended, positive BS; liver and spleen WNL surgical sites clean shanta drain in place   Genitourinary: no suprapubic tenderness  Lymphatic: no LN palpable  Musculoskeletal: no muscle tenderness, no joint swelling or tenderness  Extremities: no pedal edema  Neurological/ Psychiatric:   moving all extremities  Skin: no rashes; no palpable lesions    Labs: all available labs reviewed                                              8.9    19.79 )-----------( 339      ( 21 Dec 2023 05:08 )             26.3     12-21    137  |  107  |  25<H>  ----------------------------<  154<H>  4.2   |  24  |  0.88    Ca    7.5<L>      21 Dec 2023 05:08  Phos  3.3     12-21  Mg     2.1     12-21    TPro  5.8<L>  /  Alb  1.9<L>  /  TBili  0.4  /  DBili  x   /  AST  25  /  ALT  27  /  AlkPhos  60  12-21      Urinalysis Basic - ( 16 Dec 2023 04:39 )    Color: x / Appearance: x / SG: x / pH: x  Gluc: 150 mg/dL / Ketone: x  / Bili: x / Urobili: x   Blood: x / Protein: x / Nitrite: x   Leuk Esterase: x / RBC: x / WBC x   Sq Epi: x / Non Sq Epi: x / Bacteria: x    Culture - Urine (12.12.23 @ 09:10)   Specimen Source: Clean Catch Clean Catch (Midstream)  Culture Results:   No growth  Culture - Surgical Swab (12.11.23 @ 19:09)   - Amoxicillin/Clavulanic Acid: S <=8/4  - Ampicillin: S <=8 These ampicillin results predict results for amoxicillin  - Ampicillin/Sulbactam: S <=4/2  - Aztreonam: S <=4  - Cefazolin: S <=2  - Cefepime: S <=2  - Cefoxitin: S <=8  - Ceftriaxone: S <=1  - Ciprofloxacin: S <=0.25  - Ertapenem: S <=0.5  - Gentamicin: S <=2  - Levofloxacin: S <=0.5  - Meropenem: S <=1  - Piperacillin/Tazobactam: S <=8  - Tobramycin: S <=2  - Trimethoprim/Sulfamethoxazole: S <=0.5/9.5  Specimen Source: .Surgical Swab culture, peritonitis  Culture Results:   Rare Proteus mirabilis   Few Bacteroides distasonis "Susceptibilities not performed"   Rare Most closely resembling Acidaminococcus species "Susceptibilities   not performed"  Organism Identification: Proteus mirabilis  Organism: Proteus mirabilis  Method Type: MICCulture - Blood (12.11.23 @ 10:57)   Specimen Source: .Blood None  Culture Results:   No growth at 4 daysCulture - Blood (12.11.23 @ 10:57)   Gram Stain:   Growth in anaerobic bottle: Gram Negative Rods  - Blood PCR Panel: NEG  Specimen Source: .Blood None  Organism: Blood Culture PCR  Culture Results:   Growth in anaerobic bottle: Bacteroides uniformis   Direct identification is available within approximately 3-5   hours either by Blood Panel Multiplexed PCR or Direct   MALDI-TOF. Details: [email or web address]  Organism Identification: Blood Culture PCR  Method Type: PCR      Radiology: all available radiological tests reviewed  < from: Xray Chest 1 View- PORTABLE-Urgent (Xray Chest 1 View- PORTABLE-Urgent .) (12.11.23 @ 20:17) >    ACC: [id number] EXAM:  XR CHEST PORTABLE URGENT 1V   ORDERED BY: HUGO GREEN     PROCEDURE DATE:  12/11/2023          INTERPRETATION:  Portable chest radiograph    CLINICAL INFORMATION: Post intubation chest radiograph    TECHNIQUE:  Portable  AP chest radiograph.    COMPARISON: 2/11/2023 chest x-ray .    FINDINGS:  CATHETERS AND TUBES: ET tube tip above tracheal bifurcation.  NG tube tip beyond GE junction.    PULMONARY: Mild LEFT effusion and/or basilar airspace disease obscures   LEFT CP angle and portions of LEFT diaphragm contour. Remaining lung   parenchyma grossly clear.  No pneumothorax.    HEART/VASCULAR: The heart and mediastinum size and configuration are   within normal limits.    BONES: Visualized osseous thorax intact.    IMPRESSION:   Catheters and tubes in place.  LEFT lower zone mild pleural effusion and/or patchy basilar airspace   disease..    --- End of Report ---      < end of copied text >  < from: CT Abdomen and Pelvis w/ Oral Cont and w/ IV Cont (12.11.23 @ 12:45) >  ACC: [id number] EXAM:  CT ABDOMEN AND PELVIS OC IC   ORDERED BY: TRAY GÓMEZ     PROCEDURE DATE:  12/11/2023          INTERPRETATION:  CLINICAL INFORMATION: Rule out small bowel obstruction.    COMPARISON: 04/16/2022.    CONTRAST/COMPLICATIONS:  IVContrast: Omnipaque 350  90 cc administered   10 cc discarded  Oral Contrast: Omnipaque 300  Complications: None reported at time of study completion    PROCEDURE:  CT of the Abdomen and Pelvis was performed.  Sagittal and coronal reformats were performed.    FINDINGS:  LOWER CHEST: Bibasilar subsegmental atelectasis, left more than right.   Mild hiatal hernia. Coronary artery calcification.    LIVER: Steatosis.  BILE DUCTS: Mildly dilated. CBD measures approximately 1 cm with smooth   distal tapering. An almost similar appearance was noted on the prior   study. Correlate clinically.  GALLBLADDER: Within normal limits.  SPLEEN: Within normal limits.  PANCREAS: Within normal limits.  ADRENALS: Within normal limits.  KIDNEYS/URETERS: Within normal limits.    BLADDER: Minimally distended.  REPRODUCTIVE ORGANS: Uterus and adnexa within normal limits.    BOWEL: No bowel obstruction. Appendix is not visualized. No evidence of   inflammation in the pericecal region. Evidence of uncomplicated   predominantly sigmoid diverticula. There are small foci of extraluminal   gas at the level of the rectum along with probable rectal pneumatosis.  PERITONEUM: Small volume abdominal and pelvic ascites. Also small volume   pneumoperitoneum.  VESSELS: Atherosclerotic changes. Common origin of the celiac and   superior mesenteric arteries.  RETROPERITONEUM/LYMPH NODES: No lymphadenopathy.  ABDOMINAL WALL: Within normal limits.  BONES: Degenerative changes. Stable moderate compression of L2.    IMPRESSION:  Evidence of pneumoperitoneum. Suspect rectal perforation considering the   presence of perirectal gas and pneumatosis.          Advanced directives addressed: full resuscitation

## 2023-12-21 NOTE — CHART NOTE - NSCHARTNOTESELECT_GEN_ALL_CORE
Dietitian F/U w/ PPN
POCUS/Event Note
Dietitian F/U w/ PPN
Event Note

## 2023-12-21 NOTE — CHART NOTE - NSCHARTNOTEFT_GEN_A_CORE
Clinical Nutrition PN Follow Up Note    *82 YO F with a PMHx of COPD, HLD presents to the ED c/o abd pain, n/v and constipation x2 days. Admitted w/ colonic perforation, Extensive stercocolitis with 2 feculent perforation of sigmoid & rectum. Necrotic segment of colon also noticed now POD 2 s/p Christina's. Had post-op fecal peritonitis, 7L washout. Went into septic shock, requiring pressors. Tx to ICU w/ post-op resp failure. NGT to LWS, blood tinged. Off pressors as of this AM 12/13.    *PPN initiated 2/2 NPO s/p Christina procedure2/2 colonic perforation     *12/14: has been NPO x 3 days, nausea overnight. NGT still in place, no plan for clamp trial today.  Plan to initiate PPN today, posisble transfer out of ICU  *12/15: PPN day #2. Transferred out of ICU, off pressors, now on 3E w/ no acute events overnight. Hypokalemia and hypophosphatemia noted s/p HD yesterday, being repleted. Possibly going into refeeding, will slowly increase lytes in PN bag and recommend to c/w KCl and KPhos repletion outside of bag until WNL. Remains NPO w/ NGT to LWS in place, reports intermittent nausea, on Zofran, and awaiting return of bowel function as per surgery. If to remain on PPN >7 days, consider placing PICC line to initiate TPN and meet >80% ENN.   *12/16 & 12/17: no acute events overnight. Tolerating CLD and plan to advance to FLD when medically feasible. Ostomy functioning.   *12/18:  PPN day #5. Tolerating FLD as per surgery note. Ostomy remains functioning. D/w surgical resident, plan to possibly advance to LRD today and if toelrating >/= 70% can d/c PPN. Will c/w PPN for now. Will provide colostomy education when able.   *12/19: PPN day #6. Found to have intra-abd Loculated pocket of peritoneal fluid in the anterior left lower quadrant with associated peritoneal thickening and enhancement that is suggestive of peritonitis. Pt currently NPO for IR drainage today. Noted to be tolerating diet. Plan to c/w PPN today.    *current status: PPN day #7. S/p IR drainage of left lower quadrant fluid (12/19). Diet advanced to Low Fiber this morning. Discussed with Surgical Resident this morning, surgical resident wants to continue with PPN today and she is concerned that pt is not consuming enough PO. Discussed that an NGT is a more appropriate route of nutrition support as her gut is functioning, however surgical resident wants to continue with PPN instead. Will C/w PPN today.    *pertinent meds: Abx, Morphine, Protonix, Miralax, D5 + IVF + KCl    *labs reviewed; All lytes WNL. Na on low end of normal, will continue to monitor. Will c/w total volume 1800 mL in PN bag, will adjust prn as per labs. K+ on higher end of normal limits, will decrease in PN bag today. T Bili WNL for trace elements. corrected Ca WNL, rec'd add 10mEq of Calcium Gluconate outside of PN bag as CAPS is out. Triglyceride <400, will c/w 50 g lipids daily No POCT noted, as per PN protocol, POCT required q6 hours to monitor glycemic control; should be maintained between 140- 180 mg/dL.   12-20    135  |  105  |  21  ----------------------------<  145<H>  4.7   |  26  |  0.78    Ca    7.4<L>      20 Dec 2023 03:29  Phos  3.9     12-20  Mg     2.1     12-20    TPro  5.4<L>  /  Alb  1.8<L>  /  TBili  0.5  /  DBili  x   /  AST  22  /  ALT  22  /  AlkPhos  45  12-20    Lipid Panel: Date/Time: 12-15-23 @ 04:58  Triglycerides, Serum: 187    *I&O's Detail    19 Dec 2023 07:01  -  20 Dec 2023 07:00  --------------------------------------------------------  IN:    Other (mL): 300 mL  Total IN: 300 mL    OUT:    Blood Loss (mL): 70 mL    Colostomy (mL): 100 mL    Drain (mL): 135 mL    Indwelling Catheter - Urethral (mL): 1500 mL    Other (mL): 800 mL    T-Tube (mL): 30 mL  Total OUT: 2635 mL    Total NET: -2335 mL  *fluid status: negative; PPN not doc'd; Strict I&O's are rec'd when pt is on PN as per protocol   *BM (+) 12/17 - small loose brown/green stool x2; ostomy. On Miralax.  *edema: 1+ generalized    *malnutrition: Pt continues to meet criteria for severe protein-calorie malnutrition in context of acute illness r/t decreased ability to meet increased nutrient needs 2/2 colonic perf w/ septic shock AEB moderate muscle/ fat wasting    Estimated Needs: based on 63.5 Kg (UBW - likely dry wt)  Calories: 1905- 2222 Kcal (30- 35 Kcal/Kg)  Protein: 95- 127 g (1.5- 2 g/Kg)  Fluids: 1587- 1905 mL (25- 30 mL/Kg)    Diet, NPO:   Except Medications (12-18-23 @ 09:04) [Active]  Parenteral Nutrition - Adult 1 Each (75 mL/Hr) TPN Continuous <Continuous>, 12-17-23 @ 22:00, 22:00, Stop order after: 1 Days    Weight History:  Weight (kg): 72.6 (12-11-23 @ 10:30)    PPN Recommendations: via peripheral venous line  Total Volume: 1800 mL x 24 hours  65 g  Amino Acids  100 g Dextrose  50 g Lipids 20%   98 mEq Sodium Chloride  14 mEq Sodium Acetate  20 mmol Sodium Phosphate  32 mEq Potassium Chloride  18 mEq Potassium Acetate  0 mmol Potassium Phosphate  0 mEq Calcium Gluconate (CAPS out of PN solution)  10 mEq Magnesium Sulfate  200 mg Thiamine  1 ml Trace Elements  10 ml MVI    Total Calories    1100      (Meets    53%  of  Estimated Energy needs  and    56%  Protein needs)   (osmolarity <900)    Additional Recommendations:  1) Daily weights  2) Strict I & O's - recommended as per PN protocol   3) Daily lyte checks including magnesium and phos  4) Weekly triglycerides/LFT checks  5) POCT q6hrs; maintain 140-180mg/dL  6) if on PN > 6 days, consider placing PICC line to meet 100% of nutr needs  7) ADAT as per surgery from FLD to LRD when medically feasible.   8) Will provide ostomy teaching when able.    *will continue to monitor and adjust PN prn*  Mariluz Yung MS, RDN, [phone]  Nutrition  Clinical Nutrition PN Follow Up Note    *84 YO F with a PMHx of COPD, HLD presents to the ED c/o abd pain, n/v and constipation x2 days. Admitted w/ colonic perforation, Extensive stercocolitis with 2 feculent perforation of sigmoid & rectum. Necrotic segment of colon also noticed now POD 2 s/p Christina's. Had post-op fecal peritonitis, 7L washout. Went into septic shock, requiring pressors. Tx to ICU w/ post-op resp failure. NGT to LWS, blood tinged. Off pressors as of this AM 12/13.    *PPN initiated 2/2 NPO s/p Christina procedure2/2 colonic perforation     *12/14: has been NPO x 3 days, nausea overnight. NGT still in place, no plan for clamp trial today.  Plan to initiate PPN today, posisble transfer out of ICU  *12/15: PPN day #2. Transferred out of ICU, off pressors, now on 3E w/ no acute events overnight. Hypokalemia and hypophosphatemia noted s/p HD yesterday, being repleted. Possibly going into refeeding, will slowly increase lytes in PN bag and recommend to c/w KCl and KPhos repletion outside of bag until WNL. Remains NPO w/ NGT to LWS in place, reports intermittent nausea, on Zofran, and awaiting return of bowel function as per surgery. If to remain on PPN >7 days, consider placing PICC line to initiate TPN and meet >80% ENN.   *12/16 & 12/17: no acute events overnight. Tolerating CLD and plan to advance to FLD when medically feasible. Ostomy functioning.   *12/18:  PPN day #5. Tolerating FLD as per surgery note. Ostomy remains functioning. D/w surgical resident, plan to possibly advance to LRD today and if toelrating >/= 70% can d/c PPN. Will c/w PPN for now. Will provide colostomy education when able.   *12/19: PPN day #6. Found to have intra-abd Loculated pocket of peritoneal fluid in the anterior left lower quadrant with associated peritoneal thickening and enhancement that is suggestive of peritonitis. Pt currently NPO for IR drainage today. Noted to be tolerating diet. Plan to c/w PPN today.    *current status: PPN day #7. S/p IR drainage of left lower quadrant fluid (12/19). Diet advanced to Low Fiber this morning. Discussed with Surgical Resident this morning, surgical resident wants to continue with PPN today and she is concerned that pt is not consuming enough PO. Discussed that an NGT is a more appropriate route of nutrition support as her gut is functioning, however surgical resident wants to continue with PPN instead. Will C/w PPN today.    *pertinent meds: Abx, Morphine, Protonix, Miralax, D5 + IVF + KCl    *labs reviewed; All lytes WNL. Na on low end of normal, will continue to monitor. Will c/w total volume 1800 mL in PN bag, will adjust prn as per labs. K+ on higher end of normal limits, will decrease in PN bag today. T Bili WNL for trace elements. corrected Ca WNL, rec'd add 10mEq of Calcium Gluconate outside of PN bag as CAPS is out. Triglyceride <400, will c/w 50 g lipids daily No POCT noted, as per PN protocol, POCT required q6 hours to monitor glycemic control; should be maintained between 140- 180 mg/dL.   12-20    135  |  105  |  21  ----------------------------<  145<H>  4.7   |  26  |  0.78    Ca    7.4<L>      20 Dec 2023 03:29  Phos  3.9     12-20  Mg     2.1     12-20    TPro  5.4<L>  /  Alb  1.8<L>  /  TBili  0.5  /  DBili  x   /  AST  22  /  ALT  22  /  AlkPhos  45  12-20    Lipid Panel: Date/Time: 12-15-23 @ 04:58  Triglycerides, Serum: 187    *I&O's Detail    19 Dec 2023 07:01  -  20 Dec 2023 07:00  --------------------------------------------------------  IN:    Other (mL): 300 mL  Total IN: 300 mL    OUT:    Blood Loss (mL): 70 mL    Colostomy (mL): 100 mL    Drain (mL): 135 mL    Indwelling Catheter - Urethral (mL): 1500 mL    Other (mL): 800 mL    T-Tube (mL): 30 mL  Total OUT: 2635 mL    Total NET: -2335 mL  *fluid status: negative; PPN not doc'd; Strict I&O's are rec'd when pt is on PN as per protocol   *BM (+) 12/17 - small loose brown/green stool x2; ostomy. On Miralax.  *edema: 1+ generalized    *malnutrition: Pt continues to meet criteria for severe protein-calorie malnutrition in context of acute illness r/t decreased ability to meet increased nutrient needs 2/2 colonic perf w/ septic shock AEB moderate muscle/ fat wasting    Estimated Needs: based on 63.5 Kg (UBW - likely dry wt)  Calories: 1905- 2222 Kcal (30- 35 Kcal/Kg)  Protein: 95- 127 g (1.5- 2 g/Kg)  Fluids: 1587- 1905 mL (25- 30 mL/Kg)    Diet, NPO:   Except Medications (12-18-23 @ 09:04) [Active]  Parenteral Nutrition - Adult 1 Each (75 mL/Hr) TPN Continuous <Continuous>, 12-17-23 @ 22:00, 22:00, Stop order after: 1 Days    Weight History:  Weight (kg): 72.6 (12-11-23 @ 10:30)    PPN Recommendations: via peripheral venous line  Total Volume: 1800 mL x 24 hours  65 g  Amino Acids  100 g Dextrose  50 g Lipids 20%   98 mEq Sodium Chloride  14 mEq Sodium Acetate  20 mmol Sodium Phosphate  32 mEq Potassium Chloride  18 mEq Potassium Acetate  0 mmol Potassium Phosphate  0 mEq Calcium Gluconate (CAPS out of PN solution)  10 mEq Magnesium Sulfate  200 mg Thiamine  1 ml Trace Elements  10 ml MVI    Total Calories    1100      (Meets    53%  of  Estimated Energy needs  and    56%  Protein needs)   (osmolarity <900)    Additional Recommendations:  1) Daily weights  2) Strict I & O's - recommended as per PN protocol   3) Daily lyte checks including magnesium and phos  4) Weekly triglycerides/LFT checks  5) POCT q6hrs; maintain 140-180mg/dL  6) if on PN > 6 days, consider placing PICC line to meet 100% of nutr needs  7) ADAT as per surgery from FLD to LRD when medically feasible.   8) Will provide ostomy teaching when able.    *will continue to monitor and adjust PN prn*  Mariluz Yung MS, RDN, [phone]  Nutrition  Clinical Nutrition PN Follow Up Note    *82 YO F with a PMHx of COPD, HLD presents to the ED c/o abd pain, n/v and constipation x2 days. Admitted w/ colonic perforation, Extensive stercocolitis with 2 feculent perforation of sigmoid & rectum. Necrotic segment of colon also noticed now POD 2 s/p Christina's. Had post-op fecal peritonitis, 7L washout. Went into septic shock, requiring pressors. Tx to ICU w/ post-op resp failure. NGT to LWS, blood tinged. Off pressors as of this AM 12/13.    *PPN initiated 2/2 NPO s/p Christina procedure2/2 colonic perforation     *12/14: has been NPO x 3 days, nausea overnight. NGT still in place, no plan for clamp trial today.  Plan to initiate PPN today, posisble transfer out of ICU  *12/15: PPN day #2. Transferred out of ICU, off pressors, now on 3E w/ no acute events overnight. Hypokalemia and hypophosphatemia noted s/p HD yesterday, being repleted. Possibly going into refeeding, will slowly increase lytes in PN bag and recommend to c/w KCl and KPhos repletion outside of bag until WNL. Remains NPO w/ NGT to LWS in place, reports intermittent nausea, on Zofran, and awaiting return of bowel function as per surgery. If to remain on PPN >7 days, consider placing PICC line to initiate TPN and meet >80% ENN.   *12/16 & 12/17: no acute events overnight. Tolerating CLD and plan to advance to FLD when medically feasible. Ostomy functioning.   *12/18:  PPN day #5. Tolerating FLD as per surgery note. Ostomy remains functioning. D/w surgical resident, plan to possibly advance to LRD today and if toelrating >/= 70% can d/c PPN. Will c/w PPN for now. Will provide colostomy education when able.   *12/19: PPN day #6. Found to have intra-abd Loculated pocket of peritoneal fluid in the anterior left lower quadrant with associated peritoneal thickening and enhancement that is suggestive of peritonitis. Pt currently NPO for IR drainage today. Noted to be tolerating diet. Plan to c/w PPN today.  *12/20: PPN day #7. S/p IR drainage of left lower quadrant fluid (12/19). Diet advanced to Low Fiber this morning. Discussed with Surgical Resident this morning, surgical resident wants to continue with PPN today and she is concerned that pt is not consuming enough PO. Discussed that an NGT is a more appropriate route of nutrition support as her gut is functioning, however surgical resident wants to continue with PPN instead.     *current status: PPN day #8. Pt continues to be on low fiber diet. Discussed with surgical resident this morning, plan to discontinue PN. Will trial Premier Protein BID in effort to optimize PO intake. Please see additional recommendations below.     *pertinent meds: Abx, Morphine, Protonix, Miralax, D5 + IVF + KCl    *labs reviewed;   12-21    137  |  107  |  25<H>  ----------------------------<  154<H>  4.2   |  24  |  0.88    Ca    7.5<L>      21 Dec 2023 05:08  Phos  3.3     12-21  Mg     2.1     12-21    TPro  5.8<L>  /  Alb  1.9<L>  /  TBili  0.4  /  DBili  x   /  AST  25  /  ALT  27  /  AlkPhos  60  12-21    Lipid Panel: Date/Time: 12-15-23 @ 04:58  Triglycerides, Serum: 187    *I&O's Detail    20 Dec 2023 07:01  -  21 Dec 2023 07:00  --------------------------------------------------------  IN:  Total IN: 0 mL    OUT:    Colostomy (mL): 525 mL    Drain (mL): 120 mL    Indwelling Catheter - Urethral (mL): 2325 mL    T-Tube (mL): 75 mL  Total OUT: 3045 mL    Total NET: -3045 mL  *fluid status: negative; Input not doc'd; Strict I&O's are rec'd when pt is on PN as per protocol   *BM (+) 12/20 - large, liquid, loose, brown; ostomy. On Miralax.  *edema: 1+ generalized    *malnutrition: Pt continues to meet criteria for severe protein-calorie malnutrition in context of acute illness r/t decreased ability to meet increased nutrient needs 2/2 colonic perf w/ septic shock AEB moderate muscle/ fat wasting    Estimated Needs: based on 63.5 Kg (UBW - likely dry wt)  Calories: 1905- 2222 Kcal (30- 35 Kcal/Kg)  Protein: 95- 127 g (1.5- 2 g/Kg)  Fluids: 1587- 1905 mL (25- 30 mL/Kg)    Diet, NPO:   Except Medications (12-18-23 @ 09:04) [Active]  Parenteral Nutrition - Adult 1 Each (75 mL/Hr) TPN Continuous <Continuous>, 12-17-23 @ 22:00, 22:00, Stop order after: 1 Days    Weight History:  Weight (kg): 72.6 (12-11-23 @ 10:30)    Additional Recommendations:  1) C/w Low Fiber diet  2) Premier protein shake BID to optimize nutritional needs (provides 160 kcal, 30 g protein/ shake)   3) Obtain vitamin D 25OH level to assess nutriture   4) Please obtain weekly weights  5) Consider adding thiamine 100 mg daily 2/2 poor PO intake/ malnutrition  6) MVI w/ minerals daily to ensure 100% RDA met  7) Encourage protein-rich foods, maximize food preferences  8) Monitor bowel movements, if no BM for >3 days, consider implementing bowel regimen.  9) Confirm goals of care regarding nutrition support     RD will continue to monitor PO intake, labs, hydration, and wt prn.   Mariulz Ward (Formerly Dilshad), MS, RDN, [phone]  Nutrition  Clinical Nutrition PN Follow Up Note    *82 YO F with a PMHx of COPD, HLD presents to the ED c/o abd pain, n/v and constipation x2 days. Admitted w/ colonic perforation, Extensive stercocolitis with 2 feculent perforation of sigmoid & rectum. Necrotic segment of colon also noticed now POD 2 s/p Christina's. Had post-op fecal peritonitis, 7L washout. Went into septic shock, requiring pressors. Tx to ICU w/ post-op resp failure. NGT to LWS, blood tinged. Off pressors as of this AM 12/13.    *PPN initiated 2/2 NPO s/p Christina procedure2/2 colonic perforation     *12/14: has been NPO x 3 days, nausea overnight. NGT still in place, no plan for clamp trial today.  Plan to initiate PPN today, posisble transfer out of ICU  *12/15: PPN day #2. Transferred out of ICU, off pressors, now on 3E w/ no acute events overnight. Hypokalemia and hypophosphatemia noted s/p HD yesterday, being repleted. Possibly going into refeeding, will slowly increase lytes in PN bag and recommend to c/w KCl and KPhos repletion outside of bag until WNL. Remains NPO w/ NGT to LWS in place, reports intermittent nausea, on Zofran, and awaiting return of bowel function as per surgery. If to remain on PPN >7 days, consider placing PICC line to initiate TPN and meet >80% ENN.   *12/16 & 12/17: no acute events overnight. Tolerating CLD and plan to advance to FLD when medically feasible. Ostomy functioning.   *12/18:  PPN day #5. Tolerating FLD as per surgery note. Ostomy remains functioning. D/w surgical resident, plan to possibly advance to LRD today and if toelrating >/= 70% can d/c PPN. Will c/w PPN for now. Will provide colostomy education when able.   *12/19: PPN day #6. Found to have intra-abd Loculated pocket of peritoneal fluid in the anterior left lower quadrant with associated peritoneal thickening and enhancement that is suggestive of peritonitis. Pt currently NPO for IR drainage today. Noted to be tolerating diet. Plan to c/w PPN today.  *12/20: PPN day #7. S/p IR drainage of left lower quadrant fluid (12/19). Diet advanced to Low Fiber this morning. Discussed with Surgical Resident this morning, surgical resident wants to continue with PPN today and she is concerned that pt is not consuming enough PO. Discussed that an NGT is a more appropriate route of nutrition support as her gut is functioning, however surgical resident wants to continue with PPN instead.     *current status: PPN day #8. Pt continues to be on low fiber diet. Discussed with surgical resident this morning, plan to discontinue PN. Will trial Premier Protein BID in effort to optimize PO intake. Please see additional recommendations below.     *pertinent meds: Abx, Morphine, Protonix, Miralax, D5 + IVF + KCl    *labs reviewed;   12-21    137  |  107  |  25<H>  ----------------------------<  154<H>  4.2   |  24  |  0.88    Ca    7.5<L>      21 Dec 2023 05:08  Phos  3.3     12-21  Mg     2.1     12-21    TPro  5.8<L>  /  Alb  1.9<L>  /  TBili  0.4  /  DBili  x   /  AST  25  /  ALT  27  /  AlkPhos  60  12-21    Lipid Panel: Date/Time: 12-15-23 @ 04:58  Triglycerides, Serum: 187    *I&O's Detail    20 Dec 2023 07:01  -  21 Dec 2023 07:00  --------------------------------------------------------  IN:  Total IN: 0 mL    OUT:    Colostomy (mL): 525 mL    Drain (mL): 120 mL    Indwelling Catheter - Urethral (mL): 2325 mL    T-Tube (mL): 75 mL  Total OUT: 3045 mL    Total NET: -3045 mL  *fluid status: negative; Input not doc'd; Strict I&O's are rec'd when pt is on PN as per protocol   *BM (+) 12/20 - large, liquid, loose, brown; ostomy. On Miralax.  *edema: 1+ generalized    *malnutrition: Pt continues to meet criteria for severe protein-calorie malnutrition in context of acute illness r/t decreased ability to meet increased nutrient needs 2/2 colonic perf w/ septic shock AEB moderate muscle/ fat wasting    Estimated Needs: based on 63.5 Kg (UBW - likely dry wt)  Calories: 1905- 2222 Kcal (30- 35 Kcal/Kg)  Protein: 95- 127 g (1.5- 2 g/Kg)  Fluids: 1587- 1905 mL (25- 30 mL/Kg)    Diet, NPO:   Except Medications (12-18-23 @ 09:04) [Active]  Parenteral Nutrition - Adult 1 Each (75 mL/Hr) TPN Continuous <Continuous>, 12-17-23 @ 22:00, 22:00, Stop order after: 1 Days    Weight History:  Weight (kg): 72.6 (12-11-23 @ 10:30)    Additional Recommendations:  1) C/w Low Fiber diet  2) Premier protein shake BID to optimize nutritional needs (provides 160 kcal, 30 g protein/ shake)   3) Obtain vitamin D 25OH level to assess nutriture   4) Please obtain weekly weights  5) Consider adding thiamine 100 mg daily 2/2 poor PO intake/ malnutrition  6) MVI w/ minerals daily to ensure 100% RDA met  7) Encourage protein-rich foods, maximize food preferences  8) Monitor bowel movements, if no BM for >3 days, consider implementing bowel regimen.  9) Confirm goals of care regarding nutrition support     RD will continue to monitor PO intake, labs, hydration, and wt prn.   Mariluz Ward (Formerly Dilshad), MS, RDN, [phone]  Nutrition

## 2023-12-21 NOTE — PROGRESS NOTE ADULT - ASSESSMENT
83F presented with abd pain, n/v and constipation, noted to have colonic perforation 2/2 stercoral ulcer    #Septic shock Septic shock from perforated colon and feculent peritonitis   -s/p Christina procedure  -S/p NGT  -Ostomy care  -Monitor CORKY output  -BCx: Gram Negative Rods, pending identification. will need repeat BCx   -Intraop cultures: Proteus Mirabilis   -s/p Zosyn   ID consulted recommendations appreciated   - change abx to unasyn 3gmq6h from rocephin   on dc po augmentin [id number] 14 days from day of negative cx    -repeat blood cultures   -currently on PPN  -strict i&o's   -daugherty in place, urology consult placed for urinary retention   -Pain management  -Serial abdominal exam  -management per CRSx  Continue Antibiotics   CTAbdomen  IMPRESSION:  *  Status post Christina procedure.  *  Loculated pocket of peritoneal fluid in the anterior left lower   quadrant measuring 6.1 x 3.3 x 6.1 cm with associated peritoneal   thickening and enhancement that is suggestive of peritonitis. An infected   collection is considered. Adjacent small bowel loops with wall   thickening, which may be reactive.  *  Dilated common bileduct, which appears slightly increased since   12/11/2023, but not significantly changed in comparison to 4/16/2022.   Correlate with liver function tests and consider additional workup as   warranted.  *  Small bilateral pleural effusions.    -SP IR drainage. 12/19 continue to monitor leukocytosis       #pAfib, currently in NSR   -TTE: 2+ MR, 3+ TR, pHTN (RVSP: 41), EF: 55 to 60%  -S/p Lopressor and amiodarone  -S/p Cardizem gtt.  Start oral cardizem and IV heparin drip as per cardiology  transition to DOAC when cleared by surgery   -Currently in NSR (intermittent episodes of sinus tach on telemetry)    Will need fu for MV post recovery as had moderate MR and Pulm.  -[id number]-[id number] Score: 3 (Age, Sex),   -Monitor electrolytes, replace and trend   -monitor on tele   Cardiology consulted recommendations appreciated.    Daugherty in place  Urology consulted, recommendations appreciated  Recommend  - Keep indwelling daugherty in place  - Will place on Flomax discussed off label use in females pt would like to try it  - Trial of void in 4 days if inpatient vs. in 1 week outpatient. If inpatient TOV is performed check post void residual, if significant d/c with daugherty to leg bag  - Follow up with Dr. Ramos outpatient      #COPD  -Mild  -Stable  -Currently not on any medication    #HLD  -Diet controlled, currently not on any medication  Resume diet, DC TPN as per surgery    VTE prophylaxis  On IV heparin   83F presented with abd pain, n/v and constipation, noted to have colonic perforation 2/2 stercoral ulcer    #Septic shock Septic shock from perforated colon and feculent peritonitis   -s/p Christina procedure  -S/p NGT  -Ostomy care  -Monitor CORKY output  -BCx: Gram Negative Rods, pending identification. will need repeat BCx   -Intraop cultures: Proteus Mirabilis   -s/p Zosyn   ID consulted recommendations appreciated   - change abx to unasyn 3gmq6h from rocephin   on dc po augmentin [id number] 14 days from day of negative cx    -repeat blood cultures   -currently on PPN  -strict i&o's   -daugherty in place, urology consult placed for urinary retention   -Pain management  -Serial abdominal exam  -management per CRSx  Continue Antibiotics   CTAbdomen  IMPRESSION:  *  Status post Christina procedure.  *  Loculated pocket of peritoneal fluid in the anterior left lower   quadrant measuring 6.1 x 3.3 x 6.1 cm with associated peritoneal   thickening and enhancement that is suggestive of peritonitis. An infected   collection is considered. Adjacent small bowel loops with wall   thickening, which may be reactive.  *  Dilated common bileduct, which appears slightly increased since   12/11/2023, but not significantly changed in comparison to 4/16/2022.   Correlate with liver function tests and consider additional workup as   warranted.  *  Small bilateral pleural effusions.    -SP IR drainage. 12/19 continue to monitor leukocytosis       #pAfib, currently in NSR   -TTE: 2+ MR, 3+ TR, pHTN (RVSP: 41), EF: 55 to 60%  -S/p Lopressor and amiodarone  -S/p Cardizem gtt.  Start oral cardizem and IV heparin drip as per cardiology  transition to DOAC when cleared by surgery   -Currently in NSR (intermittent episodes of sinus tach on telemetry)    Will need fu for MV post recovery as had moderate MR and Pulm.  -[id number] Score: 3 (Age, Sex),   -Monitor electrolytes, replace and trend   -monitor on tele   Cardiology consulted recommendations appreciated.    Daugherty in place  Urology consulted, recommendations appreciated  Recommend  - Keep indwelling daugherty in place  - Will place on Flomax discussed off label use in females pt would like to try it  - Trial of void in 4 days if inpatient vs. in 1 week outpatient. If inpatient TOV is performed check post void residual, if significant d/c with daugherty to leg bag  - Follow up with Dr. Ramos outpatient      #COPD  -Mild  -Stable  -Currently not on any medication    #HLD  -Diet controlled, currently not on any medication  Resume diet, DC TPN as per surgery    VTE prophylaxis  On IV heparin   83F presented with abd pain, n/v and constipation, noted to have colonic perforation 2/2 stercoral ulcer    #Septic shock Septic shock from perforated colon and feculent peritonitis   -s/p Christina procedure  -S/p NGT  -Ostomy care  -Monitor CORKY output  -BCx: Gram Negative Rods, pending identification. will need repeat BCx   -Intraop cultures: Proteus Mirabilis   -s/p Zosyn   ID consulted recommendations appreciated   - change abx to unasyn 3gmq6h from rocephin   on dc po augmentin [id number] 14 days from day of negative cx    -repeat blood cultures   -currently on PPN  -strict i&o's   -daugherty in place, urology consult placed for urinary retention   -Pain management  -Serial abdominal exam  -management per CRSx  Continue Antibiotics   CTAbdomen  IMPRESSION:  *  Status post Christina procedure.  *  Loculated pocket of peritoneal fluid in the anterior left lower   quadrant measuring 6.1 x 3.3 x 6.1 cm with associated peritoneal   thickening and enhancement that is suggestive of peritonitis. An infected   collection is considered. Adjacent small bowel loops with wall   thickening, which may be reactive.  *  Dilated common bileduct, which appears slightly increased since   12/11/2023, but not significantly changed in comparison to 4/16/2022.   Correlate with liver function tests and consider additional workup as   warranted.  *  Small bilateral pleural effusions.    -SP IR drainage. 12/19 continue to monitor leukocytosis   - now on rocephin since 12/14  - on unasyn 3gmq6h #6  - on antifungal coverage diflucan 200mg daily #2     #pAfib, currently in NSR   -TTE: 2+ MR, 3+ TR, pHTN (RVSP: 41), EF: 55 to 60%  -S/p Lopressor and amiodarone  -S/p Cardizem gtt.  Start oral cardizem and IV heparin drip as per cardiology  transition to DOAC when cleared by surgery   -Currently in NSR (intermittent episodes of sinus tach on telemetry)    Will need fu for MV post recovery as had moderate MR and Pulm.  -[id number] Score: 3 (Age, Sex),   -Monitor electrolytes, replace and trend   -monitor on tele   Cardiology consulted recommendations appreciated.    Daugherty in place  Urology consulted, recommendations appreciated  Recommend  - Keep indwelling daugherty in place  - Will place on Flomax discussed off label use in females pt would like to try it  - Trial of void in 4 days if inpatient vs. in 1 week outpatient. If inpatient TOV is performed check post void residual, if significant d/c with daugherty to leg bag  - Follow up with Dr. Ramos outpatient      #COPD  -Mild  -Stable  -Currently not on any medication    #HLD  -Diet controlled, currently not on any medication  Resume diet, DC TPN as per surgery    VTE prophylaxis  On IV heparin   83F presented with abd pain, n/v and constipation, noted to have colonic perforation 2/2 stercoral ulcer    #Septic shock Septic shock from perforated colon and feculent peritonitis   -s/p Christina procedure  -S/p NGT  -Ostomy care  -Monitor CORKY output  -BCx: Gram Negative Rods, pending identification. will need repeat BCx   -Intraop cultures: Proteus Mirabilis   -s/p Zosyn   ID consulted recommendations appreciated   - change abx to unasyn 3gmq6h from rocephin   on dc po augmentin [id number] 14 days from day of negative cx    -repeat blood cultures   -currently on PPN  -strict i&o's   -daugherty in place, urology consult placed for urinary retention   -Pain management  -Serial abdominal exam  -management per CRSx  Continue Antibiotics   CTAbdomen  IMPRESSION:  *  Status post Christina procedure.  *  Loculated pocket of peritoneal fluid in the anterior left lower   quadrant measuring 6.1 x 3.3 x 6.1 cm with associated peritoneal   thickening and enhancement that is suggestive of peritonitis. An infected   collection is considered. Adjacent small bowel loops with wall   thickening, which may be reactive.  *  Dilated common bileduct, which appears slightly increased since   12/11/2023, but not significantly changed in comparison to 4/16/2022.   Correlate with liver function tests and consider additional workup as   warranted.  *  Small bilateral pleural effusions.    -SP IR drainage. 12/19 continue to monitor leukocytosis   - now on rocephin since 12/14  - on unasyn 3gmq6h #6  - on antifungal coverage diflucan 200mg daily #2     #pAfib, currently in NSR   -TTE: 2+ MR, 3+ TR, pHTN (RVSP: 41), EF: 55 to 60%  -S/p Lopressor and amiodarone  -S/p Cardizem gtt.  Start oral cardizem and IV heparin drip as per cardiology  transition to DOAC when cleared by surgery   -Currently in NSR (intermittent episodes of sinus tach on telemetry)    Will need fu for MV post recovery as had moderate MR and Pulm.  -KUC1AH1-ZIOj Score: 3 (Age, Sex),   -Monitor electrolytes, replace and trend   -monitor on tele   Cardiology consulted recommendations appreciated.    Daugherty in place  Urology consulted, recommendations appreciated  Recommend  - Keep indwelling daugherty in place  - Will place on Flomax discussed off label use in females pt would like to try it  - Trial of void in 4 days if inpatient vs. in 1 week outpatient. If inpatient TOV is performed check post void residual, if significant d/c with daugherty to leg bag  - Follow up with Dr. Ramos outpatient      #COPD  -Mild  -Stable  -Currently not on any medication    #HLD  -Diet controlled, currently not on any medication  Resume diet, DC TPN as per surgery    VTE prophylaxis  On IV heparin

## 2023-12-21 NOTE — PROGRESS NOTE ADULT - SUBJECTIVE AND OBJECTIVE BOX
HOSPITALIST ATTENDING PROGRESS NOTE    Chart and meds reviewed.      Subjective: Patient seen and examined. Patient is resting.  Sitting comfortably in chair.  Status post drainage 2 days ago.  Patient denies any fevers or chills overnight.  Abdominal pain mildly improved.  Still some purulent drainage in the drain.  Continue to monitor.  Resume DOAC for DVT.  Leukocytosis remains but mildly improved.  Restart diet. TPN will be discontinued.      Additional results/Imaging, I have personally reviewed:    LABS:                            8.9    19.79 )-----------( 339      ( 21 Dec 2023 05:08 )             26.3     12-21    137  |  107  |  25<H>  ----------------------------<  154<H>  4.2   |  24  |  0.88    Ca    7.5<L>      21 Dec 2023 05:08  Phos  3.3     12-21  Mg     2.1     12-21    TPro  5.8<L>  /  Alb  1.9<L>  /  TBili  0.4  /  DBili  x   /  AST  25  /  ALT  27  /  AlkPhos  60  12-21        LIVER FUNCTIONS - ( 21 Dec 2023 05:08 )  Alb: 1.9 g/dL / Pro: 5.8 gm/dL / ALK PHOS: 60 U/L / ALT: 27 U/L / AST: 25 U/L / GGT: x           PTT - ( 21 Dec 2023 05:08 )  PTT:119.4 sec  Urinalysis Basic - ( 21 Dec 2023 05:08 )    Color: x / Appearance: x / SG: x / pH: x  Gluc: 154 mg/dL / Ketone: x  / Bili: x / Urobili: x   Blood: x / Protein: x / Nitrite: x   Leuk Esterase: x / RBC: x / WBC x   Sq Epi: x / Non Sq Epi: x / Bacteria: x              All other systems reviewed and found to be negative with the exception of what has been described above.    MEDICATIONS  (STANDING):  ampicillin/sulbactam  IVPB 3 Gram(s) IV Intermittent every 6 hours  diltiazem    Tablet 60 milliGRAM(s) Oral every 8 hours  fluconAZOLE IVPB      fluconAZOLE IVPB 200 milliGRAM(s) IV Intermittent every 24 hours  lidocaine   4% Patch 1 Patch Transdermal daily  metoprolol tartrate Injectable 5 milliGRAM(s) IV Push every 5 minutes  pantoprazole  Injectable 40 milliGRAM(s) IV Push two times a day  polyethylene glycol 3350 17 Gram(s) Oral every 12 hours  rivaroxaban 15 milliGRAM(s) Oral two times a day with meals  tamsulosin 0.4 milliGRAM(s) Oral at bedtime    MEDICATIONS  (PRN):  acetaminophen   IVPB .. 1000 milliGRAM(s) IV Intermittent once PRN Temp greater or equal to 38C (100.4F), Mild Pain (1 - 3)  ondansetron Injectable 4 milliGRAM(s) IV Push every 6 hours PRN Nausea      VITALS:  T(F): 97.9 (12-21-23 @ 08:51), Max: 98.5 (12-20-23 @ 20:19)  HR: 94 (12-21-23 @ 08:51) (94 - 100)  BP: 123/41 (12-21-23 @ 08:51) (123/41 - 148/52)  RR: 18 (12-21-23 @ 08:51) (18 - 18)  SpO2: 94% (12-21-23 @ 08:51) (94% - 99%)  Wt(kg): --    I&O's Summary    20 Dec 2023 07:01  -  21 Dec 2023 07:00  --------------------------------------------------------  IN: 0 mL / OUT: 3045 mL / NET: -3045 mL        CAPILLARY BLOOD GLUCOSE          PHYSICAL EXAM:  Gen: No acute distress   HEENT:  pupils equal and reactive, EOMI,   NECK:   supple, no carotid bruits, No JVD  CV:  +S1, +S2, regular rate rhythm, no murmurs or rubs  RESP:   lungs clear to auscultation bilaterally, no wheezing, rales, rhonchi, good air entry bilaterally  GI:  abdomen soft, non-tender, non-distended, normal BS, no bruits, no abdominal masses, no palpable masses  MSK:   normal muscle tone, no atrophy, no rigidity, no contractions  EXT:  no clubbing, no cyanosis, no edema, no calf pain, swelling or erythema  VASCULAR:  pulses equal and symmetric in the upper and lower extremities  NEURO:  AAOX3, no focal neurological deficits, follows all commands, able to move extremities spontaneously  SKIN:  no ulcers, lesions or rashes      CULTURES:  Blood, Urine: Negative (12-20 @ 13:20)      Telemetry, personally reviewed

## 2023-12-21 NOTE — PROGRESS NOTE ADULT - ASSESSMENT
83 year old female presented with colonic perforation 2/2 stercoral ulcer, now POD 10 s/p Christina  AFib better controlled with Diltiazem and PRN Metoprolol. Patient on Heparin drip.   s/p IR drain placement 12/19 for intra-abdominal collection       Plan  Cont low residue diet   Can resume oral AC from surgical standpoint   Monitor ostomy function, IR drain   Pain & nausea control PRN  Cardiology recs appreciated  Hospitalist recs  Incentive spirometry, wean oxygen  Cont abx and antifungal per ID recs  Replace electrolytes PRN  PT anticipates RONALD  Will need ostomy teaching  Urology rec:- Trial of void in 4 days if inpatient vs. in 1 week outpatient. If inpatient TOV is performed check post void residual, if significant d/c with daugherty to leg bag    Discussed with attending.

## 2023-12-22 ENCOUNTER — TRANSCRIPTION ENCOUNTER (OUTPATIENT)
Age: 83
End: 2023-12-22

## 2023-12-22 DIAGNOSIS — R18.8 OTHER ASCITES: ICD-10-CM

## 2023-12-22 LAB
ANION GAP SERPL CALC-SCNC: 5 MMOL/L — SIGNIFICANT CHANGE UP (ref 5–17)
ANION GAP SERPL CALC-SCNC: 5 MMOL/L — SIGNIFICANT CHANGE UP (ref 5–17)
BUN SERPL-MCNC: 18 MG/DL — SIGNIFICANT CHANGE UP (ref 7–23)
BUN SERPL-MCNC: 18 MG/DL — SIGNIFICANT CHANGE UP (ref 7–23)
CALCIUM SERPL-MCNC: 7.7 MG/DL — LOW (ref 8.5–10.1)
CALCIUM SERPL-MCNC: 7.7 MG/DL — LOW (ref 8.5–10.1)
CHLORIDE SERPL-SCNC: 107 MMOL/L — SIGNIFICANT CHANGE UP (ref 96–108)
CHLORIDE SERPL-SCNC: 107 MMOL/L — SIGNIFICANT CHANGE UP (ref 96–108)
CO2 SERPL-SCNC: 25 MMOL/L — SIGNIFICANT CHANGE UP (ref 22–31)
CO2 SERPL-SCNC: 25 MMOL/L — SIGNIFICANT CHANGE UP (ref 22–31)
CREAT SERPL-MCNC: 0.8 MG/DL — SIGNIFICANT CHANGE UP (ref 0.5–1.3)
CREAT SERPL-MCNC: 0.8 MG/DL — SIGNIFICANT CHANGE UP (ref 0.5–1.3)
EGFR: 73 ML/MIN/1.73M2 — SIGNIFICANT CHANGE UP
EGFR: 73 ML/MIN/1.73M2 — SIGNIFICANT CHANGE UP
GLUCOSE SERPL-MCNC: 105 MG/DL — HIGH (ref 70–99)
GLUCOSE SERPL-MCNC: 105 MG/DL — HIGH (ref 70–99)
HCT VFR BLD CALC: 26.5 % — LOW (ref 34.5–45)
HCT VFR BLD CALC: 26.5 % — LOW (ref 34.5–45)
HGB BLD-MCNC: 8.7 G/DL — LOW (ref 11.5–15.5)
HGB BLD-MCNC: 8.7 G/DL — LOW (ref 11.5–15.5)
MCHC RBC-ENTMCNC: 29.7 PG — SIGNIFICANT CHANGE UP (ref 27–34)
MCHC RBC-ENTMCNC: 29.7 PG — SIGNIFICANT CHANGE UP (ref 27–34)
MCHC RBC-ENTMCNC: 32.8 GM/DL — SIGNIFICANT CHANGE UP (ref 32–36)
MCHC RBC-ENTMCNC: 32.8 GM/DL — SIGNIFICANT CHANGE UP (ref 32–36)
MCV RBC AUTO: 90.4 FL — SIGNIFICANT CHANGE UP (ref 80–100)
MCV RBC AUTO: 90.4 FL — SIGNIFICANT CHANGE UP (ref 80–100)
PLATELET # BLD AUTO: 382 K/UL — SIGNIFICANT CHANGE UP (ref 150–400)
PLATELET # BLD AUTO: 382 K/UL — SIGNIFICANT CHANGE UP (ref 150–400)
POTASSIUM SERPL-MCNC: 4.3 MMOL/L — SIGNIFICANT CHANGE UP (ref 3.5–5.3)
POTASSIUM SERPL-MCNC: 4.3 MMOL/L — SIGNIFICANT CHANGE UP (ref 3.5–5.3)
POTASSIUM SERPL-SCNC: 4.3 MMOL/L — SIGNIFICANT CHANGE UP (ref 3.5–5.3)
POTASSIUM SERPL-SCNC: 4.3 MMOL/L — SIGNIFICANT CHANGE UP (ref 3.5–5.3)
RBC # BLD: 2.93 M/UL — LOW (ref 3.8–5.2)
RBC # BLD: 2.93 M/UL — LOW (ref 3.8–5.2)
RBC # FLD: 15.1 % — HIGH (ref 10.3–14.5)
RBC # FLD: 15.1 % — HIGH (ref 10.3–14.5)
SODIUM SERPL-SCNC: 137 MMOL/L — SIGNIFICANT CHANGE UP (ref 135–145)
SODIUM SERPL-SCNC: 137 MMOL/L — SIGNIFICANT CHANGE UP (ref 135–145)
WBC # BLD: 15.42 K/UL — HIGH (ref 3.8–10.5)
WBC # BLD: 15.42 K/UL — HIGH (ref 3.8–10.5)
WBC # FLD AUTO: 15.42 K/UL — HIGH (ref 3.8–10.5)
WBC # FLD AUTO: 15.42 K/UL — HIGH (ref 3.8–10.5)

## 2023-12-22 PROCEDURE — 99231 SBSQ HOSP IP/OBS SF/LOW 25: CPT

## 2023-12-22 PROCEDURE — 99233 SBSQ HOSP IP/OBS HIGH 50: CPT

## 2023-12-22 RX ORDER — LIDOCAINE 4 G/100G
0 CREAM TOPICAL
Qty: 0 | Refills: 0 | DISCHARGE
Start: 2023-12-22

## 2023-12-22 RX ORDER — TAMSULOSIN HYDROCHLORIDE 0.4 MG/1
1 CAPSULE ORAL
Qty: 0 | Refills: 0 | DISCHARGE
Start: 2023-12-22

## 2023-12-22 RX ORDER — RIVAROXABAN 15 MG-20MG
1 KIT ORAL
Qty: 0 | Refills: 0 | DISCHARGE
Start: 2023-12-22

## 2023-12-22 RX ORDER — POLYETHYLENE GLYCOL 3350 17 G/17G
17 POWDER, FOR SOLUTION ORAL
Qty: 0 | Refills: 0 | DISCHARGE
Start: 2023-12-22

## 2023-12-22 RX ORDER — DILTIAZEM HCL 120 MG
1 CAPSULE, EXT RELEASE 24 HR ORAL
Qty: 0 | Refills: 0 | DISCHARGE
Start: 2023-12-22

## 2023-12-22 RX ORDER — FLUCONAZOLE 150 MG/1
1 TABLET ORAL
Qty: 7 | Refills: 0
Start: 2023-12-22 | End: 2023-12-28

## 2023-12-22 RX ADMIN — ONDANSETRON 4 MILLIGRAM(S): 8 TABLET, FILM COATED ORAL at 00:35

## 2023-12-22 RX ADMIN — POLYETHYLENE GLYCOL 3350 17 GRAM(S): 17 POWDER, FOR SOLUTION ORAL at 22:12

## 2023-12-22 RX ADMIN — AMPICILLIN SODIUM AND SULBACTAM SODIUM 200 GRAM(S): 250; 125 INJECTION, POWDER, FOR SUSPENSION INTRAMUSCULAR; INTRAVENOUS at 23:42

## 2023-12-22 RX ADMIN — AMPICILLIN SODIUM AND SULBACTAM SODIUM 200 GRAM(S): 250; 125 INJECTION, POWDER, FOR SUSPENSION INTRAMUSCULAR; INTRAVENOUS at 06:04

## 2023-12-22 RX ADMIN — AMPICILLIN SODIUM AND SULBACTAM SODIUM 200 GRAM(S): 250; 125 INJECTION, POWDER, FOR SUSPENSION INTRAMUSCULAR; INTRAVENOUS at 12:52

## 2023-12-22 RX ADMIN — FLUCONAZOLE 100 MILLIGRAM(S): 150 TABLET ORAL at 12:53

## 2023-12-22 RX ADMIN — TAMSULOSIN HYDROCHLORIDE 0.4 MILLIGRAM(S): 0.4 CAPSULE ORAL at 22:12

## 2023-12-22 RX ADMIN — POLYETHYLENE GLYCOL 3350 17 GRAM(S): 17 POWDER, FOR SOLUTION ORAL at 09:48

## 2023-12-22 RX ADMIN — RIVAROXABAN 15 MILLIGRAM(S): KIT at 18:07

## 2023-12-22 RX ADMIN — Medication 60 MILLIGRAM(S): at 06:04

## 2023-12-22 RX ADMIN — Medication 60 MILLIGRAM(S): at 15:05

## 2023-12-22 RX ADMIN — PANTOPRAZOLE SODIUM 40 MILLIGRAM(S): 20 TABLET, DELAYED RELEASE ORAL at 22:12

## 2023-12-22 RX ADMIN — AMPICILLIN SODIUM AND SULBACTAM SODIUM 200 GRAM(S): 250; 125 INJECTION, POWDER, FOR SUSPENSION INTRAMUSCULAR; INTRAVENOUS at 18:01

## 2023-12-22 RX ADMIN — LIDOCAINE 1 PATCH: 4 CREAM TOPICAL at 09:48

## 2023-12-22 RX ADMIN — Medication 60 MILLIGRAM(S): at 22:19

## 2023-12-22 RX ADMIN — RIVAROXABAN 15 MILLIGRAM(S): KIT at 09:47

## 2023-12-22 RX ADMIN — PANTOPRAZOLE SODIUM 40 MILLIGRAM(S): 20 TABLET, DELAYED RELEASE ORAL at 09:47

## 2023-12-22 NOTE — PROGRESS NOTE ADULT - SUBJECTIVE AND OBJECTIVE BOX
83F presented with colonic perforation 2/2 stercoral ulcer, now s/p Christina procedure found have LLQ abdominal fluid collection. Pt now s/p IR drainage yesterday with removal of 15cc cloudy yellow fluid, sent for analysis.     Vital Signs Last 24 Hrs  T(C): 36.7 (22 Dec 2023 08:10), Max: 36.7 (21 Dec 2023 13:33)  T(F): 98.1 (22 Dec 2023 08:10), Max: 98.1 (22 Dec 2023 08:10)  HR: 95 (22 Dec 2023 08:10) (95 - 99)  BP: 123/49 (22 Dec 2023 08:10) (123/49 - 147/46)  BP(mean): 77 (22 Dec 2023 06:00) (63 - 77)  RR: 16 (22 Dec 2023 08:10) (16 - 16)  SpO2: 93% (22 Dec 2023 08:10) (93% - 100%)    Parameters below as of 22 Dec 2023 08:10  Patient On (Oxygen Delivery Method): nasal cannula      LABS:                        8.7    15.42 )-----------( 382      ( 22 Dec 2023 07:09 )             26.5     12-22    137  |  107  |  18  ----------------------------<  105<H>  4.3   |  25  |  0.80    Ca    7.7<L>      22 Dec 2023 07:09  Phos  3.3     12-21  Mg     2.1     12-21    TPro  5.8<L>  /  Alb  1.9<L>  /  TBili  0.4  /  DBili  x   /  AST  25  /  ALT  27  /  AlkPhos  60  12-21    I&O's Detail    21 Dec 2023 07:01  -  22 Dec 2023 07:00  --------------------------------------------------------  IN:  Total IN: 0 mL    OUT:    Indwelling Catheter - Urethral (mL): 1000 mL    T-Tube (mL): 5 mL  Total OUT: 1005 mL    Total NET: -1005 mL      22 Dec 2023 07:01  -  22 Dec 2023 13:28  --------------------------------------------------------  IN:  Total IN: 0 mL    OUT:    T-Tube (mL): 15 mL  Total OUT: 15 mL    Total NET: -15 mL

## 2023-12-22 NOTE — DISCHARGE NOTE PROVIDER - PROVIDER TOKENS
PROVIDER:[TOKEN:[[id number]],FOLLOWUP:[2 weeks]],PROVIDER:[TOKEN:[[id number]],FOLLOWUP:[2 weeks]] PROVIDER:[TOKEN:[[id number]],FOLLOWUP:[2 weeks]],PROVIDER:[TOKEN:[[id number]],FOLLOWUP:[2 weeks]] PROVIDER:[TOKEN:[[id number]],FOLLOWUP:[2 weeks]],PROVIDER:[TOKEN:[[id number]],FOLLOWUP:[2 weeks]],PROVIDER:[TOKEN:[[id number]],FOLLOWUP:[2 weeks]],PROVIDER:[TOKEN:[[id number]],FOLLOWUP:[1 week]] PROVIDER:[TOKEN:[[id number]],FOLLOWUP:[2 weeks]],PROVIDER:[TOKEN:[[id number]],FOLLOWUP:[2 weeks]],PROVIDER:[TOKEN:[[id number]],FOLLOWUP:[2 weeks]],PROVIDER:[TOKEN:[[id number]],FOLLOWUP:[1 week]]

## 2023-12-22 NOTE — PROGRESS NOTE ADULT - ATTENDING COMMENTS
Patient is doing well.  Afebrile, no N/V, tolerating diet, stoma functioning.  Leyva removed, voiding.  Abdomen soft, nondistended, nontender, stoma with brown output, midline incision C/D/I.  Labs reviewed - leukocytosis continues to improve.    -- From CRS standpoint she would be ready for discharge to [facility] - tolerating diet, no N/V, has bowel function.  -- Will follow up with consulting services (ID, IR, Cardiology, Medicine) to ensure appropriate follow up and discharge plans in place  -- Once bed available and cleared by consulting services patient may be discharged to [facility]  -- Will need ostomy care.  Will need follow-up in my office for staple removal in 10-14 days  -- Will need IR follow-up for drain  -- Continue antibiotics as per ID. Patient is doing well.  Afebrile, no N/V, tolerating diet, stoma functioning.  Leyva removed, voiding.  Abdomen soft, nondistended, nontender, stoma with brown output, midline incision C/D/I.  Labs reviewed - leukocytosis continues to improve.    -- From CRS standpoint she would be ready for discharge to [facility] - tolerating diet, no N/V, has bowel function.  -- Will follow up with consulting services (ID, IR, Cardiology, Medicine) to ensure appropriate follow up and discharge plans in place  -- Once bed available and cleared by consulting services patient may be discharged to [facility]  -- Will need ostomy care.  Will need follow-up in my office for staple removal in 10-14 days  -- Will need IR follow-up for drain  -- Continue antibiotics as per ID.

## 2023-12-22 NOTE — PROGRESS NOTE ADULT - ASSESSMENT
82yo F with LLQ fluid s/p IR drainage  - 15cc clear yellow output overnight  - Pt WBC improved  - Drain flushed at bedside, patent 84yo F with LLQ fluid s/p IR drainage  - 15cc clear yellow output overnight  - Pt WBC improved  - Drain flushed at bedside, patent

## 2023-12-22 NOTE — DISCHARGE NOTE PROVIDER - NSDCCPTREATMENT_GEN_ALL_CORE_FT
PRINCIPAL PROCEDURE  Procedure: Christina resection of colon  Findings and Treatment: Open      SECONDARY PROCEDURE  Procedure: Abdominal washout  Findings and Treatment: 7L

## 2023-12-22 NOTE — PROGRESS NOTE ADULT - PROBLEM SELECTOR PLAN 1
- Pt provided with appointment for outpt IR tube check on 12/28/23 at 8:30AM  - Continue to monitor outputs  - If pt is discharged to rehab, continue flushing/dressing changes as ordered

## 2023-12-22 NOTE — DISCHARGE NOTE PROVIDER - HOSPITAL COURSE
83 year old female presented with colonic perforation 2/2 stercoral ulcer, now POD 10 s/p Christina  s/p IR drain placement 12/19 for intra-abdominal collection   Leukocytosis improved on IV abx and surgical intervention, ostomy teaching, voided after daugherty removal  Afib now on xarelto  Continue oral antibiotics and finish on 12/30 as per ID consult  Needs rehab for further PT.    83 year old female presented with colonic perforation 2/2 stercoral ulcer, now POD 10 s/p Christina  s/p IR drain placement 12/19 for intra-abdominal collection   Leukocytosis improved on IV abx and surgical intervention, ostomy teaching, voided after daugherty removal  Afib now on xarelto  Continue oral antibiotics and finish on 12/30 as per ID consult  Needs rehab for further PT.   flush abdominal catheter daily with 5ml saline forward flush only.   change gauze around drain when soiled and as needed. 83 year old female presented with colonic perforation 2/2 stercoral ulcer, now POD 11 s/p Christina  s/p IR drain placement 12/19 for intra-abdominal collection   Leukocytosis improved on IV abx & antifungal, and with surgical intervention, ostomy teaching, voided after Leyva removal  Afib now on Xarelto, rate-controlled on diltiazem, f/u with Cardiology for duration and transition of Xarelto dose  Continue oral antibiotics and antifungal and finish on 12/30, as per ID consult, f/u with ID for possible continued duration  Urinary retention resolved after addition of Flomax, PVR 80s mL, f/u with Urologist for possible continued need longer term  Needs rehab for further PT.   Flush abdominal catheter daily with 5ml saline, forward flush only; f/u with IR for drain study on 12/28  Change gauze around drain when soiled and as needed.  Change gauze on midline staples daily and as needed when soiled; leave open to air when no longer seeping old hematoma and fat necrosis. Call Dr. Armenta's office for an appointment if the wound looks like it needs evaluation sooner than in 2 weeks. 83 year old female presented with colonic perforation 2/2 stercoral ulcer, now POD 12 s/p Christina  s/p IR drain placement 12/19 for intra-abdominal collection   Leukocytosis improved with surgical intervention, IV abx & antifungal; received ostomy teaching, voided after Leyva removal  Afib developed post-op, now on Xarelto, rate-controlled on diltiazem, f/u with Cardiology for duration and transition of Xarelto dose  Continue oral antibiotics and antifungal and finish on 12/30, as per ID consult, f/u with ID in 1 week, for possible continued duration  Urinary retention resolved after addition of Flomax, PVR 80s mL, f/u with Urologist for possible continued need longer term and evaluation of incontinence  Needs rehab for further PT.   Flush abdominal catheter daily with 5ml saline, forward flush only; f/u with IR for drain study on 12/28  Change gauze around drain when soiled and as needed.  Change gauze on midline staples daily and as needed when soiled; leave open to air when no longer seeping old hematoma and fat necrosis. Call Dr. Armenta's office for an appointment if the wound looks like it needs evaluation sooner than in 2 weeks.

## 2023-12-22 NOTE — PROGRESS NOTE ADULT - ASSESSMENT
83F presented with abd pain, n/v and constipation, noted to have colonic perforation 2/2 stercoral ulcer    #Septic shock Septic shock from perforated colon and feculent peritonitis   -s/p Christina procedure  -S/p NGT  -Ostomy care  -Monitor CORKY output  -Intraop cultures: Proteus Mirabilis   -s/p Zosyn   ID consulted recommendations appreciated   - change abx to unasyn 3gmq6h from rocephin   on dc po augmentin [id number] 14 days from day of negative cx    -repeat blood cultures   -currently on PPN  -strict i&o's   -daugherty in place, urology consult placed for urinary retention   -Pain management  -Serial abdominal exam  -management per CRSx  Continue Antibiotics   CTAbdomen  IMPRESSION:  *  Status post Christina procedure.  *  Loculated pocket of peritoneal fluid in the anterior left lower   quadrant measuring 6.1 x 3.3 x 6.1 cm with associated peritoneal   thickening and enhancement that is suggestive of peritonitis. An infected   collection is considered. Adjacent small bowel loops with wall   thickening, which may be reactive.  *  Dilated common bileduct, which appears slightly increased since   12/11/2023, but not significantly changed in comparison to 4/16/2022.   Correlate with liver function tests and consider additional workup as   warranted.  *  Small bilateral pleural effusions.    -SP IR drainage. 12/19 continue to monitor leukocytosis   - now on rocephin since 12/14  - on unasyn 3gmq6h #7  - on antifungal coverage diflucan 200mg daily #4    #pAfib, currently in NSR   -TTE: 2+ MR, 3+ TR, pHTN (RVSP: 41), EF: 55 to 60%  -S/p Lopressor and amiodarone  -S/p Cardizem gtt.  Start oral cardizem and IV heparin drip as per cardiology  transition to DOAC when cleared by surgery   -Currently in NSR (intermittent episodes of sinus tach on telemetry)    Will need fu for MV post recovery as had moderate MR and Pulm.  -[id number] Score: 3 (Age, Sex),   -Monitor electrolytes, replace and trend   -monitor on tele   Cardiology consulted recommendations appreciated.    Daugherty in place  Urology consulted, recommendations appreciated  Recommend  - Keep indwelling daugherty in place  - Will place on Flomax discussed off label use in females pt would like to try it  - Trial of void in 4 days if inpatient vs. in 1 week outpatient. If inpatient TOV is performed check post void residual, if significant d/c with daugherty to leg bag  - Follow up with Dr. Ramos outpatient      #COPD  -Mild  -Stable  -Currently not on any medication    #HLD  -Diet controlled, currently not on any medication  Resume diet, DC TPN as per surgery    VTE prophylaxis  On IV heparin   83F presented with abd pain, n/v and constipation, noted to have colonic perforation 2/2 stercoral ulcer    #Septic shock Septic shock from perforated colon and feculent peritonitis   -s/p Christina procedure  -S/p NGT  -Ostomy care  -Monitor CORKY output  -Intraop cultures: Proteus Mirabilis   -s/p Zosyn   ID consulted recommendations appreciated   - change abx to unasyn 3gmq6h from rocephin   on dc po augmentin [id number] 14 days from day of negative cx    -repeat blood cultures   -currently on PPN  -strict i&o's   -daugherty in place, urology consult placed for urinary retention   -Pain management  -Serial abdominal exam  -management per CRSx  Continue Antibiotics   CTAbdomen  IMPRESSION:  *  Status post Christina procedure.  *  Loculated pocket of peritoneal fluid in the anterior left lower   quadrant measuring 6.1 x 3.3 x 6.1 cm with associated peritoneal   thickening and enhancement that is suggestive of peritonitis. An infected   collection is considered. Adjacent small bowel loops with wall   thickening, which may be reactive.  *  Dilated common bileduct, which appears slightly increased since   12/11/2023, but not significantly changed in comparison to 4/16/2022.   Correlate with liver function tests and consider additional workup as   warranted.  *  Small bilateral pleural effusions.    -SP IR drainage. 12/19 continue to monitor leukocytosis   - now on rocephin since 12/14  - on unasyn 3gmq6h #7  - on antifungal coverage diflucan 200mg daily #4    #pAfib, currently in NSR   -TTE: 2+ MR, 3+ TR, pHTN (RVSP: 41), EF: 55 to 60%  -S/p Lopressor and amiodarone  -S/p Cardizem gtt.  Start oral cardizem and IV heparin drip as per cardiology  transition to DOAC when cleared by surgery   -Currently in NSR (intermittent episodes of sinus tach on telemetry)    Will need fu for MV post recovery as had moderate MR and Pulm.  -[id number] Score: 3 (Age, Sex),   -Monitor electrolytes, replace and trend   -monitor on tele   Cardiology consulted recommendations appreciated.    Daugherty in place  Urology consulted, recommendations appreciated  Recommend  - Keep indwelling daugherty in place  - Will place on Flomax discussed off label use in females pt would like to try it  - Trial of void in 4 days if inpatient vs. in 1 week outpatient. If inpatient TOV is performed check post void residual, if significant d/c with daugherty to leg bag  - Follow up with Dr. Ramos outpatient      #COPD  -Mild  -Stable  -Currently not on any medication    #HLD  -Diet controlled, currently not on any medication  Resume diet, DC TPN as per surgery    VTE prophylaxis  On IV heparin

## 2023-12-22 NOTE — PROGRESS NOTE ADULT - SUBJECTIVE AND OBJECTIVE BOX
Date of service: 12-22-23 @ 12:55    pt seen and examined  s/p IR drainage of LLQ collection 12/19   feels better tolerating diet  denies abd discomfort   no fevers     ROS: no fever or chills; denies dizziness, no HA, no SOB or cough, no diarrhea or constipation; no dysuria, no urinary frequency, no legs pain, no rashes      MEDICATIONS  (STANDING):  ampicillin/sulbactam  IVPB 3 Gram(s) IV Intermittent every 6 hours  diltiazem    Tablet 60 milliGRAM(s) Oral every 8 hours  fluconAZOLE IVPB      fluconAZOLE IVPB 200 milliGRAM(s) IV Intermittent every 24 hours  lidocaine   4% Patch 1 Patch Transdermal daily  metoprolol tartrate Injectable 5 milliGRAM(s) IV Push every 5 minutes  pantoprazole  Injectable 40 milliGRAM(s) IV Push two times a day  polyethylene glycol 3350 17 Gram(s) Oral every 12 hours  rivaroxaban 15 milliGRAM(s) Oral two times a day with meals  tamsulosin 0.4 milliGRAM(s) Oral at bedtime    Vital Signs Last 24 Hrs  T(C): 36.7 (22 Dec 2023 08:10), Max: 36.7 (21 Dec 2023 13:33)  T(F): 98.1 (22 Dec 2023 08:10), Max: 98.1 (22 Dec 2023 08:10)  HR: 95 (22 Dec 2023 08:10) (95 - 99)  BP: 123/49 (22 Dec 2023 08:10) (123/49 - 147/46)  BP(mean): 77 (22 Dec 2023 06:00) (63 - 77)  RR: 16 (22 Dec 2023 08:10) (16 - 16)  SpO2: 93% (22 Dec 2023 08:10) (93% - 100%)    Parameters below as of 22 Dec 2023 08:10  Patient On (Oxygen Delivery Method): nasal cannula        PE:  Constitutional: frail looking  HEENT: NC/AT, EOMI, PERRLA, conjunctivae clear; ears and nose atraumatic; pharynx benign  Neck: supple; thyroid not palpable  Back: no tenderness  Respiratory: respiratory effort normal; clear to auscultation  Cardiovascular: S1S2 regular, no murmurs  Abdomen: soft, not tender, not distended, positive BS; liver and spleen WNL surgical sites clean shanta drain in place   Genitourinary: no suprapubic tenderness  Lymphatic: no LN palpable  Musculoskeletal: no muscle tenderness, no joint swelling or tenderness  Extremities: no pedal edema  Neurological/ Psychiatric:   moving all extremities  Skin: no rashes; no palpable lesions    Labs: all available labs reviewed                                   8.7    15.42 )-----------( 382      ( 22 Dec 2023 07:09 )             26.5     12-22    137  |  107  |  18  ----------------------------<  105<H>  4.3   |  25  |  0.80    Ca    7.7<L>      22 Dec 2023 07:09  Phos  3.3     12-21  Mg     2.1     12-21    TPro  5.8<L>  /  Alb  1.9<L>  /  TBili  0.4  /  DBili  x   /  AST  25  /  ALT  27  /  AlkPhos  60  12-21         Urinalysis Basic - ( 16 Dec 2023 04:39 )    Color: x / Appearance: x / SG: x / pH: x  Gluc: 150 mg/dL / Ketone: x  / Bili: x / Urobili: x   Blood: x / Protein: x / Nitrite: x   Leuk Esterase: x / RBC: x / WBC x   Sq Epi: x / Non Sq Epi: x / Bacteria: x    Culture - Urine (12.12.23 @ 09:10)   Specimen Source: Clean Catch Clean Catch (Midstream)  Culture Results:   No growth  Culture - Surgical Swab (12.11.23 @ 19:09)   - Amoxicillin/Clavulanic Acid: S <=8/4  - Ampicillin: S <=8 These ampicillin results predict results for amoxicillin  - Ampicillin/Sulbactam: S <=4/2  - Aztreonam: S <=4  - Cefazolin: S <=2  - Cefepime: S <=2  - Cefoxitin: S <=8  - Ceftriaxone: S <=1  - Ciprofloxacin: S <=0.25  - Ertapenem: S <=0.5  - Gentamicin: S <=2  - Levofloxacin: S <=0.5  - Meropenem: S <=1  - Piperacillin/Tazobactam: S <=8  - Tobramycin: S <=2  - Trimethoprim/Sulfamethoxazole: S <=0.5/9.5  Specimen Source: .Surgical Swab culture, peritonitis  Culture Results:   Rare Proteus mirabilis   Few Bacteroides distasonis "Susceptibilities not performed"   Rare Most closely resembling Acidaminococcus species "Susceptibilities   not performed"  Organism Identification: Proteus mirabilis  Organism: Proteus mirabilis  Method Type: MICCulture - Blood (12.11.23 @ 10:57)   Specimen Source: .Blood None  Culture Results:   No growth at 4 daysCulture - Blood (12.11.23 @ 10:57)   Gram Stain:   Growth in anaerobic bottle: Gram Negative Rods  - Blood PCR Panel: NEG  Specimen Source: .Blood None  Organism: Blood Culture PCR  Culture Results:   Growth in anaerobic bottle: Bacteroides uniformis   Direct identification is available within approximately 3-5   hours either by Blood Panel Multiplexed PCR or Direct   MALDI-TOF. Details: [email or web address]  Organism Identification: Blood Culture PCR  Method Type: PCR      Radiology: all available radiological tests reviewed  < from: Xray Chest 1 View- PORTABLE-Urgent (Xray Chest 1 View- PORTABLE-Urgent .) (12.11.23 @ 20:17) >    ACC: [id number] EXAM:  XR CHEST PORTABLE URGENT 1V   ORDERED BY: HUGO GREEN     PROCEDURE DATE:  12/11/2023          INTERPRETATION:  Portable chest radiograph    CLINICAL INFORMATION: Post intubation chest radiograph    TECHNIQUE:  Portable  AP chest radiograph.    COMPARISON: 2/11/2023 chest x-ray .    FINDINGS:  CATHETERS AND TUBES: ET tube tip above tracheal bifurcation.  NG tube tip beyond GE junction.    PULMONARY: Mild LEFT effusion and/or basilar airspace disease obscures   LEFT CP angle and portions of LEFT diaphragm contour. Remaining lung   parenchyma grossly clear.  No pneumothorax.    HEART/VASCULAR: The heart and mediastinum size and configuration are   within normal limits.    BONES: Visualized osseous thorax intact.    IMPRESSION:   Catheters and tubes in place.  LEFT lower zone mild pleural effusion and/or patchy basilar airspace   disease..    --- End of Report ---      < end of copied text >  < from: CT Abdomen and Pelvis w/ Oral Cont and w/ IV Cont (12.11.23 @ 12:45) >  ACC: [id number] EXAM:  CT ABDOMEN AND PELVIS OC IC   ORDERED BY: TRAY GÓMEZ     PROCEDURE DATE:  12/11/2023          INTERPRETATION:  CLINICAL INFORMATION: Rule out small bowel obstruction.    COMPARISON: 04/16/2022.    CONTRAST/COMPLICATIONS:  IVContrast: Omnipaque 350  90 cc administered   10 cc discarded  Oral Contrast: Omnipaque 300  Complications: None reported at time of study completion    PROCEDURE:  CT of the Abdomen and Pelvis was performed.  Sagittal and coronal reformats were performed.    FINDINGS:  LOWER CHEST: Bibasilar subsegmental atelectasis, left more than right.   Mild hiatal hernia. Coronary artery calcification.    LIVER: Steatosis.  BILE DUCTS: Mildly dilated. CBD measures approximately 1 cm with smooth   distal tapering. An almost similar appearance was noted on the prior   study. Correlate clinically.  GALLBLADDER: Within normal limits.  SPLEEN: Within normal limits.  PANCREAS: Within normal limits.  ADRENALS: Within normal limits.  KIDNEYS/URETERS: Within normal limits.    BLADDER: Minimally distended.  REPRODUCTIVE ORGANS: Uterus and adnexa within normal limits.    BOWEL: No bowel obstruction. Appendix is not visualized. No evidence of   inflammation in the pericecal region. Evidence of uncomplicated   predominantly sigmoid diverticula. There are small foci of extraluminal   gas at the level of the rectum along with probable rectal pneumatosis.  PERITONEUM: Small volume abdominal and pelvic ascites. Also small volume   pneumoperitoneum.  VESSELS: Atherosclerotic changes. Common origin of the celiac and   superior mesenteric arteries.  RETROPERITONEUM/LYMPH NODES: No lymphadenopathy.  ABDOMINAL WALL: Within normal limits.  BONES: Degenerative changes. Stable moderate compression of L2.    IMPRESSION:  Evidence of pneumoperitoneum. Suspect rectal perforation considering the   presence of perirectal gas and pneumatosis.          Advanced directives addressed: full resuscitation   Date of service: 12-22-23 @ 12:55    pt seen and examined  s/p IR drainage of LLQ collection 12/19   feels better tolerating diet  denies abd discomfort   no fevers     ROS: no fever or chills; denies dizziness, no HA, no SOB or cough, no diarrhea or constipation; no dysuria, no urinary frequency, no legs pain, no rashes      MEDICATIONS  (STANDING):  ampicillin/sulbactam  IVPB 3 Gram(s) IV Intermittent every 6 hours  diltiazem    Tablet 60 milliGRAM(s) Oral every 8 hours  fluconAZOLE IVPB      fluconAZOLE IVPB 200 milliGRAM(s) IV Intermittent every 24 hours  lidocaine   4% Patch 1 Patch Transdermal daily  metoprolol tartrate Injectable 5 milliGRAM(s) IV Push every 5 minutes  pantoprazole  Injectable 40 milliGRAM(s) IV Push two times a day  polyethylene glycol 3350 17 Gram(s) Oral every 12 hours  rivaroxaban 15 milliGRAM(s) Oral two times a day with meals  tamsulosin 0.4 milliGRAM(s) Oral at bedtime    Vital Signs Last 24 Hrs  T(C): 36.7 (22 Dec 2023 08:10), Max: 36.7 (21 Dec 2023 13:33)  T(F): 98.1 (22 Dec 2023 08:10), Max: 98.1 (22 Dec 2023 08:10)  HR: 95 (22 Dec 2023 08:10) (95 - 99)  BP: 123/49 (22 Dec 2023 08:10) (123/49 - 147/46)  BP(mean): 77 (22 Dec 2023 06:00) (63 - 77)  RR: 16 (22 Dec 2023 08:10) (16 - 16)  SpO2: 93% (22 Dec 2023 08:10) (93% - 100%)    Parameters below as of 22 Dec 2023 08:10  Patient On (Oxygen Delivery Method): nasal cannula        PE:  Constitutional: frail looking  HEENT: NC/AT, EOMI, PERRLA, conjunctivae clear; ears and nose atraumatic; pharynx benign  Neck: supple; thyroid not palpable  Back: no tenderness  Respiratory: respiratory effort normal; clear to auscultation  Cardiovascular: S1S2 regular, no murmurs  Abdomen: soft, not tender, not distended, positive BS; liver and spleen WNL surgical sites clean shanta drain in place   Genitourinary: no suprapubic tenderness  Lymphatic: no LN palpable  Musculoskeletal: no muscle tenderness, no joint swelling or tenderness  Extremities: no pedal edema  Neurological/ Psychiatric:   moving all extremities  Skin: no rashes; no palpable lesions    Labs: all available labs reviewed                                   8.7    15.42 )-----------( 382      ( 22 Dec 2023 07:09 )             26.5     12-22    137  |  107  |  18  ----------------------------<  105<H>  4.3   |  25  |  0.80    Ca    7.7<L>      22 Dec 2023 07:09  Phos  3.3     12-21  Mg     2.1     12-21    TPro  5.8<L>  /  Alb  1.9<L>  /  TBili  0.4  /  DBili  x   /  AST  25  /  ALT  27  /  AlkPhos  60  12-21         Urinalysis Basic - ( 16 Dec 2023 04:39 )    Color: x / Appearance: x / SG: x / pH: x  Gluc: 150 mg/dL / Ketone: x  / Bili: x / Urobili: x   Blood: x / Protein: x / Nitrite: x   Leuk Esterase: x / RBC: x / WBC x   Sq Epi: x / Non Sq Epi: x / Bacteria: x    Culture - Urine (12.12.23 @ 09:10)   Specimen Source: Clean Catch Clean Catch (Midstream)  Culture Results:   No growth  Culture - Surgical Swab (12.11.23 @ 19:09)   - Amoxicillin/Clavulanic Acid: S <=8/4  - Ampicillin: S <=8 These ampicillin results predict results for amoxicillin  - Ampicillin/Sulbactam: S <=4/2  - Aztreonam: S <=4  - Cefazolin: S <=2  - Cefepime: S <=2  - Cefoxitin: S <=8  - Ceftriaxone: S <=1  - Ciprofloxacin: S <=0.25  - Ertapenem: S <=0.5  - Gentamicin: S <=2  - Levofloxacin: S <=0.5  - Meropenem: S <=1  - Piperacillin/Tazobactam: S <=8  - Tobramycin: S <=2  - Trimethoprim/Sulfamethoxazole: S <=0.5/9.5  Specimen Source: .Surgical Swab culture, peritonitis  Culture Results:   Rare Proteus mirabilis   Few Bacteroides distasonis "Susceptibilities not performed"   Rare Most closely resembling Acidaminococcus species "Susceptibilities   not performed"  Organism Identification: Proteus mirabilis  Organism: Proteus mirabilis  Method Type: MICCulture - Blood (12.11.23 @ 10:57)   Specimen Source: .Blood None  Culture Results:   No growth at 4 daysCulture - Blood (12.11.23 @ 10:57)   Gram Stain:   Growth in anaerobic bottle: Gram Negative Rods  - Blood PCR Panel: NEG  Specimen Source: .Blood None  Organism: Blood Culture PCR  Culture Results:   Growth in anaerobic bottle: Bacteroides uniformis   Direct identification is available within approximately 3-5   hours either by Blood Panel Multiplexed PCR or Direct   MALDI-TOF. Details: [email or web address]  Organism Identification: Blood Culture PCR  Method Type: PCR      Radiology: all available radiological tests reviewed  < from: Xray Chest 1 View- PORTABLE-Urgent (Xray Chest 1 View- PORTABLE-Urgent .) (12.11.23 @ 20:17) >    ACC: [id number] EXAM:  XR CHEST PORTABLE URGENT 1V   ORDERED BY: HUGO GREEN     PROCEDURE DATE:  12/11/2023          INTERPRETATION:  Portable chest radiograph    CLINICAL INFORMATION: Post intubation chest radiograph    TECHNIQUE:  Portable  AP chest radiograph.    COMPARISON: 2/11/2023 chest x-ray .    FINDINGS:  CATHETERS AND TUBES: ET tube tip above tracheal bifurcation.  NG tube tip beyond GE junction.    PULMONARY: Mild LEFT effusion and/or basilar airspace disease obscures   LEFT CP angle and portions of LEFT diaphragm contour. Remaining lung   parenchyma grossly clear.  No pneumothorax.    HEART/VASCULAR: The heart and mediastinum size and configuration are   within normal limits.    BONES: Visualized osseous thorax intact.    IMPRESSION:   Catheters and tubes in place.  LEFT lower zone mild pleural effusion and/or patchy basilar airspace   disease..    --- End of Report ---      < end of copied text >  < from: CT Abdomen and Pelvis w/ Oral Cont and w/ IV Cont (12.11.23 @ 12:45) >  ACC: [id number] EXAM:  CT ABDOMEN AND PELVIS OC IC   ORDERED BY: TRAY GÓMEZ     PROCEDURE DATE:  12/11/2023          INTERPRETATION:  CLINICAL INFORMATION: Rule out small bowel obstruction.    COMPARISON: 04/16/2022.    CONTRAST/COMPLICATIONS:  IVContrast: Omnipaque 350  90 cc administered   10 cc discarded  Oral Contrast: Omnipaque 300  Complications: None reported at time of study completion    PROCEDURE:  CT of the Abdomen and Pelvis was performed.  Sagittal and coronal reformats were performed.    FINDINGS:  LOWER CHEST: Bibasilar subsegmental atelectasis, left more than right.   Mild hiatal hernia. Coronary artery calcification.    LIVER: Steatosis.  BILE DUCTS: Mildly dilated. CBD measures approximately 1 cm with smooth   distal tapering. An almost similar appearance was noted on the prior   study. Correlate clinically.  GALLBLADDER: Within normal limits.  SPLEEN: Within normal limits.  PANCREAS: Within normal limits.  ADRENALS: Within normal limits.  KIDNEYS/URETERS: Within normal limits.    BLADDER: Minimally distended.  REPRODUCTIVE ORGANS: Uterus and adnexa within normal limits.    BOWEL: No bowel obstruction. Appendix is not visualized. No evidence of   inflammation in the pericecal region. Evidence of uncomplicated   predominantly sigmoid diverticula. There are small foci of extraluminal   gas at the level of the rectum along with probable rectal pneumatosis.  PERITONEUM: Small volume abdominal and pelvic ascites. Also small volume   pneumoperitoneum.  VESSELS: Atherosclerotic changes. Common origin of the celiac and   superior mesenteric arteries.  RETROPERITONEUM/LYMPH NODES: No lymphadenopathy.  ABDOMINAL WALL: Within normal limits.  BONES: Degenerative changes. Stable moderate compression of L2.    IMPRESSION:  Evidence of pneumoperitoneum. Suspect rectal perforation considering the   presence of perirectal gas and pneumatosis.          Advanced directives addressed: full resuscitation

## 2023-12-22 NOTE — DISCHARGE NOTE PROVIDER - NSDCMRMEDTOKEN_GEN_ALL_CORE_FT
Augmentin 875 mg-125 mg oral tablet: 1 tab(s) orally 2 times a day  dilTIAZem 60 mg oral tablet: 1 tab(s) orally every 8 hours  lidocaine 4% topical film: Apply topically to affected area once a day  Multiple Vitamins oral tablet: 1 tab(s) orally once a day  polyethylene glycol 3350 oral powder for reconstitution: 17 gram(s) orally every 12 hours  rivaroxaban 15 mg oral tablet: 1 tab(s) orally 2 times a day (with meals)  tamsulosin 0.4 mg oral capsule: 1 cap(s) orally once a day (at bedtime)   Augmentin 875 mg-125 mg oral tablet: 1 tab(s) orally 2 times a day  dilTIAZem 60 mg oral tablet: 1 tab(s) orally every 8 hours  fluconazole 200 mg oral tablet: 1 tab(s) orally once a day  lidocaine 4% topical film: Apply topically to affected area once a day  Multiple Vitamins oral tablet: 1 tab(s) orally once a day  polyethylene glycol 3350 oral powder for reconstitution: 17 gram(s) orally every 12 hours  rivaroxaban 15 mg oral tablet: 1 tab(s) orally 2 times a day (with meals)  tamsulosin 0.4 mg oral capsule: 1 cap(s) orally once a day (at bedtime)   Augmentin 875 mg-125 mg oral tablet: 1 tab(s) orally 2 times a day  dilTIAZem 60 mg oral tablet: 1 tab(s) orally every 8 hours  fluconazole 200 mg oral tablet: 1 tab(s) orally once a day  lidocaine 4% topical film: Apply topically to affected area once a day  Multiple Vitamins oral tablet: 1 tab(s) orally once a day  polyethylene glycol 3350 oral powder for reconstitution: 17 gram(s) orally every 12 hours  rivaroxaban 15 mg oral tablet: 1 tab(s) orally 2 times a day (with meals)  tamsulosin 0.4 mg oral capsule: 1 cap(s) orally once a day (at bedtime)  Tylenol 325 mg oral tablet: 2 tab(s) orally every 6 hours as needed for  moderate pain

## 2023-12-22 NOTE — PROGRESS NOTE ADULT - SUBJECTIVE AND OBJECTIVE BOX
HOSPITALIST ATTENDING PROGRESS NOTE    Chart and meds reviewed.      Subjective: Patient seen and examined. Patient resting comfortably.  WBC is down to 15.  Continue antibiotics as per ID.  Discussed with surgery team.  Possible discharge today.      Additional results/Imaging, I have personally reviewed:    LABS:                            8.7    15.42 )-----------( 382      ( 22 Dec 2023 07:09 )             26.5     12-22    137  |  107  |  18  ----------------------------<  105<H>  4.3   |  25  |  0.80    Ca    7.7<L>      22 Dec 2023 07:09  Phos  3.3     12-21  Mg     2.1     12-21    TPro  5.8<L>  /  Alb  1.9<L>  /  TBili  0.4  /  DBili  x   /  AST  25  /  ALT  27  /  AlkPhos  60  12-21        LIVER FUNCTIONS - ( 21 Dec 2023 05:08 )  Alb: 1.9 g/dL / Pro: 5.8 gm/dL / ALK PHOS: 60 U/L / ALT: 27 U/L / AST: 25 U/L / GGT: x           PTT - ( 21 Dec 2023 12:56 )  PTT:94.7 sec  Urinalysis Basic - ( 22 Dec 2023 07:09 )    Color: x / Appearance: x / SG: x / pH: x  Gluc: 105 mg/dL / Ketone: x  / Bili: x / Urobili: x   Blood: x / Protein: x / Nitrite: x   Leuk Esterase: x / RBC: x / WBC x   Sq Epi: x / Non Sq Epi: x / Bacteria: x              All other systems reviewed and found to be negative with the exception of what has been described above.    MEDICATIONS  (STANDING):  ampicillin/sulbactam  IVPB 3 Gram(s) IV Intermittent every 6 hours  diltiazem    Tablet 60 milliGRAM(s) Oral every 8 hours  fluconAZOLE IVPB      fluconAZOLE IVPB 200 milliGRAM(s) IV Intermittent every 24 hours  lidocaine   4% Patch 1 Patch Transdermal daily  metoprolol tartrate Injectable 5 milliGRAM(s) IV Push every 5 minutes  pantoprazole  Injectable 40 milliGRAM(s) IV Push two times a day  polyethylene glycol 3350 17 Gram(s) Oral every 12 hours  rivaroxaban 15 milliGRAM(s) Oral two times a day with meals  tamsulosin 0.4 milliGRAM(s) Oral at bedtime    MEDICATIONS  (PRN):  acetaminophen   IVPB .. 1000 milliGRAM(s) IV Intermittent once PRN Temp greater or equal to 38C (100.4F), Mild Pain (1 - 3)  ondansetron Injectable 4 milliGRAM(s) IV Push every 6 hours PRN Nausea      VITALS:  T(F): 98.1 (12-22-23 @ 08:10), Max: 98.1 (12-22-23 @ 08:10)  HR: 95 (12-22-23 @ 08:10) (95 - 99)  BP: 123/49 (12-22-23 @ 08:10) (123/49 - 147/46)  RR: 16 (12-22-23 @ 08:10) (16 - 16)  SpO2: 93% (12-22-23 @ 08:10) (93% - 100%)  Wt(kg): --    I&O's Summary    21 Dec 2023 07:01  -  22 Dec 2023 07:00  --------------------------------------------------------  IN: 0 mL / OUT: 1005 mL / NET: -1005 mL    22 Dec 2023 07:01  -  22 Dec 2023 14:25  --------------------------------------------------------  IN: 0 mL / OUT: 15 mL / NET: -15 mL        CAPILLARY BLOOD GLUCOSE          PHYSICAL EXAM:  Gen: No acute distress   HEENT:  pupils equal and reactive, EOMI,   NECK:   supple, no carotid bruits, No JVD  CV:  +S1, +S2, regular rate rhythm, no murmurs or rubs  RESP:   lungs clear to auscultation bilaterally, no wheezing, rales, rhonchi, good air entry bilaterally  GI:  abdomen soft, non-tender, non-distended, normal BS, no bruits, no abdominal masses, no palpable masses  MSK:   normal muscle tone, no atrophy, no rigidity, no contractions  EXT:  no clubbing, no cyanosis, no edema, no calf pain, swelling or erythema  VASCULAR:  pulses equal and symmetric in the upper and lower extremities  NEURO:  AAOX3, no focal neurological deficits, follows all commands, able to move extremities spontaneously  SKIN:  no ulcers, lesions or rashes      CULTURES:      Telemetry, personally reviewed

## 2023-12-22 NOTE — DISCHARGE NOTE PROVIDER - NPI NUMBER (FOR SYSADMIN USE ONLY) :
[[id number]],[[id number] [[id number]],[[id number]] [[id number]],[[id number]],[[id number]],[[id number]] [[id number]],[[id number]],[[id number]]

## 2023-12-22 NOTE — DISCHARGE NOTE PROVIDER - CARE PROVIDERS DIRECT ADDRESSES
,DirectAddress_Unknown,DirectAddress_Unknown ,DirectAddress_Unknown,DirectAddress_Unknown,[email or web address],DirectAddress_Unknown ,DirectAddress_Unknown,DirectAddress_Unknown,[email or web address],DirectAddress_Unknown

## 2023-12-22 NOTE — DISCHARGE NOTE PROVIDER - CARE PROVIDER_API CALL
Pratibha Armenta  Colon/Rectal Surgery  [address], [place] [address]  Phone: [phone]  Fax: [phone]  Follow Up Time: 2 weeks    Ronan Ramos Ramirez  Urology  [address], [address]  [place] [address]  Phone: [phone]  Fax: [phone]  Follow Up Time: 2 weeks   Pratibha Armenta  Colon/Rectal Surgery  [address], [place] [address]  Phone: [phone]  Fax: [phone]  Follow Up Time: 2 weeks    Ronan Ramos Ramirez  Urology  [address], [address]  [place] [address]  Phone: [phone]  Fax: [phone]  Follow Up Time: 2 weeks   Pratibha Armenta  Colon/Rectal Surgery  [address], [place] [address]  Phone: [phone]  Fax: [phone]  Follow Up Time: 2 weeks    Ronan Ramos  Urology  [address], [address]  [place] [address]  Phone: [phone]  Fax: [phone]  Follow Up Time: 2 weeks    Carlos Rojas  Interventional Cardiology  [address], [place] [address]  Phone: [phone]  Fax: [phone]  Follow Up Time: 2 weeks    Angelina Mclaughlin  Infectious Disease  [address], [address], [place] [address]  Phone: [phone]  Fax: [phone]  Follow Up Time: 1 week   Pratibha Armenta  Colon/Rectal Surgery  [address], [place] [address]  Phone: [phone]  Fax: [phone]  Follow Up Time: 2 weeks    Ronan Ramos  Urology  [address], [address]  [place] [address]  Phone: [phone]  Fax: [phone]  Follow Up Time: 2 weeks    Carlos Rojas  Interventional Cardiology  [address], [place] [address]  Phone: [phone]  Fax: [phone]  Follow Up Time: 2 weeks    Angelina Mclaughlin  Infectious Disease  [address], [address], [place] [address]  Phone: [phone]  Fax: [phone]  Follow Up Time: 1 week

## 2023-12-22 NOTE — DISCHARGE NOTE PROVIDER - NSDCCPCAREPLAN_GEN_ALL_CORE_FT
PRINCIPAL DISCHARGE DIAGNOSIS  Diagnosis: Perforated abdominal viscus  Assessment and Plan of Treatment:       SECONDARY DISCHARGE DIAGNOSES  Diagnosis: Urinary retention  Assessment and Plan of Treatment:     Diagnosis: Perforation of colon  Assessment and Plan of Treatment: from stercocolitis

## 2023-12-22 NOTE — PROGRESS NOTE ADULT - SUBJECTIVE AND OBJECTIVE BOX
Patient seen and examined on routine rounds.   She denies nausea, vomiting, fever or chills. Tolerating diet, pain well controlled   Passed TOV after daugherty removed  Nurse report no acute event overnight   VS reviewed    Physical Exam:  General: AOx3, Well developed, NAD  HEENT: NC/AT, normal pinnae and tragi  Chest: Normal respiratory effort, equal chest rise. On 4L INC  Heart: regular rate, normotensive  Abdomen: midline dressing c/d/i, Ostomy pink and patent, semisolid stool, gas in bag, incision healing well, no erythema or induration  Neuro/Psych: No localized deficits. Normal speech, normal tone, normal affect  Skin: Normal, warm, no rashes, no lesions noted  Extremities: Warm, well perfused, no edema    Vital Signs Last 24 Hrs  T(C): 36.5 (21 Dec 2023 22:10), Max: 36.7 (21 Dec 2023 13:33)  T(F): 97.7 (21 Dec 2023 22:10), Max: 98 (21 Dec 2023 13:33)  HR: 99 (21 Dec 2023 22:10) (94 - 100)  BP: 143/42 (21 Dec 2023 22:10) (123/41 - 147/46)  BP(mean): 63 (21 Dec 2023 22:10) (63 - 70)  RR: 16 (21 Dec 2023 22:10) (16 - 18)  SpO2: 93% (21 Dec 2023 22:10) (93% - 100%)    Parameters below as of 21 Dec 2023 22:10  Patient On (Oxygen Delivery Method): nasal cannula    I&O's Detail    20 Dec 2023 07:01  -  21 Dec 2023 07:00  --------------------------------------------------------  IN:  Total IN: 0 mL    OUT:    Colostomy (mL): 525 mL    Drain (mL): 120 mL    Indwelling Catheter - Urethral (mL): 2325 mL    T-Tube (mL): 75 mL  Total OUT: 3045 mL    Total NET: -3045 mL      21 Dec 2023 07:01  -  22 Dec 2023 05:09  --------------------------------------------------------  IN:  Total IN: 0 mL    OUT:    Indwelling Catheter - Urethral (mL): 1000 mL    T-Tube (mL): 5 mL  Total OUT: 1005 mL    Total NET: -1005 mL      MEDICATIONS  (STANDING):  ampicillin/sulbactam  IVPB 3 Gram(s) IV Intermittent every 6 hours  diltiazem    Tablet 60 milliGRAM(s) Oral every 8 hours  fluconAZOLE IVPB      fluconAZOLE IVPB 200 milliGRAM(s) IV Intermittent every 24 hours  lidocaine   4% Patch 1 Patch Transdermal daily  metoprolol tartrate Injectable 5 milliGRAM(s) IV Push every 5 minutes  pantoprazole  Injectable 40 milliGRAM(s) IV Push two times a day  polyethylene glycol 3350 17 Gram(s) Oral every 12 hours  rivaroxaban 15 milliGRAM(s) Oral two times a day with meals  tamsulosin 0.4 milliGRAM(s) Oral at bedtime    MEDICATIONS  (PRN):  acetaminophen   IVPB .. 1000 milliGRAM(s) IV Intermittent once PRN Temp greater or equal to 38C (100.4F), Mild Pain (1 - 3)  ondansetron Injectable 4 milliGRAM(s) IV Push every 6 hours PRN Nausea

## 2023-12-22 NOTE — PROGRESS NOTE ADULT - ASSESSMENT
83 year old female presented with colonic perforation 2/2 stercoral ulcer, now POD 10 s/p Christina  s/p IR drain placement 12/19 for intra-abdominal collection   Leucocytosis   Afib now on xarelto    Plan  Cont low residue diet   Cont oral AC   Monitor ostomy function, IR drain   Pain & nausea control PRN  Cardiology recs appreciated  Hospitalist recs  Incentive spirometry, wean oxygen  Cont abx and antifungal per ID recs  Replace electrolytes PRN  PT anticipates RONALD  Will need ostomy teaching  Urology on board- passed TOV overnight after daugherty removed   Discussed with attending.

## 2023-12-22 NOTE — DISCHARGE NOTE PROVIDER - NSDCFUADDAPPT_GEN_ALL_CORE_FT
Interventional Radiology on 12/28/23 at 8:30 AM at [facility], please provide a record of daily drain output at this appointment. Interventional Radiology on 12/28/23 at 8:30 AM at [facility], please provide a record of daily drain output at this appointment.

## 2023-12-22 NOTE — PROGRESS NOTE ADULT - ASSESSMENT
84 y/o female with a PMHx of COPD, HLD presents to the ED c/o abd pain, n/v and constipation x2 days. Denies CP, SOB, urinary symptoms. NKDA. Nonsmoker. No EtOH use. No other complaints at this time. Pt found to have septic shock d/t stercoral ulcer and colonic perforation eval by surgery taken to OR on 12/11 s/p Hartmanns procedure, abd washout noted to have feculent peritonitis, cx sent, blood cx growing Bacteroides, Body fluid cx growing proteus, bacteroides, Acidaminococcus has been on IV zosyn perioperatively.     1. Sepsis with Bacteroides species. Abdominal perforation. Feculent peritonitis. S/p Hartmanns procedure/abd washout  - imaging reviewed repeat CT noted, + LLQ abd collection s/p IR drainage 12/19 f/u cx no growth   - OR cultures growing bacteroides, acidaminococcus, proteus   - blood cx 12/11 growing bacteroides  - s/p zosyn 12/11-12/14  - now on rocephin since 12/14  - on unasyn 3gmq6h #7  - on antifungal coverage diflucan 200mg daily #3   - continue with abx coverage   - repeat blood cx no growth 12/16   - on dc po augmentin [id number] 14 days until 12/30  - complete 7 days po diflucan on dc   - surgical f/u noted  - tolerating abx well so far; no side effects noted  - reason for abx use and side effects reviewed with patient  - fu cbc     2. other issues -care per medicine  84 y/o female with a PMHx of COPD, HLD presents to the ED c/o abd pain, n/v and constipation x2 days. Denies CP, SOB, urinary symptoms. NKDA. Nonsmoker. No EtOH use. No other complaints at this time. Pt found to have septic shock d/t stercoral ulcer and colonic perforation eval by surgery taken to OR on 12/11 s/p Hartmanns procedure, abd washout noted to have feculent peritonitis, cx sent, blood cx growing Bacteroides, Body fluid cx growing proteus, bacteroides, Acidaminococcus has been on IV zosyn perioperatively.     1. Sepsis with Bacteroides species. Abdominal perforation. Feculent peritonitis. S/p Hartmanns procedure/abd washout  - imaging reviewed repeat CT noted, + LLQ abd collection s/p IR drainage 12/19 f/u cx no growth   - OR cultures growing bacteroides, acidaminococcus, proteus   - blood cx 12/11 growing bacteroides  - s/p zosyn 12/11-12/14  - now on rocephin since 12/14  - on unasyn 3gmq6h #7  - on antifungal coverage diflucan 200mg daily #3   - continue with abx coverage   - repeat blood cx no growth 12/16   - on dc po augmentin [id number] 14 days until 12/30  - complete 7 days po diflucan on dc   - surgical f/u noted  - tolerating abx well so far; no side effects noted  - reason for abx use and side effects reviewed with patient  - fu cbc     2. other issues -care per medicine

## 2023-12-23 ENCOUNTER — TRANSCRIPTION ENCOUNTER (OUTPATIENT)
Age: 83
End: 2023-12-23

## 2023-12-23 VITALS
HEART RATE: 86 BPM | RESPIRATION RATE: 18 BRPM | OXYGEN SATURATION: 96 % | DIASTOLIC BLOOD PRESSURE: 50 MMHG | TEMPERATURE: 98 F | SYSTOLIC BLOOD PRESSURE: 130 MMHG

## 2023-12-23 RX ORDER — ACETAMINOPHEN 500 MG
2 TABLET ORAL
Qty: 50 | Refills: 0
Start: 2023-12-23

## 2023-12-23 RX ADMIN — Medication 60 MILLIGRAM(S): at 05:43

## 2023-12-23 RX ADMIN — LIDOCAINE 1 PATCH: 4 CREAM TOPICAL at 10:12

## 2023-12-23 RX ADMIN — RIVAROXABAN 15 MILLIGRAM(S): KIT at 10:12

## 2023-12-23 RX ADMIN — AMPICILLIN SODIUM AND SULBACTAM SODIUM 200 GRAM(S): 250; 125 INJECTION, POWDER, FOR SUSPENSION INTRAMUSCULAR; INTRAVENOUS at 12:22

## 2023-12-23 RX ADMIN — AMPICILLIN SODIUM AND SULBACTAM SODIUM 200 GRAM(S): 250; 125 INJECTION, POWDER, FOR SUSPENSION INTRAMUSCULAR; INTRAVENOUS at 05:37

## 2023-12-23 RX ADMIN — PANTOPRAZOLE SODIUM 40 MILLIGRAM(S): 20 TABLET, DELAYED RELEASE ORAL at 10:14

## 2023-12-23 RX ADMIN — FLUCONAZOLE 100 MILLIGRAM(S): 150 TABLET ORAL at 13:12

## 2023-12-23 RX ADMIN — POLYETHYLENE GLYCOL 3350 17 GRAM(S): 17 POWDER, FOR SOLUTION ORAL at 10:14

## 2023-12-23 RX ADMIN — Medication 60 MILLIGRAM(S): at 13:13

## 2023-12-23 NOTE — DISCHARGE NOTE NURSING/CASE MANAGEMENT/SOCIAL WORK - NSDCFUADDAPPT_GEN_ALL_CORE_FT
Interventional Radiology on 12/28/23 at 8:30 AM at [facility], please provide a record of daily drain output at this appointment. Interventional Radiology on 12/28/23 at 8:30 AM at [facility], please provide a record of daily drain output at this appointment.

## 2023-12-23 NOTE — PROGRESS NOTE ADULT - REASON FOR ADMISSION
colonic perforation with pneumoperitoneaum
colonic perforation with pneumoperitoneum
colonic perforation with pneumoperitoneum
colonic perforation with pneumoperitoneaum
colonic perforation with pneumoperitoneum
colonic perforation with pneumoperitoneaum
colonic perforation with pneumoperitoneaum
colonic perforation with pneumoperitoneum
colonic perforation with pneumoperitoneaum
colonic perforation with pneumoperitoneaum
colonic perforation with pneumoperitoneum
colonic perforation with pneumoperitoneum
colonic perforation with pneumoperitoneaum
colonic perforation with pneumoperitoneaum

## 2023-12-23 NOTE — PROGRESS NOTE ADULT - NUTRITIONAL ASSESSMENT
This patient has been assessed with a concern for Malnutrition and has been determined to have a diagnosis/diagnoses of Severe protein-calorie malnutrition.    This patient is being managed with:   lipid fat emulsion (Fish Oil and Plant Based) 20% Infusion-[Known as SMOFLIPID 20% Infusion]  50 Gram(s) in IV Solution 250 milliLiter(s) infuse at 20.83 mL/Hr  Dose Rate: 0.6849 Gm/kG/Day Infuse Over: 12 Hours; Stop After 12 Hours  Administration Instructions: Use 1.2 micron in-line filter  Entered: Dec 19 2023 10:00PM    Parenteral Nutrition - Adult-  Entered: Dec 19 2023 10:00PM    lipid fat emulsion (Fish Oil and Plant Based) 20% Infusion-[Known as SMOFLIPID 20% Infusion]  50 Gram(s) in IV Solution 250 milliLiter(s) infuse at 20.8 mL/Hr  Dose Rate: 0.685 Gm/kG/Day Infuse Over: 12 Hours; Stop After 12 Hours  Administration Instructions: Use 1.2 micron in-line filter  Entered: Dec 18 2023 10:00PM    Parenteral Nutrition - Adult-  Entered: Dec 18 2023 10:00PM    Diet NPO after Midnight-     NPO Start Date: 18-Dec-2023   NPO Start Time: 23:59  Entered: Dec 18 2023  3:17PM    Diet Full Liquid-  Supplement Feeding Modality:  Oral  Ensure Muscle Health Cans or Servings Per Day:  3       Frequency:  Three Times a day  Entered: Dec 18 2023  3:15PM  
This patient has been assessed with a concern for Malnutrition and has been determined to have a diagnosis/diagnoses of Severe protein-calorie malnutrition.    This patient is being managed with:   Parenteral Nutrition - Adult-  Entered: Dec 14 2023 10:00PM    Diet NPO-  Entered: Dec 11 2023  1:53PM  
This patient has been assessed with a concern for Malnutrition and has been determined to have a diagnosis/diagnoses of Severe protein-calorie malnutrition.    This patient is being managed with:   lipid fat emulsion (Fish Oil and Plant Based) 20% Infusion-[Known as SMOFLIPID 20% Infusion]  50 Gram(s) in IV Solution 250 milliLiter(s) infuse at 20.83 mL/Hr  Dose Rate: 0.6849 Gm/kG/Day Infuse Over: 12 Hours; Stop After 12 Hours  Administration Instructions: Use 1.2 micron in-line filter  Entered: Dec 19 2023 10:00PM    Parenteral Nutrition - Adult-  Entered: Dec 19 2023 10:00PM    Diet Full Liquid-  Supplement Feeding Modality:  Oral  Ensure Muscle Health Cans or Servings Per Day:  3       Frequency:  Three Times a day  Entered: Dec 18 2023  3:15PM  
This patient has been assessed with a concern for Malnutrition and has been determined to have a diagnosis/diagnoses of Severe protein-calorie malnutrition.    This patient is being managed with:   Diet Low Fiber-  Entered: Dec 20 2023  6:26AM  
This patient has been assessed with a concern for Malnutrition and has been determined to have a diagnosis/diagnoses of Severe protein-calorie malnutrition.    This patient is being managed with:   lipid fat emulsion (Fish Oil and Plant Based) 20% Infusion-[Known as SMOFLIPID 20% Infusion]  50 Gram(s) in IV Solution 250 milliLiter(s) infuse at 20.8 mL/Hr  Dose Rate: 20.83 mL/Hr Infuse Over: 12 Hours; Stop After 12 Hours  Administration Instructions: Use 1.2 micron in-line filter  Entered: Dec 17 2023 10:00PM    Parenteral Nutrition - Adult-  Entered: Dec 17 2023 10:00PM    Diet Full Liquid-  Entered: Dec 17 2023  8:24AM    Parenteral Nutrition - Adult-  Entered: Dec 16 2023 10:00PM  
This patient has been assessed with a concern for Malnutrition and has been determined to have a diagnosis/diagnoses of Severe protein-calorie malnutrition.    This patient is being managed with:   Parenteral Nutrition - Adult-  Entered: Dec 15 2023 10:00PM    Diet NPO-  Except Medications  With Chewing Gum  With Hard Candy  With Ice Chips/Sips of Water  Entered: Dec 15 2023 11:02AM    Parenteral Nutrition - Adult-  Entered: Dec 14 2023 10:00PM  
This patient has been assessed with a concern for Malnutrition and has been determined to have a diagnosis/diagnoses of Severe protein-calorie malnutrition.    This patient is being managed with:   Diet Low Fiber-  Entered: Dec 20 2023  6:26AM  
This patient has been assessed with a concern for Malnutrition and has been determined to have a diagnosis/diagnoses of Severe protein-calorie malnutrition.    This patient is being managed with:   Diet Low Fiber-  Entered: Dec 20 2023  6:26AM  
This patient has been assessed with a concern for Malnutrition and has been determined to have a diagnosis/diagnoses of Severe protein-calorie malnutrition.    This patient is being managed with:   lipid fat emulsion (Fish Oil and Plant Based) 20% Infusion-[Known as SMOFLIPID 20% Infusion]  50 Gram(s) in IV Solution 250 milliLiter(s) infuse at 20.8 mL/Hr  Dose Rate: 0.685 Gm/kG/Day Infuse Over: 12 Hours; Stop After 12 Hours  Administration Instructions: Use 1.2 micron in-line filter  Entered: Dec 18 2023 10:00PM    Parenteral Nutrition - Adult-  Entered: Dec 18 2023 10:00PM    Diet NPO-  Except Medications  Entered: Dec 18 2023  9:04AM    lipid fat emulsion (Fish Oil and Plant Based) 20% Infusion-[Known as SMOFLIPID 20% Infusion]  50 Gram(s) in IV Solution 250 milliLiter(s) infuse at 20.8 mL/Hr  Dose Rate: 20.83 mL/Hr Infuse Over: 12 Hours; Stop After 12 Hours  Administration Instructions: Use 1.2 micron in-line filter  Entered: Dec 17 2023 10:00PM    Parenteral Nutrition - Adult-  Entered: Dec 17 2023 10:00PM  
This patient has been assessed with a concern for Malnutrition and has been determined to have a diagnosis/diagnoses of Severe protein-calorie malnutrition.    This patient is being managed with:   lipid fat emulsion (Fish Oil and Plant Based) 20% Infusion-[Known as SMOFLIPID 20% Infusion]  50 Gram(s) in IV Solution 250 milliLiter(s) infuse at 20.83 mL/Hr  Dose Rate: 0.6849 Gm/kG/Day Infuse Over: 12 Hours; Stop After 12 Hours  Administration Instructions: Use 1.2 micron in-line filter  Entered: Dec 20 2023 10:00PM    Parenteral Nutrition - Adult-  Entered: Dec 20 2023 10:00PM    Diet Low Fiber-  Entered: Dec 20 2023  6:26AM    Parenteral Nutrition - Adult-  Entered: Dec 19 2023 10:00PM  
This patient has been assessed with a concern for Malnutrition and has been determined to have a diagnosis/diagnoses of Severe protein-calorie malnutrition.    This patient is being managed with:   lipid fat emulsion (Fish Oil and Plant Based) 20% Infusion-[Known as SMOFLIPID 20% Infusion]  50 Gram(s) in IV Solution 250 milliLiter(s) infuse at 20.9 mL/Hr  Dose Rate: 0.69 Gm/kG/Day Infuse Over: 12 Hours; Stop After 12 Hours  Administration Instructions: Use 1.2 micron in-line filter  Entered: Dec 16 2023 10:00PM    Parenteral Nutrition - Adult-  Entered: Dec 16 2023 10:00PM    Diet Clear Liquid-  Supplement Feeding Modality:  Oral  Ensure Clear Cans or Servings Per Day:  3       Frequency:  Three Times a day  Entered: Dec 16 2023 10:43AM    Parenteral Nutrition - Adult-  Entered: Dec 15 2023 10:00PM  
This patient has been assessed with a concern for Malnutrition and has been determined to have a diagnosis/diagnoses of Severe protein-calorie malnutrition.    This patient is being managed with:   Diet Low Fiber-  Entered: Dec 20 2023  6:26AM  
This patient has been assessed with a concern for Malnutrition and has been determined to have a diagnosis/diagnoses of Severe protein-calorie malnutrition.    This patient is being managed with:   lipid fat emulsion (Fish Oil and Plant Based) 20% Infusion-[Known as SMOFLIPID 20% Infusion]  50 Gram(s) in IV Solution 250 milliLiter(s) infuse at 20.8 mL/Hr  Dose Rate: 0.685 Gm/kG/Day Infuse Over: 12 Hours; Stop After 12 Hours  Administration Instructions: Use 1.2 micron in-line filter  Entered: Dec 18 2023 10:00PM    Parenteral Nutrition - Adult-  Entered: Dec 18 2023 10:00PM    Diet NPO after Midnight-     NPO Start Date: 18-Dec-2023   NPO Start Time: 23:59  Entered: Dec 18 2023  3:17PM    Diet Full Liquid-  Supplement Feeding Modality:  Oral  Ensure Muscle Health Cans or Servings Per Day:  3       Frequency:  Three Times a day  Entered: Dec 18 2023  3:15PM    Parenteral Nutrition - Adult-  Entered: Dec 17 2023 10:00PM  
This patient has been assessed with a concern for Malnutrition and has been determined to have a diagnosis/diagnoses of Severe protein-calorie malnutrition.    This patient is being managed with:   lipid fat emulsion (Fish Oil and Plant Based) 20% Infusion-[Known as SMOFLIPID 20% Infusion]  50 Gram(s) in IV Solution 250 milliLiter(s) infuse at 20.8 mL/Hr  Dose Rate: 20.83 mL/Hr Infuse Over: 12 Hours; Stop After 12 Hours  Administration Instructions: Use 1.2 micron in-line filter  Entered: Dec 17 2023 10:00PM    Parenteral Nutrition - Adult-  Entered: Dec 17 2023 10:00PM    Diet Full Liquid-  Entered: Dec 17 2023  8:24AM    lipid fat emulsion (Fish Oil and Plant Based) 20% Infusion-[Known as SMOFLIPID 20% Infusion]  50 Gram(s) in IV Solution 250 milliLiter(s) infuse at 20.9 mL/Hr  Dose Rate: 0.69 Gm/kG/Day Infuse Over: 12 Hours; Stop After 12 Hours  Administration Instructions: Use 1.2 micron in-line filter  Entered: Dec 16 2023 10:00PM    Parenteral Nutrition - Adult-  Entered: Dec 16 2023 10:00PM  
This patient has been assessed with a concern for Malnutrition and has been determined to have a diagnosis/diagnoses of Severe protein-calorie malnutrition.    This patient is being managed with:   Parenteral Nutrition - Adult-  Entered: Dec 14 2023 10:00PM    Diet NPO-  Entered: Dec 11 2023  1:53PM

## 2023-12-23 NOTE — DISCHARGE NOTE NURSING/CASE MANAGEMENT/SOCIAL WORK - NSDCPEFALRISK_GEN_ALL_CORE
For information on Fall & Injury Prevention, visit: https://www.[facility]/news/fall-prevention-protects-and-maintains-health-and-mobility OR  https://www.[facility]/news/fall-prevention-tips-to-avoid-injury OR  https://www.cdc.gov/steadi/patient.html For information on Fall & Injury Prevention, visit: https://www.[facility]/news/fall-prevention-protects-and-maintains-health-and-mobility OR  https://www.[facility]/news/fall-prevention-tips-to-avoid-injury OR  https://www.cdc.gov/steadi/patient.html

## 2023-12-23 NOTE — PROGRESS NOTE ADULT - ATTENDING COMMENTS
As above  Was to be d/c yest to [facility] but pt and son declined and appealed discharge.  Appeal denied.  To [facility] at 3pm today.

## 2023-12-23 NOTE — DISCHARGE NOTE NURSING/CASE MANAGEMENT/SOCIAL WORK - PATIENT PORTAL LINK FT
You can access the FollowMyHealth Patient Portal offered by [facility] by registering at the following website: [email or web address]. By joining [employer]’s FollowMyHealth portal, you will also be able to view your health information using other applications (apps) compatible with our system. You can access the FollowMyHealth Patient Portal offered by [facility] by registering at the following website: [email or web address]. By joining [employer]’s FollowMyHealth portal, you will also be able to view your health information using other applications (apps) compatible with our system.

## 2023-12-23 NOTE — PROGRESS NOTE ADULT - PROVIDER SPECIALTY LIST ADULT
Cardiology
Colorectal Surgery
Infectious Disease
Intervent Radiology
Intervent Radiology
Cardiology
Colorectal Surgery
Critical Care
Hospitalist
Colorectal Surgery
Colorectal Surgery
Hospitalist
Colorectal Surgery
Critical Care
Hospitalist
Colorectal Surgery
Colorectal Surgery
Hospitalist
Infectious Disease
Hospitalist
Critical Care

## 2023-12-23 NOTE — PROGRESS NOTE ADULT - ASSESSMENT
83 year old female presented with colonic perforation 2/2 stercoral ulcer, now POD 11 s/p Christina  s/p IR drain placement 12/19 for intra-abdominal collection   Leucocytosis   Afib now on xarelto    Plan  Cleared from surgical perspective for d/c  Cont low residue diet   Cont oral AC   Monitor ostomy function, IR drain   Pain & nausea control PRN  Cont abx and antifungal per ID recs  Replace electrolytes PRN  Once bed available and cleared by consulting services patient may be discharged to [facility]  Will need ostomy care.    Outpatient f/u for staple removal in 10-14 days  Will need IR follow-up for drain    Discussed with attending.  83 year old female presented with colonic perforation 2/2 stercoral ulcer, now POD 11 s/p Christina  s/p IR drain placement 12/19 for intra-abdominal collection   Leucocytosis   Afib now on xarelto    Plan  Cleared from surgical perspective for d/c  Cont low residue diet   Cont oral AC   Monitor ostomy function, IR drain   Pain & nausea control PRN  Cont abx and antifungal per ID recs  Replace electrolytes PRN  Once bed available and cleared by consulting services patient may be discharged to [facility]  Will need ostomy care.    Outpatient f/u for staple removal in 10-14 days  Will need IR follow-up for drain    Discussed with attending.

## 2023-12-23 NOTE — PROGRESS NOTE ADULT - SUBJECTIVE AND OBJECTIVE BOX
Patient seen and examined on routine rounds.   She denies nausea, vomiting, fever or chills. Tolerating diet, pain well controlled   Passed TOV after daugherty removed  Nurse report no acute event overnight   VS reviewed    Physical Exam:  General: AOx3, Well developed, NAD  HEENT: NC/AT, normal pinnae and tragi  Chest: Normal respiratory effort, equal chest rise. On 4L INC  Heart: regular rate, normotensive  Abdomen: midline dressing c/d/i, Ostomy pink and patent, semisolid stool, gas in bag, incision healing well, no erythema or induration  Neuro/Psych: No localized deficits. Normal speech, normal tone, normal affect  Skin: Normal, warm, no rashes, no lesions noted  Extremities: Warm, well perfused, no edema      Vital Signs Last 24 Hrs  T(C): 36.3 (22 Dec 2023 20:45), Max: 36.7 (22 Dec 2023 08:10)  T(F): 97.3 (22 Dec 2023 20:45), Max: 98.1 (22 Dec 2023 08:10)  HR: 90 (22 Dec 2023 22:14) (90 - 98)  BP: 146/49 (22 Dec 2023 22:14) (123/49 - 154/49)  BP(mean): 77 (22 Dec 2023 06:00) (77 - 77)  RR: 16 (22 Dec 2023 22:14) (16 - 17)  SpO2: 95% (22 Dec 2023 22:14) (93% - 99%)    Parameters below as of 22 Dec 2023 22:14  Patient On (Oxygen Delivery Method): nasal cannula    MEDICATIONS  (STANDING):  ampicillin/sulbactam  IVPB 3 Gram(s) IV Intermittent every 6 hours  diltiazem    Tablet 60 milliGRAM(s) Oral every 8 hours  fluconAZOLE IVPB 200 milliGRAM(s) IV Intermittent every 24 hours  fluconAZOLE IVPB      lidocaine   4% Patch 1 Patch Transdermal daily  metoprolol tartrate Injectable 5 milliGRAM(s) IV Push every 5 minutes  pantoprazole  Injectable 40 milliGRAM(s) IV Push two times a day  polyethylene glycol 3350 17 Gram(s) Oral every 12 hours  rivaroxaban 15 milliGRAM(s) Oral two times a day with meals  tamsulosin 0.4 milliGRAM(s) Oral at bedtime    MEDICATIONS  (PRN):  acetaminophen   IVPB .. 1000 milliGRAM(s) IV Intermittent once PRN Temp greater or equal to 38C (100.4F), Mild Pain (1 - 3)  ondansetron Injectable 4 milliGRAM(s) IV Push every 6 hours PRN Nausea

## 2023-12-24 LAB
CULTURE RESULTS: NO GROWTH — SIGNIFICANT CHANGE UP
CULTURE RESULTS: NO GROWTH — SIGNIFICANT CHANGE UP
SPECIMEN SOURCE: SIGNIFICANT CHANGE UP
SPECIMEN SOURCE: SIGNIFICANT CHANGE UP

## 2023-12-28 ENCOUNTER — OUTPATIENT (OUTPATIENT)
Dept: INPATIENT UNIT | Facility: HOSPITAL | Age: 83
LOS: 1 days | Discharge: HOME | End: 2023-12-28
Payer: MEDICARE

## 2023-12-28 ENCOUNTER — RESULT REVIEW (OUTPATIENT)
Age: 83
End: 2023-12-28

## 2023-12-28 ENCOUNTER — TRANSCRIPTION ENCOUNTER (OUTPATIENT)
Age: 83
End: 2023-12-28

## 2023-12-28 VITALS
DIASTOLIC BLOOD PRESSURE: 58 MMHG | TEMPERATURE: 98 F | RESPIRATION RATE: 17 BRPM | HEART RATE: 110 BPM | OXYGEN SATURATION: 95 % | SYSTOLIC BLOOD PRESSURE: 93 MMHG

## 2023-12-28 VITALS
HEART RATE: 70 BPM | HEIGHT: 63 IN | SYSTOLIC BLOOD PRESSURE: 107 MMHG | TEMPERATURE: 97 F | RESPIRATION RATE: 16 BRPM | WEIGHT: 171.96 LBS | DIASTOLIC BLOOD PRESSURE: 56 MMHG | OXYGEN SATURATION: 97 %

## 2023-12-28 DIAGNOSIS — R18.8 OTHER ASCITES: ICD-10-CM

## 2023-12-28 DIAGNOSIS — Z48.03 ENCOUNTER FOR CHANGE OR REMOVAL OF DRAINS: ICD-10-CM

## 2023-12-28 PROCEDURE — 49424 ASSESS CYST CONTRAST INJECT: CPT

## 2023-12-28 PROCEDURE — 76080 X-RAY EXAM OF FISTULA: CPT | Mod: 26

## 2023-12-28 PROCEDURE — 76080 X-RAY EXAM OF FISTULA: CPT

## 2023-12-28 NOTE — ASU PATIENT PROFILE, ADULT - HISTORY OF COVID-19 VACCINATION
Developed RVR 6/3/7 now has amiodarone started and being managed per cardiology.    6/4/17: HR controlled with Amiodarone and Metoprolol         Yes

## 2023-12-28 NOTE — ASU DISCHARGE PLAN (ADULT/PEDIATRIC) - NS MD DC FALL RISK RISK
For information on Fall & Injury Prevention, visit: https://www.[facility]/news/fall-prevention-protects-and-maintains-health-and-mobility OR  https://www.[facility]/news/fall-prevention-tips-to-avoid-injury OR  https://www.cdc.gov/steadi/patient.html For information on Fall & Injury Prevention, visit: https://www.[facility]/news/fall-prevention-protects-and-maintains-health-and-mobility OR  https://www.[facility]/news/fall-prevention-tips-to-avoid-injury OR  https://www.cdc.gov/steadi/patient.html

## 2023-12-28 NOTE — ASU PREOP CHECKLIST - SKIN PREP
Rapid Response Epic Documentation     Situation:  Called to 3rd floor for a patient with A-flutter       Objective:    Patient was lying supine in a bed with an EKG running. Patient appeared pink, warm, dry.     Assessment:  Patient has a hx of a-flutter with regular episodes that usually resolve on their own or with a medication. Patient reports waking up with a fast heart rate that never resolved on its own. Patient was being seen for tremors and reported this to their RN that their pulse felt fast. When their EKG came back in SVT, the staff called 911. Patient also said they've had diarrhea for the past 2 days and is still ongoing, but relieved with anti-diarrhea meds. Medics arrived and will be taking patient to [facility].            BP:     Pulse: 130, 144, 139  Respiration: 20, 20, 20  SPO2:  Glucose:N/A  Mental Status: x4  CMS: x4  Stroke Scale:N/A  EKG: SVT      Treatment:    18 Gauge IV lock      Location:      3rd floor 3.64    Disposition:      Transport to [facility]    Protocol Used:     Tachycardia          n/a

## 2023-12-28 NOTE — ASU PATIENT PROFILE, ADULT - FALL HARM RISK - HARM RISK INTERVENTIONS
Assistance with ambulation/Assistance OOB with selected safe patient handling equipment/Communicate Risk of Fall with Harm to all staff/Monitor gait and stability/Reinforce activity limits and safety measures with patient and family/Review medications for side effects contributing to fall risk/Sit up slowly, dangle for a short time, stand at bedside before walking/Tailored Fall Risk Interventions/Toileting schedule using arm’s reach rule for commode and bathroom/Use of alarms - bed, chair and/or voice tab/Visual Cue: Yellow wristband and red socks/Bed in lowest position, wheels locked, appropriate side rails in place/Call bell, personal items and telephone in reach/Instruct patient to call for assistance before getting out of bed or chair/Non-slip footwear when patient is out of bed/Mobile to call system/Physically safe environment - no spills, clutter or unnecessary equipment/Purposeful Proactive Rounding/Room/bathroom lighting operational, light cord in reach Assistance with ambulation/Assistance OOB with selected safe patient handling equipment/Communicate Risk of Fall with Harm to all staff/Monitor gait and stability/Reinforce activity limits and safety measures with patient and family/Review medications for side effects contributing to fall risk/Sit up slowly, dangle for a short time, stand at bedside before walking/Tailored Fall Risk Interventions/Toileting schedule using arm’s reach rule for commode and bathroom/Use of alarms - bed, chair and/or voice tab/Visual Cue: Yellow wristband and red socks/Bed in lowest position, wheels locked, appropriate side rails in place/Call bell, personal items and telephone in reach/Instruct patient to call for assistance before getting out of bed or chair/Non-slip footwear when patient is out of bed/[place] to call system/Physically safe environment - no spills, clutter or unnecessary equipment/Purposeful Proactive Rounding/Room/bathroom lighting operational, light cord in reach

## 2023-12-28 NOTE — ASU DISCHARGE PLAN (ADULT/PEDIATRIC) - ASU DC SPECIAL INSTRUCTIONSFT
Drain Check    Discharge Instructions  - You have had a drain checked and removed.  - Keep the area clean and dry.  - Can remove the dressing and shower in 48hrs.        Notify your primary physician and/or Interventional Radiology IMMEDIATELY if you experience any of the following       - Fever of 101F or 38C       - Chills or Rigors/ Shakes       - Swelling and/or Redness in the area of the puncture site       - Worsening Pain       - Blood soaked bandages or worsening bleeding       - Lightheadedness and/or dizziness upon standing       - Chest Pain/ Tightness       - Shortness of Breath       - Difficulty walking    If you have a problem that you believe requires IMMEDIATE attention, please go to your NEAREST Emergency Room. If you believe your problem can safely wait until you speak to a physician, please call Interventional Radiology for any concerns.    Please feel free to contact us at [phone] if any problems arise. Drain Check    Discharge Instructions  - You have had a drain checked and removed.  - Keep the area clean and dry.  - Can remove the dressing and shower in 48hrs.        Notify your primary physician and/or Interventional Radiology IMMEDIATELY if you experience any of the following       - Fever of 101F or 38C       - Chills or Rigors/ Shakes       - Swelling and/or Redness in the area of the puncture site       - Worsening Pain       - Blood soaked bandages or worsening bleeding       - Lightheadedness and/or dizziness upon standing       - Chest Pain/ Tightness       - Shortness of Breath       - Difficulty walking    If you have a problem that you believe requires IMMEDIATE attention, please go to your NEAREST Emergency Room. If you believe your problem can safely wait until you speak to a physician, please call Interventional Radiology for any concerns.    Please feel free to contact us at [phone] if any problems arise.

## 2024-01-03 DIAGNOSIS — K65.9 PERITONITIS, UNSPECIFIED: ICD-10-CM

## 2024-01-03 DIAGNOSIS — E78.5 HYPERLIPIDEMIA, UNSPECIFIED: ICD-10-CM

## 2024-01-03 DIAGNOSIS — E43 UNSPECIFIED SEVERE PROTEIN-CALORIE MALNUTRITION: ICD-10-CM

## 2024-01-03 DIAGNOSIS — K63.1 PERFORATION OF INTESTINE (NONTRAUMATIC): ICD-10-CM

## 2024-01-03 DIAGNOSIS — Z71.3 DIETARY COUNSELING AND SURVEILLANCE: ICD-10-CM

## 2024-01-03 DIAGNOSIS — N17.9 ACUTE KIDNEY FAILURE, UNSPECIFIED: ICD-10-CM

## 2024-01-03 DIAGNOSIS — K66.8 OTHER SPECIFIED DISORDERS OF PERITONEUM: ICD-10-CM

## 2024-01-03 DIAGNOSIS — J44.9 CHRONIC OBSTRUCTIVE PULMONARY DISEASE, UNSPECIFIED: ICD-10-CM

## 2024-01-03 DIAGNOSIS — K59.00 CONSTIPATION, UNSPECIFIED: ICD-10-CM

## 2024-01-03 DIAGNOSIS — J95.821 ACUTE POSTPROCEDURAL RESPIRATORY FAILURE: ICD-10-CM

## 2024-01-03 DIAGNOSIS — R65.21 SEVERE SEPSIS WITH SEPTIC SHOCK: ICD-10-CM

## 2024-01-03 DIAGNOSIS — E87.20 ACIDOSIS, UNSPECIFIED: ICD-10-CM

## 2024-01-03 DIAGNOSIS — A41.9 SEPSIS, UNSPECIFIED ORGANISM: ICD-10-CM

## 2024-01-03 DIAGNOSIS — R33.9 RETENTION OF URINE, UNSPECIFIED: ICD-10-CM

## 2024-01-03 DIAGNOSIS — I48.0 PAROXYSMAL ATRIAL FIBRILLATION: ICD-10-CM

## 2024-01-05 ENCOUNTER — APPOINTMENT (OUTPATIENT)
Dept: COLORECTAL SURGERY | Facility: CLINIC | Age: 84
End: 2024-01-05

## 2024-01-10 ENCOUNTER — APPOINTMENT (OUTPATIENT)
Dept: COLORECTAL SURGERY | Facility: CLINIC | Age: 84
End: 2024-01-10
Payer: MEDICARE

## 2024-01-10 ENCOUNTER — NON-APPOINTMENT (OUTPATIENT)
Age: 84
End: 2024-01-10

## 2024-01-10 VITALS
HEIGHT: 63 IN | RESPIRATION RATE: 15 BRPM | OXYGEN SATURATION: 95 % | BODY MASS INDEX: 25.69 KG/M2 | SYSTOLIC BLOOD PRESSURE: 134 MMHG | WEIGHT: 145 LBS | HEART RATE: 90 BPM | TEMPERATURE: 97.5 F | DIASTOLIC BLOOD PRESSURE: 62 MMHG

## 2024-01-10 LAB
CULTURE RESULTS: SIGNIFICANT CHANGE UP
CULTURE RESULTS: SIGNIFICANT CHANGE UP
SPECIMEN SOURCE: SIGNIFICANT CHANGE UP
SPECIMEN SOURCE: SIGNIFICANT CHANGE UP

## 2024-01-10 PROCEDURE — 99024 POSTOP FOLLOW-UP VISIT: CPT

## 2024-01-10 NOTE — PLAN
[TextEntry] : -- Patient is recovering from her colectomy with colostomy for colonic perforation -- She will eventually need a colonoscopy prior to consideration of colostomy reversal -- OK to liberalize to regular diet -- Would recommend increasing Miralax to twice daily or add Colace for further stool softening -- Follow up in 8 weeks to discuss colonoscopy and eventual surgical planning for colostomy reversal.  Would aim for at least 4 months postop from original surgery given degree of contamination.

## 2024-01-10 NOTE — HISTORY OF PRESENT ILLNESS
[FreeTextEntry1] : 83yoF who underwent Christina procedure 12/11/2023 for colon perforation - clinically appeared to be stercoral colitis, on pathology report noted to be diverticulitis.  She has been at rehab at [facility] since discharge and is doing well.  No complaints regarding her wound.  No fever/chills/N/V.  No pain.  Stoma functioning well.  She has never had a colonoscopy.

## 2024-01-10 NOTE — PHYSICAL EXAM
[JVD] : no jugular venous distention  [Respiratory Effort] : normal respiratory effort [Normal Rate and Rhythm] : normal rate and rhythm [No Edema] : No edema [Alert] : alert [Oriented to Person] : oriented to person [Oriented to Place] : oriented to place [Oriented to Time] : oriented to time [Calm] : calm [de-identified] : Uses wheelchair, no acute distress [de-identified] : Soft, nontender, nondistended, no rebound/guarding.  staples to midline removed.  Colostomy in place to L abdomen - soft pellets in appliance. [de-identified] : Normocephalic [de-identified] : Moves all extremities; uses wheelchair [de-identified] : Incision C/D/I, mild skin-level separation at inferior aspect, steri-strips placed after staple removal.

## 2024-03-13 ENCOUNTER — APPOINTMENT (OUTPATIENT)
Dept: COLORECTAL SURGERY | Facility: CLINIC | Age: 84
End: 2024-03-13
Payer: MEDICARE

## 2024-03-13 VITALS
WEIGHT: 145 LBS | HEIGHT: 63 IN | RESPIRATION RATE: 15 BRPM | OXYGEN SATURATION: 99 % | DIASTOLIC BLOOD PRESSURE: 80 MMHG | HEART RATE: 91 BPM | BODY MASS INDEX: 25.69 KG/M2 | TEMPERATURE: 97.8 F | SYSTOLIC BLOOD PRESSURE: 137 MMHG

## 2024-03-13 PROCEDURE — 99213 OFFICE O/P EST LOW 20 MIN: CPT

## 2024-03-13 NOTE — PHYSICAL EXAM
[JVD] : no jugular venous distention  [Respiratory Effort] : normal respiratory effort [Normal Rate and Rhythm] : normal rate and rhythm [No Edema] : No edema [Alert] : alert [Oriented to Person] : oriented to person [Oriented to Place] : oriented to place [Oriented to Time] : oriented to time [Calm] : calm [de-identified] : Uses wheelchair, no acute distress [de-identified] : Soft, nontender, nondistended, no rebound/guarding.  Colostomy in place to L abdomen - soft brown stool in appliance. [de-identified] : Normocephalic [de-identified] : Moves all extremities; uses wheelchair [de-identified] : Incision C/D/I, mild skin-level separation at inferior aspect, steri-strips placed after staple removal.

## 2024-03-13 NOTE — HISTORY OF PRESENT ILLNESS
[FreeTextEntry1] : 3/13/2024: Presents for follow-up.  She feels well, is tolerating a diet, ambulating with a walker.  Colostomy functioning well, on Miralax daily with soft brown stool.  1/10/2024: 83yoF who underwent Christina procedure 12/11/2023 for colon perforation - clinically appeared to be stercoral colitis, on pathology report noted to be diverticulitis.  She has been at rehab at [facility] since discharge and is doing well.  No complaints regarding her wound.  No fever/chills/N/V.  No pain.  Stoma functioning well.  She has never had a colonoscopy.

## 2024-03-13 NOTE — PLAN
[TextEntry] : -- Patient is overdue for colonoscopy and will need one prior to colostomy reversal. --  During today's visit, we discussed all aspects of her screening colonoscopy.  We spoke about the rationale for colorectal cancer screening at her age.  We also discussed the preprocedural bowel prep and the procedure itself.  We discussed the risks and benefits of the procedure.  Risks include but are not limited to bleeding, bloating, pain, colonic perforation, and missed lesions, in addition to risks associated with the anesthesia.  I counseled the patient that the timing of the next colonoscopy would depend on the findings of this upcoming procedure.  All questions were answered to her satisfaction.  She was provided with a printout detailing the Miralax/Dulcolax prep.  I will have my  reach out to the patient to schedule colonoscopy.  She will require both cardiology and medical clearance prior to the procedure.  I will next see the patient for her scheduled colonoscopy.

## 2024-05-08 ENCOUNTER — RESULT REVIEW (OUTPATIENT)
Age: 84
End: 2024-05-08

## 2024-05-08 ENCOUNTER — APPOINTMENT (OUTPATIENT)
Dept: COLORECTAL SURGERY | Facility: AMBULATORY MEDICAL SERVICES | Age: 84
End: 2024-05-08
Payer: MEDICARE

## 2024-05-08 PROCEDURE — 44389 COLONOSCOPY WITH BIOPSY: CPT

## 2024-05-08 PROCEDURE — 45331 SIGMOIDOSCOPY AND BIOPSY: CPT

## 2024-05-10 ENCOUNTER — NON-APPOINTMENT (OUTPATIENT)
Age: 84
End: 2024-05-10

## 2024-05-23 ENCOUNTER — APPOINTMENT (OUTPATIENT)
Dept: COLORECTAL SURGERY | Facility: CLINIC | Age: 84
End: 2024-05-23
Payer: MEDICARE

## 2024-05-23 VITALS
SYSTOLIC BLOOD PRESSURE: 126 MMHG | HEIGHT: 63 IN | BODY MASS INDEX: 25.69 KG/M2 | WEIGHT: 145 LBS | DIASTOLIC BLOOD PRESSURE: 79 MMHG

## 2024-05-23 DIAGNOSIS — Z90.49 ACQUIRED ABSENCE OF OTHER SPECIFIED PARTS OF DIGESTIVE TRACT: ICD-10-CM

## 2024-05-23 DIAGNOSIS — Z93.3 COLOSTOMY STATUS: ICD-10-CM

## 2024-05-23 DIAGNOSIS — J98.2 INTERSTITIAL EMPHYSEMA: ICD-10-CM

## 2024-05-23 PROCEDURE — 99213 OFFICE O/P EST LOW 20 MIN: CPT

## 2024-05-23 RX ORDER — METRONIDAZOLE 500 MG/1
500 TABLET ORAL
Qty: 3 | Refills: 0 | Status: ACTIVE | COMMUNITY
Start: 2024-05-23 | End: 1900-01-01

## 2024-05-23 RX ORDER — NEOMYCIN SULFATE 500 MG/1
500 TABLET ORAL
Qty: 6 | Refills: 0 | Status: ACTIVE | COMMUNITY
Start: 2024-05-23 | End: 1900-01-01

## 2024-05-23 NOTE — HISTORY OF PRESENT ILLNESS
[FreeTextEntry1] : 5/23/2024: Presents after colonoscopy and to discuss colostomy reversal.  No longer requiring walker/cane.  Feels well.  Pathology with cecal adenomatous polyp and sigmoid hyperplastic change.  She is eager to proceed with stoma reversal.  3/13/2024: Presents for follow-up.  She feels well, is tolerating a diet, ambulating with a walker.  Colostomy functioning well, on Miralax daily with soft brown stool.  1/10/2024: 83yoF who underwent Christina procedure 12/11/2023 for colon perforation - clinically appeared to be stercoral colitis, on pathology report noted to be diverticulitis.  She has been at rehab at [facility] since discharge and is doing well.  No complaints regarding her wound.  No fever/chills/N/V.  No pain.  Stoma functioning well.  She has never had a colonoscopy.

## 2024-05-23 NOTE — PLAN
[TextEntry] : -- Patient to be scheduled for colostomy reversal - will attempt MIS approach, discussed risk of conversion to open -- Reviewed risks of bleeding, infection, injury to intraabdominal/pelvic structures, recurrence, need for stoma, conversion to open, hernia, pain, ileus, DVT/PE/stroke/MI/hernia/death.  Patient and her son expressed understanding. -- She will need PCP, Cardiology, and Pulmonology clearance prior to surgery. -- Reviewed preoperative bowel prep.  Antibiotics sent. -- I will next see patient for her scheduled surgery.  If she has further questions preoperatively she may schedule another in-office visit to discuss.

## 2024-05-23 NOTE — PHYSICAL EXAM
[JVD] : no jugular venous distention  [Respiratory Effort] : normal respiratory effort [Normal Rate and Rhythm] : normal rate and rhythm [No Edema] : No edema [Alert] : alert [Oriented to Person] : oriented to person [Oriented to Place] : oriented to place [Oriented to Time] : oriented to time [Calm] : calm [de-identified] : Soft, nontender, nondistended, no rebound/guarding.  Colostomy in place to L abdomen - soft brown stool in appliance. [de-identified] : Uses wheelchair, no acute distress [de-identified] : Normocephalic [de-identified] : Moves all extremities

## 2024-06-10 NOTE — GOALS OF CARE CONVERSATION - ADVANCED CARE PLANNING - SURROGATE PHONE NUMBER
For information on Fall & Injury Prevention, visit: https://www.[facility]/news/fall-prevention-protects-and-maintains-health-and-mobility OR  https://www.[facility]/news/fall-prevention-tips-to-avoid-injury OR  https://www.cdc.gov/steadi/patient.html 805.295.5133 [phone]

## 2024-06-21 ENCOUNTER — OUTPATIENT (OUTPATIENT)
Dept: OUTPATIENT SERVICES | Facility: HOSPITAL | Age: 84
LOS: 1 days | End: 2024-06-21
Payer: MEDICARE

## 2024-06-21 ENCOUNTER — RESULT REVIEW (OUTPATIENT)
Age: 84
End: 2024-06-21

## 2024-06-21 VITALS
WEIGHT: 143.08 LBS | RESPIRATION RATE: 16 BRPM | DIASTOLIC BLOOD PRESSURE: 47 MMHG | HEART RATE: 65 BPM | TEMPERATURE: 97 F | HEIGHT: 63 IN | OXYGEN SATURATION: 100 % | SYSTOLIC BLOOD PRESSURE: 120 MMHG

## 2024-06-21 DIAGNOSIS — Z90.49 ACQUIRED ABSENCE OF OTHER SPECIFIED PARTS OF DIGESTIVE TRACT: Chronic | ICD-10-CM

## 2024-06-21 DIAGNOSIS — Z01.818 ENCOUNTER FOR OTHER PREPROCEDURAL EXAMINATION: ICD-10-CM

## 2024-06-21 DIAGNOSIS — Z93.3 COLOSTOMY STATUS: Chronic | ICD-10-CM

## 2024-06-21 LAB
A1C WITH ESTIMATED AVERAGE GLUCOSE RESULT: 5.9 % — HIGH (ref 4–5.6)
ANION GAP SERPL CALC-SCNC: 5 MMOL/L — SIGNIFICANT CHANGE UP (ref 5–17)
APPEARANCE UR: ABNORMAL
APTT BLD: 37.1 SEC — HIGH (ref 24.5–35.6)
BACTERIA # UR AUTO: ABNORMAL /HPF
BASOPHILS # BLD AUTO: 0.03 K/UL — SIGNIFICANT CHANGE UP (ref 0–0.2)
BASOPHILS NFR BLD AUTO: 0.3 % — SIGNIFICANT CHANGE UP (ref 0–2)
BILIRUB UR-MCNC: NEGATIVE — SIGNIFICANT CHANGE UP
BLD GP AB SCN SERPL QL: SIGNIFICANT CHANGE UP
BUN SERPL-MCNC: 22 MG/DL — SIGNIFICANT CHANGE UP (ref 7–23)
CALCIUM SERPL-MCNC: 9.3 MG/DL — SIGNIFICANT CHANGE UP (ref 8.5–10.1)
CAST: 1 /LPF — SIGNIFICANT CHANGE UP (ref 0–4)
CHLORIDE SERPL-SCNC: 107 MMOL/L — SIGNIFICANT CHANGE UP (ref 96–108)
CO2 SERPL-SCNC: 28 MMOL/L — SIGNIFICANT CHANGE UP (ref 22–31)
COLOR SPEC: YELLOW — SIGNIFICANT CHANGE UP
CREAT SERPL-MCNC: 1.16 MG/DL — SIGNIFICANT CHANGE UP (ref 0.5–1.3)
DIFF PNL FLD: NEGATIVE — SIGNIFICANT CHANGE UP
EGFR: 47 ML/MIN/1.73M2 — LOW
EOSINOPHIL # BLD AUTO: 0.1 K/UL — SIGNIFICANT CHANGE UP (ref 0–0.5)
EOSINOPHIL NFR BLD AUTO: 1.1 % — SIGNIFICANT CHANGE UP (ref 0–6)
ESTIMATED AVERAGE GLUCOSE: 123 MG/DL — HIGH (ref 68–114)
GLUCOSE SERPL-MCNC: 100 MG/DL — HIGH (ref 70–99)
GLUCOSE UR QL: NEGATIVE MG/DL — SIGNIFICANT CHANGE UP
HCT VFR BLD CALC: 43.9 % — SIGNIFICANT CHANGE UP (ref 34.5–45)
HGB BLD-MCNC: 14.3 G/DL — SIGNIFICANT CHANGE UP (ref 11.5–15.5)
IMM GRANULOCYTES NFR BLD AUTO: 0.5 % — SIGNIFICANT CHANGE UP (ref 0–0.9)
INR BLD: 1.49 RATIO — HIGH (ref 0.85–1.18)
KETONES UR-MCNC: NEGATIVE MG/DL — SIGNIFICANT CHANGE UP
LEUKOCYTE ESTERASE UR-ACNC: ABNORMAL
LYMPHOCYTES # BLD AUTO: 2.17 K/UL — SIGNIFICANT CHANGE UP (ref 1–3.3)
LYMPHOCYTES # BLD AUTO: 24.6 % — SIGNIFICANT CHANGE UP (ref 13–44)
MCHC RBC-ENTMCNC: 29.1 PG — SIGNIFICANT CHANGE UP (ref 27–34)
MCHC RBC-ENTMCNC: 32.6 GM/DL — SIGNIFICANT CHANGE UP (ref 32–36)
MCV RBC AUTO: 89.4 FL — SIGNIFICANT CHANGE UP (ref 80–100)
MONOCYTES # BLD AUTO: 0.67 K/UL — SIGNIFICANT CHANGE UP (ref 0–0.9)
MONOCYTES NFR BLD AUTO: 7.6 % — SIGNIFICANT CHANGE UP (ref 2–14)
NEUTROPHILS # BLD AUTO: 5.8 K/UL — SIGNIFICANT CHANGE UP (ref 1.8–7.4)
NEUTROPHILS NFR BLD AUTO: 65.9 % — SIGNIFICANT CHANGE UP (ref 43–77)
NITRITE UR-MCNC: POSITIVE
PH UR: 6 — SIGNIFICANT CHANGE UP (ref 5–8)
PLATELET # BLD AUTO: 205 K/UL — SIGNIFICANT CHANGE UP (ref 150–400)
POTASSIUM SERPL-MCNC: 4 MMOL/L — SIGNIFICANT CHANGE UP (ref 3.5–5.3)
POTASSIUM SERPL-SCNC: 4 MMOL/L — SIGNIFICANT CHANGE UP (ref 3.5–5.3)
PROT UR-MCNC: NEGATIVE MG/DL — SIGNIFICANT CHANGE UP
PROTHROM AB SERPL-ACNC: 16.6 SEC — HIGH (ref 9.5–13)
RBC # BLD: 4.91 M/UL — SIGNIFICANT CHANGE UP (ref 3.8–5.2)
RBC # FLD: 14.1 % — SIGNIFICANT CHANGE UP (ref 10.3–14.5)
RBC CASTS # UR COMP ASSIST: 12 /HPF — HIGH (ref 0–4)
SODIUM SERPL-SCNC: 140 MMOL/L — SIGNIFICANT CHANGE UP (ref 135–145)
SP GR SPEC: 1.02 — SIGNIFICANT CHANGE UP (ref 1–1.03)
SQUAMOUS # UR AUTO: 5 /HPF — SIGNIFICANT CHANGE UP (ref 0–5)
UROBILINOGEN FLD QL: 0.2 MG/DL — SIGNIFICANT CHANGE UP (ref 0.2–1)
WBC # BLD: 8.81 K/UL — SIGNIFICANT CHANGE UP (ref 3.8–10.5)
WBC # FLD AUTO: 8.81 K/UL — SIGNIFICANT CHANGE UP (ref 3.8–10.5)
WBC UR QL: 90 /HPF — HIGH (ref 0–5)

## 2024-06-21 PROCEDURE — 86900 BLOOD TYPING SEROLOGIC ABO: CPT

## 2024-06-21 PROCEDURE — 86850 RBC ANTIBODY SCREEN: CPT

## 2024-06-21 PROCEDURE — 85610 PROTHROMBIN TIME: CPT

## 2024-06-21 PROCEDURE — 80048 BASIC METABOLIC PNL TOTAL CA: CPT

## 2024-06-21 PROCEDURE — 93005 ELECTROCARDIOGRAM TRACING: CPT

## 2024-06-21 PROCEDURE — 83036 HEMOGLOBIN GLYCOSYLATED A1C: CPT

## 2024-06-21 PROCEDURE — 86901 BLOOD TYPING SEROLOGIC RH(D): CPT

## 2024-06-21 PROCEDURE — 93010 ELECTROCARDIOGRAM REPORT: CPT

## 2024-06-21 PROCEDURE — 71046 X-RAY EXAM CHEST 2 VIEWS: CPT | Mod: 26

## 2024-06-21 PROCEDURE — 99214 OFFICE O/P EST MOD 30 MIN: CPT | Mod: 25

## 2024-06-21 PROCEDURE — 85730 THROMBOPLASTIN TIME PARTIAL: CPT

## 2024-06-21 PROCEDURE — 81001 URINALYSIS AUTO W/SCOPE: CPT

## 2024-06-21 PROCEDURE — 85025 COMPLETE CBC W/AUTO DIFF WBC: CPT

## 2024-06-21 PROCEDURE — 36415 COLL VENOUS BLD VENIPUNCTURE: CPT

## 2024-06-21 PROCEDURE — 71046 X-RAY EXAM CHEST 2 VIEWS: CPT

## 2024-06-22 ENCOUNTER — TRANSCRIPTION ENCOUNTER (OUTPATIENT)
Age: 84
End: 2024-06-22

## 2024-06-22 DIAGNOSIS — Z01.818 ENCOUNTER FOR OTHER PREPROCEDURAL EXAMINATION: ICD-10-CM

## 2024-06-24 ENCOUNTER — NON-APPOINTMENT (OUTPATIENT)
Age: 84
End: 2024-06-24

## 2024-06-29 RX ORDER — SODIUM CHLORIDE 0.9 % (FLUSH) 0.9 %
3 SYRINGE (ML) INJECTION EVERY 8 HOURS
Refills: 0 | Status: DISCONTINUED | OUTPATIENT
Start: 2024-07-01 | End: 2024-07-04

## 2024-07-01 ENCOUNTER — APPOINTMENT (OUTPATIENT)
Dept: COLORECTAL SURGERY | Facility: HOSPITAL | Age: 84
End: 2024-07-01

## 2024-07-01 ENCOUNTER — INPATIENT (INPATIENT)
Facility: HOSPITAL | Age: 84
LOS: 2 days | Discharge: HOME | DRG: 951 | End: 2024-07-04
Attending: COLON & RECTAL SURGERY | Admitting: COLON & RECTAL SURGERY
Payer: MEDICARE

## 2024-07-01 ENCOUNTER — RESULT REVIEW (OUTPATIENT)
Age: 84
End: 2024-07-01

## 2024-07-01 VITALS
OXYGEN SATURATION: 95 % | HEART RATE: 72 BPM | RESPIRATION RATE: 16 BRPM | WEIGHT: 145.06 LBS | TEMPERATURE: 97 F | HEIGHT: 63 IN | SYSTOLIC BLOOD PRESSURE: 102 MMHG | DIASTOLIC BLOOD PRESSURE: 57 MMHG

## 2024-07-01 DIAGNOSIS — Z90.49 ACQUIRED ABSENCE OF OTHER SPECIFIED PARTS OF DIGESTIVE TRACT: Chronic | ICD-10-CM

## 2024-07-01 DIAGNOSIS — Z96.1 PRESENCE OF INTRAOCULAR LENS: Chronic | ICD-10-CM

## 2024-07-01 DIAGNOSIS — Z90.49 ACQUIRED ABSENCE OF OTHER SPECIFIED PARTS OF DIGESTIVE TRACT: ICD-10-CM

## 2024-07-01 DIAGNOSIS — Z93.3 COLOSTOMY STATUS: Chronic | ICD-10-CM

## 2024-07-01 DIAGNOSIS — T85.22XA DISPLACEMENT OF INTRAOCULAR LENS, INITIAL ENCOUNTER: Chronic | ICD-10-CM

## 2024-07-01 LAB
APTT BLD: 29.2 SEC — SIGNIFICANT CHANGE UP (ref 24.5–35.6)
GLUCOSE BLDC GLUCOMTR-MCNC: 104 MG/DL — HIGH (ref 70–99)
GLUCOSE BLDC GLUCOMTR-MCNC: 147 MG/DL — HIGH (ref 70–99)
GLUCOSE BLDC GLUCOMTR-MCNC: 199 MG/DL — HIGH (ref 70–99)
INR BLD: 1.03 RATIO — SIGNIFICANT CHANGE UP (ref 0.85–1.18)
PROTHROM AB SERPL-ACNC: 11.6 SEC — SIGNIFICANT CHANGE UP (ref 9.5–13)

## 2024-07-01 PROCEDURE — C9399: CPT

## 2024-07-01 PROCEDURE — 97530 THERAPEUTIC ACTIVITIES: CPT | Mod: GP

## 2024-07-01 PROCEDURE — 36415 COLL VENOUS BLD VENIPUNCTURE: CPT

## 2024-07-01 PROCEDURE — 88304 TISSUE EXAM BY PATHOLOGIST: CPT | Mod: 26

## 2024-07-01 PROCEDURE — 97116 GAIT TRAINING THERAPY: CPT | Mod: GP

## 2024-07-01 PROCEDURE — 85730 THROMBOPLASTIN TIME PARTIAL: CPT

## 2024-07-01 PROCEDURE — 84100 ASSAY OF PHOSPHORUS: CPT

## 2024-07-01 PROCEDURE — 83735 ASSAY OF MAGNESIUM: CPT

## 2024-07-01 PROCEDURE — S2900 ROBOTIC SURGICAL SYSTEM: CPT | Mod: NC

## 2024-07-01 PROCEDURE — S2900: CPT

## 2024-07-01 PROCEDURE — 94640 AIRWAY INHALATION TREATMENT: CPT

## 2024-07-01 PROCEDURE — 85027 COMPLETE CBC AUTOMATED: CPT

## 2024-07-01 PROCEDURE — 88307 TISSUE EXAM BY PATHOLOGIST: CPT | Mod: 26

## 2024-07-01 PROCEDURE — 88304 TISSUE EXAM BY PATHOLOGIST: CPT

## 2024-07-01 PROCEDURE — 88307 TISSUE EXAM BY PATHOLOGIST: CPT

## 2024-07-01 PROCEDURE — C9290: CPT

## 2024-07-01 PROCEDURE — C1765: CPT

## 2024-07-01 PROCEDURE — 85025 COMPLETE CBC W/AUTO DIFF WBC: CPT

## 2024-07-01 PROCEDURE — 97162 PT EVAL MOD COMPLEX 30 MIN: CPT | Mod: GP

## 2024-07-01 PROCEDURE — 80048 BASIC METABOLIC PNL TOTAL CA: CPT

## 2024-07-01 PROCEDURE — 44227 LAP CLOSE ENTEROSTOMY: CPT | Mod: AS

## 2024-07-01 PROCEDURE — C1889: CPT

## 2024-07-01 PROCEDURE — 85610 PROTHROMBIN TIME: CPT

## 2024-07-01 PROCEDURE — 82962 GLUCOSE BLOOD TEST: CPT

## 2024-07-01 PROCEDURE — 44227 LAP CLOSE ENTEROSTOMY: CPT

## 2024-07-01 RX ORDER — OXYBUTYNIN CHLORIDE 5 MG
10 TABLET ORAL
Refills: 0 | Status: DISCONTINUED | OUTPATIENT
Start: 2024-07-01 | End: 2024-07-04

## 2024-07-01 RX ORDER — DEXTROSE MONOHYDRATE AND SODIUM CHLORIDE 5; .3 G/100ML; G/100ML
1000 INJECTION, SOLUTION INTRAVENOUS
Refills: 0 | Status: DISCONTINUED | OUTPATIENT
Start: 2024-07-01 | End: 2024-07-01

## 2024-07-01 RX ORDER — MORPHINE SULFATE 100 MG/1
2 TABLET, EXTENDED RELEASE ORAL EVERY 4 HOURS
Refills: 0 | Status: DISCONTINUED | OUTPATIENT
Start: 2024-07-01 | End: 2024-07-04

## 2024-07-01 RX ORDER — CEFOTETAN DISODIUM 2 G
1 VIAL (EA) INJECTION EVERY 12 HOURS
Refills: 0 | Status: COMPLETED | OUTPATIENT
Start: 2024-07-01 | End: 2024-07-02

## 2024-07-01 RX ORDER — TIOTROPIUM BROMIDE 18 UG/1
2 CAPSULE ORAL; RESPIRATORY (INHALATION) DAILY
Refills: 0 | Status: DISCONTINUED | OUTPATIENT
Start: 2024-07-01 | End: 2024-07-04

## 2024-07-01 RX ORDER — DEXTROSE MONOHYDRATE AND SODIUM CHLORIDE 5; .3 G/100ML; G/100ML
250 INJECTION, SOLUTION INTRAVENOUS ONCE
Refills: 0 | Status: COMPLETED | OUTPATIENT
Start: 2024-07-01 | End: 2024-07-01

## 2024-07-01 RX ORDER — ACETAMINOPHEN 325 MG
1000 TABLET ORAL ONCE
Refills: 0 | Status: DISCONTINUED | OUTPATIENT
Start: 2024-07-01 | End: 2024-07-04

## 2024-07-01 RX ORDER — SOLIFENACIN SUCCINATE 10 MG/1
1 TABLET, FILM COATED ORAL
Refills: 0 | DISCHARGE

## 2024-07-01 RX ORDER — METOPROLOL TARTRATE 50 MG
50 TABLET ORAL DAILY
Refills: 0 | Status: DISCONTINUED | OUTPATIENT
Start: 2024-07-01 | End: 2024-07-04

## 2024-07-01 RX ORDER — ROSUVASTATIN CALCIUM 20 MG/1
1 TABLET ORAL
Refills: 0 | DISCHARGE

## 2024-07-01 RX ORDER — RIVAROXABAN 10 MG/1
1 TABLET, FILM COATED ORAL
Refills: 0 | DISCHARGE

## 2024-07-01 RX ORDER — HYDROMORPHONE HCL 0.2 MG/ML
0.4 INJECTION, SOLUTION INTRAVENOUS
Refills: 0 | Status: DISCONTINUED | OUTPATIENT
Start: 2024-07-01 | End: 2024-07-01

## 2024-07-01 RX ORDER — BUDESONIDE/FORMOTEROL FUMARATE 160-4.5MCG
2 HFA AEROSOL WITH ADAPTER (GRAM) INHALATION
Refills: 0 | Status: DISCONTINUED | OUTPATIENT
Start: 2024-07-01 | End: 2024-07-04

## 2024-07-01 RX ORDER — FENTANYL CITRATE 50 UG/ML
25 INJECTION, SOLUTION INTRAMUSCULAR; INTRAVENOUS
Refills: 0 | Status: DISCONTINUED | OUTPATIENT
Start: 2024-07-01 | End: 2024-07-01

## 2024-07-01 RX ORDER — ATORVASTATIN CALCIUM 20 MG/1
40 TABLET, FILM COATED ORAL AT BEDTIME
Refills: 0 | Status: DISCONTINUED | OUTPATIENT
Start: 2024-07-01 | End: 2024-07-04

## 2024-07-01 RX ORDER — ONDANSETRON HYDROCHLORIDE 2 MG/ML
4 INJECTION INTRAMUSCULAR; INTRAVENOUS EVERY 6 HOURS
Refills: 0 | Status: DISCONTINUED | OUTPATIENT
Start: 2024-07-01 | End: 2024-07-04

## 2024-07-01 RX ORDER — KETOROLAC TROMETHAMINE 30 MG/ML
15 INJECTION, SOLUTION INTRAMUSCULAR EVERY 6 HOURS
Refills: 0 | Status: DISCONTINUED | OUTPATIENT
Start: 2024-07-01 | End: 2024-07-02

## 2024-07-01 RX ORDER — METOPROLOL TARTRATE 50 MG
1 TABLET ORAL
Refills: 0 | DISCHARGE

## 2024-07-01 RX ORDER — ALVIMOPAN 12 MG/1
12 CAPSULE ORAL
Refills: 0 | Status: DISCONTINUED | OUTPATIENT
Start: 2024-07-01 | End: 2024-07-03

## 2024-07-01 RX ORDER — FLUTICASONE FUROATE, UMECLIDINIUM BROMIDE AND VILANTEROL TRIFENATATE 200; 62.5; 25 UG/1; UG/1; UG/1
1 POWDER RESPIRATORY (INHALATION)
Refills: 0 | DISCHARGE

## 2024-07-01 RX ORDER — ALVIMOPAN 12 MG/1
12 CAPSULE ORAL ONCE
Refills: 0 | Status: COMPLETED | OUTPATIENT
Start: 2024-07-01 | End: 2024-07-01

## 2024-07-01 RX ORDER — ONDANSETRON HYDROCHLORIDE 2 MG/ML
4 INJECTION INTRAMUSCULAR; INTRAVENOUS ONCE
Refills: 0 | Status: DISCONTINUED | OUTPATIENT
Start: 2024-07-01 | End: 2024-07-01

## 2024-07-01 RX ORDER — ENOXAPARIN SODIUM 100 MG/ML
40 INJECTION SUBCUTANEOUS EVERY 24 HOURS
Refills: 0 | Status: DISCONTINUED | OUTPATIENT
Start: 2024-07-01 | End: 2024-07-02

## 2024-07-01 RX ORDER — HEPARIN SODIUM 50 [USP'U]/ML
5000 INJECTION, SOLUTION INTRAVENOUS ONCE
Refills: 0 | Status: COMPLETED | OUTPATIENT
Start: 2024-07-01 | End: 2024-07-01

## 2024-07-01 RX ADMIN — DEXTROSE MONOHYDRATE AND SODIUM CHLORIDE 500 MILLILITER(S): 5; .3 INJECTION, SOLUTION INTRAVENOUS at 17:15

## 2024-07-01 RX ADMIN — ATORVASTATIN CALCIUM 40 MILLIGRAM(S): 20 TABLET, FILM COATED ORAL at 22:21

## 2024-07-01 RX ADMIN — Medication 10 MILLIGRAM(S): at 22:20

## 2024-07-01 RX ADMIN — FENTANYL CITRATE 25 MICROGRAM(S): 50 INJECTION, SOLUTION INTRAMUSCULAR; INTRAVENOUS at 17:00

## 2024-07-01 RX ADMIN — FENTANYL CITRATE 25 MICROGRAM(S): 50 INJECTION, SOLUTION INTRAMUSCULAR; INTRAVENOUS at 16:07

## 2024-07-01 RX ADMIN — ALVIMOPAN 12 MILLIGRAM(S): 12 CAPSULE ORAL at 09:11

## 2024-07-01 RX ADMIN — Medication 3 MILLILITER(S): at 22:00

## 2024-07-01 RX ADMIN — Medication 100 GRAM(S): at 22:20

## 2024-07-01 RX ADMIN — ALVIMOPAN 12 MILLIGRAM(S): 12 CAPSULE ORAL at 22:20

## 2024-07-01 RX ADMIN — FENTANYL CITRATE 25 MICROGRAM(S): 50 INJECTION, SOLUTION INTRAMUSCULAR; INTRAVENOUS at 16:51

## 2024-07-01 RX ADMIN — FENTANYL CITRATE 25 MICROGRAM(S): 50 INJECTION, SOLUTION INTRAMUSCULAR; INTRAVENOUS at 16:15

## 2024-07-01 RX ADMIN — DEXTROSE MONOHYDRATE AND SODIUM CHLORIDE 75 MILLILITER(S): 5; .3 INJECTION, SOLUTION INTRAVENOUS at 18:10

## 2024-07-01 RX ADMIN — HEPARIN SODIUM 5000 UNIT(S): 50 INJECTION, SOLUTION INTRAVENOUS at 09:11

## 2024-07-01 RX ADMIN — DEXTROSE MONOHYDRATE AND SODIUM CHLORIDE 500 MILLILITER(S): 5; .3 INJECTION, SOLUTION INTRAVENOUS at 18:10

## 2024-07-02 LAB
ANION GAP SERPL CALC-SCNC: 6 MMOL/L — SIGNIFICANT CHANGE UP (ref 5–17)
BASOPHILS # BLD AUTO: 0.02 K/UL — SIGNIFICANT CHANGE UP (ref 0–0.2)
BASOPHILS NFR BLD AUTO: 0.1 % — SIGNIFICANT CHANGE UP (ref 0–2)
BUN SERPL-MCNC: 17 MG/DL — SIGNIFICANT CHANGE UP (ref 7–23)
CALCIUM SERPL-MCNC: 9 MG/DL — SIGNIFICANT CHANGE UP (ref 8.5–10.1)
CHLORIDE SERPL-SCNC: 104 MMOL/L — SIGNIFICANT CHANGE UP (ref 96–108)
CO2 SERPL-SCNC: 24 MMOL/L — SIGNIFICANT CHANGE UP (ref 22–31)
CREAT SERPL-MCNC: 1.16 MG/DL — SIGNIFICANT CHANGE UP (ref 0.5–1.3)
EGFR: 46 ML/MIN/1.73M2 — LOW
EOSINOPHIL # BLD AUTO: 0 K/UL — SIGNIFICANT CHANGE UP (ref 0–0.5)
EOSINOPHIL NFR BLD AUTO: 0 % — SIGNIFICANT CHANGE UP (ref 0–6)
GLUCOSE BLDC GLUCOMTR-MCNC: 146 MG/DL — HIGH (ref 70–99)
GLUCOSE BLDC GLUCOMTR-MCNC: 151 MG/DL — HIGH (ref 70–99)
GLUCOSE BLDC GLUCOMTR-MCNC: 181 MG/DL — HIGH (ref 70–99)
GLUCOSE BLDC GLUCOMTR-MCNC: 192 MG/DL — HIGH (ref 70–99)
GLUCOSE SERPL-MCNC: 132 MG/DL — HIGH (ref 70–99)
HCT VFR BLD CALC: 36.4 % — SIGNIFICANT CHANGE UP (ref 34.5–45)
HGB BLD-MCNC: 12.1 G/DL — SIGNIFICANT CHANGE UP (ref 11.5–15.5)
IMM GRANULOCYTES NFR BLD AUTO: 0.4 % — SIGNIFICANT CHANGE UP (ref 0–0.9)
LYMPHOCYTES # BLD AUTO: 1.53 K/UL — SIGNIFICANT CHANGE UP (ref 1–3.3)
LYMPHOCYTES # BLD AUTO: 10.8 % — LOW (ref 13–44)
MAGNESIUM SERPL-MCNC: 1.6 MG/DL — SIGNIFICANT CHANGE UP (ref 1.6–2.6)
MCHC RBC-ENTMCNC: 28.9 PG — SIGNIFICANT CHANGE UP (ref 27–34)
MCHC RBC-ENTMCNC: 33.2 GM/DL — SIGNIFICANT CHANGE UP (ref 32–36)
MCV RBC AUTO: 86.9 FL — SIGNIFICANT CHANGE UP (ref 80–100)
MONOCYTES # BLD AUTO: 0.89 K/UL — SIGNIFICANT CHANGE UP (ref 0–0.9)
MONOCYTES NFR BLD AUTO: 6.3 % — SIGNIFICANT CHANGE UP (ref 2–14)
NEUTROPHILS # BLD AUTO: 11.69 K/UL — HIGH (ref 1.8–7.4)
NEUTROPHILS NFR BLD AUTO: 82.4 % — HIGH (ref 43–77)
PHOSPHATE SERPL-MCNC: 2.4 MG/DL — LOW (ref 2.5–4.5)
PLATELET # BLD AUTO: 182 K/UL — SIGNIFICANT CHANGE UP (ref 150–400)
POTASSIUM SERPL-MCNC: 3.8 MMOL/L — SIGNIFICANT CHANGE UP (ref 3.5–5.3)
POTASSIUM SERPL-SCNC: 3.8 MMOL/L — SIGNIFICANT CHANGE UP (ref 3.5–5.3)
RBC # BLD: 4.19 M/UL — SIGNIFICANT CHANGE UP (ref 3.8–5.2)
RBC # FLD: 14.3 % — SIGNIFICANT CHANGE UP (ref 10.3–14.5)
SODIUM SERPL-SCNC: 134 MMOL/L — LOW (ref 135–145)
WBC # BLD: 14.19 K/UL — HIGH (ref 3.8–10.5)
WBC # FLD AUTO: 14.19 K/UL — HIGH (ref 3.8–10.5)

## 2024-07-02 PROCEDURE — 99221 1ST HOSP IP/OBS SF/LOW 40: CPT

## 2024-07-02 RX ORDER — MAGNESIUM SULFATE 100 %
2 POWDER (GRAM) MISCELLANEOUS ONCE
Refills: 0 | Status: COMPLETED | OUTPATIENT
Start: 2024-07-02 | End: 2024-07-02

## 2024-07-02 RX ORDER — HEPARIN SODIUM 50 [USP'U]/ML
5000 INJECTION, SOLUTION INTRAVENOUS EVERY 8 HOURS
Refills: 0 | Status: DISCONTINUED | OUTPATIENT
Start: 2024-07-02 | End: 2024-07-04

## 2024-07-02 RX ORDER — SOD PHOS DI, MONO/K PHOS MONO 250 MG
1 TABLET ORAL ONCE
Refills: 0 | Status: COMPLETED | OUTPATIENT
Start: 2024-07-02 | End: 2024-07-02

## 2024-07-02 RX ADMIN — ALVIMOPAN 12 MILLIGRAM(S): 12 CAPSULE ORAL at 21:59

## 2024-07-02 RX ADMIN — Medication 3 MILLILITER(S): at 13:36

## 2024-07-02 RX ADMIN — Medication 1 TABLET(S): at 09:59

## 2024-07-02 RX ADMIN — HEPARIN SODIUM 5000 UNIT(S): 50 INJECTION, SOLUTION INTRAVENOUS at 13:33

## 2024-07-02 RX ADMIN — Medication 50 MILLIGRAM(S): at 09:59

## 2024-07-02 RX ADMIN — ATORVASTATIN CALCIUM 40 MILLIGRAM(S): 20 TABLET, FILM COATED ORAL at 22:00

## 2024-07-02 RX ADMIN — Medication 3 MILLILITER(S): at 05:51

## 2024-07-02 RX ADMIN — Medication 10 MILLIGRAM(S): at 21:59

## 2024-07-02 RX ADMIN — ALVIMOPAN 12 MILLIGRAM(S): 12 CAPSULE ORAL at 09:59

## 2024-07-02 RX ADMIN — Medication 1 PACKET(S): at 15:44

## 2024-07-02 RX ADMIN — Medication 100 GRAM(S): at 09:58

## 2024-07-02 RX ADMIN — Medication 25 GRAM(S): at 15:45

## 2024-07-02 RX ADMIN — Medication 3 MILLILITER(S): at 21:08

## 2024-07-02 RX ADMIN — HEPARIN SODIUM 5000 UNIT(S): 50 INJECTION, SOLUTION INTRAVENOUS at 22:00

## 2024-07-02 RX ADMIN — ENOXAPARIN SODIUM 40 MILLIGRAM(S): 100 INJECTION SUBCUTANEOUS at 05:33

## 2024-07-03 ENCOUNTER — TRANSCRIPTION ENCOUNTER (OUTPATIENT)
Age: 84
End: 2024-07-03

## 2024-07-03 LAB
ANION GAP SERPL CALC-SCNC: 6 MMOL/L — SIGNIFICANT CHANGE UP (ref 5–17)
BUN SERPL-MCNC: 17 MG/DL — SIGNIFICANT CHANGE UP (ref 7–23)
CALCIUM SERPL-MCNC: 8.3 MG/DL — LOW (ref 8.5–10.1)
CHLORIDE SERPL-SCNC: 103 MMOL/L — SIGNIFICANT CHANGE UP (ref 96–108)
CO2 SERPL-SCNC: 26 MMOL/L — SIGNIFICANT CHANGE UP (ref 22–31)
CREAT SERPL-MCNC: 0.98 MG/DL — SIGNIFICANT CHANGE UP (ref 0.5–1.3)
EGFR: 57 ML/MIN/1.73M2 — LOW
GLUCOSE BLDC GLUCOMTR-MCNC: 147 MG/DL — HIGH (ref 70–99)
GLUCOSE SERPL-MCNC: 133 MG/DL — HIGH (ref 70–99)
HCT VFR BLD CALC: 34.4 % — LOW (ref 34.5–45)
HCT VFR BLD CALC: 37 % — SIGNIFICANT CHANGE UP (ref 34.5–45)
HGB BLD-MCNC: 11.5 G/DL — SIGNIFICANT CHANGE UP (ref 11.5–15.5)
HGB BLD-MCNC: 12.2 G/DL — SIGNIFICANT CHANGE UP (ref 11.5–15.5)
MAGNESIUM SERPL-MCNC: 2.5 MG/DL — SIGNIFICANT CHANGE UP (ref 1.6–2.6)
MCHC RBC-ENTMCNC: 29.3 PG — SIGNIFICANT CHANGE UP (ref 27–34)
MCHC RBC-ENTMCNC: 29.3 PG — SIGNIFICANT CHANGE UP (ref 27–34)
MCHC RBC-ENTMCNC: 33 GM/DL — SIGNIFICANT CHANGE UP (ref 32–36)
MCHC RBC-ENTMCNC: 33.4 GM/DL — SIGNIFICANT CHANGE UP (ref 32–36)
MCV RBC AUTO: 87.8 FL — SIGNIFICANT CHANGE UP (ref 80–100)
MCV RBC AUTO: 88.7 FL — SIGNIFICANT CHANGE UP (ref 80–100)
PHOSPHATE SERPL-MCNC: 2.3 MG/DL — LOW (ref 2.5–4.5)
PLATELET # BLD AUTO: 157 K/UL — SIGNIFICANT CHANGE UP (ref 150–400)
PLATELET # BLD AUTO: 173 K/UL — SIGNIFICANT CHANGE UP (ref 150–400)
POTASSIUM SERPL-MCNC: 3.4 MMOL/L — LOW (ref 3.5–5.3)
POTASSIUM SERPL-SCNC: 3.4 MMOL/L — LOW (ref 3.5–5.3)
RBC # BLD: 3.92 M/UL — SIGNIFICANT CHANGE UP (ref 3.8–5.2)
RBC # BLD: 4.17 M/UL — SIGNIFICANT CHANGE UP (ref 3.8–5.2)
RBC # FLD: 14.9 % — HIGH (ref 10.3–14.5)
RBC # FLD: 15 % — HIGH (ref 10.3–14.5)
SODIUM SERPL-SCNC: 135 MMOL/L — SIGNIFICANT CHANGE UP (ref 135–145)
WBC # BLD: 15.37 K/UL — HIGH (ref 3.8–10.5)
WBC # BLD: 15.62 K/UL — HIGH (ref 3.8–10.5)
WBC # FLD AUTO: 15.37 K/UL — HIGH (ref 3.8–10.5)
WBC # FLD AUTO: 15.62 K/UL — HIGH (ref 3.8–10.5)

## 2024-07-03 PROCEDURE — 99232 SBSQ HOSP IP/OBS MODERATE 35: CPT

## 2024-07-03 RX ORDER — POTASSIUM CHLORIDE 600 MG/1
40 TABLET, FILM COATED, EXTENDED RELEASE ORAL ONCE
Refills: 0 | Status: COMPLETED | OUTPATIENT
Start: 2024-07-03 | End: 2024-07-03

## 2024-07-03 RX ORDER — GUAIFENESIN 100 %
100 POWDER (GRAM) MISCELLANEOUS EVERY 6 HOURS
Refills: 0 | Status: DISCONTINUED | OUTPATIENT
Start: 2024-07-03 | End: 2024-07-04

## 2024-07-03 RX ORDER — SOD PHOS DI, MONO/K PHOS MONO 250 MG
1 TABLET ORAL
Refills: 0 | Status: COMPLETED | OUTPATIENT
Start: 2024-07-03 | End: 2024-07-03

## 2024-07-03 RX ORDER — POTASSIUM CHLORIDE 600 MG/1
20 TABLET, FILM COATED, EXTENDED RELEASE ORAL
Refills: 0 | Status: DISCONTINUED | OUTPATIENT
Start: 2024-07-03 | End: 2024-07-03

## 2024-07-03 RX ORDER — SOD PHOS DI, MONO/K PHOS MONO 250 MG
2 TABLET ORAL ONCE
Refills: 0 | Status: DISCONTINUED | OUTPATIENT
Start: 2024-07-03 | End: 2024-07-03

## 2024-07-03 RX ADMIN — ONDANSETRON HYDROCHLORIDE 4 MILLIGRAM(S): 2 INJECTION INTRAMUSCULAR; INTRAVENOUS at 03:48

## 2024-07-03 RX ADMIN — Medication 100 MILLIGRAM(S): at 22:34

## 2024-07-03 RX ADMIN — Medication 1 PACKET(S): at 11:51

## 2024-07-03 RX ADMIN — POTASSIUM CHLORIDE 40 MILLIEQUIVALENT(S): 600 TABLET, FILM COATED, EXTENDED RELEASE ORAL at 11:51

## 2024-07-03 RX ADMIN — Medication 3 MILLILITER(S): at 22:13

## 2024-07-03 RX ADMIN — HEPARIN SODIUM 5000 UNIT(S): 50 INJECTION, SOLUTION INTRAVENOUS at 22:17

## 2024-07-03 RX ADMIN — Medication 10 MILLIGRAM(S): at 22:17

## 2024-07-03 RX ADMIN — Medication 3 MILLILITER(S): at 13:59

## 2024-07-03 RX ADMIN — Medication 50 MILLIGRAM(S): at 11:52

## 2024-07-03 RX ADMIN — Medication 1 TABLET(S): at 11:52

## 2024-07-03 RX ADMIN — Medication 100 MILLIGRAM(S): at 16:25

## 2024-07-03 RX ADMIN — Medication 10 MILLIGRAM(S): at 11:52

## 2024-07-03 RX ADMIN — Medication 2 PUFF(S): at 17:02

## 2024-07-03 RX ADMIN — ONDANSETRON HYDROCHLORIDE 4 MILLIGRAM(S): 2 INJECTION INTRAMUSCULAR; INTRAVENOUS at 11:53

## 2024-07-03 RX ADMIN — HEPARIN SODIUM 5000 UNIT(S): 50 INJECTION, SOLUTION INTRAVENOUS at 06:25

## 2024-07-03 RX ADMIN — Medication 3 MILLILITER(S): at 05:00

## 2024-07-03 RX ADMIN — TIOTROPIUM BROMIDE 2 PUFF(S): 18 CAPSULE ORAL; RESPIRATORY (INHALATION) at 17:29

## 2024-07-03 RX ADMIN — Medication 1 PACKET(S): at 14:55

## 2024-07-03 RX ADMIN — HEPARIN SODIUM 5000 UNIT(S): 50 INJECTION, SOLUTION INTRAVENOUS at 14:55

## 2024-07-03 RX ADMIN — ATORVASTATIN CALCIUM 40 MILLIGRAM(S): 20 TABLET, FILM COATED ORAL at 22:16

## 2024-07-04 VITALS
DIASTOLIC BLOOD PRESSURE: 44 MMHG | OXYGEN SATURATION: 93 % | RESPIRATION RATE: 18 BRPM | SYSTOLIC BLOOD PRESSURE: 120 MMHG | HEART RATE: 83 BPM | TEMPERATURE: 98 F

## 2024-07-04 LAB
ANION GAP SERPL CALC-SCNC: 6 MMOL/L — SIGNIFICANT CHANGE UP (ref 5–17)
BASOPHILS # BLD AUTO: 0.02 K/UL — SIGNIFICANT CHANGE UP (ref 0–0.2)
BASOPHILS NFR BLD AUTO: 0.1 % — SIGNIFICANT CHANGE UP (ref 0–2)
BUN SERPL-MCNC: 14 MG/DL — SIGNIFICANT CHANGE UP (ref 7–23)
CALCIUM SERPL-MCNC: 8.2 MG/DL — LOW (ref 8.5–10.1)
CHLORIDE SERPL-SCNC: 104 MMOL/L — SIGNIFICANT CHANGE UP (ref 96–108)
CO2 SERPL-SCNC: 26 MMOL/L — SIGNIFICANT CHANGE UP (ref 22–31)
CREAT SERPL-MCNC: 0.88 MG/DL — SIGNIFICANT CHANGE UP (ref 0.5–1.3)
EGFR: 65 ML/MIN/1.73M2 — SIGNIFICANT CHANGE UP
EOSINOPHIL # BLD AUTO: 0.05 K/UL — SIGNIFICANT CHANGE UP (ref 0–0.5)
EOSINOPHIL NFR BLD AUTO: 0.4 % — SIGNIFICANT CHANGE UP (ref 0–6)
GLUCOSE BLDC GLUCOMTR-MCNC: 157 MG/DL — HIGH (ref 70–99)
GLUCOSE SERPL-MCNC: 119 MG/DL — HIGH (ref 70–99)
HCT VFR BLD CALC: 34.8 % — SIGNIFICANT CHANGE UP (ref 34.5–45)
HGB BLD-MCNC: 11.7 G/DL — SIGNIFICANT CHANGE UP (ref 11.5–15.5)
IMM GRANULOCYTES NFR BLD AUTO: 0.4 % — SIGNIFICANT CHANGE UP (ref 0–0.9)
LYMPHOCYTES # BLD AUTO: 1.59 K/UL — SIGNIFICANT CHANGE UP (ref 1–3.3)
LYMPHOCYTES # BLD AUTO: 11.8 % — LOW (ref 13–44)
MAGNESIUM SERPL-MCNC: 2.1 MG/DL — SIGNIFICANT CHANGE UP (ref 1.6–2.6)
MCHC RBC-ENTMCNC: 30.1 PG — SIGNIFICANT CHANGE UP (ref 27–34)
MCHC RBC-ENTMCNC: 33.6 GM/DL — SIGNIFICANT CHANGE UP (ref 32–36)
MCV RBC AUTO: 89.5 FL — SIGNIFICANT CHANGE UP (ref 80–100)
MONOCYTES # BLD AUTO: 0.77 K/UL — SIGNIFICANT CHANGE UP (ref 0–0.9)
MONOCYTES NFR BLD AUTO: 5.7 % — SIGNIFICANT CHANGE UP (ref 2–14)
NEUTROPHILS # BLD AUTO: 11.03 K/UL — HIGH (ref 1.8–7.4)
NEUTROPHILS NFR BLD AUTO: 81.6 % — HIGH (ref 43–77)
PHOSPHATE SERPL-MCNC: 2.3 MG/DL — LOW (ref 2.5–4.5)
PLATELET # BLD AUTO: 158 K/UL — SIGNIFICANT CHANGE UP (ref 150–400)
POTASSIUM SERPL-MCNC: 4.1 MMOL/L — SIGNIFICANT CHANGE UP (ref 3.5–5.3)
POTASSIUM SERPL-SCNC: 4.1 MMOL/L — SIGNIFICANT CHANGE UP (ref 3.5–5.3)
RBC # BLD: 3.89 M/UL — SIGNIFICANT CHANGE UP (ref 3.8–5.2)
RBC # FLD: 14.9 % — HIGH (ref 10.3–14.5)
SODIUM SERPL-SCNC: 136 MMOL/L — SIGNIFICANT CHANGE UP (ref 135–145)
WBC # BLD: 13.52 K/UL — HIGH (ref 3.8–10.5)
WBC # FLD AUTO: 13.52 K/UL — HIGH (ref 3.8–10.5)

## 2024-07-04 PROCEDURE — 99232 SBSQ HOSP IP/OBS MODERATE 35: CPT

## 2024-07-04 RX ADMIN — Medication 100 MILLIGRAM(S): at 06:25

## 2024-07-04 RX ADMIN — Medication 10 MILLIGRAM(S): at 09:27

## 2024-07-04 RX ADMIN — HEPARIN SODIUM 5000 UNIT(S): 50 INJECTION, SOLUTION INTRAVENOUS at 06:25

## 2024-07-04 RX ADMIN — Medication 3 MILLILITER(S): at 05:33

## 2024-07-04 RX ADMIN — Medication 100 MILLIGRAM(S): at 12:27

## 2024-07-04 RX ADMIN — Medication 1 TABLET(S): at 09:27

## 2024-07-04 RX ADMIN — Medication 50 MILLIGRAM(S): at 09:27

## 2024-07-05 PROBLEM — I25.10 ATHEROSCLEROTIC HEART DISEASE OF NATIVE CORONARY ARTERY WITHOUT ANGINA PECTORIS: Chronic | Status: ACTIVE | Noted: 2024-06-21

## 2024-07-05 PROBLEM — I65.29 OCCLUSION AND STENOSIS OF UNSPECIFIED CAROTID ARTERY: Chronic | Status: ACTIVE | Noted: 2024-06-21

## 2024-07-05 PROBLEM — I38 ENDOCARDITIS, VALVE UNSPECIFIED: Chronic | Status: ACTIVE | Noted: 2024-06-21

## 2024-07-05 PROBLEM — Z86.19 PERSONAL HISTORY OF OTHER INFECTIOUS AND PARASITIC DISEASES: Chronic | Status: ACTIVE | Noted: 2024-06-21

## 2024-07-05 PROBLEM — J44.9 CHRONIC OBSTRUCTIVE PULMONARY DISEASE, UNSPECIFIED: Chronic | Status: ACTIVE | Noted: 2024-06-21

## 2024-07-05 PROBLEM — K63.1 PERFORATION OF INTESTINE (NONTRAUMATIC): Chronic | Status: ACTIVE | Noted: 2024-06-21

## 2024-07-05 PROBLEM — I48.0 PAROXYSMAL ATRIAL FIBRILLATION: Chronic | Status: ACTIVE | Noted: 2024-06-21

## 2024-07-05 PROBLEM — N32.81 OVERACTIVE BLADDER: Chronic | Status: ACTIVE | Noted: 2024-06-21

## 2024-07-05 PROBLEM — Z87.81 PERSONAL HISTORY OF (HEALED) TRAUMATIC FRACTURE: Chronic | Status: ACTIVE | Noted: 2024-06-21

## 2024-07-08 ENCOUNTER — NON-APPOINTMENT (OUTPATIENT)
Age: 84
End: 2024-07-08

## 2024-07-08 LAB — SURGICAL PATHOLOGY STUDY: SIGNIFICANT CHANGE UP

## 2024-07-18 DIAGNOSIS — E78.5 HYPERLIPIDEMIA, UNSPECIFIED: ICD-10-CM

## 2024-07-18 DIAGNOSIS — K66.0 PERITONEAL ADHESIONS (POSTPROCEDURAL) (POSTINFECTION): ICD-10-CM

## 2024-07-18 DIAGNOSIS — J44.9 CHRONIC OBSTRUCTIVE PULMONARY DISEASE, UNSPECIFIED: ICD-10-CM

## 2024-07-18 DIAGNOSIS — E83.39 OTHER DISORDERS OF PHOSPHORUS METABOLISM: ICD-10-CM

## 2024-07-18 DIAGNOSIS — I48.0 PAROXYSMAL ATRIAL FIBRILLATION: ICD-10-CM

## 2024-07-18 DIAGNOSIS — D72.829 ELEVATED WHITE BLOOD CELL COUNT, UNSPECIFIED: ICD-10-CM

## 2024-07-18 DIAGNOSIS — Z43.3 ENCOUNTER FOR ATTENTION TO COLOSTOMY: ICD-10-CM

## 2024-07-18 DIAGNOSIS — Z79.01 LONG TERM (CURRENT) USE OF ANTICOAGULANTS: ICD-10-CM

## 2024-07-19 ENCOUNTER — APPOINTMENT (OUTPATIENT)
Dept: COLORECTAL SURGERY | Facility: CLINIC | Age: 84
End: 2024-07-19
Payer: MEDICARE

## 2024-07-19 VITALS
RESPIRATION RATE: 15 BRPM | OXYGEN SATURATION: 98 % | HEIGHT: 63 IN | SYSTOLIC BLOOD PRESSURE: 119 MMHG | HEART RATE: 93 BPM | TEMPERATURE: 97 F | DIASTOLIC BLOOD PRESSURE: 67 MMHG | WEIGHT: 145 LBS | BODY MASS INDEX: 25.69 KG/M2

## 2024-07-19 DIAGNOSIS — Z98.890 OTHER SPECIFIED POSTPROCEDURAL STATES: ICD-10-CM

## 2024-07-19 PROCEDURE — 99024 POSTOP FOLLOW-UP VISIT: CPT

## 2024-10-22 ENCOUNTER — APPOINTMENT (OUTPATIENT)
Dept: COLORECTAL SURGERY | Facility: CLINIC | Age: 84
End: 2024-10-22
Payer: MEDICARE

## 2024-10-22 VITALS
TEMPERATURE: 97 F | OXYGEN SATURATION: 98 % | WEIGHT: 142 LBS | HEART RATE: 83 BPM | SYSTOLIC BLOOD PRESSURE: 135 MMHG | RESPIRATION RATE: 15 BRPM | DIASTOLIC BLOOD PRESSURE: 75 MMHG | HEIGHT: 63 IN | BODY MASS INDEX: 25.16 KG/M2

## 2024-10-22 DIAGNOSIS — Z98.890 OTHER SPECIFIED POSTPROCEDURAL STATES: ICD-10-CM

## 2024-10-22 PROCEDURE — 99213 OFFICE O/P EST LOW 20 MIN: CPT

## 2024-11-17 ENCOUNTER — NON-APPOINTMENT (OUTPATIENT)
Age: 84
End: 2024-11-17

## 2024-11-17 ENCOUNTER — EMERGENCY (EMERGENCY)
Facility: HOSPITAL | Age: 84
LOS: 0 days | Discharge: HOME | End: 2024-11-17
Attending: EMERGENCY MEDICINE
Payer: MEDICARE

## 2024-11-17 VITALS
TEMPERATURE: 98 F | HEART RATE: 86 BPM | RESPIRATION RATE: 16 BRPM | SYSTOLIC BLOOD PRESSURE: 123 MMHG | OXYGEN SATURATION: 97 % | DIASTOLIC BLOOD PRESSURE: 52 MMHG

## 2024-11-17 VITALS — WEIGHT: 148.81 LBS

## 2024-11-17 DIAGNOSIS — R29.6 REPEATED FALLS: ICD-10-CM
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
## 2024-11-17 DIAGNOSIS — S83.91XA SPRAIN OF UNSPECIFIED SITE OF RIGHT KNEE, INITIAL ENCOUNTER: ICD-10-CM

## 2024-11-17 DIAGNOSIS — Z90.49 ACQUIRED ABSENCE OF OTHER SPECIFIED PARTS OF DIGESTIVE TRACT: Chronic | ICD-10-CM

## 2024-11-17 DIAGNOSIS — Z79.01 LONG TERM (CURRENT) USE OF ANTICOAGULANTS: ICD-10-CM

## 2024-11-17 DIAGNOSIS — I48.0 PAROXYSMAL ATRIAL FIBRILLATION: ICD-10-CM

## 2024-11-17 DIAGNOSIS — E78.5 HYPERLIPIDEMIA, UNSPECIFIED: ICD-10-CM

## 2024-11-17 DIAGNOSIS — M25.561 PAIN IN RIGHT KNEE: ICD-10-CM
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
## 2024-11-17 DIAGNOSIS — Z93.3 COLOSTOMY STATUS: Chronic | ICD-10-CM

## 2024-11-17 DIAGNOSIS — J44.9 CHRONIC OBSTRUCTIVE PULMONARY DISEASE, UNSPECIFIED: ICD-10-CM

## 2024-11-17 DIAGNOSIS — Z96.1 PRESENCE OF INTRAOCULAR LENS: Chronic | ICD-10-CM

## 2024-11-17 PROCEDURE — 99283 EMERGENCY DEPT VISIT LOW MDM: CPT | Mod: 25

## 2024-11-17 PROCEDURE — 99284 EMERGENCY DEPT VISIT MOD MDM: CPT | Mod: FS

## 2024-11-17 PROCEDURE — 73562 X-RAY EXAM OF KNEE 3: CPT | Mod: 26,RT

## 2024-11-17 PROCEDURE — 73562 X-RAY EXAM OF KNEE 3: CPT | Mod: RT

## 2024-11-17 RX ORDER — ACETAMINOPHEN 500 MG
650 TABLET ORAL ONCE
Refills: 0 | Status: COMPLETED | OUTPATIENT
Start: 2024-11-17 | End: 2024-11-17

## 2024-11-17 RX ADMIN — Medication 650 MILLIGRAM(S): at 14:13

## 2024-11-17 NOTE — ED STATDOCS - NSICDXPASTMEDICALHX_GEN_ALL_CORE_FT
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

## 2024-11-17 NOTE — ED ADULT TRIAGE NOTE - CHIEF COMPLAINT QUOTE
pt states last night she was in the bathroom and must have turned wrong, her right knee has severe pain and she is unable to bear much weight. Pt states shes a fall risk, a frequent flier, and we should have her records. Denies fall

## 2024-11-17 NOTE — ED STATDOCS - OBJECTIVE STATEMENT
84 year old female with PMHx of PAF, COPD, HLD, h/o sepsis, colon resection (12/23), and herniated discs presenting to  ED, stating that last night she was in the bathroom and must have turned wrong, and her right knee now has severe pain and she is unable to bear much weight. Pt states she is a fall risk, and falls frequently. Pt denies fall, head strike, LOC. Pt is on Xarelto and did not take any pain meds PTA. Pt has no known medical allergies. Orthopedist is Dr. Stauffer.

## 2024-11-17 NOTE — ED STATDOCS - CARE PROVIDER_API CALL
Rigoberto Huston  Orthopaedic Sports Medicine  [address], [address]  [place] [address]  Phone: [phone]  Fax: [phone]  Follow Up Time:     Cesar Kendrick  Orthopaedic Surgery  [address] [place] [address]  Phone: [phone]  Fax: [phone]  Follow Up Time:

## 2024-11-17 NOTE — ED STATDOCS - PHYSICAL EXAMINATION
Constitutional: NAD AOx3  Eyes: PERRL EOMI  Head: Normocephalic atraumatic  Mouth: MMM  Cardiac: regular rate and rhythm  Resp: Lungs CTAB  GI: Abd s/nd/nt  Neuro: CN2-12 grossly intact, MOLINA x 4  Skin: No visible rashes   MSK: Right knee, no contusion, no edema, positive pain with ROM

## 2024-11-17 NOTE — ED STATDOCS - PROGRESS NOTE DETAILS
83 y/o F with PMH pf COPD, a-fib on xarelto presents with right knee pain. Pt states she was getting up to go to the restroom when she lost her balance and injured her right knee. States she had difficulty getting up from the toilet after use. States she still is having difficulty bearing weight in her right leg due to knee pain. Walks with walker at baseline. PE; Well appearing. MSK: No obvious deformity to lower extremities. NO reproducible tenderness. Pain with PROM. Sensation intact to light touch in lower extremities. 5/5 LE strength. A/P: low suspicion for fracture, plan for XR, reassess. Expected dc with orthopedic follow up. - Zane Sterling PA-C

## 2024-11-17 NOTE — ED ADULT NURSE NOTE - NS_ED_NURSE_TEACHING_TOPIC_ED_A_ED
Pt/son educated on all d/c instructions with return verbal understanding of all d/c instructions to myself at this time./Orthopedic

## 2024-11-17 NOTE — ED STATDOCS - PATIENT PORTAL LINK FT
You can access the FollowMyHealth Patient Portal offered by [facility] by registering at the following website: [email or web address]. By joining [employer]’s FollowMyHealth portal, you will also be able to view your health information using other applications (apps) compatible with our system.

## 2024-12-16 NOTE — ED ADULT NURSE NOTE - NS ED NOTE ABUSE SUSPICION NEGLECT YN
Some AL[facility] providers will accept Medicaid insurance.  Possible options include:      -[facility], [phone], https://www.[employer]/   -[facility]: [email or web address]-[email or web address]/(180) 721-8471   -[facility], [phone], [email or web address], https://[employer] [facility]/   -[facility]: info@[employer], 669.455.2033, https://[employer]/   -[facility], [phone] [phone], https://functionalskillsgroup.org/, [email or web address], [email or web address]   -[facility], [phone], https://www.[employer]/about   -[facility], [phone], [email or web address], https://www.[employer].com/[employer]   -[facility], https://www.[employer]/locations/#[place]   - Catherine TALAMANTES*      
No

## 2025-01-23 NOTE — CDI QUERY NOTE - NSCDINOTECODERS_GEN_A_CORE
Detail Level: Zone CDI Specialist Sunscreen Recommendations: Sunblock with zinc encouraged. Detail Level: Detailed

## 2025-02-27 NOTE — PROGRESS NOTE ADULT - ATTENDING COMMENTS
Has Your Skin Lesion Been Treated?: not been treated Is This A New Presentation, Or A Follow-Up?: Skin Lesion Patient was extubated by the time of my rounds this afternoon.  Abdomen feels sore but pain different than yesterday.  She is tired but nods/shakes her head to respond to questions appropriately.  On exam abdomen soft, no rebound/guarding, appropriately tender, Prevena to midline, colostomy to L abdomen, pink, flat, no output.  Leyva in place, dark yellow urine.  Bilateral upper extremities with symmetrical edema to forearms.  She is tachycardic.  NGT in place, dark and slightly bloody output.  Labs reviewed - Cr 1.26 from 0.99, no leukocytosis, Hgb 11.4 - suspect dilutional as patient underwent significant fluid resuscitation yesterday and not much blood loss intraoperatively.    -- NPO, NGT for now  -- Awaiting return of bowel function  -- IS, ambulation  -- DVT prophylaxis - Lovenox ordered, would consider heparin   -- Ostomy care and education  -- Multimodal pain control  -- Continue antibiotics.  Intraop cultures pending though suspect this will be polymicrobial given findings of feculent peritonitis.